# Patient Record
Sex: FEMALE | Race: BLACK OR AFRICAN AMERICAN | NOT HISPANIC OR LATINO | Employment: PART TIME | URBAN - METROPOLITAN AREA
[De-identification: names, ages, dates, MRNs, and addresses within clinical notes are randomized per-mention and may not be internally consistent; named-entity substitution may affect disease eponyms.]

---

## 2017-02-15 RX ORDER — ETODOLAC 400 MG/1
400 TABLET, FILM COATED ORAL 2 TIMES DAILY
Qty: 20 TABLET | Refills: 0 | Status: SHIPPED | OUTPATIENT
Start: 2017-02-15 | End: 2017-02-25

## 2017-02-15 RX ORDER — METHOCARBAMOL 500 MG/1
500 TABLET, FILM COATED ORAL 3 TIMES DAILY
Qty: 30 TABLET | Refills: 0 | Status: SHIPPED | OUTPATIENT
Start: 2017-02-15 | End: 2017-02-25

## 2017-03-17 RX ORDER — BENAZEPRIL HYDROCHLORIDE 5 MG/1
5 TABLET ORAL DAILY
Qty: 90 TABLET | Refills: 3 | Status: SHIPPED | OUTPATIENT
Start: 2017-03-17 | End: 2017-06-20

## 2017-04-29 ENCOUNTER — PATIENT MESSAGE (OUTPATIENT)
Dept: INTERNAL MEDICINE | Facility: CLINIC | Age: 72
End: 2017-04-29

## 2017-05-23 ENCOUNTER — LAB VISIT (OUTPATIENT)
Dept: LAB | Facility: HOSPITAL | Age: 72
End: 2017-05-23
Attending: INTERNAL MEDICINE
Payer: MEDICARE

## 2017-05-23 DIAGNOSIS — Z79.899 OTHER LONG TERM (CURRENT) DRUG THERAPY: ICD-10-CM

## 2017-05-23 DIAGNOSIS — R79.89 INCREASED PTH LEVEL: ICD-10-CM

## 2017-05-23 DIAGNOSIS — I10 ESSENTIAL HYPERTENSION: ICD-10-CM

## 2017-05-23 DIAGNOSIS — E04.2 MULTIPLE THYROID NODULES: ICD-10-CM

## 2017-05-23 LAB
25(OH)D3+25(OH)D2 SERPL-MCNC: 20 NG/ML
ANION GAP SERPL CALC-SCNC: 8 MMOL/L
BASOPHILS # BLD AUTO: 0.02 K/UL
BASOPHILS NFR BLD: 0.3 %
BUN SERPL-MCNC: 18 MG/DL
CALCIUM SERPL-MCNC: 9.9 MG/DL
CHLORIDE SERPL-SCNC: 100 MMOL/L
CHOLEST/HDLC SERPL: 3.6 {RATIO}
CO2 SERPL-SCNC: 30 MMOL/L
CREAT SERPL-MCNC: 1.2 MG/DL
DIFFERENTIAL METHOD: ABNORMAL
EOSINOPHIL # BLD AUTO: 0.6 K/UL
EOSINOPHIL NFR BLD: 7.6 %
ERYTHROCYTE [DISTWIDTH] IN BLOOD BY AUTOMATED COUNT: 15.6 %
EST. GFR  (AFRICAN AMERICAN): 52.5 ML/MIN/1.73 M^2
EST. GFR  (NON AFRICAN AMERICAN): 45.6 ML/MIN/1.73 M^2
GLUCOSE SERPL-MCNC: 80 MG/DL
HCT VFR BLD AUTO: 40 %
HDL/CHOLESTEROL RATIO: 27.7 %
HDLC SERPL-MCNC: 191 MG/DL
HDLC SERPL-MCNC: 53 MG/DL
HGB BLD-MCNC: 13.1 G/DL
LDLC SERPL CALC-MCNC: 121.6 MG/DL
LYMPHOCYTES # BLD AUTO: 3 K/UL
LYMPHOCYTES NFR BLD: 39.1 %
MCH RBC QN AUTO: 26.1 PG
MCHC RBC AUTO-ENTMCNC: 32.8 %
MCV RBC AUTO: 80 FL
MONOCYTES # BLD AUTO: 0.7 K/UL
MONOCYTES NFR BLD: 9.2 %
NEUTROPHILS # BLD AUTO: 3.3 K/UL
NEUTROPHILS NFR BLD: 43.7 %
NONHDLC SERPL-MCNC: 138 MG/DL
PLATELET # BLD AUTO: 257 K/UL
PMV BLD AUTO: 10.3 FL
POTASSIUM SERPL-SCNC: 3.7 MMOL/L
PTH-INTACT SERPL-MCNC: 91 PG/ML
RBC # BLD AUTO: 5.02 M/UL
SODIUM SERPL-SCNC: 138 MMOL/L
TRIGL SERPL-MCNC: 82 MG/DL
TSH SERPL DL<=0.005 MIU/L-ACNC: 1.83 UIU/ML
WBC # BLD AUTO: 7.64 K/UL

## 2017-05-23 PROCEDURE — 82306 VITAMIN D 25 HYDROXY: CPT

## 2017-05-23 PROCEDURE — 83970 ASSAY OF PARATHORMONE: CPT

## 2017-05-23 PROCEDURE — 84443 ASSAY THYROID STIM HORMONE: CPT

## 2017-05-23 PROCEDURE — 85025 COMPLETE CBC W/AUTO DIFF WBC: CPT

## 2017-05-23 PROCEDURE — 80061 LIPID PANEL: CPT

## 2017-05-23 PROCEDURE — 80048 BASIC METABOLIC PNL TOTAL CA: CPT

## 2017-05-23 PROCEDURE — 36415 COLL VENOUS BLD VENIPUNCTURE: CPT

## 2017-05-26 ENCOUNTER — PATIENT MESSAGE (OUTPATIENT)
Dept: INTERNAL MEDICINE | Facility: CLINIC | Age: 72
End: 2017-05-26

## 2017-05-30 ENCOUNTER — OFFICE VISIT (OUTPATIENT)
Dept: INTERNAL MEDICINE | Facility: CLINIC | Age: 72
End: 2017-05-30
Payer: MEDICARE

## 2017-05-30 VITALS
BODY MASS INDEX: 31 KG/M2 | HEART RATE: 67 BPM | DIASTOLIC BLOOD PRESSURE: 72 MMHG | HEIGHT: 65 IN | SYSTOLIC BLOOD PRESSURE: 131 MMHG | WEIGHT: 186.06 LBS

## 2017-05-30 DIAGNOSIS — R79.89 LOW VITAMIN D LEVEL: ICD-10-CM

## 2017-05-30 DIAGNOSIS — I10 ESSENTIAL HYPERTENSION: Primary | ICD-10-CM

## 2017-05-30 DIAGNOSIS — N18.30 CKD (CHRONIC KIDNEY DISEASE), STAGE III: ICD-10-CM

## 2017-05-30 DIAGNOSIS — R79.89 HIGH SERUM PARATHYROID HORMONE (PTH): ICD-10-CM

## 2017-05-30 PROCEDURE — 99214 OFFICE O/P EST MOD 30 MIN: CPT | Mod: S$GLB,,, | Performed by: INTERNAL MEDICINE

## 2017-05-30 PROCEDURE — 1159F MED LIST DOCD IN RCRD: CPT | Mod: S$GLB,,, | Performed by: INTERNAL MEDICINE

## 2017-05-30 PROCEDURE — 1126F AMNT PAIN NOTED NONE PRSNT: CPT | Mod: S$GLB,,, | Performed by: INTERNAL MEDICINE

## 2017-05-30 PROCEDURE — 99999 PR PBB SHADOW E&M-EST. PATIENT-LVL III: CPT | Mod: PBBFAC,,, | Performed by: INTERNAL MEDICINE

## 2017-05-30 PROCEDURE — 1157F ADVNC CARE PLAN IN RCRD: CPT | Mod: S$GLB,,, | Performed by: INTERNAL MEDICINE

## 2017-05-30 PROCEDURE — 99499 UNLISTED E&M SERVICE: CPT | Mod: S$GLB,,, | Performed by: INTERNAL MEDICINE

## 2017-05-30 NOTE — PROGRESS NOTES
REASON FOR VISIT:  This is a 72-year-old female who comes in for a regular   follow-up visit.  Overall in general she feels well.  She does complain of being   tired at times, but she gets up at 4:30 in the morning and then goes to the gym   later for about an hour to an hour and a half workout.  She has no trouble   going to sleep.  Sometimes she might wake up once or twice before getting up in   the morning.    PAST MEDICAL HISTORY:  Hypertension.  Irritable bowel syndrome.  Details of this and the rest of past medical history are outlined in 11/16/2016   note.    In reference to last year, initially she had a calcium level of 10.6 and because   of such, had a PTH that was 96, which was a little bit elevated.  Also her   creatinine at first was 1.3, but then with better hydration went down to 1.0 and   the calcium went back down to 10.2.  She also had a thyroid ultrasound to   follow up on bilateral thyroid nodules and there was no change.  In the current   labs, her PTH is down to 91, a year ago 96.  Creatinine was 1.2 and calcium 9.9;   however, she feels that she might not be drinking as much water as she has done   before.  She had a normal CBC, and cholesterol 191 with HDL 53 and .    TSH was normal at 1.8.  Vitamin D was 20.  She was on vitamin D 1000 units, but   per my instructions was told to stop.    REVIEW OF SYMPTOMS:  She has no pains in the chest, palpitations, shortness of   breath, or abdominal pain.  Regular bowel function.  No difficulty urinating.    MEDICATIONS:  Dyazide once a day.  Benazepril 5 mg a day.    PHYSICAL EXAMINATION:  VITAL SIGNS:  Weight 186 pounds, pulse rate 68, blood pressure by me 131/72.  LUNGS:  Clear.  HEART:  Regular rate and rhythm.  No murmurs or gallops.  ABDOMEN:  Active bowel sounds, soft, nontender.  No hepatosplenomegaly or   abdominal masses.  PULSES:  2+ carotid, 2+ pedal pulses.  EXTREMITIES:  Very trace pedal edema on the right.    IMPRESSION:  1.   Hypertension, optimal control.  2.  Chronic kidney disease stage III, based on her current creatinine reading.  3.  Mild hyperparathyroid.  4.  Vitamin D deficiency.    PLAN:  Again, proper hydration discussed.  Advised taking vitamin D 1000 units a day.  We will arrange for a renal ultrasound.          /brandon [id number] review        KATHIA/DONNIE  dd: 05/30/2017 16:00:39 (CDT)  td: 05/31/2017 01:08:10 (CDT)  Doc ID   #[id number]  Job ID #[id number]    CC:

## 2017-06-05 ENCOUNTER — CLINICAL SUPPORT (OUTPATIENT)
Dept: AUDIOLOGY | Facility: CLINIC | Age: 72
End: 2017-06-05
Payer: MEDICARE

## 2017-06-05 ENCOUNTER — OFFICE VISIT (OUTPATIENT)
Dept: OTOLARYNGOLOGY | Facility: CLINIC | Age: 72
End: 2017-06-05
Payer: MEDICARE

## 2017-06-05 VITALS — HEIGHT: 65 IN | BODY MASS INDEX: 30.23 KG/M2 | WEIGHT: 181.44 LBS

## 2017-06-05 DIAGNOSIS — H91.13 PRESBYCUSIS, BILATERAL: Primary | ICD-10-CM

## 2017-06-05 DIAGNOSIS — H90.3 SENSORINEURAL HEARING LOSS, BILATERAL: Primary | ICD-10-CM

## 2017-06-05 PROCEDURE — 99999 PR PBB SHADOW E&M-EST. PATIENT-LVL III: CPT | Mod: PBBFAC,,, | Performed by: OTOLARYNGOLOGY

## 2017-06-05 PROCEDURE — 1159F MED LIST DOCD IN RCRD: CPT | Mod: S$GLB,,, | Performed by: OTOLARYNGOLOGY

## 2017-06-05 PROCEDURE — 1157F ADVNC CARE PLAN IN RCRD: CPT | Mod: S$GLB,,, | Performed by: OTOLARYNGOLOGY

## 2017-06-05 PROCEDURE — 99999 PR PBB SHADOW E&M-EST. PATIENT-LVL I: CPT | Mod: PBBFAC,,,

## 2017-06-05 PROCEDURE — 92557 COMPREHENSIVE HEARING TEST: CPT | Mod: S$GLB,,, | Performed by: AUDIOLOGIST

## 2017-06-05 PROCEDURE — 1126F AMNT PAIN NOTED NONE PRSNT: CPT | Mod: S$GLB,,, | Performed by: OTOLARYNGOLOGY

## 2017-06-05 PROCEDURE — 99203 OFFICE O/P NEW LOW 30 MIN: CPT | Mod: S$GLB,,, | Performed by: OTOLARYNGOLOGY

## 2017-06-05 NOTE — PROGRESS NOTES
Subjective:       Patient ID: Carmen Wang is a 72 y.o. female.    Chief Complaint: Hearing Loss    71 yo F with years of hearing loss. Stopped using ITC HA 1 year ago because they were clogging her ears up with wax. She has noticed her hearing becoming worse since then.      Hearing Loss:   Chronicity:  Chronic  Onset:  More than 1 year ago  Progression since onset:  Gradually worsening  Frequency:  Constantly  Severity:  Moderate  Hearing loss characteristics:  Muffled, trouble hearing TV, worse background noise and impaired select discrimination   Associated symptoms: tinnitus.  No dizziness, no vertigo, no ear congestion, no ear pain, no fever and no headaches.  Aggravated by:  Nothing  Treatments tried:  Hearing aids  Improvement on treatment:  MildNo dizziness.    Review of Systems   Constitutional: Negative.  Negative for fever.   HENT: Positive for hearing loss and tinnitus. Negative for ear discharge and ear pain.    Eyes: Negative for visual disturbance.   Respiratory: Negative for shortness of breath.    Cardiovascular: Negative for chest pain.   Gastrointestinal: Negative.    Endocrine: Negative.    Genitourinary: Negative.    Musculoskeletal: Negative.    Skin: Negative.    Allergic/Immunologic: Negative.    Neurological: Negative for dizziness, vertigo and headaches.   Hematological: Negative.    Psychiatric/Behavioral: Negative.        Past Medical History:   Diagnosis Date    Cataract     Hypertension     IBS (irritable bowel syndrome)        Past Surgical History:   Procedure Laterality Date    APPENDECTOMY      with the hysterectomy    BUNIONECTOMY      CHOLECYSTECTOMY      COLONOSCOPY N/A 10/5/2015    Procedure: COLONOSCOPY;  Surgeon: Teja Olivarez MD;  Location: [address];  Service: Endoscopy;  Laterality: N/A;    HYSTERECTOMY      RUPERT secondary to pelvic pain, sacrocolpoplexy       Objective:      Physical Exam   Constitutional: She is oriented to person, place, and  time. Vital signs are normal. She appears well-developed and well-nourished.  Non-toxic appearance. She does not have a sickly appearance. No distress.   HENT:   Head: Normocephalic and atraumatic.   Right Ear: Tympanic membrane, external ear and ear canal normal. No tenderness. Tympanic membrane is not injected. Decreased hearing is noted.   Left Ear: Tympanic membrane, external ear and ear canal normal. No tenderness. Tympanic membrane is not injected. Decreased hearing is noted.   Nose: No mucosal edema, rhinorrhea or septal deviation.   Eyes: EOM and lids are normal. Pupils are equal, round, and reactive to light.   Neck: Normal range of motion. No thyroid mass present.   Cardiovascular: Normal rate, regular rhythm and normal heart sounds.    Pulmonary/Chest: Effort normal and breath sounds normal.   Lymphadenopathy:     She has no cervical adenopathy.   Neurological: She is alert and oriented to person, place, and time.   Skin: Skin is warm and dry.   Nursing note and vitals reviewed.            Assessment:       1. Presbycusis, bilateral        Plan:       Carmen was seen today for hearing loss.    Diagnoses and all orders for this visit:    Presbycusis, bilateral    Recommend having a hearing aid evaluation

## 2017-06-12 ENCOUNTER — HOSPITAL ENCOUNTER (OUTPATIENT)
Dept: RADIOLOGY | Facility: HOSPITAL | Age: 72
Discharge: HOME | End: 2017-06-12
Attending: INTERNAL MEDICINE
Payer: MEDICARE

## 2017-06-12 DIAGNOSIS — I10 ESSENTIAL HYPERTENSION: ICD-10-CM

## 2017-06-12 DIAGNOSIS — N18.30 CKD (CHRONIC KIDNEY DISEASE), STAGE III: ICD-10-CM

## 2017-06-12 PROCEDURE — 76770 US EXAM ABDO BACK WALL COMP: CPT | Mod: 26,,, | Performed by: RADIOLOGY

## 2017-06-12 PROCEDURE — 76770 US EXAM ABDO BACK WALL COMP: CPT | Mod: TC

## 2017-06-14 ENCOUNTER — TELEPHONE (OUTPATIENT)
Dept: INTERNAL MEDICINE | Facility: CLINIC | Age: 72
End: 2017-06-14

## 2017-06-14 ENCOUNTER — LAB VISIT (OUTPATIENT)
Dept: LAB | Facility: HOSPITAL | Age: 72
End: 2017-06-14
Attending: INTERNAL MEDICINE
Payer: MEDICARE

## 2017-06-14 DIAGNOSIS — N30.90 INFLAMMATION OF BLADDER: ICD-10-CM

## 2017-06-14 DIAGNOSIS — N30.90 INFLAMMATION OF BLADDER: Primary | ICD-10-CM

## 2017-06-14 LAB
BILIRUB UR QL STRIP: NEGATIVE
CLARITY UR REFRACT.AUTO: ABNORMAL
COLOR UR AUTO: YELLOW
GLUCOSE UR QL STRIP: NEGATIVE
HGB UR QL STRIP: NEGATIVE
KETONES UR QL STRIP: NEGATIVE
LEUKOCYTE ESTERASE UR QL STRIP: NEGATIVE
NITRITE UR QL STRIP: NEGATIVE
PH UR STRIP: 5 [PH] (ref 5–8)
PROT UR QL STRIP: NEGATIVE
SP GR UR STRIP: 1.02 (ref 1–1.03)
URN SPEC COLLECT METH UR: ABNORMAL
UROBILINOGEN UR STRIP-ACNC: NEGATIVE EU/DL

## 2017-06-14 PROCEDURE — 87086 URINE CULTURE/COLONY COUNT: CPT

## 2017-06-14 PROCEDURE — 81003 URINALYSIS AUTO W/O SCOPE: CPT

## 2017-06-16 LAB — BACTERIA UR CULT: NO GROWTH

## 2017-06-19 ENCOUNTER — NURSE TRIAGE (OUTPATIENT)
Dept: ADMINISTRATIVE | Facility: CLINIC | Age: 72
End: 2017-06-19

## 2017-06-19 ENCOUNTER — PATIENT MESSAGE (OUTPATIENT)
Dept: INTERNAL MEDICINE | Facility: CLINIC | Age: 72
End: 2017-06-19

## 2017-06-19 ENCOUNTER — TELEPHONE (OUTPATIENT)
Dept: INTERNAL MEDICINE | Facility: CLINIC | Age: 72
End: 2017-06-19

## 2017-06-19 NOTE — TELEPHONE ENCOUNTER
Called pt to see if she would like to come in today at 4 no answer left message on VM to call office back

## 2017-06-19 NOTE — TELEPHONE ENCOUNTER
Reason for Disposition   Age > 60 years    Protocols used: ST HEART RATE AND HEARTBEAT [id number]    Pt calling regarding heart rate fluctuating.  Pt states she does not feel bad.  Attempt to schedule appointment with MD today but pt stated she could not be seen today and would like an appointment for tomorrow.  Advised pt to call back if her condition changes.  Appointment scheduled for tomorrow.  Care Advice given.

## 2017-06-20 ENCOUNTER — PATIENT MESSAGE (OUTPATIENT)
Dept: INTERNAL MEDICINE | Facility: CLINIC | Age: 72
End: 2017-06-20

## 2017-06-20 ENCOUNTER — DOCUMENTATION ONLY (OUTPATIENT)
Dept: INTERNAL MEDICINE | Facility: CLINIC | Age: 72
End: 2017-06-20

## 2017-06-20 ENCOUNTER — LAB VISIT (OUTPATIENT)
Dept: LAB | Facility: HOSPITAL | Age: 72
End: 2017-06-20
Attending: INTERNAL MEDICINE
Payer: MEDICARE

## 2017-06-20 ENCOUNTER — OFFICE VISIT (OUTPATIENT)
Dept: INTERNAL MEDICINE | Facility: CLINIC | Age: 72
End: 2017-06-20
Payer: MEDICARE

## 2017-06-20 VITALS
BODY MASS INDEX: 29.27 KG/M2 | DIASTOLIC BLOOD PRESSURE: 82 MMHG | HEIGHT: 65 IN | HEART RATE: 78 BPM | SYSTOLIC BLOOD PRESSURE: 135 MMHG | WEIGHT: 175.69 LBS

## 2017-06-20 DIAGNOSIS — I10 ESSENTIAL HYPERTENSION: ICD-10-CM

## 2017-06-20 DIAGNOSIS — N18.30 CKD (CHRONIC KIDNEY DISEASE), STAGE III: ICD-10-CM

## 2017-06-20 DIAGNOSIS — D64.9 ANEMIA, UNSPECIFIED TYPE: ICD-10-CM

## 2017-06-20 DIAGNOSIS — R00.0 TACHYCARDIA: ICD-10-CM

## 2017-06-20 DIAGNOSIS — R79.89 LOW TSH LEVEL: Primary | ICD-10-CM

## 2017-06-20 DIAGNOSIS — I10 ESSENTIAL HYPERTENSION: Primary | ICD-10-CM

## 2017-06-20 LAB
ALBUMIN SERPL BCP-MCNC: 3.4 G/DL
ALP SERPL-CCNC: 67 U/L
ALT SERPL W/O P-5'-P-CCNC: 16 U/L
ANION GAP SERPL CALC-SCNC: 7 MMOL/L
AST SERPL-CCNC: 20 U/L
BASOPHILS # BLD AUTO: 0.02 K/UL
BASOPHILS NFR BLD: 0.2 %
BILIRUB SERPL-MCNC: 0.5 MG/DL
BUN SERPL-MCNC: 22 MG/DL
CALCIUM SERPL-MCNC: 10.3 MG/DL
CHLORIDE SERPL-SCNC: 102 MMOL/L
CO2 SERPL-SCNC: 31 MMOL/L
CREAT SERPL-MCNC: 1 MG/DL
DIFFERENTIAL METHOD: ABNORMAL
EOSINOPHIL # BLD AUTO: 0.3 K/UL
EOSINOPHIL NFR BLD: 2.7 %
ERYTHROCYTE [DISTWIDTH] IN BLOOD BY AUTOMATED COUNT: 15.8 %
EST. GFR  (AFRICAN AMERICAN): >60 ML/MIN/1.73 M^2
EST. GFR  (NON AFRICAN AMERICAN): 56.8 ML/MIN/1.73 M^2
GLUCOSE SERPL-MCNC: 96 MG/DL
HCT VFR BLD AUTO: 36.6 %
HGB BLD-MCNC: 11.7 G/DL
LYMPHOCYTES # BLD AUTO: 2.5 K/UL
LYMPHOCYTES NFR BLD: 25.6 %
MCH RBC QN AUTO: 25.8 PG
MCHC RBC AUTO-ENTMCNC: 32 %
MCV RBC AUTO: 81 FL
MONOCYTES # BLD AUTO: 1.2 K/UL
MONOCYTES NFR BLD: 12.6 %
NEUTROPHILS # BLD AUTO: 5.8 K/UL
NEUTROPHILS NFR BLD: 58.6 %
PLATELET # BLD AUTO: 271 K/UL
PMV BLD AUTO: 10.9 FL
POTASSIUM SERPL-SCNC: 3.6 MMOL/L
PROT SERPL-MCNC: 7.4 G/DL
RBC # BLD AUTO: 4.54 M/UL
SODIUM SERPL-SCNC: 140 MMOL/L
T4 FREE SERPL-MCNC: 1.11 NG/DL
TSH SERPL DL<=0.005 MIU/L-ACNC: 0.04 UIU/ML
WBC # BLD AUTO: 9.83 K/UL

## 2017-06-20 PROCEDURE — 1157F ADVNC CARE PLAN IN RCRD: CPT | Mod: S$GLB,,, | Performed by: INTERNAL MEDICINE

## 2017-06-20 PROCEDURE — 99999 PR PBB SHADOW E&M-EST. PATIENT-LVL III: CPT | Mod: PBBFAC,,, | Performed by: INTERNAL MEDICINE

## 2017-06-20 PROCEDURE — 93005 ELECTROCARDIOGRAM TRACING: CPT | Mod: S$GLB,,, | Performed by: INTERNAL MEDICINE

## 2017-06-20 PROCEDURE — 80053 COMPREHEN METABOLIC PANEL: CPT

## 2017-06-20 PROCEDURE — 99499 UNLISTED E&M SERVICE: CPT | Mod: S$PBB,,, | Performed by: INTERNAL MEDICINE

## 2017-06-20 PROCEDURE — 84443 ASSAY THYROID STIM HORMONE: CPT

## 2017-06-20 PROCEDURE — 84439 ASSAY OF FREE THYROXINE: CPT

## 2017-06-20 PROCEDURE — 1159F MED LIST DOCD IN RCRD: CPT | Mod: S$GLB,,, | Performed by: INTERNAL MEDICINE

## 2017-06-20 PROCEDURE — 85025 COMPLETE CBC W/AUTO DIFF WBC: CPT

## 2017-06-20 PROCEDURE — 1126F AMNT PAIN NOTED NONE PRSNT: CPT | Mod: S$GLB,,, | Performed by: INTERNAL MEDICINE

## 2017-06-20 PROCEDURE — 99214 OFFICE O/P EST MOD 30 MIN: CPT | Mod: S$GLB,,, | Performed by: INTERNAL MEDICINE

## 2017-06-20 PROCEDURE — 93010 ELECTROCARDIOGRAM REPORT: CPT | Mod: S$GLB,,, | Performed by: INTERNAL MEDICINE

## 2017-06-20 PROCEDURE — 36415 COLL VENOUS BLD VENIPUNCTURE: CPT | Mod: PO

## 2017-06-20 RX ORDER — ATENOLOL 50 MG/1
50 TABLET ORAL DAILY
Qty: 30 TABLET | Refills: 11 | Status: SHIPPED | OUTPATIENT
Start: 2017-06-20 | End: 2017-08-30 | Stop reason: DRUGHIGH

## 2017-06-20 NOTE — PROGRESS NOTES
REASON FOR VISIT:  This is a 72-year-old female who two days ago, after taking a   shower, noticed her heart thumping.  She took her blood pressure and it was 160   and the heart rate was going above 100, maybe 110.  She rested for a while and   then it resolved, but she did feel woozy throughout the day.  Yesterday she was   at the gym and when she got on the treadmill, just walking 3 miles an hour, it   was going up to 130.  She slowed it down to a crawl pace, maybe no more than 1   mile an hour, but it remained at 120.  When she got off, it eventually went   down, but she has felt weak and dizzy throughout the day.  She also recently has   noted some weight loss.  When she weighed herself four days ago, it was 177 and   normally she is in the 180s.    PAST MEDICAL HISTORY:  Hypertension.  Irritable bowel syndrome.  Chronic kidney disease, stage III.  Recently secondary hyperparathyroid.    CURRENT MEDICINES:  Dyazide once a day.  Benazepril 5 mg once a day.    REVIEW OF SYMPTOMS:  She has no abdominal pain.  No change in bowel or urine   function.    Before the appointment today, she had an EKG which revealed a normal sinus   rhythm with some nonspecific T-wave abnormalities in the anterior lateral leads.    PHYSICAL EXAMINATION:  VITAL SIGNS:  Weight 175 pounds, pulse rate 84, blood pressure by me 152/82.  On   May 30, she had a weight of 186 pounds; there is 11-pound weight change.  LUNGS:  Clear.  HEART:  Regular rate and rhythm.  ABDOMEN:  Active bowel sounds, soft, nontender.  PULSES:  2+ carotid pulses.  RECTAL:  Stool is brown, heme negative.  Excess anal tissue (this was done   because last night she was complaining of pain in her rectal region).    IMPRESSION:  1.  Tachycardia.  2.  Hypertension.  3.  Weight loss.  4.  Chronic kidney disease.    PLAN:  Today we will repeat labs, although she had them recently.  We will   arrange for a CBC, chemistry, and a TSH.  We will stop benazepril and put her on    atenolol at 50 mg once a day.  Phone review to follow up.    /sc [id number] review      KATHIA/DONNIE  dd: 06/20/2017 11:19:30 (CDT)  td: 06/21/2017 00:36:37 (CDT)  Doc ID   #[id number]  Job ID #[id number]    CC:

## 2017-06-21 NOTE — PROGRESS NOTES
Test results noted     TSH is low consistent with hyperthyroid state    May have a thyroiditis    Continue with atenolol   Will repeat lab in 2 weeks

## 2017-06-22 ENCOUNTER — PATIENT MESSAGE (OUTPATIENT)
Dept: INTERNAL MEDICINE | Facility: CLINIC | Age: 72
End: 2017-06-22

## 2017-06-23 ENCOUNTER — PATIENT MESSAGE (OUTPATIENT)
Dept: INTERNAL MEDICINE | Facility: CLINIC | Age: 72
End: 2017-06-23

## 2017-06-27 ENCOUNTER — PATIENT MESSAGE (OUTPATIENT)
Dept: INTERNAL MEDICINE | Facility: CLINIC | Age: 72
End: 2017-06-27

## 2017-07-07 ENCOUNTER — LAB VISIT (OUTPATIENT)
Dept: LAB | Facility: HOSPITAL | Age: 72
End: 2017-07-07
Attending: INTERNAL MEDICINE
Payer: MEDICARE

## 2017-07-07 DIAGNOSIS — D64.9 ANEMIA, UNSPECIFIED TYPE: ICD-10-CM

## 2017-07-07 DIAGNOSIS — R79.89 LOW TSH LEVEL: ICD-10-CM

## 2017-07-07 LAB
BASOPHILS # BLD AUTO: 0.02 K/UL
BASOPHILS NFR BLD: 0.3 %
DIFFERENTIAL METHOD: ABNORMAL
EOSINOPHIL # BLD AUTO: 0.6 K/UL
EOSINOPHIL NFR BLD: 7.5 %
ERYTHROCYTE [DISTWIDTH] IN BLOOD BY AUTOMATED COUNT: 15.9 %
HCT VFR BLD AUTO: 36.4 %
HGB BLD-MCNC: 11.5 G/DL
LYMPHOCYTES # BLD AUTO: 2.9 K/UL
LYMPHOCYTES NFR BLD: 36.7 %
MCH RBC QN AUTO: 25.6 PG
MCHC RBC AUTO-ENTMCNC: 31.6 %
MCV RBC AUTO: 81 FL
MONOCYTES # BLD AUTO: 0.9 K/UL
MONOCYTES NFR BLD: 10.8 %
NEUTROPHILS # BLD AUTO: 3.6 K/UL
NEUTROPHILS NFR BLD: 44.6 %
PLATELET # BLD AUTO: 262 K/UL
PMV BLD AUTO: 11.6 FL
RBC # BLD AUTO: 4.5 M/UL
T4 FREE SERPL-MCNC: 0.99 NG/DL
TSH SERPL DL<=0.005 MIU/L-ACNC: 1.89 UIU/ML
WBC # BLD AUTO: 7.99 K/UL

## 2017-07-07 PROCEDURE — 85025 COMPLETE CBC W/AUTO DIFF WBC: CPT

## 2017-07-07 PROCEDURE — 83540 ASSAY OF IRON: CPT

## 2017-07-07 PROCEDURE — 82728 ASSAY OF FERRITIN: CPT

## 2017-07-07 PROCEDURE — 84439 ASSAY OF FREE THYROXINE: CPT

## 2017-07-07 PROCEDURE — 36415 COLL VENOUS BLD VENIPUNCTURE: CPT | Mod: PO

## 2017-07-07 PROCEDURE — 84443 ASSAY THYROID STIM HORMONE: CPT

## 2017-07-08 ENCOUNTER — PATIENT MESSAGE (OUTPATIENT)
Dept: INTERNAL MEDICINE | Facility: CLINIC | Age: 72
End: 2017-07-08

## 2017-07-08 LAB
FERRITIN SERPL-MCNC: 25 NG/ML
IRON SERPL-MCNC: 40 UG/DL
SATURATED IRON: 8 %
TOTAL IRON BINDING CAPACITY: 481 UG/DL
TRANSFERRIN SERPL-MCNC: 325 MG/DL

## 2017-07-09 ENCOUNTER — PATIENT MESSAGE (OUTPATIENT)
Dept: INTERNAL MEDICINE | Facility: CLINIC | Age: 72
End: 2017-07-09

## 2017-07-09 DIAGNOSIS — D64.9 ANEMIA, UNSPECIFIED TYPE: Primary | ICD-10-CM

## 2017-07-10 ENCOUNTER — TELEPHONE (OUTPATIENT)
Dept: INTERNAL MEDICINE | Facility: CLINIC | Age: 72
End: 2017-07-10

## 2017-07-10 NOTE — TELEPHONE ENCOUNTER
Set up labs in  1 months as per order (Routing comment    Appt schedule for labs and mail out to home address on file.

## 2017-07-11 NOTE — TELEPHONE ENCOUNTER
Dr. Redman,   Do I continue to take the D3 Vitamins along with the Feron tablets?   Please let me know.   Thanks             Please advise

## 2017-07-12 ENCOUNTER — PATIENT MESSAGE (OUTPATIENT)
Dept: INTERNAL MEDICINE | Facility: CLINIC | Age: 72
End: 2017-07-12

## 2017-08-01 ENCOUNTER — TELEPHONE (OUTPATIENT)
Dept: OBSTETRICS AND GYNECOLOGY | Facility: CLINIC | Age: 72
End: 2017-08-01

## 2017-08-01 ENCOUNTER — PATIENT MESSAGE (OUTPATIENT)
Dept: OBSTETRICS AND GYNECOLOGY | Facility: CLINIC | Age: 72
End: 2017-08-01

## 2017-08-01 DIAGNOSIS — Z12.31 OTHER SCREENING MAMMOGRAM: Primary | ICD-10-CM

## 2017-08-14 ENCOUNTER — LAB VISIT (OUTPATIENT)
Dept: LAB | Facility: HOSPITAL | Age: 72
End: 2017-08-14
Attending: INTERNAL MEDICINE
Payer: MEDICARE

## 2017-08-14 DIAGNOSIS — D64.9 ANEMIA, UNSPECIFIED TYPE: ICD-10-CM

## 2017-08-14 LAB
ERYTHROCYTE [DISTWIDTH] IN BLOOD BY AUTOMATED COUNT: 16.4 %
FERRITIN SERPL-MCNC: 49 NG/ML
HCT VFR BLD AUTO: 39.7 %
HGB BLD-MCNC: 12.6 G/DL
IRON SERPL-MCNC: 80 UG/DL
MCH RBC QN AUTO: 25.5 PG
MCHC RBC AUTO-ENTMCNC: 31.7 G/DL
MCV RBC AUTO: 80 FL
PLATELET # BLD AUTO: 270 K/UL
PMV BLD AUTO: 11.3 FL
RBC # BLD AUTO: 4.94 M/UL
SATURATED IRON: 19 %
TOTAL IRON BINDING CAPACITY: 431 UG/DL
TRANSFERRIN SERPL-MCNC: 291 MG/DL
WBC # BLD AUTO: 7.66 K/UL

## 2017-08-14 PROCEDURE — 83540 ASSAY OF IRON: CPT

## 2017-08-14 PROCEDURE — 36415 COLL VENOUS BLD VENIPUNCTURE: CPT | Mod: PO

## 2017-08-14 PROCEDURE — 82728 ASSAY OF FERRITIN: CPT

## 2017-08-14 PROCEDURE — 85027 COMPLETE CBC AUTOMATED: CPT

## 2017-08-21 ENCOUNTER — DOCUMENTATION ONLY (OUTPATIENT)
Dept: INTERNAL MEDICINE | Facility: CLINIC | Age: 72
End: 2017-08-21

## 2017-08-21 DIAGNOSIS — D64.9 ANEMIA, UNSPECIFIED TYPE: ICD-10-CM

## 2017-08-21 DIAGNOSIS — R79.89 ABNORMAL THYROID BLOOD TEST: Primary | ICD-10-CM

## 2017-08-21 DIAGNOSIS — N18.30 STAGE 3 CHRONIC KIDNEY DISEASE: ICD-10-CM

## 2017-08-21 DIAGNOSIS — K58.9 IRRITABLE BOWEL SYNDROME WITHOUT DIARRHEA: ICD-10-CM

## 2017-08-22 ENCOUNTER — PATIENT MESSAGE (OUTPATIENT)
Dept: INTERNAL MEDICINE | Facility: CLINIC | Age: 72
End: 2017-08-22

## 2017-08-22 ENCOUNTER — LAB VISIT (OUTPATIENT)
Dept: LAB | Facility: HOSPITAL | Age: 72
End: 2017-08-22
Attending: INTERNAL MEDICINE
Payer: MEDICARE

## 2017-08-22 ENCOUNTER — DOCUMENTATION ONLY (OUTPATIENT)
Dept: INTERNAL MEDICINE | Facility: CLINIC | Age: 72
End: 2017-08-22

## 2017-08-22 DIAGNOSIS — N18.30 STAGE 3 CHRONIC KIDNEY DISEASE: Primary | ICD-10-CM

## 2017-08-22 DIAGNOSIS — N18.30 STAGE 3 CHRONIC KIDNEY DISEASE: ICD-10-CM

## 2017-08-22 DIAGNOSIS — R79.89 ABNORMAL THYROID BLOOD TEST: ICD-10-CM

## 2017-08-22 LAB
ANION GAP SERPL CALC-SCNC: 7 MMOL/L
BUN SERPL-MCNC: 22 MG/DL
CALCIUM SERPL-MCNC: 10.4 MG/DL
CHLORIDE SERPL-SCNC: 104 MMOL/L
CO2 SERPL-SCNC: 30 MMOL/L
CREAT SERPL-MCNC: 1.3 MG/DL
EST. GFR  (AFRICAN AMERICAN): 47.7 ML/MIN/1.73 M^2
EST. GFR  (NON AFRICAN AMERICAN): 41.4 ML/MIN/1.73 M^2
GLUCOSE SERPL-MCNC: 86 MG/DL
POTASSIUM SERPL-SCNC: 4.1 MMOL/L
SODIUM SERPL-SCNC: 141 MMOL/L
T4 FREE SERPL-MCNC: 1.07 NG/DL
TSH SERPL DL<=0.005 MIU/L-ACNC: 2.41 UIU/ML

## 2017-08-22 PROCEDURE — 84439 ASSAY OF FREE THYROXINE: CPT

## 2017-08-22 PROCEDURE — 80048 BASIC METABOLIC PNL TOTAL CA: CPT

## 2017-08-22 PROCEDURE — 84443 ASSAY THYROID STIM HORMONE: CPT

## 2017-08-22 PROCEDURE — 36415 COLL VENOUS BLD VENIPUNCTURE: CPT

## 2017-08-22 NOTE — PROGRESS NOTES
Mrs. nowak recently had labs this past week      the hemoglobin, hematocrit, ferritin, iron levels are improved continue with Fergon once a day       she had a stool for occult blood in the lab that was negative       she is still however having recurrent episodes of abdominal cramps and loose stools which thus far felt to be due to urine will bowel syndrome but will put in referral for GI for further evaluation      Her recent thyroid panel was normal however the creatinine again is up to 1.3 consistent with chronic kidney disease stage III       within the past she has she's been fluctuating between stage II and 3       she is now on Dyazide and atenolol and I suggested to put a hold on Dyazide, keep me informed of her blood pressure and and let me know if the edema develops     will also refer to nephrology for further assessment and evaluation

## 2017-08-25 ENCOUNTER — TELEPHONE (OUTPATIENT)
Dept: INTERNAL MEDICINE | Facility: CLINIC | Age: 72
End: 2017-08-25

## 2017-08-28 ENCOUNTER — PATIENT MESSAGE (OUTPATIENT)
Dept: INTERNAL MEDICINE | Facility: CLINIC | Age: 72
End: 2017-08-28

## 2017-08-28 ENCOUNTER — DOCUMENTATION ONLY (OUTPATIENT)
Dept: INTERNAL MEDICINE | Facility: CLINIC | Age: 72
End: 2017-08-28

## 2017-08-28 NOTE — PROGRESS NOTES
Dr. Redman,   I discontinued taking the Triameterene fluid tablet as you directed me to do on last Tuesday. My weight has increased by 8lbs in the last 6 days. My feet have begun to swell. I decided to take the medicine this Monday morning.   Please call me and let me know where to go from here.   [phone]     Thank you.

## 2017-08-30 ENCOUNTER — PATIENT MESSAGE (OUTPATIENT)
Dept: NEPHROLOGY | Facility: CLINIC | Age: 72
End: 2017-08-30

## 2017-08-30 ENCOUNTER — LAB VISIT (OUTPATIENT)
Dept: LAB | Facility: HOSPITAL | Age: 72
End: 2017-08-30
Payer: MEDICARE

## 2017-08-30 ENCOUNTER — OFFICE VISIT (OUTPATIENT)
Dept: NEPHROLOGY | Facility: CLINIC | Age: 72
End: 2017-08-30
Payer: MEDICARE

## 2017-08-30 VITALS
OXYGEN SATURATION: 98 % | HEIGHT: 65 IN | SYSTOLIC BLOOD PRESSURE: 124 MMHG | BODY MASS INDEX: 30.16 KG/M2 | DIASTOLIC BLOOD PRESSURE: 82 MMHG | WEIGHT: 181 LBS | HEART RATE: 54 BPM

## 2017-08-30 DIAGNOSIS — I10 ESSENTIAL HYPERTENSION: ICD-10-CM

## 2017-08-30 DIAGNOSIS — N18.30 CKD (CHRONIC KIDNEY DISEASE), STAGE III: Primary | ICD-10-CM

## 2017-08-30 DIAGNOSIS — N18.30 CKD (CHRONIC KIDNEY DISEASE), STAGE III: ICD-10-CM

## 2017-08-30 LAB
CREAT UR-MCNC: 38 MG/DL
PROT UR-MCNC: <7 MG/DL
PROT/CREAT RATIO, UR: NORMAL

## 2017-08-30 PROCEDURE — 99205 OFFICE O/P NEW HI 60 MIN: CPT | Mod: S$GLB,,, | Performed by: INTERNAL MEDICINE

## 2017-08-30 PROCEDURE — 84156 ASSAY OF PROTEIN URINE: CPT

## 2017-08-30 PROCEDURE — 1126F AMNT PAIN NOTED NONE PRSNT: CPT | Mod: S$GLB,,, | Performed by: INTERNAL MEDICINE

## 2017-08-30 PROCEDURE — 3079F DIAST BP 80-89 MM HG: CPT | Mod: S$GLB,,, | Performed by: INTERNAL MEDICINE

## 2017-08-30 PROCEDURE — 3074F SYST BP LT 130 MM HG: CPT | Mod: S$GLB,,, | Performed by: INTERNAL MEDICINE

## 2017-08-30 PROCEDURE — 1157F ADVNC CARE PLAN IN RCRD: CPT | Mod: S$GLB,,, | Performed by: INTERNAL MEDICINE

## 2017-08-30 PROCEDURE — 3008F BODY MASS INDEX DOCD: CPT | Mod: S$GLB,,, | Performed by: INTERNAL MEDICINE

## 2017-08-30 PROCEDURE — 1159F MED LIST DOCD IN RCRD: CPT | Mod: S$GLB,,, | Performed by: INTERNAL MEDICINE

## 2017-08-30 PROCEDURE — 99499 UNLISTED E&M SERVICE: CPT | Mod: S$GLB,,, | Performed by: INTERNAL MEDICINE

## 2017-08-30 PROCEDURE — 99999 PR PBB SHADOW E&M-EST. PATIENT-LVL III: CPT | Mod: PBBFAC,,, | Performed by: INTERNAL MEDICINE

## 2017-08-30 RX ORDER — TRIAMTERENE/HYDROCHLOROTHIAZID 37.5-25 MG
1 TABLET ORAL DAILY
COMMUNITY
End: 2018-09-04 | Stop reason: SDUPTHER

## 2017-08-30 RX ORDER — BENAZEPRIL HYDROCHLORIDE 20 MG/1
20 TABLET ORAL DAILY
Qty: 30 TABLET | Refills: 3 | Status: SHIPPED | OUTPATIENT
Start: 2017-08-30 | End: 2017-10-02 | Stop reason: SDUPTHER

## 2017-08-30 NOTE — LETTER
August 30, 2017      Deniz Redman MD  [address]  [place] [address]           [facility] Nephrology  [address]  [facility] [address]  Phone: [phone]  Fax: [phone]          Patient: Carmen Wang   MR Number: [id number]   YOB: 1945   Date of Visit: 8/30/2017       Dear Dr. Deniz Redman:    Thank you for referring Carmen Wang to me for evaluation. Attached you will find relevant portions of my assessment and plan of care.    If you have questions, please do not hesitate to call me. I look forward to following Carmen Wang along with you.    Sincerely,    Jessy Hoang, DO [place]  CC:  No Recipients    If you would like to receive this communication electronically, please contact [email or web address] or [phone] to request more information on [employer] access.    For providers and/or their staff who would like to refer a patient to [facility], please contact us through our one-stop-shop provider referral line, [facility], at [phone].    If you feel you have received this communication in error or would no longer like to receive these types of communications, please e-mail [email or web address]

## 2017-08-30 NOTE — PROGRESS NOTES
CONSULTING PHYSICIAN:  Deniz Redman M.D.    REASON FOR CONSULTATION:  Evaluation of CKD stage III.    HISTORY OF PRESENT ILLNESS:  This is a 72-year-old -American female with   longstanding history of hypertension, irritable bowel syndrome and CKD stage   III, who is coming in for evaluation.  The patient states her blood pressures   are fairly well controlled in the 130s/70s.  She states that she does not   hydrate well and drinks about 32 ounces of water a day.  She does take   occasional Advil about two to three times a month.  Her benazepril was switched   to atenolol in June and recently triamterene and hydrochlorothiazide was held   because her creatinine was 1.3 and she started retaining fluid and had went back   on it about two days ago and the edema is improving.    PAST MEDICAL HISTORY:  Significant for hypertension for five years, CKD stage   III, IBS.    REVIEW OF SYSTEMS:  She denies any frequency, urgency, hematuria, dysuria,   nausea, vomiting, chest pain or shortness of breath.    PHYSICAL EXAMINATION:  GENERAL:  Well-nourished, well-developed individual, in no acute distress.  HEENT:  PERRLA, EOMI.  NECK:  No cervical lymphadenopathy.  CARDIOVASCULAR:  Rate and rhythm regular murmurs, gallops.  ABDOMEN:  Soft, nontender, nondistended.  EXTREMITIES:  Trace edema in the ankles, more so on the left side, which is   chronic for her.  SKIN:  No erythema, warm to touch.  PSYCHIATRIC:  Alert and oriented x3.  The patient has good judgment and insight.    ASSESSMENT AND PLAN:  This is a 72-year-old -American female with   longstanding history of hypertension and chronic kidney disease stage III, who   is coming in for evaluation.  1.  Chronic kidney disease stage III with an MDRD GFR of 48 mL per minute.  Her   baseline creatinine appears to be around 1.1 to 1.2 and occasionally lower and   higher with the most recent creatinine of 1.3.  Her CKD is likely secondary to   longstanding  hypertension and age-related nephron loss.  I do not think she is   having prerenal picture at times with dehydration and NSAIDs on board.  I   advised her to stay away from NSAIDs and to hydrate well.  She understands the   importance of hydration.  I would like to switch for her atenolol to benazepril   20 mg for nephro-protection and we will repeat labs next week after she has been   on benazepril and hydrated.  She is also on triamterene and hydrochlorothiazide   and discussed low-potassium diet in the past while she was on both, has not had   any potassium issues.  We will monitor closely and take her off triamterene if   needed if potassium becomes an issue.  2.  Hypertension.  Blood pressures are stable.  She will be switched from   atenolol 50, benazepril 20 mg and will continue triamterene and   hydrochlorothiazide.  She will monitor blood pressures, currently stable, she   will notify us if they are high.  3.  Renal osteodystrophy.  We will check an intact PTH and vitamin D levels.    Her calciums are borderline high and is not on calcium supplements.  She is on   vitamin D.  We will check an SPEP with next labs.  Her intact PTH is stable.  We   will check an ionized calcium and SPEP.  4.  Anemia.  Hemoglobin is stable.  5.  She also had a recent ultrasound, which was stable with a simple cyst.    I would like to thank Dr. Redman for this consult.      JYOTSNA  dd: 08/30/2017 14:34:31 (CDT)  td: 08/31/2017 05:13:27 (CDT)  Doc ID   #[id number]  Job ID #[id number]    CC:     [id number]

## 2017-09-01 ENCOUNTER — PATIENT MESSAGE (OUTPATIENT)
Dept: NEPHROLOGY | Facility: CLINIC | Age: 72
End: 2017-09-01

## 2017-09-01 ENCOUNTER — PATIENT MESSAGE (OUTPATIENT)
Dept: INTERNAL MEDICINE | Facility: CLINIC | Age: 72
End: 2017-09-01

## 2017-09-01 NOTE — TELEPHONE ENCOUNTER
Dr. Hoang changed my medicine. I am back on Benezapril but 20 mg. You had me on the 5 mg.   Why such a big jump?   My pressure has averaged 140/65 for the past week. It was lower than that when I visited her.     Please contact me.     [phone]

## 2017-09-02 ENCOUNTER — PATIENT MESSAGE (OUTPATIENT)
Dept: INTERNAL MEDICINE | Facility: CLINIC | Age: 72
End: 2017-09-02

## 2017-09-08 ENCOUNTER — LAB VISIT (OUTPATIENT)
Dept: LAB | Facility: HOSPITAL | Age: 72
End: 2017-09-08
Attending: INTERNAL MEDICINE
Payer: MEDICARE

## 2017-09-08 DIAGNOSIS — I10 ESSENTIAL HYPERTENSION: ICD-10-CM

## 2017-09-08 DIAGNOSIS — N18.30 CKD (CHRONIC KIDNEY DISEASE), STAGE III: ICD-10-CM

## 2017-09-08 LAB
ALBUMIN SERPL BCP-MCNC: 3.4 G/DL
ANION GAP SERPL CALC-SCNC: 10 MMOL/L
BUN SERPL-MCNC: 21 MG/DL
CALCIUM SERPL-MCNC: 10.5 MG/DL
CHLORIDE SERPL-SCNC: 101 MMOL/L
CO2 SERPL-SCNC: 30 MMOL/L
CREAT SERPL-MCNC: 1.2 MG/DL
EST. GFR  (AFRICAN AMERICAN): 52.5 ML/MIN/1.73 M^2
EST. GFR  (NON AFRICAN AMERICAN): 45.6 ML/MIN/1.73 M^2
GLUCOSE SERPL-MCNC: 49 MG/DL
PHOSPHATE SERPL-MCNC: 2.1 MG/DL
POTASSIUM SERPL-SCNC: 4 MMOL/L
SODIUM SERPL-SCNC: 141 MMOL/L

## 2017-09-08 PROCEDURE — 84165 PROTEIN E-PHORESIS SERUM: CPT | Mod: 26,,, | Performed by: PATHOLOGY

## 2017-09-08 PROCEDURE — 80069 RENAL FUNCTION PANEL: CPT

## 2017-09-08 PROCEDURE — 36415 COLL VENOUS BLD VENIPUNCTURE: CPT | Mod: PO

## 2017-09-08 PROCEDURE — 84165 PROTEIN E-PHORESIS SERUM: CPT

## 2017-09-09 NOTE — PROGRESS NOTES
Received call from lab for critical glucose of 49. Patient not inpatient, no need for intervention at this time. Will route note to ordering provider.

## 2017-09-11 ENCOUNTER — TELEPHONE (OUTPATIENT)
Dept: NEPHROLOGY | Facility: CLINIC | Age: 72
End: 2017-09-11

## 2017-09-11 ENCOUNTER — TELEPHONE (OUTPATIENT)
Dept: INTERNAL MEDICINE | Facility: CLINIC | Age: 72
End: 2017-09-11

## 2017-09-11 DIAGNOSIS — N18.30 STAGE 3 CHRONIC KIDNEY DISEASE: Primary | ICD-10-CM

## 2017-09-11 DIAGNOSIS — I10 ESSENTIAL HYPERTENSION: Primary | ICD-10-CM

## 2017-09-11 LAB
ALBUMIN SERPL ELPH-MCNC: 3.94 G/DL
ALPHA1 GLOB SERPL ELPH-MCNC: 0.3 G/DL
ALPHA2 GLOB SERPL ELPH-MCNC: 0.83 G/DL
B-GLOBULIN SERPL ELPH-MCNC: 0.86 G/DL
GAMMA GLOB SERPL ELPH-MCNC: 1.47 G/DL
PATHOLOGIST INTERPRETATION SPE: NORMAL
PROT SERPL-MCNC: 7.4 G/DL

## 2017-09-11 NOTE — TELEPHONE ENCOUNTER
----- Message from MarinoMain Leeanne sent at 9/11/2017  3:15 PM CDT -----  Contact: Pt at [phone]  Pt would like to discuss her test results from tests that was completed for Dr. Hoang . Pt would like Dr. Redman to look at the notes.

## 2017-09-11 NOTE — TELEPHONE ENCOUNTER
"I sent the below message to the pt.  Please get renin, aldosterone and SPEP.    "Your creatinine is stable on the benazepril.  I noticed your bicarb is always slightly on the higher side.  I want to rule out hyperaldosteronism to make sure you don't have that.  We can screen that with a blood test.  I will have my nurse set up the blood work.  Calcium borderline high  chronically-please hold off on any calcium supplements if you are taking any".  "

## 2017-09-11 NOTE — TELEPHONE ENCOUNTER
I am not sure if she was fasting when she did the blood work.  Please make sure she is eating frequent meals and I would recommend that she f/u with her PCP for evaluation.

## 2017-09-11 NOTE — TELEPHONE ENCOUNTER
----- Message from Jayla Neal MA sent at 9/11/2017 10:54 AM CDT -----  Pt wants to know what to do about her low blood sugar. She is very concerned.

## 2017-09-12 ENCOUNTER — HOSPITAL ENCOUNTER (OUTPATIENT)
Dept: RADIOLOGY | Facility: HOSPITAL | Age: 72
Discharge: HOME | End: 2017-09-12
Attending: OBSTETRICS & GYNECOLOGY
Payer: MEDICARE

## 2017-09-12 ENCOUNTER — TELEPHONE (OUTPATIENT)
Dept: NEPHROLOGY | Facility: CLINIC | Age: 72
End: 2017-09-12

## 2017-09-12 VITALS — WEIGHT: 181 LBS | BODY MASS INDEX: 30.16 KG/M2 | HEIGHT: 65 IN

## 2017-09-12 DIAGNOSIS — Z12.31 OTHER SCREENING MAMMOGRAM: ICD-10-CM

## 2017-09-12 PROCEDURE — 77063 BREAST TOMOSYNTHESIS BI: CPT | Mod: 26,,, | Performed by: RADIOLOGY

## 2017-09-12 PROCEDURE — 77067 SCR MAMMO BI INCL CAD: CPT | Mod: 26,,, | Performed by: RADIOLOGY

## 2017-09-12 PROCEDURE — 77067 SCR MAMMO BI INCL CAD: CPT | Mod: TC

## 2017-09-26 ENCOUNTER — OFFICE VISIT (OUTPATIENT)
Dept: OPTOMETRY | Facility: CLINIC | Age: 72
End: 2017-09-26
Payer: MEDICARE

## 2017-09-26 DIAGNOSIS — H10.33 ACUTE CONJUNCTIVITIS OF BOTH EYES, UNSPECIFIED ACUTE CONJUNCTIVITIS TYPE: ICD-10-CM

## 2017-09-26 DIAGNOSIS — H18.20 CORNEAL EDEMA OF BOTH EYES: Primary | ICD-10-CM

## 2017-09-26 PROCEDURE — 99999 PR PBB SHADOW E&M-EST. PATIENT-LVL II: CPT | Mod: PBBFAC,,, | Performed by: OPTOMETRIST

## 2017-09-26 PROCEDURE — 92012 INTRM OPH EXAM EST PATIENT: CPT | Mod: S$GLB,,, | Performed by: OPTOMETRIST

## 2017-09-26 NOTE — PROGRESS NOTES
HPI     Ms. Carmen Wang is here for an urgent visit.  She is a patient of Dr. Espinal's, but he did not have any open   appointments today.    She says eyes have been red, painful, and sensitive to light x 2-3 weeks.   Symptoms started OD then moved to OS. Since 4 days ago pain and light   sensitivity have resolved, but blurred vision started 2 days ago.   Blurred vision with glasses affects all ranges worse in the morning and   improves throughout the day, resolves by around 11pm.    09/07/17 pt went to [facility] and was told she had   conjunctivitis. She was told to take Tobradex and Refresh for 2 weeks.  09/22/17 pt went in for a follow up visit and she c/o pain and light   sensitivity. She was prescribed Durezol (TID OU x 1 week, then qDay OU x 1   week, then discontinue), pt only used drop Friday night and Saturday   morning because she began to notice changes in her vision, pt states she   attempted to call Dr. Hernandez's office on Saturday 09/23/17 to notify   them of her vision changes but did not speak to anyone until yesterday   afternoon (09/25/17), pt states they did not give her any instructions, pt   states that while picking up other medications from the pharmacy this   morning she discovered that the office called in a prescription for   PredForte but she pt did not  the drop.    +Drops: Refresh (last used this morning), Durezol TID OU (last used last   night, pt confirms shaking bottle)  -Pain  -Tearing  -Sensitivity to light  -Flashes  -Floaters    OCULAR HISTORY  Last Eye Exam 09/22/17: at [facility] with Dr. Baldemar [facility]     Last visit at [facility]: 04/18/16 with Dr. Espinal  (-)eye surgery   Amaurosis fugax (2013, carotid doppler normal right and 1-39% stenosis   left)  Cataracts OU     FAMILY HISTORY  (+)Glaucoma: mother         Last edited by Eri Spencer, OD on 9/26/2017 11:32 AM. (History)            Assessment /Plan     For exam results, see  Encounter Report.    Corneal edema of both eyes   Cause of blurred vision and likely cause of mild iritis OU. Either secondary to conjunctivitis or reaction to Durezol.   Discontinue Durezol, switch to PredForte (prescribed recently by Dr. Badlemar Hernandez, pt has not yet picked up medication) QID OU until seen for f/u by Dr. Espinal. Shake bottle well before each use.    Recommended she RTC with Dr. Espinal in 2-3 days, but pt declines and requests appointment next week. Scheduled for 10/03/17. Advised pt to call or RTC immediately if symptoms worsen.    Acute conjunctivitis of both eyes, unspecified acute conjunctivitis type   Start PredForte QID OU. See Dr. Espinal for f/u.     Note: Outside records faxed today from Dr. Baldemar Hernandez's office. Reviewed and uploaded into [employer].       RTC 10/03/17 for f/u with Dr. Espinal

## 2017-09-26 NOTE — PATIENT INSTRUCTIONS
Please continue taking the PredForte drops: 1 drop in both eyes, 4 times per day, until seen by Dr. Espinal for a follow-up visit. Shake bottle well before each use.     If you have any problems after hours or over the weekend, please call the [facility]  at [phone] and ask that the on-call Ophthalmologist be paged.

## 2017-09-30 ENCOUNTER — PATIENT MESSAGE (OUTPATIENT)
Dept: OPTOMETRY | Facility: CLINIC | Age: 72
End: 2017-09-30

## 2017-10-02 ENCOUNTER — TELEPHONE (OUTPATIENT)
Dept: OPTOMETRY | Facility: CLINIC | Age: 72
End: 2017-10-02

## 2017-10-02 DIAGNOSIS — I10 ESSENTIAL HYPERTENSION: Primary | ICD-10-CM

## 2017-10-02 RX ORDER — BENAZEPRIL HYDROCHLORIDE 20 MG/1
20 TABLET ORAL DAILY
Qty: 30 TABLET | Refills: 3 | Status: SHIPPED | OUTPATIENT
Start: 2017-10-02 | End: 2018-01-24 | Stop reason: SDUPTHER

## 2017-10-02 NOTE — TELEPHONE ENCOUNTER
Please see the tel message from 9/11.  I don't see the aldosterone and renin levels-did she not get them?  Are the orders still there for her to get new ones.

## 2017-10-02 NOTE — TELEPHONE ENCOUNTER
Called pt in response to her email    Pt first concern was that Dr. Spencer had instructed her to use the predforte QID OU, but drop had instruction on label to use TID OU .     I explained to her that The drop was originally  prescribed by Dr. Hernandez an outside [facility] provider so when he put the prescription in yes it did say TID OU, but at your follow up with Dr. Spencer she instructed QID OU.       Pt also had questions on why her view of the AVS did not have a final diagenesis      Pt was concern about Dr. Espinal having access to viewing her outside records form Dr. Hernandez it was explained to pt that they are scanned in her chart and Dr. Espinal would be able to view them under media

## 2017-10-03 ENCOUNTER — OFFICE VISIT (OUTPATIENT)
Dept: OPTOMETRY | Facility: CLINIC | Age: 72
End: 2017-10-03
Payer: MEDICARE

## 2017-10-03 DIAGNOSIS — Z13.5 SCREENING FOR EYE CONDITION: ICD-10-CM

## 2017-10-03 DIAGNOSIS — H25.13 NUCLEAR SCLEROSIS OF BOTH EYES: ICD-10-CM

## 2017-10-03 DIAGNOSIS — H53.8 BLURRED VISION, BILATERAL: Primary | ICD-10-CM

## 2017-10-03 DIAGNOSIS — H26.9 CORTICAL CATARACT OF LEFT EYE: ICD-10-CM

## 2017-10-03 PROCEDURE — 92012 INTRM OPH EXAM EST PATIENT: CPT | Mod: S$GLB,,, | Performed by: OPTOMETRIST

## 2017-10-03 PROCEDURE — 99499 UNLISTED E&M SERVICE: CPT | Mod: S$PBB,,, | Performed by: OPTOMETRIST

## 2017-10-03 PROCEDURE — 99999 PR PBB SHADOW E&M-EST. PATIENT-LVL II: CPT | Mod: PBBFAC,,, | Performed by: OPTOMETRIST

## 2017-10-03 RX ORDER — SODIUM CHLORIDE 50 MG/ML
2 SOLUTION/ DROPS OPHTHALMIC 2 TIMES DAILY
Qty: 15 ML | Refills: 5 | Status: SHIPPED | OUTPATIENT
Start: 2017-10-03 | End: 2018-07-12

## 2017-10-03 NOTE — PATIENT INSTRUCTIONS
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  Central cortical cataract in the left eye.  Defer refraction to later date.   Will then advise regarding whether cataract surgery is necessary at this time.    Recent diagnosis of (presumably) viral conjunctivitis in both eyes.  Was prescribed tobramycin/dexamethasone eyedrops, which cleared signs and symptoms, but threreafter Ms. Wang noted pain in the eye(s).    Subseqently was given Rx for Durazol eyedrops which did not appear to be of benefit, per patient.  Thereafter, began using Prednisolone Acetate 1.0% ophthalmic suspension four times per day in both eyes.  Presents today with rare cell (only) in the anterior chamber of each eye.   Patient reports no pain, but states that she notes blurry vision in the morning upon awakening, but not when she has not slept much the night before (twice).    She notes that when she awakens with blurred vision, her vision seems to become clearer after a while.  Notes she did not sleep much last night, and she noted no symptoms of blurred vision this morning.  Discussed with  whether cornea of each eye may be becoming hypoxic while sleeping, causing hazy cornea and blurred vision in each eye, although not noted on examination today.  Note no evidence of abnormality of retina at macula on OCT done today.     Continue Prednisolone Acetate 1.0% four times per day in each eye.  Suggest use of Huey 128 5.0% sodium chloride solution at bedtime and upon awakening in both eyes eyes.  Return for recheck in approximately ten days.  Ms. Wang to call Ms. Moser to schedule.   Will attempt refraction on that date.

## 2017-10-03 NOTE — PROGRESS NOTES
HPI     Concerns About Ocular Health    Additional comments: Eye exam            Comments   Patient is in for a Urgent Care f/u for corneal edema OU. Patient was seen   by Dr. Chinchilla on 09/26/17 and was given Predforte eye drops to use four time   a day OU. Patient states she's still having blurry vision OU in the   morning for 4 hours after waking up.     On 09/07/2017 saw doctor at [facility], [place].  Was advised of viral infection.  Was given Rx for generic TobraDex drops.    Redness cleared, but pain noted thereafter.  Went back.  Was given Rx for   Durezol to use in both eyes.  Pain relieved, but VA was blurry in both   eyes upon awakening in AM  Saw Dr. Spencer on 09/26/2017.  Was advised to use Prednisolone drops qid OU,   in lieu of Durezol.  Still using drops.  Notes redness and pain has subsided, but blurriness of vision in AM   persists, but not this morning (notes she did not sleep much last night)      Patient's age: 72 y.o.  AA female.   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 Yes      If yes, wears  Full-time or part-time?  Full-time   Present glasses are: Bifocal, SV Distance, SV Reading?  Progressive lenses     Approximate age of present glasses:  1+ years old   Got new glasses following last exam, or subsequently?:  No    Any problem with VA with glasses?  Pt co decrease in distance vision   Also has trouble with night driving and glare also notices a change in her     peripheral vision   Wears CLs?:  No   Headaches?  No   Eye pain/discomfort?  No                                                                                   Flashes?  No   Floaters?  Yes, several years denies increase or change   Diplopia/Double vision?  No   Patient's Ocular History:          Any eye surgeries? No          Any eye injury?  No   Pt was seen by an eye Dr in [place]  "08/2015 for scratched cornea          Any treatment for eye disease?  No   Family history of eye disease?     Mother and Maternal Aunt + Glaucoma   Cousins  + Keratoconus   Significant patient medical history:         1. Diabetes?  No   2. HBP?  Yes, controlled by medication and diet               3. Other (describe):      ! OTC eyedrops currently using:  None    ! Prescription eye meds currently using:  None    ! Any history of allergy/adverse reaction to any eye meds used   previously?  No    ! Any history of allergy/adverse reaction to eyedrops used during prior   eye exam(s)? No    ! Any history of allergy/adverse reaction to Novacaine or similar meds?   No    ! Any history of allergy/adverse reaction to Epinephrine or similar meds?   No    ! Patient okay with use of anesthetic eyedrops to check eye pressure?    Yes      ! Patient okay with use of eyedrops to dilate pupils today?  Yes    !  Allergies/Medications/Medical History/Family History reviewed today?    Yes       PD =   75/71  Desired reading distance =   14.5"                         Last edited by Chris Espinal, OD on 10/3/2017 10:42 AM. (History)            Assessment /Plan     For exam results, see Encounter Report.    1. Blurred vision, bilateral  sodium chloride 5% (PUJA 128) 5 % ophthalmic solution   2. Nuclear sclerosis of both eyes     3. Cortical cataract of left eye     4. Screening for eye condition                   Bilateral nuclear sclerosis of lens of both eyes.  Central cortical cataract in the left eye.  Defer refraction to later date.   Will then advise regarding whether cataract surgery is necessary at this time.    Recent diagnosis of (presumably) viral conjunctivitis in both eyes.  Was prescribed tobramycin/dexamethasone eyedrops, which cleared signs and symptoms, but threreafter Ms. Wang noted pain in the eye(s).    Subseqently was given Rx for Durazol eyedrops which did not appear to be of benefit, per patient.  Thereafter, " began using Prednisolone Acetate 1.0% ophthalmic suspension four times per day in both eyes.  Presents today with rare cell (only) in the anterior chamber of each eye.   Patient reports no pain, but states that she notes blurry vision in the morning upon awakening, but not when she has not slept much the night before (twice).    She notes that when she awakens with blurred vision, her vision seems to become clearer after a while.  Notes she did not sleep much last night, and she noted no symptoms of blurred vision this morning.  Discussed with  whether cornea of each eye may be becoming hypoxic while sleeping, causing hazy cornea and blurred vision in each eye, although not noted on examination today.  Note no evidence of abnormality of retina at macula on OCT done today.     Continue Prednisolone Acetate 1.0% four times per day in each eye.  Suggest use of Huey 128 5.0% sodium chloride solution at bedtime and upon awakening in both eyes eyes.  Return for recheck in approximately ten days.  Ms. Wang to call Ms. Moser to schedule.   Will attempt refraction on that date.

## 2017-10-08 ENCOUNTER — PATIENT MESSAGE (OUTPATIENT)
Dept: OPTOMETRY | Facility: CLINIC | Age: 72
End: 2017-10-08

## 2017-10-09 DIAGNOSIS — H53.8 BLURRED VISION, BILATERAL: Primary | ICD-10-CM

## 2017-10-09 RX ORDER — PREDNISOLONE ACETATE 10 MG/ML
1 SUSPENSION/ DROPS OPHTHALMIC 4 TIMES DAILY
Qty: 5 ML | Refills: 0 | Status: SHIPPED | OUTPATIENT
Start: 2017-10-09 | End: 2017-10-19

## 2017-10-16 ENCOUNTER — OFFICE VISIT (OUTPATIENT)
Dept: OPTOMETRY | Facility: CLINIC | Age: 72
End: 2017-10-16
Payer: MEDICARE

## 2017-10-16 DIAGNOSIS — H18.9 CORNEAL DISORDER: ICD-10-CM

## 2017-10-16 DIAGNOSIS — H53.8 BLURRED VISION, BILATERAL: Primary | ICD-10-CM

## 2017-10-16 DIAGNOSIS — H26.9 CORTICAL CATARACT OF LEFT EYE: ICD-10-CM

## 2017-10-16 DIAGNOSIS — H25.13 NUCLEAR SCLEROSIS OF BOTH EYES: ICD-10-CM

## 2017-10-16 PROCEDURE — 92012 INTRM OPH EXAM EST PATIENT: CPT | Mod: S$GLB,,, | Performed by: OPTOMETRIST

## 2017-10-16 PROCEDURE — 99499 UNLISTED E&M SERVICE: CPT | Mod: S$PBB,,, | Performed by: OPTOMETRIST

## 2017-10-16 PROCEDURE — 99999 PR PBB SHADOW E&M-EST. PATIENT-LVL II: CPT | Mod: PBBFAC,,, | Performed by: OPTOMETRIST

## 2017-10-16 NOTE — PROGRESS NOTES
HPI     Concerns About Ocular Health    Additional comments: f/u on eye discomfort/blurring of vision in OS noted   on last visit  States VA seems a little blurry today, but eyes do feel better, and she   does not notice the blurring of VA in early AM noted at the time of the   last visit.            Comments   Patient in for progress check.  Patient is in for a f/u   Being followed for (diagnosis):   Blurred Vision bilateral     Date last seen: 10/03/17    Doctor last seen:  Dr. Espinal     Prescribed eye medications(s) using:  Prednisolone Acetate 1.0% qid OU     OTC eye medication(s) using:  Huey 128   5.0% sodium chloride solution bid   since last visit.      Signs/symptoms of condition resolved/better/stable/worse?:  Much Better    Allergies/Medications reviewed today?  Yes              Last edited by Chris Espinal, OD on 10/16/2017 10:52 AM. (History)            Assessment /Plan     For exam results, see Encounter Report.    1. Blurred vision, bilateral     2. Corneal disorder     3. Nuclear sclerosis of both eyes     4. Cortical cataract of left eye                  Trace central corneal haze bilaterally.   Ms. Wang notes difficulty with VA, and more apparent in the right eye than in the left eye.     Taper use of Prednisolone Acetate eyedrops in both eyes as follows:  One drop three times per day for three days, then one drop twice per day for three days, and then one drop once per day for three days, and then discontinue.  Continue Huey 128 5.0% ophthalmic solution in both eyes at bedtime and upon awakening.  Return in two weeks for progress check and recheck of refraction in two weeks.

## 2017-10-16 NOTE — PATIENT INSTRUCTIONS
Trace central corneal haze bilaterally.   Ms. Wang notes difficulty with VA, and more apparent in the right eye than in the left eye.     Taper use of Prednisolone Acetate eyedrops in both eyes as follows:  One drop three times per day for three days, then one drop twice per day for three days, and then one drop once per day for three days, and then discontinue.  Continue Huey 128 5.0% ophthalmic solution in both eyes at bedtime and upon awakening.  Return in two weeks for progress check and recheck of refraction in two weeks.

## 2017-10-26 ENCOUNTER — PATIENT MESSAGE (OUTPATIENT)
Dept: OPTOMETRY | Facility: CLINIC | Age: 72
End: 2017-10-26

## 2017-10-28 RX ORDER — TRIAMTERENE AND HYDROCHLOROTHIAZIDE 37.5; 25 MG/1; MG/1
1 CAPSULE ORAL EVERY MORNING
Qty: 90 CAPSULE | Refills: 3 | Status: SHIPPED | OUTPATIENT
Start: 2017-10-28 | End: 2018-10-18 | Stop reason: SDUPTHER

## 2017-10-30 ENCOUNTER — PATIENT MESSAGE (OUTPATIENT)
Dept: OPTOMETRY | Facility: CLINIC | Age: 72
End: 2017-10-30

## 2017-11-06 ENCOUNTER — OFFICE VISIT (OUTPATIENT)
Dept: OPTOMETRY | Facility: CLINIC | Age: 72
End: 2017-11-06
Payer: MEDICARE

## 2017-11-06 DIAGNOSIS — H25.13 NUCLEAR SCLEROSIS OF BOTH EYES: ICD-10-CM

## 2017-11-06 DIAGNOSIS — H18.9 CORNEAL DISORDER: Primary | ICD-10-CM

## 2017-11-06 DIAGNOSIS — H18.20 CORNEAL EDEMA OF BOTH EYES: ICD-10-CM

## 2017-11-06 PROCEDURE — 99999 PR PBB SHADOW E&M-EST. PATIENT-LVL II: CPT | Mod: PBBFAC,,, | Performed by: OPTOMETRIST

## 2017-11-06 PROCEDURE — 99499 UNLISTED E&M SERVICE: CPT | Mod: S$PBB,,, | Performed by: OPTOMETRIST

## 2017-11-06 PROCEDURE — 92012 INTRM OPH EXAM EST PATIENT: CPT | Mod: S$GLB,,, | Performed by: OPTOMETRIST

## 2017-11-06 NOTE — PROGRESS NOTES
"HPI     Follow-up    Additional comments: Patient here for follow-up using drops. She does not   see well with her glasses on  -she takes them off and sees much better     When her glasses are on she sees double in OD and a shadow in OS.            Comments   Patient in for progress check.  Patient is in for a f/u   Being followed for (diagnosis):   Blurred Vision bilateral      Date last seen: 10/16/17     Doctor last seen:  Dr. Espinal      Prescribed eye medications(s) using:  NaCL solution bid in both eyes.  No   longer using Prednisolone Acetate drops in either eye.       Signs/symptoms of condition resolved/better/stable/worse?:  About the same        Allergies/Medications reviewed today?  Yes        PD 75/71  Reading distance 14.5"              Last edited by Chris Espinal, OD on 11/6/2017 10:53 AM. (History)            Assessment /Plan     For exam results, see Encounter Report.    1. Corneal disorder     2. Nuclear sclerosis of both eyes     3. Corneal edema of both eyes                  Bilateral nuclear sclerosis of lens, consistent with age.  Early central cortical cataract in the left eye.   No need for cataract surgery in either eye.  Presbyopia consistent with age.  New spectacle lens Rx issued for use as desired.  Recommend full-time wear.    Suspect mild corneal edema in both eyes secondary to hypoxia while asleep.  Central cornea appears clear in each eye today.   Continue Huey 128 (sodium chloride) eyedrops in both eyes at bedtime and upon awakening in the morning.     Recheck in six months - or prior if any further problems or changes in vision noted in the interim.     "

## 2017-11-06 NOTE — PATIENT INSTRUCTIONS
Bilateral nuclear sclerosis of lens, consistent with age.  Early central cortical cataract in the left eye.   No need for cataract surgery in either eye.  Presbyopia consistent with age.  New spectacle lens Rx issued for use as desired.  Recommend full-time wear.    Suspect mild corneal edema in both eyes secondary to hypoxia while asleep.  Central cornea appears clear in each eye today.   Continue Huey 128 (sodium chloride) eyedrops in both eyes at bedtime and upon awakening in the morning.     Recheck in six months - or prior if any further problems or changes in vision noted in the interim.

## 2017-11-08 ENCOUNTER — PATIENT MESSAGE (OUTPATIENT)
Dept: OPTOMETRY | Facility: CLINIC | Age: 72
End: 2017-11-08

## 2017-11-09 ENCOUNTER — PATIENT MESSAGE (OUTPATIENT)
Dept: OPTOMETRY | Facility: CLINIC | Age: 72
End: 2017-11-09

## 2017-11-11 ENCOUNTER — OFFICE VISIT (OUTPATIENT)
Dept: OPTOMETRY | Facility: CLINIC | Age: 72
End: 2017-11-11
Payer: MEDICARE

## 2017-11-11 DIAGNOSIS — H18.9 CORNEAL DISORDER: ICD-10-CM

## 2017-11-11 DIAGNOSIS — H20.10 IRITIS, CHRONIC: Primary | ICD-10-CM

## 2017-11-11 DIAGNOSIS — H57.11 EYE PAIN, RIGHT: ICD-10-CM

## 2017-11-11 PROCEDURE — 92012 INTRM OPH EXAM EST PATIENT: CPT | Mod: S$GLB,,, | Performed by: OPTOMETRIST

## 2017-11-11 PROCEDURE — 99999 PR PBB SHADOW E&M-EST. PATIENT-LVL II: CPT | Mod: PBBFAC,,, | Performed by: OPTOMETRIST

## 2017-11-11 RX ORDER — PREDNISOLONE ACETATE 10 MG/ML
SUSPENSION/ DROPS OPHTHALMIC
Qty: 5 ML | Refills: 0 | Status: SHIPPED | OUTPATIENT
Start: 2017-11-11 | End: 2018-02-22

## 2017-11-11 NOTE — PATIENT INSTRUCTIONS
Chronic/recurrent symptomatic mild iritis in the right eye.  Note trace cells in anterior chamber of the right eye today.  Possible few KPs on endothelium of the right eye.  Suggest resume use of Pred Forte opthalmic suspension in the right eye - one drop every two hours while awake until seen again.  Return on 11/13/2017 for follow-up.  Once right eye looks and feels back to normal, will do slow taper of drops, and will advise complete medical work-up to rule out any occult inflammatory etiology of symptoms (PCP Dr. Deniz Redman).

## 2017-11-11 NOTE — PROGRESS NOTES
HPI     Eye Problem    Additional comments: Pt states that she is having a reoccurance of red   eye started on Wednesday (11/08/17) morning and has persisted.  Not as red   today as originally.  Notes eye was uncomfortable with photophobia on 11/08, but somewhat better   eye.            Comments   Patient in for progress check.    Being followed for (diagnosis):   Recurrent redness of right eye.  Some   photophobia a few days ago, but redness somewhat improved, and photophobia   has resolved.     Date last seen: 11/06/2017    Doctor last seen:  Dr. Espinal    Prescribed eye medications(s) using:  None presently    OTC eye medication(s) using:  Matthew 128 sodium chloride eyedrops in both   eyes, as previously suggested.     Signs/symptoms of condition resolved/better/stable/worse?:  Recurrent.     Reports no known history of inflammatory disease, but does report history   of thyroid nodules.     Allergies/Medications reviewed today?  yes                                             Date last seen:  11/6/17    Doctor last seen:  Dr. Espinal    Prescribed eye medications(s) using:  none    OTC eye medication(s) using:  Sodium Chloride Opthalmic solution (matthew)    Signs/symptoms of condition resolved/better/stable/worse?:  Not so red     Allergies/Medications reviewed today?  yes                 Last edited by Chris Espinal, OD on 11/11/2017  1:28 PM. (History)            Assessment /Plan     For exam results, see Encounter Report.    1. Iritis, chronic  prednisoLONE acetate (PRED FORTE) 1 % DrpS   2. Eye pain, right     3. Corneal disorder

## 2017-11-13 ENCOUNTER — PATIENT MESSAGE (OUTPATIENT)
Dept: OPTOMETRY | Facility: CLINIC | Age: 72
End: 2017-11-13

## 2017-11-19 ENCOUNTER — PATIENT MESSAGE (OUTPATIENT)
Dept: OPTOMETRY | Facility: CLINIC | Age: 72
End: 2017-11-19

## 2017-11-20 ENCOUNTER — OFFICE VISIT (OUTPATIENT)
Dept: OPTOMETRY | Facility: CLINIC | Age: 72
End: 2017-11-20
Payer: MEDICARE

## 2017-11-20 DIAGNOSIS — H18.9 CORNEAL DISORDER: Primary | ICD-10-CM

## 2017-11-20 DIAGNOSIS — H20.10 IRITIS, CHRONIC: ICD-10-CM

## 2017-11-20 PROCEDURE — 99999 PR PBB SHADOW E&M-EST. PATIENT-LVL II: CPT | Mod: PBBFAC,,, | Performed by: OPTOMETRIST

## 2017-11-20 PROCEDURE — 99499 UNLISTED E&M SERVICE: CPT | Mod: S$GLB,,, | Performed by: OPTOMETRIST

## 2017-11-20 NOTE — PATIENT INSTRUCTIONS
Mrs. Wang now asymptomatic.  Reports she has discontinued use of Prednisolone drops in both eye, after a rapid taper.  No longer using Huey 128 drops in either eye.  No evidence of corneal edema OD and OS.   Happy with VA with latest spectacle lens Rx issued.  Discussed with Mrs. Wang.  Remain off all drops, and monitor.   Call/return if she notes any recurrence of symptoms.  Othewise, return when she receives a recall notice in the mail for her annual general eye examination and refraction.

## 2017-11-20 NOTE — PROGRESS NOTES
HPI     Concerns About Ocular Health    Additional comments: f/u           Comments   Patient in for progress check.    Being followed for (diagnosis):   Recurrent redness of right eye.  Some   photophobia a few days ago, but redness somewhat improved, and photophobia   has resolved.     Date last seen: 11/11/2017    Doctor last seen:  Dr. Espinal    Prescribed eye medications(s) using:  None presently    OTC eye medication(s) using:       Signs/symptoms of condition resolved/better/stable/worse?:  Recurrent.     Reports no known history of inflammatory disease, but does report history   of thyroid nodules.     Allergies/Medications reviewed today?  yes                                                    Last edited by Heaven Moser on 11/20/2017  2:04 PM. (History)            Assessment /Plan     For exam results, see Encounter Report.    1. Corneal disorder     2. Iritis, chronic                      Mrs. Wang now asymptomatic.  Reports she has discontinued use of Prednisolone drops in both eye, after a rapid taper.  No longer using Huey 128 drops in either eye.  No evidence of cornea edema OD and OS.   Happy with VA with latest spectacle lens Rx issued.  Discussed with Mrs. Wang.  Remain off all drops, and monitor.   Call/return if she notes any recurrence of symptoms.  Othewise, return when she receives a recall notice in the mail for her annual general eye examination and refraction.

## 2017-12-28 ENCOUNTER — PATIENT MESSAGE (OUTPATIENT)
Dept: INTERNAL MEDICINE | Facility: CLINIC | Age: 72
End: 2017-12-28

## 2018-01-08 ENCOUNTER — LAB VISIT (OUTPATIENT)
Dept: LAB | Facility: HOSPITAL | Age: 73
End: 2018-01-08
Attending: INTERNAL MEDICINE
Payer: MEDICARE

## 2018-01-08 DIAGNOSIS — I10 ESSENTIAL HYPERTENSION: ICD-10-CM

## 2018-01-08 PROCEDURE — 84244 ASSAY OF RENIN: CPT

## 2018-01-08 PROCEDURE — 36415 COLL VENOUS BLD VENIPUNCTURE: CPT | Mod: PO

## 2018-01-08 PROCEDURE — 82088 ASSAY OF ALDOSTERONE: CPT

## 2018-01-09 ENCOUNTER — PATIENT MESSAGE (OUTPATIENT)
Dept: INTERNAL MEDICINE | Facility: CLINIC | Age: 73
End: 2018-01-09

## 2018-01-10 LAB — ALDOST SERPL-MCNC: 12.2 NG/DL

## 2018-01-11 LAB — RENIN PLAS-CCNC: 6.3 NG/ML/H

## 2018-01-22 DIAGNOSIS — I10 ESSENTIAL HYPERTENSION: ICD-10-CM

## 2018-01-22 RX ORDER — BENAZEPRIL HYDROCHLORIDE 20 MG/1
20 TABLET ORAL DAILY
Qty: 30 TABLET | Refills: 0 | Status: CANCELLED | OUTPATIENT
Start: 2018-01-22

## 2018-01-24 ENCOUNTER — TELEPHONE (OUTPATIENT)
Dept: NEPHROLOGY | Facility: CLINIC | Age: 73
End: 2018-01-24

## 2018-01-24 ENCOUNTER — PATIENT MESSAGE (OUTPATIENT)
Dept: INTERNAL MEDICINE | Facility: CLINIC | Age: 73
End: 2018-01-24

## 2018-01-24 DIAGNOSIS — I10 ESSENTIAL HYPERTENSION: ICD-10-CM

## 2018-01-24 RX ORDER — BENAZEPRIL HYDROCHLORIDE 20 MG/1
20 TABLET ORAL DAILY
Qty: 90 TABLET | Refills: 3 | Status: SHIPPED | OUTPATIENT
Start: 2018-01-24 | End: 2019-01-11 | Stop reason: SDUPTHER

## 2018-01-25 ENCOUNTER — PATIENT MESSAGE (OUTPATIENT)
Dept: INTERNAL MEDICINE | Facility: CLINIC | Age: 73
End: 2018-01-25

## 2018-02-22 ENCOUNTER — OFFICE VISIT (OUTPATIENT)
Dept: NEPHROLOGY | Facility: CLINIC | Age: 73
End: 2018-02-22
Payer: MEDICARE

## 2018-02-22 ENCOUNTER — LAB VISIT (OUTPATIENT)
Dept: LAB | Facility: HOSPITAL | Age: 73
End: 2018-02-22
Payer: MEDICARE

## 2018-02-22 VITALS
HEART RATE: 77 BPM | HEIGHT: 65 IN | OXYGEN SATURATION: 99 % | DIASTOLIC BLOOD PRESSURE: 64 MMHG | SYSTOLIC BLOOD PRESSURE: 126 MMHG | WEIGHT: 181.44 LBS | BODY MASS INDEX: 30.23 KG/M2

## 2018-02-22 DIAGNOSIS — N18.30 CKD (CHRONIC KIDNEY DISEASE), STAGE III: ICD-10-CM

## 2018-02-22 DIAGNOSIS — I10 ESSENTIAL HYPERTENSION: ICD-10-CM

## 2018-02-22 DIAGNOSIS — I10 ESSENTIAL HYPERTENSION: Primary | ICD-10-CM

## 2018-02-22 LAB
ALBUMIN SERPL BCP-MCNC: 3.4 G/DL
ANION GAP SERPL CALC-SCNC: 10 MMOL/L
BUN SERPL-MCNC: 21 MG/DL
CALCIUM SERPL-MCNC: 10.4 MG/DL
CHLORIDE SERPL-SCNC: 99 MMOL/L
CO2 SERPL-SCNC: 28 MMOL/L
CREAT SERPL-MCNC: 1.2 MG/DL
EST. GFR  (AFRICAN AMERICAN): 52.5 ML/MIN/1.73 M^2
EST. GFR  (NON AFRICAN AMERICAN): 45.6 ML/MIN/1.73 M^2
GLUCOSE SERPL-MCNC: 86 MG/DL
PHOSPHATE SERPL-MCNC: 2.8 MG/DL
POTASSIUM SERPL-SCNC: 3.7 MMOL/L
PTH-INTACT SERPL-MCNC: 80 PG/ML
SODIUM SERPL-SCNC: 137 MMOL/L

## 2018-02-22 PROCEDURE — 84165 PROTEIN E-PHORESIS SERUM: CPT | Mod: 26,,, | Performed by: PATHOLOGY

## 2018-02-22 PROCEDURE — 83970 ASSAY OF PARATHORMONE: CPT

## 2018-02-22 PROCEDURE — 80069 RENAL FUNCTION PANEL: CPT

## 2018-02-22 PROCEDURE — 84165 PROTEIN E-PHORESIS SERUM: CPT

## 2018-02-22 PROCEDURE — 99999 PR PBB SHADOW E&M-EST. PATIENT-LVL II: CPT | Mod: PBBFAC,,, | Performed by: INTERNAL MEDICINE

## 2018-02-22 PROCEDURE — 1159F MED LIST DOCD IN RCRD: CPT | Mod: S$GLB,,, | Performed by: INTERNAL MEDICINE

## 2018-02-22 PROCEDURE — 99214 OFFICE O/P EST MOD 30 MIN: CPT | Mod: S$GLB,,, | Performed by: INTERNAL MEDICINE

## 2018-02-22 PROCEDURE — 3008F BODY MASS INDEX DOCD: CPT | Mod: S$GLB,,, | Performed by: INTERNAL MEDICINE

## 2018-02-22 PROCEDURE — 36415 COLL VENOUS BLD VENIPUNCTURE: CPT

## 2018-02-22 PROCEDURE — 1126F AMNT PAIN NOTED NONE PRSNT: CPT | Mod: S$GLB,,, | Performed by: INTERNAL MEDICINE

## 2018-02-22 NOTE — PROGRESS NOTES
HISTORY OF PRESENT ILLNESS:  This is a 72-year-old -American female with   longstanding history of hypertension, irritable bowel syndrome and CKD stage   III, who is coming in for f/u.  The patient states her blood pressures   are fairly well controlled in the 120's- 130s/60's-70s.   No recent hospitalizations.    PAST MEDICAL HISTORY:  Significant for hypertension for five years, CKD stage   III, IBS.    REVIEW OF SYSTEMS:  She denies any frequency, urgency, hematuria, dysuria,   nausea, vomiting, chest pain or shortness of breath.    PHYSICAL EXAMINATION:  GENERAL:  Well-nourished, well-developed individual, in no acute distress.  HEENT:  PERRLA, EOMI.  NECK:  No cervical lymphadenopathy.  CARDIOVASCULAR:  Rate and rhythm regular murmurs, gallops.  ABDOMEN:  Soft, nontender, nondistended.  EXTREMITIES:  Trace edema in the ankles, more so on the left side, which is   chronic for her.  SKIN:  No erythema, warm to touch.  PSYCHIATRIC:  Alert and oriented x3.  The patient has good judgment and insight.    ASSESSMENT AND PLAN:  This is a 72-year-old -American female with   longstanding history of hypertension and chronic kidney disease stage III, who   is coming in for f/u.  1.  Chronic kidney disease stage III with an MDRD GFR of 50 mL per minute.  Her   baseline creatinine appears to be around 1.1 to 1.2 and occasionally lower and   higher with the most recent creatinine of 1.2.  Her CKD is likely secondary to   longstanding hypertension and age-related nephron loss.    I advised her to stay away from NSAIDs and to hydrate well.  She understands the   importance of hydration.  I  Switched  for her atenolol to benazepril 20 mg for nephro-protection.  She is also on triamterene and hydrochlorothiazide and discussed low-potassium diet in the past while she was on both, has not had any potassium issues.  We will monitor closely and take her off triamterene if   needed if potassium becomes an issue.  2.   Hypertension.  Blood pressures are stable.   She will monitor blood pressures, currently stable, she   will notify us if they are high.  3.  Renal osteodystrophy.   intact PTH stable .    Her calciums are borderline high and is not  taking  calcium supplements. We will check an SPEP with next labs.  Her intact PTH is stable. SPEP stable.  4.  Anemia.  Hemoglobin is stable.  5.  She also had a recent ultrasound, which was stable with a simple cyst.    RTC in 5 months.

## 2018-02-23 LAB
ALBUMIN SERPL ELPH-MCNC: 3.71 G/DL
ALPHA1 GLOB SERPL ELPH-MCNC: 0.28 G/DL
ALPHA2 GLOB SERPL ELPH-MCNC: 0.82 G/DL
B-GLOBULIN SERPL ELPH-MCNC: 0.84 G/DL
GAMMA GLOB SERPL ELPH-MCNC: 1.43 G/DL
PATHOLOGIST INTERPRETATION SPE: NORMAL
PROT SERPL-MCNC: 7.1 G/DL

## 2018-03-02 ENCOUNTER — OFFICE VISIT (OUTPATIENT)
Dept: GASTROENTEROLOGY | Facility: CLINIC | Age: 73
End: 2018-03-02
Payer: MEDICARE

## 2018-03-02 VITALS
WEIGHT: 182.75 LBS | BODY MASS INDEX: 29.37 KG/M2 | DIASTOLIC BLOOD PRESSURE: 76 MMHG | HEIGHT: 66 IN | HEART RATE: 75 BPM | SYSTOLIC BLOOD PRESSURE: 141 MMHG

## 2018-03-02 DIAGNOSIS — D50.9 IRON DEFICIENCY ANEMIA, UNSPECIFIED IRON DEFICIENCY ANEMIA TYPE: ICD-10-CM

## 2018-03-02 DIAGNOSIS — K58.0 IRRITABLE BOWEL SYNDROME WITH DIARRHEA: Primary | ICD-10-CM

## 2018-03-02 PROCEDURE — 99999 PR PBB SHADOW E&M-EST. PATIENT-LVL III: CPT | Mod: PBBFAC,,, | Performed by: INTERNAL MEDICINE

## 2018-03-02 PROCEDURE — 99204 OFFICE O/P NEW MOD 45 MIN: CPT | Mod: S$PBB,,, | Performed by: INTERNAL MEDICINE

## 2018-03-02 RX ORDER — QUINIDINE GLUCONATE 324 MG
240 TABLET, EXTENDED RELEASE ORAL
COMMUNITY
End: 2018-09-26

## 2018-03-02 NOTE — PROGRESS NOTES
[facility] Consultation Note    Reason for Consult:    Chief Complaint   Patient presents with    Irritable Bowel Syndrome    Diarrhea       PCP:   Deniz Redman    Referring MD:  Aaareferral Self  No address on file    HPI:  Carmen Wang is a 72 y.o. female here for evaluation of chronic diarrhea.  She has had problems with bowel movements since gallbladder surgery.  She mostly has a problem with fecal urgency.  She has a BM soon after eating.  Changes to her diet have helped her bowel movements.  Rich and creamy foods worsened symptoms.  Also mixed temperatures caused worse symptoms.  Her bowel movements are once daily, but loose.  She has increased bloating, gas, and noises.  She denies pain.  Symptoms improve after bowel movements.  She denies blood in the stools.  She also denies nausea and vomiting.     She had a colonoscopy with Dr. Olivarez 10/5/15 for screening - this was to the cecum with good prep and only revealed diverticulosis of the entire colon (10 year repeat recommended).  She also had a colonoscopy 10/28/08 with Dr. Olivarez for screening that was to the cecum with good prep and also showed only diverticulosis.  She has had iron deficiency present for many years and started taking supplements in August with improvement.            ROS:  Constitutional: No fevers, chills, No weight loss, normal appetite  ENT: No congestion, rhinorrhea, or chronic sinus problems  CV: No chest pain or palpitations  Pulm: No cough, No shortness of breath  Ophtho: No vision changes or pain  GI: see HPI  Derm: No rash or lesions  Heme: No lymphadenopathy, No bruising  MSK: No arthritis or joint swelling  : No dysuria, No frequent urination  Endo: No hot or cold intolerance        Medical History:  has a past medical history of Cataract; Chronic diarrhea; Hypertension; and IBS (irritable bowel syndrome).    Surgical History:  has a past surgical history that includes Cholecystectomy;  "Bunionectomy; Hysterectomy; Appendectomy; and Colonoscopy (N/A, 10/5/2015).    Family History: family history includes Arthritis in her father, mother, and son; Breast cancer (age of onset: 76) in her sister; Cancer in her brother; Diabetes in her brother; Glaucoma in her maternal aunt, maternal aunt, and mother; Hypertension in her father, mother, son, and son; Lupus in her daughter; No Known Problems in her brother, brother, brother, brother, brother, maternal grandfather, maternal uncle, paternal aunt, paternal grandfather, paternal grandmother, paternal uncle, sister, sister, and sister; Stroke in her maternal grandmother; Thyroid disease in her brother, mother, sister, sister, and sister.    Social History:  reports that she has never smoked. She has never used smokeless tobacco. She reports that she does not drink alcohol or use drugs.    Review of patient's allergies indicates:   Allergen Reactions    Codeine      Other reaction(s): Hallucinations    Penicillins Hives     Other reaction(s): Rash       Prior to Admission medications    Medication Sig Start Date End Date Taking? Authorizing Provider   benazepril (LOTENSIN) 20 MG tablet Take 1 tablet (20 mg total) by mouth once daily. 1/24/18 1/24/19 Yes Deniz Redman MD   ferrous gluconate (IRON HIGH POTENCY) 240 (27 FE) MG tablet Take 240 mg by mouth 3 (three) times daily with meals.   Yes Historical Provider, MD   triamterene-hydrochlorothiazide 37.5-25 mg (DYAZIDE) 37.5-25 mg per capsule TAKE 1 CAPSULE BY MOUTH EVERY MORNING. 10/28/17 10/28/18 Yes Deniz Redman MD   sodium chloride 5% (PUJA 128) 5 % ophthalmic solution Place 2 drops into both eyes 2 (two) times daily. 10/3/17 11/2/17  Chris Espinal OD   triamterene-hydrochlorothiazide 37.5-25 mg (MAXZIDE-25) 37.5-25 mg per tablet Take 1 tablet by mouth once daily.    Historical Provider, MD       Objective Findings:  Vital Signs:  BP (!) 141/76   Pulse 75   Ht 5' 5.5" (1.664 m)   Wt 82.9 " kg (182 lb 12.2 oz)   LMP 01/01/1982 (Approximate)   BMI 29.95 kg/m²   Body mass index is 29.95 kg/m².    Physical Exam:  General Appearance:  Well appearing in no acute distress, appears stated age  Head:  Normocephalic, atraumatic  Eyes:  No scleral icterus or pallor, EOMI  ENT:  Neck supple, moist mucosa; OP clear  Lungs:  CTA bilaterally in anterior and posterior fields, no wheezes, no crackles; no dullness  Heart:  Regular rate and rhythm, S1, S2 normal, no murmurs heard  Abdomen:  Soft, non tender, non distended with positive bowel sounds in all four quadrants. No hepatosplenomegaly, ascites, or mass  Extremities:  No clubbing, cyanosis, or edema        Labs:  Lab Results   Component Value Date    WBC 7.66 08/14/2017    HGB 12.6 08/14/2017    HCT 39.7 08/14/2017    MCV 80 (L) 08/14/2017    RDW 16.4 (H) 08/14/2017     08/14/2017    GRAN 3.6 07/07/2017    GRAN 44.6 07/07/2017    LYMPH 2.9 07/07/2017    LYMPH 36.7 07/07/2017    MONO 0.9 07/07/2017    MONO 10.8 07/07/2017    EOS 0.6 (H) 07/07/2017    BASO 0.02 07/07/2017     Lab Results   Component Value Date     02/22/2018    K 3.7 02/22/2018    CL 99 02/22/2018    CO2 28 02/22/2018    GLU 86 02/22/2018    BUN 21 02/22/2018    CREATININE 1.2 02/22/2018    CALCIUM 10.4 02/22/2018    PROT 7.4 06/20/2017    ALBUMIN 3.4 (L) 02/22/2018    BILITOT 0.5 06/20/2017    ALKPHOS 67 06/20/2017    AST 20 06/20/2017    ALT 16 06/20/2017                   Assessment:  Carmen Wang is a 72 y.o. female with:  1. Irritable bowel syndrome with diarrhea - prior cholecystectomy and may have bile acid involvement.  Also consider SIBO.  Need to consider Celiac disease as well.     2. Iron deficiency anemia, unspecified iron deficiency anemia type - present for many years and improved with oral replacement therapy.  Negative colonsocopy 2008 and 2015.            Recommendations/Plan:  1. Celiac serology ordered  2. Try IBgard    Follow-up as needed.        Order  summary:  Orders Placed This Encounter    TISSUE TRANSGLUTAMINASE (TTG), IGA    IGA         Thank you so much for allowing me to participate in the care of Carmen Rutledge MD

## 2018-03-12 ENCOUNTER — HOSPITAL ENCOUNTER (OUTPATIENT)
Dept: RADIOLOGY | Facility: HOSPITAL | Age: 73
Discharge: HOME | End: 2018-03-12
Attending: PHYSICIAN ASSISTANT
Payer: MEDICARE

## 2018-03-12 ENCOUNTER — OFFICE VISIT (OUTPATIENT)
Dept: ORTHOPEDICS | Facility: CLINIC | Age: 73
End: 2018-03-12
Payer: MEDICARE

## 2018-03-12 VITALS
BODY MASS INDEX: 30.71 KG/M2 | WEIGHT: 184.31 LBS | HEIGHT: 65 IN | DIASTOLIC BLOOD PRESSURE: 82 MMHG | SYSTOLIC BLOOD PRESSURE: 126 MMHG | HEART RATE: 68 BPM

## 2018-03-12 DIAGNOSIS — R52 PAIN: ICD-10-CM

## 2018-03-12 DIAGNOSIS — M16.12 PRIMARY OSTEOARTHRITIS OF LEFT HIP: ICD-10-CM

## 2018-03-12 DIAGNOSIS — R52 PAIN: Primary | ICD-10-CM

## 2018-03-12 PROCEDURE — 73502 X-RAY EXAM HIP UNI 2-3 VIEWS: CPT | Mod: TC,LT

## 2018-03-12 PROCEDURE — 99203 OFFICE O/P NEW LOW 30 MIN: CPT | Mod: S$GLB,,, | Performed by: PHYSICIAN ASSISTANT

## 2018-03-12 PROCEDURE — 99999 PR PBB SHADOW E&M-EST. PATIENT-LVL III: CPT | Mod: PBBFAC,,, | Performed by: PHYSICIAN ASSISTANT

## 2018-03-12 PROCEDURE — 73502 X-RAY EXAM HIP UNI 2-3 VIEWS: CPT | Mod: 26,LT,, | Performed by: RADIOLOGY

## 2018-03-12 PROCEDURE — 3074F SYST BP LT 130 MM HG: CPT | Mod: S$GLB,,, | Performed by: PHYSICIAN ASSISTANT

## 2018-03-12 PROCEDURE — 3079F DIAST BP 80-89 MM HG: CPT | Mod: S$GLB,,, | Performed by: PHYSICIAN ASSISTANT

## 2018-03-12 RX ORDER — METHYLPREDNISOLONE 4 MG/1
TABLET ORAL
Qty: 1 PACKAGE | Refills: 0 | Status: SHIPPED | OUTPATIENT
Start: 2018-03-12 | End: 2018-07-12

## 2018-03-13 NOTE — PROGRESS NOTES
SUBJECTIVE:     Chief Complaint & History of Present Illness:  Carmen Wang is a New patient 72 y.o. female who is seen here today with a complaint of    Chief Complaint   Patient presents with    Left Hip - Pain    Left Leg - Pain    .  She's here today for evaluation of gradual onset pain and soreness in the left hip and upper thigh region over the past several months.  There's been no specific trauma or injury to the leg or hip states she has a pain with certain rotational movements of the hip and flexion  On a scale of 1-10, with 10 being worst pain imaginable, he rates this pain as 3 on good days and 5 on bad days.  she describes the pain as tender and sore.      Past Medical History:   Diagnosis Date    Cataract     Chronic diarrhea     Hypertension     IBS (irritable bowel syndrome)        Past Surgical History:   Procedure Laterality Date    APPENDECTOMY      with the hysterectomy    BUNIONECTOMY      CHOLECYSTECTOMY      COLONOSCOPY N/A 10/5/2015    Procedure: COLONOSCOPY;  Surgeon: Teja Olivarez MD;  Location: [address]);  Service: Endoscopy;  Laterality: N/A;    HYSTERECTOMY      RUPERT secondary to pelvic pain, sacrocolpoplexy       Family History   Problem Relation Age of Onset    Hypertension Mother     Arthritis Mother     Thyroid disease Mother     Glaucoma Mother     Arthritis Father     Hypertension Father     Thyroid disease Sister     Breast cancer Sister 76    Thyroid disease Brother     Diabetes Brother     Thyroid disease Sister     Thyroid disease Sister     Cancer Brother      prostate    No Known Problems Sister     No Known Problems Sister     Lupus Daughter     Arthritis Son     Hypertension Son     Hypertension Son     No Known Problems Sister     No Known Problems Brother     No Known Problems Brother     No Known Problems Brother     No Known Problems Brother     No Known Problems Brother     Glaucoma Maternal Aunt     Stroke  Maternal Grandmother     Glaucoma Maternal Aunt     No Known Problems Maternal Uncle     No Known Problems Paternal Aunt     No Known Problems Paternal Uncle     No Known Problems Maternal Grandfather     No Known Problems Paternal Grandmother     No Known Problems Paternal Grandfather     Colon cancer Neg Hx     Ovarian cancer Neg Hx     Amblyopia Neg Hx     Blindness Neg Hx     Cataracts Neg Hx     Macular degeneration Neg Hx     Retinal detachment Neg Hx     Strabismus Neg Hx     Esophageal cancer Neg Hx        Review of patient's allergies indicates:   Allergen Reactions    Codeine      Other reaction(s): Hallucinations    Penicillins Hives     Other reaction(s): Rash         Current Outpatient Prescriptions:     benazepril (LOTENSIN) 20 MG tablet, Take 1 tablet (20 mg total) by mouth once daily., Disp: 90 tablet, Rfl: 3    ferrous gluconate (IRON HIGH POTENCY) 240 (27 FE) MG tablet, Take 240 mg by mouth 3 (three) times daily with meals., Disp: , Rfl:     triamterene-hydrochlorothiazide 37.5-25 mg (DYAZIDE) 37.5-25 mg per capsule, TAKE 1 CAPSULE BY MOUTH EVERY MORNING., Disp: 90 capsule, Rfl: 3    triamterene-hydrochlorothiazide 37.5-25 mg (MAXZIDE-25) 37.5-25 mg per tablet, Take 1 tablet by mouth once daily., Disp: , Rfl:     methylPREDNISolone (MEDROL DOSEPACK) 4 mg tablet, use as directed, Disp: 1 Package, Rfl: 0    sodium chloride 5% (PUJA 128) 5 % ophthalmic solution, Place 2 drops into both eyes 2 (two) times daily., Disp: 15 mL, Rfl: 5    Review of Systems:  ROS:  Constitutional: no fever or chills  Eyes: no visual changes  ENT: no nasal congestion or sore throat  Respiratory: no cough or shortness of breath  Cardiovascular: no chest pain or palpitations  Gastrointestinal: no nausea or vomiting, tolerating diet, Positive for IBS  Genitourinary: no hematuria or dysuria, Positive CK D stage III,   Integument/Breast: no rash or pruritis  Hematologic/Lymphatic: no easy bruising or  "lymphadenopathy  Musculoskeletal: no arthralgias or myalgias  Neurological: no seizures or tremors  Behavioral/Psych: no auditory or visual hallucinations  Endocrine: no heat or cold intolerance      PE:  /82 (BP Location: Right arm, Patient Position: Sitting)   Pulse 68   Ht 5' 5" (1.651 m)   Wt 83.6 kg (184 lb 4.9 oz)   LMP 01/01/1982 (Approximate)   BMI 30.67 kg/m²   General: Pleasant, cooperative, NAD   HEENT: NCAT, sclera nonicteric   Lungs: Respirations are equal and unlabored.   Abdomen: Soft and non-tender.  CV: 2+ bilateral upper and lower extremity pulses.   Skin: Intact throughout LE with no rashes, erythema, or lesions  Extremities: No LE edema, NVI lower extremities        Hip Exam:   leftpositives: pain with rotation and negatives: FROM  no pain with heel impact  pulses full    130 degrees flexion  0 degrees extension   30 degrees internal rotation  40 degrees external rotation  35 degrees abduction  0 degrees adduction   0 flexion contracture      RADIOGRAPHS:  X-rays taken today films viewed by me demonstrate mild to moderate arthritic changes throughout the left hip with evidence of impingement in the inferior acetabular region with sclerotic changes in this area no other evidence of fracture dislocation or other bony abnormalities    ASSESSMENT/PLAN:     Plan: We discussed with the patient at length all the different treatment options available for arthrosis of the hip including anti-inflammatories, acetaminophen, rest, ice, lower extremity strengthening exercise, occasional cortisone injections for temporary relief, and finally total hip arthroplasty.   Patient would like to begin with conservative treatment we'll schedule her physical therapy for range of motion and strengthening her back in 3 weeks to assess progress sooner symptoms dictate  "

## 2018-04-09 ENCOUNTER — OFFICE VISIT (OUTPATIENT)
Dept: ORTHOPEDICS | Facility: CLINIC | Age: 73
End: 2018-04-09
Payer: MEDICARE

## 2018-04-09 VITALS
HEART RATE: 65 BPM | WEIGHT: 184 LBS | SYSTOLIC BLOOD PRESSURE: 138 MMHG | HEIGHT: 65 IN | DIASTOLIC BLOOD PRESSURE: 80 MMHG | BODY MASS INDEX: 30.66 KG/M2

## 2018-04-09 DIAGNOSIS — M16.12 PRIMARY OSTEOARTHRITIS OF LEFT HIP: Primary | ICD-10-CM

## 2018-04-09 PROCEDURE — 3079F DIAST BP 80-89 MM HG: CPT | Mod: S$GLB,,, | Performed by: PHYSICIAN ASSISTANT

## 2018-04-09 PROCEDURE — 3075F SYST BP GE 130 - 139MM HG: CPT | Mod: S$GLB,,, | Performed by: PHYSICIAN ASSISTANT

## 2018-04-09 PROCEDURE — 99213 OFFICE O/P EST LOW 20 MIN: CPT | Mod: S$GLB,,, | Performed by: PHYSICIAN ASSISTANT

## 2018-04-09 PROCEDURE — 99999 PR PBB SHADOW E&M-EST. PATIENT-LVL III: CPT | Mod: PBBFAC,,, | Performed by: PHYSICIAN ASSISTANT

## 2018-04-09 NOTE — PROGRESS NOTES
SUBJECTIVE:     Chief Complaint & History of Present Illness:  Carmen Wang is a Established patient 72 y.o. female who is seen here today with a complaint of    Chief Complaint   Patient presents with    Left Hip - Pain    .  She is patient well known to me was last seen and treated in the clinic for this condition 3/12/2018 at which time we had begun on a course of physical therapy and a steroid pack for her painful hips.  She has responded very well with physical therapy and her pain is just about returned to baseline.  She has about 4 sessions left and is very enthusiastic about her great progress  On a scale of 1-10, with 10 being worst pain imaginable, he rates this pain as 1 on good days and 3 on bad days.  she describes the pain as sore.      Past Medical History:   Diagnosis Date    Cataract     Chronic diarrhea     Hypertension     IBS (irritable bowel syndrome)        Past Surgical History:   Procedure Laterality Date    APPENDECTOMY      with the hysterectomy    BUNIONECTOMY      CHOLECYSTECTOMY      COLONOSCOPY N/A 10/5/2015    Procedure: COLONOSCOPY;  Surgeon: Teja Olivarez MD;  Location: [facility] ([address] [place] [address]);  Service: Endoscopy;  Laterality: N/A;    HYSTERECTOMY      RUPERT secondary to pelvic pain, sacrocolpoplexy       Family History   Problem Relation Age of Onset    Hypertension Mother     Arthritis Mother     Thyroid disease Mother     Glaucoma Mother     Arthritis Father     Hypertension Father     Thyroid disease Sister     Breast cancer Sister 76    Thyroid disease Brother     Diabetes Brother     Thyroid disease Sister     Thyroid disease Sister     Cancer Brother      prostate    No Known Problems Sister     No Known Problems Sister     Lupus Daughter     Arthritis Son     Hypertension Son     Hypertension Son     No Known Problems Sister     No Known Problems Brother     No Known Problems Brother     No Known Problems Brother     No Known Problems  Brother     No Known Problems Brother     Glaucoma Maternal Aunt     Stroke Maternal Grandmother     Glaucoma Maternal Aunt     No Known Problems Maternal Uncle     No Known Problems Paternal Aunt     No Known Problems Paternal Uncle     No Known Problems Maternal Grandfather     No Known Problems Paternal Grandmother     No Known Problems Paternal Grandfather     Colon cancer Neg Hx     Ovarian cancer Neg Hx     Amblyopia Neg Hx     Blindness Neg Hx     Cataracts Neg Hx     Macular degeneration Neg Hx     Retinal detachment Neg Hx     Strabismus Neg Hx     Esophageal cancer Neg Hx        Review of patient's allergies indicates:   Allergen Reactions    Codeine      Other reaction(s): Hallucinations    Penicillins Hives     Other reaction(s): Rash         Current Outpatient Prescriptions:     benazepril (LOTENSIN) 20 MG tablet, Take 1 tablet (20 mg total) by mouth once daily., Disp: 90 tablet, Rfl: 3    ferrous gluconate (IRON HIGH POTENCY) 240 (27 FE) MG tablet, Take 240 mg by mouth 3 (three) times daily with meals., Disp: , Rfl:     methylPREDNISolone (MEDROL DOSEPACK) 4 mg tablet, use as directed, Disp: 1 Package, Rfl: 0    triamterene-hydrochlorothiazide 37.5-25 mg (DYAZIDE) 37.5-25 mg per capsule, TAKE 1 CAPSULE BY MOUTH EVERY MORNING., Disp: 90 capsule, Rfl: 3    triamterene-hydrochlorothiazide 37.5-25 mg (MAXZIDE-25) 37.5-25 mg per tablet, Take 1 tablet by mouth once daily., Disp: , Rfl:     sodium chloride 5% (PUJA 128) 5 % ophthalmic solution, Place 2 drops into both eyes 2 (two) times daily., Disp: 15 mL, Rfl: 5    Review of Systems:  ROS:  Constitutional: no fever or chills  Eyes: no visual changes  ENT: no nasal congestion or sore throat  Respiratory: no cough or shortness of breath  Cardiovascular: no chest pain or palpitations  Gastrointestinal: no nausea or vomiting, tolerating diet, Positive for IBS  Genitourinary: no hematuria or dysuria, Positive CK D stage III,  "  Integument/Breast: no rash or pruritis  Hematologic/Lymphatic: no easy bruising or lymphadenopathy  Musculoskeletal: no arthralgias or myalgias  Neurological: no seizures or tremors  Behavioral/Psych: no auditory or visual hallucinations  Endocrine: no heat or cold intolerance       PE:  /80 (BP Location: Left arm, Patient Position: Sitting)   Pulse 65   Ht 5' 5" (1.651 m)   Wt 83.4 kg (183 lb 15.6 oz)   LMP 01/01/1982 (Approximate)   BMI 30.61 kg/m²   General: Pleasant, cooperative, NAD   HEENT: NCAT, sclera nonicteric   Lungs: Respirations are equal and unlabored.   Abdomen: Soft and non-tender.  CV: 2+ bilateral upper and lower extremity pulses.   Skin: Intact throughout LE with no rashes, erythema, or lesions  Extremities: No LE edema, NVI lower extremities        Hip Exam:   leftfull painless range of motion, without tenderness    135 degrees flexion  -05 degrees extension   30 degrees internal rotation  40 degrees external rotation  40 degrees abduction  0 degrees adduction   0 flexion contracture            ASSESSMENT/PLAN:     Plan: We discussed with the patient at length all the different treatment options available for arthrosis of the hip including anti-inflammatories, acetaminophen, rest, ice, lower extremity strengthening exercise, occasional cortisone injections for temporary relief, and finally total hip arthroplasty.   She'll continue her current course of physical therapy with plans to transition to an independent program.  We'll follow-up as needed  "

## 2018-06-13 ENCOUNTER — PATIENT MESSAGE (OUTPATIENT)
Dept: INTERNAL MEDICINE | Facility: CLINIC | Age: 73
End: 2018-06-13

## 2018-06-27 ENCOUNTER — PATIENT MESSAGE (OUTPATIENT)
Dept: INTERNAL MEDICINE | Facility: CLINIC | Age: 73
End: 2018-06-27

## 2018-07-03 ENCOUNTER — PATIENT MESSAGE (OUTPATIENT)
Dept: INTERNAL MEDICINE | Facility: CLINIC | Age: 73
End: 2018-07-03

## 2018-07-05 ENCOUNTER — TELEPHONE (OUTPATIENT)
Dept: NEPHROLOGY | Facility: CLINIC | Age: 73
End: 2018-07-05

## 2018-07-05 DIAGNOSIS — N18.30 CKD (CHRONIC KIDNEY DISEASE), STAGE III: Primary | ICD-10-CM

## 2018-07-09 ENCOUNTER — TELEPHONE (OUTPATIENT)
Dept: INTERNAL MEDICINE | Facility: CLINIC | Age: 73
End: 2018-07-09

## 2018-07-09 DIAGNOSIS — N18.30 CKD (CHRONIC KIDNEY DISEASE), STAGE III: ICD-10-CM

## 2018-07-09 DIAGNOSIS — I10 ESSENTIAL HYPERTENSION: ICD-10-CM

## 2018-07-09 DIAGNOSIS — Z00.00 ANNUAL PHYSICAL EXAM: Primary | ICD-10-CM

## 2018-07-09 DIAGNOSIS — D64.9 ANEMIA, UNSPECIFIED TYPE: ICD-10-CM

## 2018-07-09 NOTE — TELEPHONE ENCOUNTER
----- Message from Opal Nye sent at 7/9/2018  8:11 AM CDT -----  Contact: self/ [phone]  Doctor appointment and lab have been scheduled.  Please link lab orders to the lab appointment.  Date of doctor appointment:  7/17/19  Physical or EP:  phys  Date of lab appointment:  7/10/19  Comments:

## 2018-07-10 ENCOUNTER — LAB VISIT (OUTPATIENT)
Dept: LAB | Facility: HOSPITAL | Age: 73
End: 2018-07-10
Attending: INTERNAL MEDICINE
Payer: MEDICARE

## 2018-07-10 DIAGNOSIS — D64.9 ANEMIA, UNSPECIFIED TYPE: ICD-10-CM

## 2018-07-10 DIAGNOSIS — Z00.00 ANNUAL PHYSICAL EXAM: ICD-10-CM

## 2018-07-10 DIAGNOSIS — I10 ESSENTIAL HYPERTENSION: ICD-10-CM

## 2018-07-10 DIAGNOSIS — N18.30 CKD (CHRONIC KIDNEY DISEASE), STAGE III: ICD-10-CM

## 2018-07-10 DIAGNOSIS — N18.30 STAGE 3 CHRONIC KIDNEY DISEASE: ICD-10-CM

## 2018-07-10 LAB
25(OH)D3+25(OH)D2 SERPL-MCNC: 23 NG/ML
ALBUMIN SERPL BCP-MCNC: 3.5 G/DL
ALBUMIN SERPL BCP-MCNC: 3.5 G/DL
ALP SERPL-CCNC: 67 U/L
ALT SERPL W/O P-5'-P-CCNC: 16 U/L
ANION GAP SERPL CALC-SCNC: 9 MMOL/L
AST SERPL-CCNC: 21 U/L
BASOPHILS # BLD AUTO: 0.03 K/UL
BASOPHILS NFR BLD: 0.5 %
BILIRUB SERPL-MCNC: 0.4 MG/DL
BUN SERPL-MCNC: 19 MG/DL
CALCIUM SERPL-MCNC: 10.3 MG/DL
CHLORIDE SERPL-SCNC: 103 MMOL/L
CHOLEST SERPL-MCNC: 194 MG/DL
CHOLEST/HDLC SERPL: 3.5 {RATIO}
CO2 SERPL-SCNC: 27 MMOL/L
CREAT SERPL-MCNC: 1.2 MG/DL
DIFFERENTIAL METHOD: ABNORMAL
EOSINOPHIL # BLD AUTO: 0.5 K/UL
EOSINOPHIL NFR BLD: 6.9 %
ERYTHROCYTE [DISTWIDTH] IN BLOOD BY AUTOMATED COUNT: 15.9 %
EST. GFR  (AFRICAN AMERICAN): 52.2 ML/MIN/1.73 M^2
EST. GFR  (NON AFRICAN AMERICAN): 45.3 ML/MIN/1.73 M^2
GLUCOSE SERPL-MCNC: 82 MG/DL
HCT VFR BLD AUTO: 40.4 %
HDLC SERPL-MCNC: 56 MG/DL
HDLC SERPL: 28.9 %
HGB BLD-MCNC: 12.5 G/DL
IMM GRANULOCYTES # BLD AUTO: 0.01 K/UL
IMM GRANULOCYTES NFR BLD AUTO: 0.2 %
LDLC SERPL CALC-MCNC: 125.4 MG/DL
LYMPHOCYTES # BLD AUTO: 2.7 K/UL
LYMPHOCYTES NFR BLD: 40.2 %
MCH RBC QN AUTO: 27.2 PG
MCHC RBC AUTO-ENTMCNC: 30.9 G/DL
MCV RBC AUTO: 88 FL
MONOCYTES # BLD AUTO: 0.7 K/UL
MONOCYTES NFR BLD: 10.8 %
NEUTROPHILS # BLD AUTO: 2.8 K/UL
NEUTROPHILS NFR BLD: 41.4 %
NONHDLC SERPL-MCNC: 138 MG/DL
NRBC BLD-RTO: 0 /100 WBC
PHOSPHATE SERPL-MCNC: 3.6 MG/DL
PLATELET # BLD AUTO: 280 K/UL
PMV BLD AUTO: 11.9 FL
POTASSIUM SERPL-SCNC: 4 MMOL/L
PROT SERPL-MCNC: 7.3 G/DL
PTH-INTACT SERPL-MCNC: 93 PG/ML
RBC # BLD AUTO: 4.6 M/UL
SODIUM SERPL-SCNC: 139 MMOL/L
T4 FREE SERPL-MCNC: 0.9 NG/DL
TRIGL SERPL-MCNC: 63 MG/DL
TSH SERPL DL<=0.005 MIU/L-ACNC: 4.16 UIU/ML
WBC # BLD AUTO: 6.65 K/UL

## 2018-07-10 PROCEDURE — 80061 LIPID PANEL: CPT

## 2018-07-10 PROCEDURE — 36415 COLL VENOUS BLD VENIPUNCTURE: CPT | Mod: PO

## 2018-07-10 PROCEDURE — 84443 ASSAY THYROID STIM HORMONE: CPT

## 2018-07-10 PROCEDURE — 85025 COMPLETE CBC W/AUTO DIFF WBC: CPT

## 2018-07-10 PROCEDURE — 82306 VITAMIN D 25 HYDROXY: CPT

## 2018-07-10 PROCEDURE — 83970 ASSAY OF PARATHORMONE: CPT

## 2018-07-10 PROCEDURE — 80069 RENAL FUNCTION PANEL: CPT

## 2018-07-10 PROCEDURE — 84439 ASSAY OF FREE THYROXINE: CPT

## 2018-07-10 PROCEDURE — 80053 COMPREHEN METABOLIC PANEL: CPT

## 2018-07-11 ENCOUNTER — PATIENT MESSAGE (OUTPATIENT)
Dept: ADMINISTRATIVE | Facility: OTHER | Age: 73
End: 2018-07-11

## 2018-07-12 ENCOUNTER — OFFICE VISIT (OUTPATIENT)
Dept: NEPHROLOGY | Facility: CLINIC | Age: 73
End: 2018-07-12
Payer: MEDICARE

## 2018-07-12 VITALS
HEART RATE: 74 BPM | SYSTOLIC BLOOD PRESSURE: 132 MMHG | DIASTOLIC BLOOD PRESSURE: 82 MMHG | WEIGHT: 183.19 LBS | HEIGHT: 65 IN | BODY MASS INDEX: 30.52 KG/M2 | OXYGEN SATURATION: 99 %

## 2018-07-12 DIAGNOSIS — N18.30 CKD (CHRONIC KIDNEY DISEASE), STAGE III: Primary | ICD-10-CM

## 2018-07-12 DIAGNOSIS — I10 ESSENTIAL HYPERTENSION: ICD-10-CM

## 2018-07-12 PROCEDURE — 99999 PR PBB SHADOW E&M-EST. PATIENT-LVL II: CPT | Mod: PBBFAC,,, | Performed by: INTERNAL MEDICINE

## 2018-07-12 PROCEDURE — 3079F DIAST BP 80-89 MM HG: CPT | Mod: S$GLB,,, | Performed by: INTERNAL MEDICINE

## 2018-07-12 PROCEDURE — 99214 OFFICE O/P EST MOD 30 MIN: CPT | Mod: S$GLB,,, | Performed by: INTERNAL MEDICINE

## 2018-07-12 PROCEDURE — 3075F SYST BP GE 130 - 139MM HG: CPT | Mod: S$GLB,,, | Performed by: INTERNAL MEDICINE

## 2018-07-12 RX ORDER — ERGOCALCIFEROL 1.25 MG/1
50000 CAPSULE ORAL
Qty: 7 CAPSULE | Refills: 0 | Status: SHIPPED | OUTPATIENT
Start: 2018-07-12 | End: 2019-01-24 | Stop reason: CLARIF

## 2018-07-12 NOTE — PROGRESS NOTES
HISTORY OF PRESENT ILLNESS:  This is a 73-year-old -American female with   longstanding history of hypertension, irritable bowel syndrome and CKD stage   III, who is coming in for f/u.  The patient states her blood pressures   are fairly well controlled in the 130's-140's/70s-80's.   No recent hospitalizations. Denies NSAID's.    PAST MEDICAL HISTORY:  Significant for hypertension for five years, CKD stage   III, IBS.    REVIEW OF SYSTEMS:  She denies any frequency, urgency, hematuria, dysuria,   nausea, vomiting, chest pain or shortness of breath.     PHYSICAL EXAMINATION:  GENERAL:  Well-nourished, well-developed individual, in no acute distress.  HEENT:  PERRLA, EOMI.  NECK:  No cervical lymphadenopathy.  CARDIOVASCULAR:  Rate and rhythm regular murmurs, gallops.  ABDOMEN:  Soft, nontender, nondistended.  EXTREMITIES:  Trace edema in the ankles, more so on the left side, which is   chronic for her.  SKIN:  No erythema, warm to touch.  PSYCHIATRIC:  Alert and oriented x3.  The patient has good judgment and insight.    ASSESSMENT AND PLAN:  This is a 73-year-old -American female with   longstanding history of hypertension and chronic kidney disease stage III, who   is coming in for f/u.  1.  Chronic kidney disease stage III with an MDRD GFR of 50 mL per minute.  Her   baseline creatinine appears to be around 1.1 to 1.2 and occasionally lower and   higher with the most recent creatinine of 1.2.  Her CKD is likely secondary to   longstanding hypertension and age-related nephron loss.    I advised her to stay away from NSAIDs and to hydrate well.  She understands the   importance of hydration.  On  benazepril 20 mg for nephro-protection.  She is also on triamterene and hydrochlorothiazide and discussed low-potassium diet in the past while she was on both, has not had any potassium issues.  We will monitor closely and take her off triamterene if   needed if potassium becomes an issue.  2.  Hypertension.   Blood pressures are stable.   She will monitor blood pressures, currently stable, she   will notify us if they are high.  3.  Renal osteodystrophy.   intact PTH stable .  Ca/phos stable.  Her calciums are borderline high and is not  taking  calcium supplements.  On HCTZ-will con't to monitor. SPEP stable.  4.  Anemia.  Hemoglobin is stable.  5.  She also had a recent ultrasound, which was stable with a simple cyst.    RTC in 7-8 months.

## 2018-07-17 ENCOUNTER — OFFICE VISIT (OUTPATIENT)
Dept: INTERNAL MEDICINE | Facility: CLINIC | Age: 73
End: 2018-07-17
Payer: MEDICARE

## 2018-07-17 VITALS
DIASTOLIC BLOOD PRESSURE: 72 MMHG | HEART RATE: 66 BPM | BODY MASS INDEX: 29.66 KG/M2 | HEIGHT: 65 IN | WEIGHT: 178 LBS | SYSTOLIC BLOOD PRESSURE: 136 MMHG

## 2018-07-17 DIAGNOSIS — Z00.00 ANNUAL PHYSICAL EXAM: Primary | ICD-10-CM

## 2018-07-17 DIAGNOSIS — N18.30 STAGE 3 CHRONIC KIDNEY DISEASE: ICD-10-CM

## 2018-07-17 DIAGNOSIS — Z12.11 SCREENING FOR COLON CANCER: ICD-10-CM

## 2018-07-17 DIAGNOSIS — R79.89 ABNORMAL THYROID BLOOD TEST: ICD-10-CM

## 2018-07-17 DIAGNOSIS — R79.89 LOW VITAMIN D LEVEL: ICD-10-CM

## 2018-07-17 DIAGNOSIS — I10 ESSENTIAL HYPERTENSION: ICD-10-CM

## 2018-07-17 PROCEDURE — 3075F SYST BP GE 130 - 139MM HG: CPT | Mod: S$GLB,,, | Performed by: INTERNAL MEDICINE

## 2018-07-17 PROCEDURE — 3078F DIAST BP <80 MM HG: CPT | Mod: S$GLB,,, | Performed by: INTERNAL MEDICINE

## 2018-07-17 PROCEDURE — G0009 ADMIN PNEUMOCOCCAL VACCINE: HCPCS | Mod: S$GLB,,, | Performed by: INTERNAL MEDICINE

## 2018-07-17 PROCEDURE — 99999 PR PBB SHADOW E&M-EST. PATIENT-LVL III: CPT | Mod: PBBFAC,,, | Performed by: INTERNAL MEDICINE

## 2018-07-17 PROCEDURE — 99397 PER PM REEVAL EST PAT 65+ YR: CPT | Mod: S$GLB,,, | Performed by: INTERNAL MEDICINE

## 2018-07-17 PROCEDURE — 90732 PPSV23 VACC 2 YRS+ SUBQ/IM: CPT | Mod: S$GLB,,, | Performed by: INTERNAL MEDICINE

## 2018-07-17 NOTE — PROGRESS NOTES
Answers for HPI/ROS submitted by the patient on 7/10/2018   Hypertension  Chronicity: recurrent  Onset: more than 1 year ago  Progression since onset: gradually improving  Condition status: controlled  anxiety: No  blurred vision: No  chest pain: No  headaches: No  malaise/fatigue: No  neck pain: No  orthopnea: No  palpitations: No  PND: No  shortness of breath: No  sweats: No  Agents associated with hypertension: no associated agents  CAD risks: no known risk factors  Compliance problems: diet  Past treatments: diuretics  Improvement on treatment: moderate      PAST MEDICAL HISTORY:  Hypertension.  Irritable bowel syndrome, with with diarrhea  Chronic kidney disease stage III, with secondary hyperparathyroid  Tachycardia  Iron deficiency anemia  Multinodular thyroid gland, on ultrasound  Cholecystectomy.  Hysterectomy.  Bladder suspension.  Lumbar degenerative disk disease.  Gluteal muscular tear.  Right foot surgery.  Resection of lipoma from the groin.  Bout of postcholecystectomy diarrhea.  Prior history of shingles.  Hearing impairment.    SOCIAL HISTORY:  Tobacco and alcohol use - none.  Exercises by doing a treadmill   and elliptical.    FAMILY HISTORY:  Very extensive.  Six sisters and seven brothers, thyroid conditions in three   sisters, one brother with diabetes, another brother with prostate cancer, but   all 13 siblings are living.    SCREENING:  Colonoscopy in October 2015 revealed diverticulosis.    MEDICATIONS:  Benazepril 20 mg daily  Vitamin D 50,000 units once a week  Dyazide once daily      REASON FOR VISIT:  This is a 73-year-old female who comes in for an annual   routine visit.  She is still being followed by Nephrology.  Recently, vitamin D   50,000 units once a week was added because of low vitamin D and also now for the   past few days, she has been off her iron pills.    In the interim, she met with Dr. Rutledge of Gastroenterology for diarrhea in March.    Testing for celiac disease was  negative.  She also tried IBgard without   success.  Really the times when she might have a loose stool is after eating   something or when it is hot or warm or when she feels hot or warm and actually   it is more of a soft stool, it is not watery or liquid like.    RECENT LABS:  TSH was elevated at 4.1, but free T4 is normal at 0.9.    Cholesterol 194 with HDL 56, .  Chemistry was normal.  Creatinine was   1.2, which is stable.  Hematocrit 40.4 which is improved with MCV now 88.    Vitamin D 23.  PTH is 93.    REVIEW OF SYMPTOMS:  Reports no pains in the chest, palpitations, shortness of   breath, or abdominal pain.  No difficulty urinating, although it is frequent.    Currently, has no arthralgias or headaches.  Occasionally might wake up with a   pain in the back of the left neck.    PHYSICAL EXAMINATION:  VITAL SIGNS:  Weight is 178 pounds, pulse 68, blood pressure 136/72.  HEENT:  Bilateral hearing aids.  Tympanic membranes normal.  Nasal mucosa is   clear.  Oropharynx, no abnormal findings.  NECK:  No thyromegaly.  No palpable masses.  LUNGS:  Clear breath sounds.  HEART:  Regular rate and rhythm.  ABDOMEN:  Active bowel sounds, soft, nontender.  No hepatosplenomegaly or   abdominal masses.  PULSES:  2+ carotid, 2+ pedal pulses.  EXTREMITIES:  No edema.  LYMPH GLAND:  No palpable adenopathy.  MUSCULOSKELETAL:  No joint deformities.    REVIEW OF THE CHART:  She does maintain regular eye examinations.    IMPRESSION:  1.  General exam.  2.  Hypertension.  3.  Chronic kidney disease with secondary hyperparathyroid.  4.  Low vitamin D.  5.  Episodic diarrhea.  6.  History of iron deficiency anemia.  7.  Elevated TSH with family history of thyroid disease.    PLAN:  In two months, we will repeat a TSH along with the TPO antibody, also   repeat a hematology profile.  Continue with attention to proper diet and   physical activity.          /ls [id number] review        KATHIA/DONNIE  dd: 07/17/2018 10:09:20 (CDT)  td:  07/17/2018 12:12:51 (CDT)  Doc ID   #[id number]  Job ID #[id number]    CC:

## 2018-07-20 ENCOUNTER — LAB VISIT (OUTPATIENT)
Dept: LAB | Facility: HOSPITAL | Age: 73
End: 2018-07-20
Attending: INTERNAL MEDICINE
Payer: MEDICARE

## 2018-07-20 DIAGNOSIS — Z12.11 SCREENING FOR COLON CANCER: ICD-10-CM

## 2018-07-20 LAB — HEMOCCULT STL QL IA: NEGATIVE

## 2018-07-20 PROCEDURE — 82274 ASSAY TEST FOR BLOOD FECAL: CPT

## 2018-08-28 RX ORDER — ERGOCALCIFEROL 1.25 MG/1
CAPSULE ORAL
Qty: 7 CAPSULE | Refills: 0 | OUTPATIENT
Start: 2018-08-28

## 2018-09-04 ENCOUNTER — HOSPITAL ENCOUNTER (OUTPATIENT)
Dept: RADIOLOGY | Facility: HOSPITAL | Age: 73
Discharge: HOME | End: 2018-09-04
Attending: PHYSICIAN ASSISTANT
Payer: MEDICARE

## 2018-09-04 ENCOUNTER — OFFICE VISIT (OUTPATIENT)
Dept: ORTHOPEDICS | Facility: CLINIC | Age: 73
End: 2018-09-04
Payer: MEDICARE

## 2018-09-04 VITALS
HEIGHT: 65 IN | WEIGHT: 182 LBS | DIASTOLIC BLOOD PRESSURE: 72 MMHG | SYSTOLIC BLOOD PRESSURE: 121 MMHG | BODY MASS INDEX: 30.32 KG/M2

## 2018-09-04 DIAGNOSIS — M70.61 TROCHANTERIC BURSITIS OF RIGHT HIP: ICD-10-CM

## 2018-09-04 DIAGNOSIS — M54.41 ACUTE RIGHT-SIDED BACK PAIN WITH SCIATICA: Primary | ICD-10-CM

## 2018-09-04 DIAGNOSIS — M54.41 ACUTE RIGHT-SIDED BACK PAIN WITH SCIATICA: ICD-10-CM

## 2018-09-04 PROCEDURE — 20610 DRAIN/INJ JOINT/BURSA W/O US: CPT | Mod: PBBFAC | Performed by: PHYSICIAN ASSISTANT

## 2018-09-04 PROCEDURE — 99213 OFFICE O/P EST LOW 20 MIN: CPT | Mod: PBBFAC,25 | Performed by: PHYSICIAN ASSISTANT

## 2018-09-04 PROCEDURE — 99213 OFFICE O/P EST LOW 20 MIN: CPT | Mod: S$PBB,25,, | Performed by: PHYSICIAN ASSISTANT

## 2018-09-04 PROCEDURE — 99999 PR PBB SHADOW E&M-EST. PATIENT-LVL III: CPT | Mod: PBBFAC,,, | Performed by: PHYSICIAN ASSISTANT

## 2018-09-04 PROCEDURE — 1101F PT FALLS ASSESS-DOCD LE1/YR: CPT | Mod: ,,, | Performed by: PHYSICIAN ASSISTANT

## 2018-09-04 PROCEDURE — 72110 X-RAY EXAM L-2 SPINE 4/>VWS: CPT | Mod: TC

## 2018-09-04 PROCEDURE — 3078F DIAST BP <80 MM HG: CPT | Mod: ,,, | Performed by: PHYSICIAN ASSISTANT

## 2018-09-04 PROCEDURE — 20610 DRAIN/INJ JOINT/BURSA W/O US: CPT | Mod: S$PBB,RT,, | Performed by: PHYSICIAN ASSISTANT

## 2018-09-04 PROCEDURE — 3074F SYST BP LT 130 MM HG: CPT | Mod: ,,, | Performed by: PHYSICIAN ASSISTANT

## 2018-09-04 PROCEDURE — 72110 X-RAY EXAM L-2 SPINE 4/>VWS: CPT | Mod: 26,,, | Performed by: RADIOLOGY

## 2018-09-04 RX ORDER — BETAMETHASONE SODIUM PHOSPHATE AND BETAMETHASONE ACETATE 3; 3 MG/ML; MG/ML
6 INJECTION, SUSPENSION INTRA-ARTICULAR; INTRALESIONAL; INTRAMUSCULAR; SOFT TISSUE
Status: COMPLETED | OUTPATIENT
Start: 2018-09-04 | End: 2018-09-04

## 2018-09-04 RX ADMIN — BETAMETHASONE ACETATE AND BETAMETHASONE SODIUM PHOSPHATE 6 MG: 3; 3 INJECTION, SUSPENSION INTRA-ARTICULAR; INTRALESIONAL; INTRAMUSCULAR; SOFT TISSUE at 11:09

## 2018-09-04 NOTE — PROGRESS NOTES
SUBJECTIVE:     Chief Complaint & History of Present Illness:  Carmen Wang is a Established patient 73 y.o. female who is seen here today with a complaint of    Chief Complaint   Patient presents with    Right Hip - Pain, Swelling    .  Patient well-known to me was last seen treated the clinic  04/09/2018 for left hip issues.  She continues to have soreness and pain across low back but this time radiating across the right hip.  These exacerbated ROM with periods of increased activity and exercise relieved with ice and rest.  On a scale of 1-10, with 10 being worst pain imaginable, he rates this pain as 3 on good days and 7 on bad days.  she describes the pain as   Tender.      Past Medical History:   Diagnosis Date    Cataract     Chronic diarrhea     Hypertension     IBS (irritable bowel syndrome)        Past Surgical History:   Procedure Laterality Date    APPENDECTOMY      with the hysterectomy    BUNIONECTOMY      CHOLECYSTECTOMY      HYSTERECTOMY      RUPERT secondary to pelvic pain, sacrocolpoplexy       Family History   Problem Relation Age of Onset    Hypertension Mother     Arthritis Mother     Thyroid disease Mother     Glaucoma Mother     Arthritis Father     Hypertension Father     Thyroid disease Sister     Breast cancer Sister 76    Thyroid disease Brother     Diabetes Brother     Thyroid disease Sister     Thyroid disease Sister     Cancer Brother         prostate    No Known Problems Sister     No Known Problems Sister     Lupus Daughter     Arthritis Son     Hypertension Son     Hypertension Son     No Known Problems Sister     Cancer Brother         prostate    No Known Problems Brother     No Known Problems Brother     No Known Problems Brother     No Known Problems Brother     Glaucoma Maternal Aunt     Stroke Maternal Grandmother     Glaucoma Maternal Aunt     No Known Problems Maternal Uncle     No Known Problems Paternal Aunt     No Known Problems  "Paternal Uncle     No Known Problems Maternal Grandfather     No Known Problems Paternal Grandmother     No Known Problems Paternal Grandfather     Colon cancer Neg Hx     Ovarian cancer Neg Hx     Amblyopia Neg Hx     Blindness Neg Hx     Cataracts Neg Hx     Macular degeneration Neg Hx     Retinal detachment Neg Hx     Strabismus Neg Hx     Esophageal cancer Neg Hx        Review of patient's allergies indicates:   Allergen Reactions    Codeine      Other reaction(s): Hallucinations    Penicillins Hives     Other reaction(s): Rash         Current Outpatient Medications:     benazepril (LOTENSIN) 20 MG tablet, Take 1 tablet (20 mg total) by mouth once daily., Disp: 90 tablet, Rfl: 3    triamterene-hydrochlorothiazide 37.5-25 mg (DYAZIDE) 37.5-25 mg per capsule, TAKE 1 CAPSULE BY MOUTH EVERY MORNING., Disp: 90 capsule, Rfl: 3    ergocalciferol (ERGOCALCIFEROL) 50,000 unit Cap, Take 1 capsule (50,000 Units total) by mouth every 7 days., Disp: 7 capsule, Rfl: 0    ferrous gluconate (IRON HIGH POTENCY) 240 (27 FE) MG tablet, Take 240 mg by mouth 3 (three) times daily with meals., Disp: , Rfl:     Review of Systems:  ROS:  Constitutional: no fever or chills  Eyes: no visual changes  ENT: no nasal congestion or sore throat  Respiratory: no cough or shortness of breath  Cardiovascular: no chest pain or palpitations  Gastrointestinal: no nausea or vomiting, tolerating diet, Positive for IBS  Genitourinary: no hematuria or dysuria, Positive CK D stage III,   Integument/Breast: no rash or pruritis  Hematologic/Lymphatic: no easy bruising or lymphadenopathy  Musculoskeletal: no arthralgias or myalgias  Neurological: no seizures or tremors  Behavioral/Psych: no auditory or visual hallucinations  Endocrine: no heat or cold intolerance       PE:  /72 (BP Location: Left arm, Patient Position: Sitting)   Ht 5' 5" (1.651 m)   Wt 82.6 kg (181 lb 15.8 oz)   LMP 01/01/1982 (Approximate)   BMI 30.28 kg/m² "   General: Pleasant, cooperative, NAD   HEENT: NCAT, sclera nonicteric   Lungs: Respirations are equal and unlabored.   Abdomen: Soft and non-tender.  CV: 2+ bilateral upper and lower extremity pulses.   Skin: Intact throughout LE with no rashes, erythema, or lesions  Extremities: No LE edema, NVI lower extremities        Hip Exam:   rightpositives: pain with flexion and tenderness over greater trochanter and negatives: FROM  no pain with heel impact  pulses full    125 degrees flexion  -05 degrees extension   35 degrees internal rotation  40 degrees external rotation  45 degrees abduction  -05 degrees adduction   0 flexion contracture     tenderness to palpation at the iliopsoas of the right hip     RADIOGRAPHS:   x-rays lumbar spine taken today films reviewed by me demonstrate well-preserved disc spaces throughout the lumbar spine no evidence of fracture dislocation or advanced degenerative joint disease or degenerative disc disease    ASSESSMENT/PLAN:     Plan: We discussed with the patient at length all the different treatment options available for arthrosis of the hip including anti-inflammatories, acetaminophen, rest, ice, lower extremity strengthening exercise, occasional cortisone injections for temporary relief, and finally total hip arthroplasty.    will proceed with therapeutic diagnostic cortisone injection of the right greater trochanteric bursa followed by a course of physical therapy    The injection site was identified and the skin was prepared with an ETOH solution. The  Greater trochanteric bursa of the   left  hip was injected with 1 ml of Celestone and 5 ml Lidocaine under sterile technique. Carmen Wang tolerated the procedure well, she was advised to rest the  hip  today, ice and support. she did receive immediate relief of the pain in and about her  hip  she was told this would be short lived and is secondary to the lidocaine. she may have an increase in her discomfort tonight followed by  steady improvement over the next several days. It may take 1-3 weeks following the injection to get the full benefit of the medication.  I will see her back in 4-6 weeks. Sooner if she has any problems or concerns.

## 2018-09-12 ENCOUNTER — TELEPHONE (OUTPATIENT)
Dept: ORTHOPEDICS | Facility: CLINIC | Age: 73
End: 2018-09-12

## 2018-09-12 ENCOUNTER — PATIENT MESSAGE (OUTPATIENT)
Dept: ORTHOPEDICS | Facility: CLINIC | Age: 73
End: 2018-09-12

## 2018-09-12 NOTE — TELEPHONE ENCOUNTER
Called pt to cancel her appt for Monday as she requested. I also told her Mr. Roma guidot be in today and will get back with her when he gets back.

## 2018-09-17 ENCOUNTER — PATIENT MESSAGE (OUTPATIENT)
Dept: ORTHOPEDICS | Facility: CLINIC | Age: 73
End: 2018-09-17

## 2018-09-17 ENCOUNTER — PATIENT MESSAGE (OUTPATIENT)
Dept: INTERNAL MEDICINE | Facility: CLINIC | Age: 73
End: 2018-09-17

## 2018-09-17 DIAGNOSIS — M70.61 TROCHANTERIC BURSITIS OF RIGHT HIP: Primary | ICD-10-CM

## 2018-09-18 ENCOUNTER — TELEPHONE (OUTPATIENT)
Dept: INTERNAL MEDICINE | Facility: CLINIC | Age: 73
End: 2018-09-18

## 2018-09-18 ENCOUNTER — TELEPHONE (OUTPATIENT)
Dept: ORTHOPEDICS | Facility: CLINIC | Age: 73
End: 2018-09-18

## 2018-09-18 ENCOUNTER — PATIENT MESSAGE (OUTPATIENT)
Dept: ORTHOPEDICS | Facility: CLINIC | Age: 73
End: 2018-09-18

## 2018-09-18 DIAGNOSIS — Z12.31 ENCOUNTER FOR SCREENING MAMMOGRAM FOR BREAST CANCER: Primary | ICD-10-CM

## 2018-09-18 NOTE — TELEPHONE ENCOUNTER
----- Message from Jeromy Glass sent at 9/18/2018  8:42 AM CDT -----  Contact: self  Patient states sent a message through her [facility] on yesterday-9/17 in regards to a visit and/or physical therapy Patient can be reached at

## 2018-09-18 NOTE — TELEPHONE ENCOUNTER
----- Message from Liliane Stanton sent at 9/18/2018  8:34 AM CDT -----  Contact: [employer] request  Message     ----- Message from [facility], System Message sent at 9/17/2018 11:48 AM CDT -----    Appointment Request From: Carmen Wang    With Provider: mammography    Preferred Date Range: 9/24/2018 - 10/12/2018    Preferred Times: Any time    Reason for visit: Existing Patient    Comments:  annual mammogram

## 2018-09-18 NOTE — TELEPHONE ENCOUNTER
I spoke to the pt letting her know that her physical therapy orders have been faxed. The pt's appt was also confirmed.

## 2018-09-20 ENCOUNTER — LAB VISIT (OUTPATIENT)
Dept: LAB | Facility: HOSPITAL | Age: 73
End: 2018-09-20
Attending: INTERNAL MEDICINE
Payer: MEDICARE

## 2018-09-20 DIAGNOSIS — I10 ESSENTIAL HYPERTENSION: ICD-10-CM

## 2018-09-20 DIAGNOSIS — R79.89 LOW VITAMIN D LEVEL: ICD-10-CM

## 2018-09-20 DIAGNOSIS — N18.30 STAGE 3 CHRONIC KIDNEY DISEASE: ICD-10-CM

## 2018-09-20 DIAGNOSIS — R79.89 ABNORMAL THYROID BLOOD TEST: ICD-10-CM

## 2018-09-20 DIAGNOSIS — Z00.00 ANNUAL PHYSICAL EXAM: ICD-10-CM

## 2018-09-20 LAB
BASOPHILS # BLD AUTO: 0.03 K/UL
BASOPHILS NFR BLD: 0.4 %
DIFFERENTIAL METHOD: ABNORMAL
EOSINOPHIL # BLD AUTO: 0.4 K/UL
EOSINOPHIL NFR BLD: 4.6 %
ERYTHROCYTE [DISTWIDTH] IN BLOOD BY AUTOMATED COUNT: 15.5 %
HCT VFR BLD AUTO: 41.3 %
HGB BLD-MCNC: 12.8 G/DL
IMM GRANULOCYTES # BLD AUTO: 0.02 K/UL
IMM GRANULOCYTES NFR BLD AUTO: 0.3 %
IRON SERPL-MCNC: 80 UG/DL
LYMPHOCYTES # BLD AUTO: 2.6 K/UL
LYMPHOCYTES NFR BLD: 33.9 %
MCH RBC QN AUTO: 26.3 PG
MCHC RBC AUTO-ENTMCNC: 31 G/DL
MCV RBC AUTO: 85 FL
MONOCYTES # BLD AUTO: 1 K/UL
MONOCYTES NFR BLD: 12.3 %
NEUTROPHILS # BLD AUTO: 3.8 K/UL
NEUTROPHILS NFR BLD: 48.5 %
NRBC BLD-RTO: 0 /100 WBC
PLATELET # BLD AUTO: 262 K/UL
PMV BLD AUTO: 12 FL
RBC # BLD AUTO: 4.87 M/UL
SATURATED IRON: 20 %
TOTAL IRON BINDING CAPACITY: 408 UG/DL
TRANSFERRIN SERPL-MCNC: 276 MG/DL
TSH SERPL DL<=0.005 MIU/L-ACNC: 1.95 UIU/ML
WBC # BLD AUTO: 7.79 K/UL

## 2018-09-20 PROCEDURE — 86376 MICROSOMAL ANTIBODY EACH: CPT

## 2018-09-20 PROCEDURE — 84443 ASSAY THYROID STIM HORMONE: CPT

## 2018-09-20 PROCEDURE — 83540 ASSAY OF IRON: CPT

## 2018-09-20 PROCEDURE — 85025 COMPLETE CBC W/AUTO DIFF WBC: CPT

## 2018-09-20 PROCEDURE — 36415 COLL VENOUS BLD VENIPUNCTURE: CPT | Mod: PO

## 2018-09-21 LAB — THYROPEROXIDASE IGG SERPL-ACNC: <6 IU/ML

## 2018-09-22 ENCOUNTER — PATIENT MESSAGE (OUTPATIENT)
Dept: INTERNAL MEDICINE | Facility: CLINIC | Age: 73
End: 2018-09-22

## 2018-09-22 DIAGNOSIS — N18.30 CKD (CHRONIC KIDNEY DISEASE), STAGE III: ICD-10-CM

## 2018-09-22 DIAGNOSIS — I10 ESSENTIAL HYPERTENSION: Primary | ICD-10-CM

## 2018-09-25 ENCOUNTER — TELEPHONE (OUTPATIENT)
Dept: INTERNAL MEDICINE | Facility: CLINIC | Age: 73
End: 2018-09-25

## 2018-09-26 ENCOUNTER — PATIENT MESSAGE (OUTPATIENT)
Dept: INTERNAL MEDICINE | Facility: CLINIC | Age: 73
End: 2018-09-26

## 2018-10-03 ENCOUNTER — HOSPITAL ENCOUNTER (OUTPATIENT)
Dept: RADIOLOGY | Facility: HOSPITAL | Age: 73
Discharge: HOME | End: 2018-10-03
Attending: INTERNAL MEDICINE
Payer: MEDICARE

## 2018-10-03 DIAGNOSIS — Z12.31 ENCOUNTER FOR SCREENING MAMMOGRAM FOR BREAST CANCER: ICD-10-CM

## 2018-10-03 PROCEDURE — 77063 BREAST TOMOSYNTHESIS BI: CPT | Mod: 26,,, | Performed by: RADIOLOGY

## 2018-10-03 PROCEDURE — 77063 BREAST TOMOSYNTHESIS BI: CPT | Mod: TC

## 2018-10-03 PROCEDURE — 77067 SCR MAMMO BI INCL CAD: CPT | Mod: TC

## 2018-10-03 PROCEDURE — 77067 SCR MAMMO BI INCL CAD: CPT | Mod: 26,,, | Performed by: RADIOLOGY

## 2018-10-11 ENCOUNTER — TELEPHONE (OUTPATIENT)
Dept: ORTHOPEDICS | Facility: CLINIC | Age: 73
End: 2018-10-11

## 2018-10-11 NOTE — TELEPHONE ENCOUNTER
----- Message from Kezia Herrera sent at 10/11/2018  8:46 AM CDT -----  Contact: Self  Pt needs a call back regarding appt can be reached @work

## 2018-10-18 RX ORDER — TRIAMTERENE AND HYDROCHLOROTHIAZIDE 37.5; 25 MG/1; MG/1
1 CAPSULE ORAL EVERY MORNING
Qty: 90 CAPSULE | Refills: 3 | Status: SHIPPED | OUTPATIENT
Start: 2018-10-18 | End: 2019-10-09 | Stop reason: SDUPTHER

## 2018-10-29 ENCOUNTER — PATIENT MESSAGE (OUTPATIENT)
Dept: ORTHOPEDICS | Facility: CLINIC | Age: 73
End: 2018-10-29

## 2018-10-30 ENCOUNTER — PATIENT MESSAGE (OUTPATIENT)
Dept: INTERNAL MEDICINE | Facility: CLINIC | Age: 73
End: 2018-10-30

## 2018-11-27 ENCOUNTER — IMMUNIZATION (OUTPATIENT)
Dept: INTERNAL MEDICINE | Facility: CLINIC | Age: 73
End: 2018-11-27
Payer: MEDICARE

## 2018-11-27 PROCEDURE — 90662 IIV NO PRSV INCREASED AG IM: CPT | Mod: S$GLB,,, | Performed by: INTERNAL MEDICINE

## 2018-11-27 PROCEDURE — G0008 ADMIN INFLUENZA VIRUS VAC: HCPCS | Mod: S$GLB,,, | Performed by: INTERNAL MEDICINE

## 2019-01-11 ENCOUNTER — LAB VISIT (OUTPATIENT)
Dept: LAB | Facility: HOSPITAL | Age: 74
End: 2019-01-11
Attending: INTERNAL MEDICINE
Payer: MEDICARE

## 2019-01-11 DIAGNOSIS — I10 ESSENTIAL HYPERTENSION: ICD-10-CM

## 2019-01-11 DIAGNOSIS — N18.30 CKD (CHRONIC KIDNEY DISEASE), STAGE III: ICD-10-CM

## 2019-01-11 LAB
ANION GAP SERPL CALC-SCNC: 9 MMOL/L
BASOPHILS # BLD AUTO: 0.04 K/UL
BASOPHILS NFR BLD: 0.6 %
BUN SERPL-MCNC: 17 MG/DL
CALCIUM SERPL-MCNC: 10.7 MG/DL
CHLORIDE SERPL-SCNC: 103 MMOL/L
CHOLEST SERPL-MCNC: 192 MG/DL
CHOLEST/HDLC SERPL: 3.6 {RATIO}
CO2 SERPL-SCNC: 28 MMOL/L
CREAT SERPL-MCNC: 1.3 MG/DL
DIFFERENTIAL METHOD: ABNORMAL
EOSINOPHIL # BLD AUTO: 0.5 K/UL
EOSINOPHIL NFR BLD: 7.3 %
ERYTHROCYTE [DISTWIDTH] IN BLOOD BY AUTOMATED COUNT: 16.3 %
EST. GFR  (AFRICAN AMERICAN): 47.4 ML/MIN/1.73 M^2
EST. GFR  (NON AFRICAN AMERICAN): 41.1 ML/MIN/1.73 M^2
GLUCOSE SERPL-MCNC: 75 MG/DL
HCT VFR BLD AUTO: 43.4 %
HDLC SERPL-MCNC: 53 MG/DL
HDLC SERPL: 27.6 %
HGB BLD-MCNC: 13.4 G/DL
IMM GRANULOCYTES # BLD AUTO: 0.02 K/UL
IMM GRANULOCYTES NFR BLD AUTO: 0.3 %
LDLC SERPL CALC-MCNC: 125.2 MG/DL
LYMPHOCYTES # BLD AUTO: 2.7 K/UL
LYMPHOCYTES NFR BLD: 42.5 %
MCH RBC QN AUTO: 26.3 PG
MCHC RBC AUTO-ENTMCNC: 30.9 G/DL
MCV RBC AUTO: 85 FL
MONOCYTES # BLD AUTO: 0.6 K/UL
MONOCYTES NFR BLD: 9.8 %
NEUTROPHILS # BLD AUTO: 2.5 K/UL
NEUTROPHILS NFR BLD: 39.5 %
NONHDLC SERPL-MCNC: 139 MG/DL
NRBC BLD-RTO: 0 /100 WBC
PLATELET # BLD AUTO: 268 K/UL
PMV BLD AUTO: 11.7 FL
POTASSIUM SERPL-SCNC: 3.9 MMOL/L
RBC # BLD AUTO: 5.1 M/UL
SODIUM SERPL-SCNC: 140 MMOL/L
TRIGL SERPL-MCNC: 69 MG/DL
WBC # BLD AUTO: 6.42 K/UL

## 2019-01-11 PROCEDURE — 80048 BASIC METABOLIC PNL TOTAL CA: CPT

## 2019-01-11 PROCEDURE — 36415 COLL VENOUS BLD VENIPUNCTURE: CPT | Mod: PO

## 2019-01-11 PROCEDURE — 85025 COMPLETE CBC W/AUTO DIFF WBC: CPT

## 2019-01-11 PROCEDURE — 80061 LIPID PANEL: CPT

## 2019-01-11 RX ORDER — BENAZEPRIL HYDROCHLORIDE 20 MG/1
20 TABLET ORAL DAILY
Qty: 90 TABLET | Refills: 3 | Status: SHIPPED | OUTPATIENT
Start: 2019-01-11 | End: 2020-01-05

## 2019-01-11 NOTE — TELEPHONE ENCOUNTER
----- Message from Zita Hess sent at 1/11/2019  9:11 AM CST -----  Contact: Kiersten ([facility]/pharmacy ) [phone]  Pharmacy is calling to clarify an RX.  RX name:  benazepril (LOTENSIN) 20 MG tablet  What do they need to clarify:  Refill Request - 90 Day supply   Comments:

## 2019-01-24 ENCOUNTER — LAB VISIT (OUTPATIENT)
Dept: LAB | Facility: HOSPITAL | Age: 74
End: 2019-01-24
Attending: INTERNAL MEDICINE
Payer: MEDICARE

## 2019-01-24 ENCOUNTER — TELEPHONE (OUTPATIENT)
Dept: INTERNAL MEDICINE | Facility: CLINIC | Age: 74
End: 2019-01-24

## 2019-01-24 ENCOUNTER — OFFICE VISIT (OUTPATIENT)
Dept: INTERNAL MEDICINE | Facility: CLINIC | Age: 74
End: 2019-01-24
Payer: MEDICARE

## 2019-01-24 VITALS
OXYGEN SATURATION: 97 % | DIASTOLIC BLOOD PRESSURE: 82 MMHG | HEIGHT: 65 IN | WEIGHT: 180 LBS | HEART RATE: 60 BPM | BODY MASS INDEX: 29.99 KG/M2 | SYSTOLIC BLOOD PRESSURE: 127 MMHG

## 2019-01-24 DIAGNOSIS — R00.0 TACHYCARDIA: ICD-10-CM

## 2019-01-24 DIAGNOSIS — N18.30 CHRONIC KIDNEY DISEASE (CKD), STAGE III (MODERATE): Primary | ICD-10-CM

## 2019-01-24 DIAGNOSIS — I10 ESSENTIAL HYPERTENSION: Primary | ICD-10-CM

## 2019-01-24 DIAGNOSIS — E83.52 HYPERCALCEMIA: ICD-10-CM

## 2019-01-24 DIAGNOSIS — N18.30 CHRONIC KIDNEY DISEASE (CKD), STAGE III (MODERATE): ICD-10-CM

## 2019-01-24 DIAGNOSIS — N18.30 STAGE 3 CHRONIC KIDNEY DISEASE: ICD-10-CM

## 2019-01-24 LAB
25(OH)D3+25(OH)D2 SERPL-MCNC: 17 NG/ML
ALBUMIN SERPL BCP-MCNC: 3.5 G/DL
ANION GAP SERPL CALC-SCNC: 7 MMOL/L
BUN SERPL-MCNC: 24 MG/DL
CALCIUM SERPL-MCNC: 10.2 MG/DL
CHLORIDE SERPL-SCNC: 102 MMOL/L
CO2 SERPL-SCNC: 30 MMOL/L
CREAT SERPL-MCNC: 1.2 MG/DL
EST. GFR  (AFRICAN AMERICAN): 52.2 ML/MIN/1.73 M^2
EST. GFR  (NON AFRICAN AMERICAN): 45.3 ML/MIN/1.73 M^2
GLUCOSE SERPL-MCNC: 86 MG/DL
PHOSPHATE SERPL-MCNC: 3 MG/DL
POTASSIUM SERPL-SCNC: 3.9 MMOL/L
PTH-INTACT SERPL-MCNC: 99 PG/ML
SODIUM SERPL-SCNC: 139 MMOL/L

## 2019-01-24 PROCEDURE — 99499 RISK ADDL DX/OHS AUDIT: ICD-10-PCS | Mod: S$GLB,,, | Performed by: INTERNAL MEDICINE

## 2019-01-24 PROCEDURE — 3074F PR MOST RECENT SYSTOLIC BLOOD PRESSURE < 130 MM HG: ICD-10-PCS | Mod: CPTII,S$GLB,, | Performed by: INTERNAL MEDICINE

## 2019-01-24 PROCEDURE — 82306 VITAMIN D 25 HYDROXY: CPT

## 2019-01-24 PROCEDURE — 99999 PR PBB SHADOW E&M-EST. PATIENT-LVL III: ICD-10-PCS | Mod: PBBFAC,,, | Performed by: INTERNAL MEDICINE

## 2019-01-24 PROCEDURE — 3079F DIAST BP 80-89 MM HG: CPT | Mod: CPTII,S$GLB,, | Performed by: INTERNAL MEDICINE

## 2019-01-24 PROCEDURE — 99499 UNLISTED E&M SERVICE: CPT | Mod: S$GLB,,, | Performed by: INTERNAL MEDICINE

## 2019-01-24 PROCEDURE — 80069 RENAL FUNCTION PANEL: CPT

## 2019-01-24 PROCEDURE — 99999 PR PBB SHADOW E&M-EST. PATIENT-LVL III: CPT | Mod: PBBFAC,,, | Performed by: INTERNAL MEDICINE

## 2019-01-24 PROCEDURE — 1101F PR PT FALLS ASSESS DOC 0-1 FALLS W/OUT INJ PAST YR: ICD-10-PCS | Mod: CPTII,S$GLB,, | Performed by: INTERNAL MEDICINE

## 2019-01-24 PROCEDURE — 3079F PR MOST RECENT DIASTOLIC BLOOD PRESSURE 80-89 MM HG: ICD-10-PCS | Mod: CPTII,S$GLB,, | Performed by: INTERNAL MEDICINE

## 2019-01-24 PROCEDURE — 36415 COLL VENOUS BLD VENIPUNCTURE: CPT | Mod: PO

## 2019-01-24 PROCEDURE — 1101F PT FALLS ASSESS-DOCD LE1/YR: CPT | Mod: CPTII,S$GLB,, | Performed by: INTERNAL MEDICINE

## 2019-01-24 PROCEDURE — 99214 OFFICE O/P EST MOD 30 MIN: CPT | Mod: S$GLB,,, | Performed by: INTERNAL MEDICINE

## 2019-01-24 PROCEDURE — 99214 PR OFFICE/OUTPT VISIT, EST, LEVL IV, 30-39 MIN: ICD-10-PCS | Mod: S$GLB,,, | Performed by: INTERNAL MEDICINE

## 2019-01-24 PROCEDURE — 3074F SYST BP LT 130 MM HG: CPT | Mod: CPTII,S$GLB,, | Performed by: INTERNAL MEDICINE

## 2019-01-24 PROCEDURE — 83970 ASSAY OF PARATHORMONE: CPT

## 2019-01-24 NOTE — PROGRESS NOTES
PAST MEDICAL HISTORY:  Hypertension.  Irritable bowel syndrome, with with diarrhea  Chronic kidney disease stage III, with secondary hyperparathyroid  Tachycardia  Iron deficiency anemia  Multinodular thyroid gland, on ultrasound  Cholecystectomy.  Hysterectomy.  Bladder suspension.  Lumbar degenerative disk disease.  Gluteal muscular tear.  Right foot surgery.  Resection of lipoma from the groin.  Bout of postcholecystectomy diarrhea.  Prior history of shingles.  Hearing impairment.    SOCIAL HISTORY:  Tobacco and alcohol use - none.  Exercises by doing a treadmill   and elliptical.  MEDICATIONS:  Benazepril 20 mg daily  Vitamin D 50,000 units once a week  Dyazide once daily        REASON FOR VISIT:  This is a 73-year-old female who comes in for a routine   followup visit.  Overall, in general, she has felt well.  She has had occasion,   a week ago last Friday, after donating blood she felt jittery and her heart   palpitating, but it passed.  She then had another episode two days ago in the   morning where her pulse rate was 119, blood pressure reading was 122/117.  After   it passed, she checked it again and was 93 and 119/77.  There were no acute   circumstances other than she might have been stressed over trying to get some   things done.    She otherwise has been feeling well in the sense there has been no chest pain,   palpitations, shortness of breath or abdominal pain.  Bowel function is regular.    There has been no difficulty urinating.  Has nocturia x1-2.  No indigestion or   heartburn and no arthralgias.    RECENT LABS:  Cholesterol came back at 192 with .  Basic metabolic   profile was normal with the exception of creatinine 1.3 and calcium was 10.7.    CBC normal.    PHYSICAL EXAMINATION:  VITAL SIGNS:  Weight is 180 pounds, pulse rate is 64, blood pressure 128/76.  NECK:  No thyromegaly.  No masses.  LUNGS:  Clear breath sounds, good effort.  HEART:  Regular rate and rhythm.  No murmurs or  gallops.  ABDOMEN:  Active bowel sounds, soft, nontender.  No hepatosplenomegaly or   abdominal masses.  PULSES:  2+ carotid pulses, 2+ pedal pulses.  EXTREMITIES:  No edema.    IMPRESSION:  1.  Hypertension.  2.  Chronic kidney disease, stage III.  3.  Hypercalcemia, may be related to secondary hyperparathyroid or that of use   of thiazide diuretic.  4.  Episode of tachycardia.    PLAN:  Maintain proper hydration.  Today, we will repeat a renal function panel   with PTH and vitamin D.  Continue with attention to proper diet and physical   activity and phone review to follow up.        /brandon [id number] review        JAM/HN  dd: 01/24/2019 09:39:31 (CST)  td: 01/24/2019 22:39:53 (CST)  Doc ID   #[id number]  Job ID #[id number]    CC:

## 2019-01-27 ENCOUNTER — PATIENT MESSAGE (OUTPATIENT)
Dept: INTERNAL MEDICINE | Facility: CLINIC | Age: 74
End: 2019-01-27

## 2019-02-07 ENCOUNTER — PATIENT MESSAGE (OUTPATIENT)
Dept: INTERNAL MEDICINE | Facility: CLINIC | Age: 74
End: 2019-02-07

## 2019-02-08 ENCOUNTER — DOCUMENTATION ONLY (OUTPATIENT)
Dept: INTERNAL MEDICINE | Facility: CLINIC | Age: 74
End: 2019-02-08

## 2019-02-08 ENCOUNTER — TELEPHONE (OUTPATIENT)
Dept: INTERNAL MEDICINE | Facility: CLINIC | Age: 74
End: 2019-02-08

## 2019-02-08 DIAGNOSIS — N18.30 CKD (CHRONIC KIDNEY DISEASE), STAGE III: ICD-10-CM

## 2019-02-08 DIAGNOSIS — I10 ESSENTIAL HYPERTENSION: ICD-10-CM

## 2019-02-08 DIAGNOSIS — Z00.00 ANNUAL PHYSICAL EXAM: Primary | ICD-10-CM

## 2019-02-08 NOTE — PROGRESS NOTES
The lab studies from the visit January 24th was review    Renal function panel showed a creatinine 1.2, slightly better but stable    Calcium was 10.2, previously 10.7    PTH 99 which is stable    Vitamin-D was 17, lower than before    back in July she was on vitamin-D 50325 units a week for 7 doses    Now she will take vitamin D 3 over-the-counter 2000 units     she has a follow-up visit with the nephrologist February 20th    For now I see no change in management      she is to continue with Dyazide which is a thiazide diuretic and to maintain proper hydration    If the calcium was to elevate higher, would give consideration to change in the diuretic to either chlorthalidone or furosemide    Set up return visit for routine physical in 4 months

## 2019-02-13 ENCOUNTER — TELEPHONE (OUTPATIENT)
Dept: INTERNAL MEDICINE | Facility: CLINIC | Age: 74
End: 2019-02-13

## 2019-02-20 ENCOUNTER — OFFICE VISIT (OUTPATIENT)
Dept: NEPHROLOGY | Facility: CLINIC | Age: 74
End: 2019-02-20
Payer: MEDICARE

## 2019-02-20 VITALS
SYSTOLIC BLOOD PRESSURE: 120 MMHG | HEART RATE: 97 BPM | OXYGEN SATURATION: 97 % | DIASTOLIC BLOOD PRESSURE: 70 MMHG | WEIGHT: 182.56 LBS | BODY MASS INDEX: 30.42 KG/M2 | HEIGHT: 65 IN

## 2019-02-20 DIAGNOSIS — I10 ESSENTIAL HYPERTENSION: ICD-10-CM

## 2019-02-20 DIAGNOSIS — N18.30 CKD (CHRONIC KIDNEY DISEASE), STAGE III: Primary | ICD-10-CM

## 2019-02-20 PROCEDURE — 3078F PR MOST RECENT DIASTOLIC BLOOD PRESSURE < 80 MM HG: ICD-10-PCS | Mod: CPTII,S$GLB,, | Performed by: INTERNAL MEDICINE

## 2019-02-20 PROCEDURE — 99499 UNLISTED E&M SERVICE: CPT | Mod: S$GLB,,, | Performed by: INTERNAL MEDICINE

## 2019-02-20 PROCEDURE — 99999 PR PBB SHADOW E&M-EST. PATIENT-LVL III: ICD-10-PCS | Mod: PBBFAC,,, | Performed by: INTERNAL MEDICINE

## 2019-02-20 PROCEDURE — 3078F DIAST BP <80 MM HG: CPT | Mod: CPTII,S$GLB,, | Performed by: INTERNAL MEDICINE

## 2019-02-20 PROCEDURE — 99999 PR PBB SHADOW E&M-EST. PATIENT-LVL III: CPT | Mod: PBBFAC,,, | Performed by: INTERNAL MEDICINE

## 2019-02-20 PROCEDURE — 99499 RISK ADDL DX/OHS AUDIT: ICD-10-PCS | Mod: S$GLB,,, | Performed by: INTERNAL MEDICINE

## 2019-02-20 PROCEDURE — 3074F PR MOST RECENT SYSTOLIC BLOOD PRESSURE < 130 MM HG: ICD-10-PCS | Mod: CPTII,S$GLB,, | Performed by: INTERNAL MEDICINE

## 2019-02-20 PROCEDURE — 99214 PR OFFICE/OUTPT VISIT, EST, LEVL IV, 30-39 MIN: ICD-10-PCS | Mod: S$GLB,,, | Performed by: INTERNAL MEDICINE

## 2019-02-20 PROCEDURE — 99214 OFFICE O/P EST MOD 30 MIN: CPT | Mod: S$GLB,,, | Performed by: INTERNAL MEDICINE

## 2019-02-20 PROCEDURE — 1101F PT FALLS ASSESS-DOCD LE1/YR: CPT | Mod: CPTII,S$GLB,, | Performed by: INTERNAL MEDICINE

## 2019-02-20 PROCEDURE — 1101F PR PT FALLS ASSESS DOC 0-1 FALLS W/OUT INJ PAST YR: ICD-10-PCS | Mod: CPTII,S$GLB,, | Performed by: INTERNAL MEDICINE

## 2019-02-20 PROCEDURE — 3074F SYST BP LT 130 MM HG: CPT | Mod: CPTII,S$GLB,, | Performed by: INTERNAL MEDICINE

## 2019-02-20 NOTE — PROGRESS NOTES
HISTORY OF PRESENT ILLNESS:  This is a 73-year-old -American female with   longstanding history of hypertension, irritable bowel syndrome and CKD stage   III, who is coming in for f/u.  The patient states her blood pressures   are fairly well controlled in the 130's-140's/70s-80's.   No recent hospitalizations.  Occasional allieve's.    PAST MEDICAL HISTORY:  Significant for hypertension for five years, CKD stage   III, IBS.    REVIEW OF SYSTEMS:  She denies any frequency, urgency, hematuria, dysuria,   nausea, vomiting, chest pain or shortness of breath.     PHYSICAL EXAMINATION:  GENERAL:  Well-nourished, well-developed individual, in no acute distress.  HEENT:  PERRLA, EOMI.  NECK:  No cervical lymphadenopathy.  CARDIOVASCULAR:  Rate and rhythm regular murmurs, gallops.  ABDOMEN:  Soft, nontender, nondistended.  EXTREMITIES:  Trace edema in the ankles, more so on the left side, which is   chronic for her.  SKIN:  No erythema, warm to touch.  PSYCHIATRIC:  Alert and oriented x3.  The patient has good judgment and insight.    ASSESSMENT AND PLAN:  This is a 73-year-old -American female with   longstanding history of hypertension and chronic kidney disease stage III, who   is coming in for f/u.  1.  Chronic kidney disease stage III with an MDRD GFR of 52 mL per minute.  Her   baseline creatinine appears to be around 1.1 to 1.2 and occasionally lower and   higher with the most recent creatinine of 1.2.  Her CKD is likely secondary to   longstanding hypertension and age-related nephron loss.    I advised her to stay away from NSAIDs and to hydrate well.  She understands the importance of hydration.  On  benazepril 20 mg for nephro-protection.  She is also on triamterene and hydrochlorothiazide and discussed low-potassium diet in the past while she was on both, has not had any potassium issues.  We will monitor closely and take her off triamterene if needed if potassium becomes an issue.  2.   Hypertension.  Blood pressures are stable.   She will monitor blood pressures, currently stable, she   will notify us if they are high.  3.  Renal osteodystrophy.   intact PTH stable .  Ca/phos stable. On vitamin for low vitamin.   Her calciums are borderline high and is not  taking  calcium supplements.  On HCTZ-will con't to monitor. SPEP stable.  4.  Anemia.  Hemoglobin is stable.  5.  She also had a  ultrasound, which was stable with a simple cyst.    RTC in 6 months.

## 2019-02-21 ENCOUNTER — TELEPHONE (OUTPATIENT)
Dept: NEPHROLOGY | Facility: CLINIC | Age: 74
End: 2019-02-21

## 2019-03-18 ENCOUNTER — PATIENT MESSAGE (OUTPATIENT)
Dept: INTERNAL MEDICINE | Facility: CLINIC | Age: 74
End: 2019-03-18

## 2019-03-18 NOTE — PROGRESS NOTES
PAST MEDICAL HISTORY:  Hypertension.  Irritable bowel syndrome, with with diarrhea  Chronic kidney disease stage III, with secondary hyperparathyroid  Tachycardia  Iron deficiency anemia  Multinodular thyroid gland, on ultrasound  Cholecystectomy.  Hysterectomy.  Bladder suspension.  Lumbar degenerative disk disease.  Gluteal muscular tear.  Right foot surgery.  Resection of lipoma from the groin.  Bout of postcholecystectomy diarrhea.  Prior history of shingles.  Hearing impairment.     SOCIAL HISTORY:  Tobacco and alcohol use - none.  Exercises by doing a treadmill   and elliptical.  MEDICATIONS:  Benazepril 20 mg daily  Vitamin D 50,000 units once a week  Dyazide once daily        REASON FOR VISIT:  This is a 73-year-old female who within the past month, maybe   two, has been getting episodes of a sharp shooting pain that she can feel over   her left temporal and extends to the left occipital even posterior cervical.  At   times, she felt that even it is in the supraclavicular region.  Last week for   two to three straight days, she felt it constantly.  She can feel like there   might be a fullness within her left thigh, although there has been no change in   vision.  She has no other associated symptoms as far as nausea, visual or   hearing disturbance, no disturbance in smell and taste.  There are no radicular   or neurologic symptoms involving the extremities and she is not aware of   anything that triggers this.  It is not changed by any position or movement of   her neck or arm.  It has awoken her from sleep last week when it was persistent   for a couple of days.  She took Tylenol to help go to sleep.    PAST MEDICAL HISTORY:  Outlined above where she has hypertension and also   history of chronic kidney disease stage II, low vitamin D.    PHYSICAL EXAMINATION:  VITAL SIGNS:  Weight is 180 pounds, pulse 72, blood pressure 122/78.  HEENT:  Both tympanic membranes normal.  Nasal mucosa is clear.  Oropharynx,  no   abnormal findings.  NECK:  No adenopathy.  LUNGS:  Clear.  HEART:  Regular rate and rhythm.  MUSCULOSKELETAL:  2+ biceps, triceps reflexes.  No TMJ tenderness.  Cranial   nerves II-XII are normal, intact, and she has intact sensation to monofilament.    Actually, the prickly feeling that she can feel on the face with monofilament   is the feeling that she gets.    IMPRESSION:  1. Head neuralgia with the possibility of being occipital neuralgia.  2. Cervical myofascial pain.  3. Hypertension.  4. Type 2 diabetes.  5. Low vitamin D.    PLAN:  Today, I am going to repeat a CBC, basic metabolic profile, vitamin D,   magnesium, also inflammatory markers, cervical spine film.  We will give a trial   of methocarbamol 500 mg three times a day for the next seven days and then   phone review to follow up.        KATHIA/DONNIE  dd: 03/19/2019 14:41:48 (CDT)  td: 03/20/2019 04:16:51 (CDT)  Doc ID   #[id number]  Job ID #[id number]    CC:         Answers for HPI/ROS submitted by the patient on 3/18/2019   activity change: No  unexpected weight change: No  neck pain: Yes  hearing loss: No  rhinorrhea: No  trouble swallowing: No  eye discharge: No  visual disturbance: No  chest tightness: No  wheezing: No  chest pain: No  palpitations: Yes  blood in stool: No  constipation: No  vomiting: No  diarrhea: No  polydipsia: No  polyuria: No  difficulty urinating: No  hematuria: No  menstrual problem: No  dysuria: No  joint swelling: No  arthralgias: No  headaches: Yes  weakness: No  confusion: No  dysphoric mood: No

## 2019-03-19 ENCOUNTER — OFFICE VISIT (OUTPATIENT)
Dept: INTERNAL MEDICINE | Facility: CLINIC | Age: 74
End: 2019-03-19
Payer: MEDICARE

## 2019-03-19 ENCOUNTER — LAB VISIT (OUTPATIENT)
Dept: LAB | Facility: HOSPITAL | Age: 74
End: 2019-03-19
Attending: INTERNAL MEDICINE
Payer: MEDICARE

## 2019-03-19 ENCOUNTER — HOSPITAL ENCOUNTER (OUTPATIENT)
Dept: RADIOLOGY | Facility: HOSPITAL | Age: 74
Discharge: HOME | End: 2019-03-19
Attending: INTERNAL MEDICINE
Payer: MEDICARE

## 2019-03-19 VITALS
DIASTOLIC BLOOD PRESSURE: 82 MMHG | OXYGEN SATURATION: 99 % | HEART RATE: 70 BPM | SYSTOLIC BLOOD PRESSURE: 130 MMHG | WEIGHT: 180 LBS | BODY MASS INDEX: 29.99 KG/M2 | HEIGHT: 65 IN

## 2019-03-19 DIAGNOSIS — N18.30 CKD (CHRONIC KIDNEY DISEASE), STAGE III: ICD-10-CM

## 2019-03-19 DIAGNOSIS — M79.18 CERVICAL MYOFASCIAL PAIN SYNDROME: ICD-10-CM

## 2019-03-19 DIAGNOSIS — I10 ESSENTIAL HYPERTENSION: ICD-10-CM

## 2019-03-19 DIAGNOSIS — M79.2 NEURALGIA: ICD-10-CM

## 2019-03-19 DIAGNOSIS — R79.89 LOW VITAMIN D LEVEL: ICD-10-CM

## 2019-03-19 DIAGNOSIS — M79.2 NEURALGIA: Primary | ICD-10-CM

## 2019-03-19 LAB
BASOPHILS # BLD AUTO: 0.06 K/UL
BASOPHILS NFR BLD: 0.7 %
DIFFERENTIAL METHOD: ABNORMAL
EOSINOPHIL # BLD AUTO: 0.5 K/UL
EOSINOPHIL NFR BLD: 6.4 %
ERYTHROCYTE [DISTWIDTH] IN BLOOD BY AUTOMATED COUNT: 15.1 %
ERYTHROCYTE [SEDIMENTATION RATE] IN BLOOD BY WESTERGREN METHOD: 14 MM/HR
HCT VFR BLD AUTO: 44.6 %
HGB BLD-MCNC: 13.4 G/DL
IMM GRANULOCYTES # BLD AUTO: 0.02 K/UL
IMM GRANULOCYTES NFR BLD AUTO: 0.2 %
LYMPHOCYTES # BLD AUTO: 3.5 K/UL
LYMPHOCYTES NFR BLD: 41.2 %
MCH RBC QN AUTO: 25.5 PG
MCHC RBC AUTO-ENTMCNC: 30 G/DL
MCV RBC AUTO: 85 FL
MONOCYTES # BLD AUTO: 0.9 K/UL
MONOCYTES NFR BLD: 11 %
NEUTROPHILS # BLD AUTO: 3.4 K/UL
NEUTROPHILS NFR BLD: 40.5 %
NRBC BLD-RTO: 0 /100 WBC
PLATELET # BLD AUTO: 323 K/UL
PMV BLD AUTO: 11.4 FL
RBC # BLD AUTO: 5.26 M/UL
WBC # BLD AUTO: 8.44 K/UL

## 2019-03-19 PROCEDURE — 72040 XR CERVICAL SPINE AP LATERAL: ICD-10-PCS | Mod: 26,,, | Performed by: RADIOLOGY

## 2019-03-19 PROCEDURE — 72040 X-RAY EXAM NECK SPINE 2-3 VW: CPT | Mod: 26,,, | Performed by: RADIOLOGY

## 2019-03-19 PROCEDURE — 99999 PR PBB SHADOW E&M-EST. PATIENT-LVL III: ICD-10-PCS | Mod: PBBFAC,,, | Performed by: INTERNAL MEDICINE

## 2019-03-19 PROCEDURE — 99499 RISK ADDL DX/OHS AUDIT: ICD-10-PCS | Mod: ,,, | Performed by: INTERNAL MEDICINE

## 2019-03-19 PROCEDURE — 3075F PR MOST RECENT SYSTOLIC BLOOD PRESS GE 130-139MM HG: ICD-10-PCS | Mod: CPTII,S$GLB,, | Performed by: INTERNAL MEDICINE

## 2019-03-19 PROCEDURE — 99214 OFFICE O/P EST MOD 30 MIN: CPT | Mod: S$GLB,,, | Performed by: INTERNAL MEDICINE

## 2019-03-19 PROCEDURE — 72040 X-RAY EXAM NECK SPINE 2-3 VW: CPT | Mod: TC,FY,PO

## 2019-03-19 PROCEDURE — 85025 COMPLETE CBC W/AUTO DIFF WBC: CPT

## 2019-03-19 PROCEDURE — 82306 VITAMIN D 25 HYDROXY: CPT

## 2019-03-19 PROCEDURE — 36415 COLL VENOUS BLD VENIPUNCTURE: CPT | Mod: PO

## 2019-03-19 PROCEDURE — 3079F PR MOST RECENT DIASTOLIC BLOOD PRESSURE 80-89 MM HG: ICD-10-PCS | Mod: CPTII,S$GLB,, | Performed by: INTERNAL MEDICINE

## 2019-03-19 PROCEDURE — 80048 BASIC METABOLIC PNL TOTAL CA: CPT

## 2019-03-19 PROCEDURE — 3079F DIAST BP 80-89 MM HG: CPT | Mod: CPTII,S$GLB,, | Performed by: INTERNAL MEDICINE

## 2019-03-19 PROCEDURE — 99499 RISK ADDL DX/OHS AUDIT: ICD-10-PCS | Mod: S$GLB,,, | Performed by: INTERNAL MEDICINE

## 2019-03-19 PROCEDURE — 83735 ASSAY OF MAGNESIUM: CPT

## 2019-03-19 PROCEDURE — 3075F SYST BP GE 130 - 139MM HG: CPT | Mod: CPTII,S$GLB,, | Performed by: INTERNAL MEDICINE

## 2019-03-19 PROCEDURE — 83970 ASSAY OF PARATHORMONE: CPT

## 2019-03-19 PROCEDURE — 1101F PT FALLS ASSESS-DOCD LE1/YR: CPT | Mod: CPTII,S$GLB,, | Performed by: INTERNAL MEDICINE

## 2019-03-19 PROCEDURE — 86140 C-REACTIVE PROTEIN: CPT

## 2019-03-19 PROCEDURE — 1101F PR PT FALLS ASSESS DOC 0-1 FALLS W/OUT INJ PAST YR: ICD-10-PCS | Mod: CPTII,S$GLB,, | Performed by: INTERNAL MEDICINE

## 2019-03-19 PROCEDURE — 99214 PR OFFICE/OUTPT VISIT, EST, LEVL IV, 30-39 MIN: ICD-10-PCS | Mod: S$GLB,,, | Performed by: INTERNAL MEDICINE

## 2019-03-19 PROCEDURE — 85652 RBC SED RATE AUTOMATED: CPT

## 2019-03-19 PROCEDURE — 99499 UNLISTED E&M SERVICE: CPT | Mod: S$GLB,,, | Performed by: INTERNAL MEDICINE

## 2019-03-19 PROCEDURE — 99999 PR PBB SHADOW E&M-EST. PATIENT-LVL III: CPT | Mod: PBBFAC,,, | Performed by: INTERNAL MEDICINE

## 2019-03-19 PROCEDURE — 99499 UNLISTED E&M SERVICE: CPT | Mod: ,,, | Performed by: INTERNAL MEDICINE

## 2019-03-19 RX ORDER — TIZANIDINE 4 MG/1
4 TABLET ORAL EVERY 8 HOURS
Qty: 30 TABLET | Refills: 0 | Status: SHIPPED | OUTPATIENT
Start: 2019-03-19 | End: 2019-03-29

## 2019-03-19 NOTE — PROGRESS NOTES
The x-ray did reveal a degree of degenerative changes involving the disc described on the report, but this involves the lower vertebrae and not the upper vertebrae    for the most part the radiograph herself was fine    Will contact the after the results of the lab studies

## 2019-03-20 ENCOUNTER — PATIENT MESSAGE (OUTPATIENT)
Dept: INTERNAL MEDICINE | Facility: CLINIC | Age: 74
End: 2019-03-20

## 2019-03-20 DIAGNOSIS — E21.3 HYPERPARATHYROIDISM: Primary | ICD-10-CM

## 2019-03-20 LAB
25(OH)D3+25(OH)D2 SERPL-MCNC: 45 NG/ML
ANION GAP SERPL CALC-SCNC: 8 MMOL/L
BUN SERPL-MCNC: 19 MG/DL
CALCIUM SERPL-MCNC: 11.1 MG/DL
CHLORIDE SERPL-SCNC: 101 MMOL/L
CO2 SERPL-SCNC: 30 MMOL/L
CREAT SERPL-MCNC: 1.1 MG/DL
CRP SERPL-MCNC: 2.4 MG/L
EST. GFR  (AFRICAN AMERICAN): 58 ML/MIN/1.73 M^2
EST. GFR  (NON AFRICAN AMERICAN): 50.3 ML/MIN/1.73 M^2
GLUCOSE SERPL-MCNC: 89 MG/DL
MAGNESIUM SERPL-MCNC: 2 MG/DL
POTASSIUM SERPL-SCNC: 4.5 MMOL/L
PTH-INTACT SERPL-MCNC: 102 PG/ML
SODIUM SERPL-SCNC: 139 MMOL/L

## 2019-03-20 NOTE — PROGRESS NOTES
The parathyroid hormone is still elevated along with elevated calcium    Certainly it could be secondary due to chronic kidney disease stage III but this is mild    Will arrange for a parathyroid scan

## 2019-03-29 ENCOUNTER — HOSPITAL ENCOUNTER (OUTPATIENT)
Dept: RADIOLOGY | Facility: HOSPITAL | Age: 74
Discharge: HOME | End: 2019-03-29
Attending: INTERNAL MEDICINE
Payer: MEDICARE

## 2019-03-29 DIAGNOSIS — E21.3 HYPERPARATHYROIDISM: ICD-10-CM

## 2019-03-29 PROCEDURE — A9500 TC99M SESTAMIBI: HCPCS

## 2019-03-29 PROCEDURE — 78071 NM PARATHYROID SCAN WITH SPECT ROUTINE: ICD-10-PCS | Mod: 26,,, | Performed by: RADIOLOGY

## 2019-03-29 PROCEDURE — 78071 PARATHYRD PLANAR W/WO SUBTRJ: CPT | Mod: 26,,, | Performed by: RADIOLOGY

## 2019-03-30 NOTE — PROGRESS NOTES
The parathyroid nuclear medicine scan did not reveal any abnormality or any abnormal growth involving the parathyroid gland    For now, I would recommend continue with the current medications, maintain proper hydration which is always been discuss, and will follow with this on the visit in June 2019 in which part of the testing will be that of the parathyroid hormone and calcium    Contact me if there is  any questions regarding the testing

## 2019-06-04 ENCOUNTER — LAB VISIT (OUTPATIENT)
Dept: LAB | Facility: HOSPITAL | Age: 74
End: 2019-06-04
Attending: INTERNAL MEDICINE
Payer: MEDICARE

## 2019-06-04 DIAGNOSIS — I10 ESSENTIAL HYPERTENSION: ICD-10-CM

## 2019-06-04 DIAGNOSIS — N18.30 CKD (CHRONIC KIDNEY DISEASE), STAGE III: ICD-10-CM

## 2019-06-04 DIAGNOSIS — Z00.00 ANNUAL PHYSICAL EXAM: ICD-10-CM

## 2019-06-04 LAB
25(OH)D3+25(OH)D2 SERPL-MCNC: 49 NG/ML (ref 30–96)
ALBUMIN SERPL BCP-MCNC: 3.3 G/DL (ref 3.5–5.2)
ALP SERPL-CCNC: 61 U/L (ref 55–135)
ALT SERPL W/O P-5'-P-CCNC: 15 U/L (ref 10–44)
ANION GAP SERPL CALC-SCNC: 7 MMOL/L (ref 8–16)
AST SERPL-CCNC: 18 U/L (ref 10–40)
BASOPHILS # BLD AUTO: 0.03 K/UL (ref 0–0.2)
BASOPHILS NFR BLD: 0.5 % (ref 0–1.9)
BILIRUB SERPL-MCNC: 0.5 MG/DL (ref 0.1–1)
BUN SERPL-MCNC: 14 MG/DL (ref 8–23)
CALCIUM SERPL-MCNC: 10.5 MG/DL (ref 8.7–10.5)
CHLORIDE SERPL-SCNC: 103 MMOL/L (ref 95–110)
CHOLEST SERPL-MCNC: 192 MG/DL (ref 120–199)
CHOLEST/HDLC SERPL: 3.5 {RATIO} (ref 2–5)
CO2 SERPL-SCNC: 29 MMOL/L (ref 23–29)
CREAT SERPL-MCNC: 1.1 MG/DL (ref 0.5–1.4)
DIFFERENTIAL METHOD: ABNORMAL
EOSINOPHIL # BLD AUTO: 0.3 K/UL (ref 0–0.5)
EOSINOPHIL NFR BLD: 5.1 % (ref 0–8)
ERYTHROCYTE [DISTWIDTH] IN BLOOD BY AUTOMATED COUNT: 17.4 % (ref 11.5–14.5)
EST. GFR  (AFRICAN AMERICAN): 57.6 ML/MIN/1.73 M^2
EST. GFR  (NON AFRICAN AMERICAN): 49.9 ML/MIN/1.73 M^2
GLUCOSE SERPL-MCNC: 84 MG/DL (ref 70–110)
HCT VFR BLD AUTO: 41.2 % (ref 37–48.5)
HDLC SERPL-MCNC: 55 MG/DL (ref 40–75)
HDLC SERPL: 28.6 % (ref 20–50)
HGB BLD-MCNC: 12.6 G/DL (ref 12–16)
IMM GRANULOCYTES # BLD AUTO: 0.01 K/UL (ref 0–0.04)
IMM GRANULOCYTES NFR BLD AUTO: 0.2 % (ref 0–0.5)
LDLC SERPL CALC-MCNC: 123 MG/DL (ref 63–159)
LYMPHOCYTES # BLD AUTO: 2.8 K/UL (ref 1–4.8)
LYMPHOCYTES NFR BLD: 42.6 % (ref 18–48)
MCH RBC QN AUTO: 25.1 PG (ref 27–31)
MCHC RBC AUTO-ENTMCNC: 30.6 G/DL (ref 32–36)
MCV RBC AUTO: 82 FL (ref 82–98)
MONOCYTES # BLD AUTO: 0.8 K/UL (ref 0.3–1)
MONOCYTES NFR BLD: 12 % (ref 4–15)
NEUTROPHILS # BLD AUTO: 2.6 K/UL (ref 1.8–7.7)
NEUTROPHILS NFR BLD: 39.6 % (ref 38–73)
NONHDLC SERPL-MCNC: 137 MG/DL
NRBC BLD-RTO: 0 /100 WBC
PLATELET # BLD AUTO: 266 K/UL (ref 150–350)
PMV BLD AUTO: 11.4 FL (ref 9.2–12.9)
POTASSIUM SERPL-SCNC: 3.7 MMOL/L (ref 3.5–5.1)
PROT SERPL-MCNC: 6.9 G/DL (ref 6–8.4)
PTH-INTACT SERPL-MCNC: 79 PG/ML (ref 9–77)
RBC # BLD AUTO: 5.02 M/UL (ref 4–5.4)
SODIUM SERPL-SCNC: 139 MMOL/L (ref 136–145)
TRIGL SERPL-MCNC: 70 MG/DL (ref 30–150)
TSH SERPL DL<=0.005 MIU/L-ACNC: 3.07 UIU/ML (ref 0.4–4)
WBC # BLD AUTO: 6.5 K/UL (ref 3.9–12.7)

## 2019-06-04 PROCEDURE — 80061 LIPID PANEL: CPT

## 2019-06-04 PROCEDURE — 82306 VITAMIN D 25 HYDROXY: CPT

## 2019-06-04 PROCEDURE — 36415 COLL VENOUS BLD VENIPUNCTURE: CPT | Mod: PO

## 2019-06-04 PROCEDURE — 84443 ASSAY THYROID STIM HORMONE: CPT

## 2019-06-04 PROCEDURE — 80053 COMPREHEN METABOLIC PANEL: CPT

## 2019-06-04 PROCEDURE — 85025 COMPLETE CBC W/AUTO DIFF WBC: CPT

## 2019-06-04 PROCEDURE — 83970 ASSAY OF PARATHORMONE: CPT

## 2019-06-10 NOTE — PROGRESS NOTES
PAST MEDICAL HISTORY:  Hypertension.  Irritable bowel syndrome, with with diarrhea  Chronic kidney disease stage III, with secondary hyperparathyroid  Tachycardia  Iron deficiency anemia  Multinodular thyroid gland, on ultrasound  Cholecystectomy.  Hysterectomy.  Bladder suspension.  Lumbar degenerative disk disease.  Gluteal muscular tear.  Right foot surgery.  Resection of lipoma from the groin.  Bout of postcholecystectomy diarrhea.  Prior history of shingles.  Hearing impairment.     SOCIAL HISTORY:  Tobacco and alcohol use - none.  Exercises by doing a treadmill and elliptical.    FAMILY HISTORY:  Very extensive.  Six sisters and seven brothers, thyroid conditions in three sisters, one brother with diabetes, another brother with prostate cancer, but all 13 siblings are living.     SCREENING:  Colonoscopy in October 2015 revealed diverticulosis.       MEDICATIONS:    Vitamin D 50,000 units once a week  Dyazide once daily            REASON FOR VISIT:  This is a 74-year-old female who comes in for an annual   routine visit.    Couple of things she has been noticing is this past week she has been having   discomfort involving the right hip or buttocks region after getting on the   elliptical, but it is better compared to before.  It is localized.    Another issue is that she has been having issues with her right hand where she   feels a discomfort along the palm of the hand at the MCP joints where it feels   swollen and at times in the morning the third finger locks on at the PIP joint.    She has no problems using the hand.    RECENT LABS:  Reviewed.  CBC normal.  Chemistry normal except creatinine is   still at 1.1.  TSH and vitamin D normal.  PTH is now 79, low.  Total cholesterol   192 with HDL 55, .    REVIEW OF SYMPTOMS:  Reports no pains in the chest, palpitations, shortness of   breath or abdominal pain.  Bowel function is regular.  Still at times may be   loose.  No difficulty urinating, no  incontinence.  No indigestion, heartburn,   regurgitation or headaches.    On May 22nd, she put a hold on her benazepril the reason being is that she is no   longer working and sitting down and now she has been more active doing exercise   and walking around more.  However, blood pressure readings at home past few   times since stopping has been in the high 130s/90s.    PHYSICAL EXAMINATION:  VITAL SIGNS:  Weight is 178 pounds, pulse 88, blood pressure 140/82.  HEENT:  Tympanic membranes normal.  Nasal mucosa is clear.  Oropharynx, no   abnormal findings.  NECK:  No thyromegaly.  No masses.  LUNGS:  Clear breath sounds, good effort.  HEART:  Regular rate and rhythm.  ABDOMEN:  Active bowel sounds, soft, nontender.  No hepatosplenomegaly or   abdominal masses.  PULSES:  2+ carotid pulses.  2+ pedal pulses.  EXTREMITIES:  No edema.  MUSCULOSKELETAL:  2+ knee and ankle jerk reflexes.  No pain when I abduct or   rotate the legs at the hip, and she was pointing where she feels the pain, but   it is not now, it is more in the upper gluts.  Evaluation of the hand, there is   a little bit of discomfort when I palpate over the third metacarpal near the   MCP.    IMPRESSION:  1. General exam.  2. Hypertension.  3. Chronic kidney disease III with mild secondary hyperparathyroid.  4. Tenosynovitis of the hand with trigger finger.  5. As per medical problem list.    PLAN:  I have recommended restarting the medication, but we would recommend that   of losartan at 25 mg daily and to monitor the blood pressure.  Arrange for a   FIT test and make a return appointment again in six months.  In regard to the   hand, soak with warm pack over the area and take Tylenol Extra Strength as   needed.  If it gets progressed, we will refer to the [facility].        JAM/HN  dd: 06/11/2019 09:18:29 (CDT)  td: 06/11/2019 22:28:57 (CDT)  Doc ID   #[id number]  Job ID #[id number]    CC:

## 2019-06-11 ENCOUNTER — OFFICE VISIT (OUTPATIENT)
Dept: INTERNAL MEDICINE | Facility: CLINIC | Age: 74
End: 2019-06-11
Payer: MEDICARE

## 2019-06-11 VITALS
HEART RATE: 94 BPM | HEIGHT: 65 IN | OXYGEN SATURATION: 98 % | SYSTOLIC BLOOD PRESSURE: 140 MMHG | BODY MASS INDEX: 29.66 KG/M2 | DIASTOLIC BLOOD PRESSURE: 82 MMHG | WEIGHT: 178 LBS

## 2019-06-11 DIAGNOSIS — I10 ESSENTIAL HYPERTENSION: ICD-10-CM

## 2019-06-11 DIAGNOSIS — N25.81 SECONDARY HYPERPARATHYROIDISM: ICD-10-CM

## 2019-06-11 DIAGNOSIS — Z00.00 ANNUAL PHYSICAL EXAM: Primary | ICD-10-CM

## 2019-06-11 DIAGNOSIS — M65.9 TENOSYNOVITIS: ICD-10-CM

## 2019-06-11 DIAGNOSIS — N18.30 CKD (CHRONIC KIDNEY DISEASE), STAGE III: ICD-10-CM

## 2019-06-11 PROCEDURE — 99999 PR PBB SHADOW E&M-EST. PATIENT-LVL III: CPT | Mod: PBBFAC,,, | Performed by: INTERNAL MEDICINE

## 2019-06-11 PROCEDURE — 3079F PR MOST RECENT DIASTOLIC BLOOD PRESSURE 80-89 MM HG: ICD-10-PCS | Mod: CPTII,S$GLB,, | Performed by: INTERNAL MEDICINE

## 2019-06-11 PROCEDURE — 99499 UNLISTED E&M SERVICE: CPT | Mod: S$GLB,,, | Performed by: INTERNAL MEDICINE

## 2019-06-11 PROCEDURE — 99499 RISK ADDL DX/OHS AUDIT: ICD-10-PCS | Mod: S$GLB,,, | Performed by: INTERNAL MEDICINE

## 2019-06-11 PROCEDURE — 99214 PR OFFICE/OUTPT VISIT, EST, LEVL IV, 30-39 MIN: ICD-10-PCS | Mod: S$GLB,,, | Performed by: INTERNAL MEDICINE

## 2019-06-11 PROCEDURE — 3074F SYST BP LT 130 MM HG: CPT | Mod: CPTII,S$GLB,, | Performed by: INTERNAL MEDICINE

## 2019-06-11 PROCEDURE — 3079F DIAST BP 80-89 MM HG: CPT | Mod: CPTII,S$GLB,, | Performed by: INTERNAL MEDICINE

## 2019-06-11 PROCEDURE — 3074F PR MOST RECENT SYSTOLIC BLOOD PRESSURE < 130 MM HG: ICD-10-PCS | Mod: CPTII,S$GLB,, | Performed by: INTERNAL MEDICINE

## 2019-06-11 PROCEDURE — 99999 PR PBB SHADOW E&M-EST. PATIENT-LVL III: ICD-10-PCS | Mod: PBBFAC,,, | Performed by: INTERNAL MEDICINE

## 2019-06-11 PROCEDURE — 99214 OFFICE O/P EST MOD 30 MIN: CPT | Mod: S$GLB,,, | Performed by: INTERNAL MEDICINE

## 2019-06-12 ENCOUNTER — PATIENT MESSAGE (OUTPATIENT)
Dept: INTERNAL MEDICINE | Facility: CLINIC | Age: 74
End: 2019-06-12

## 2019-06-12 DIAGNOSIS — Z12.11 SCREENING FOR COLON CANCER: Primary | ICD-10-CM

## 2019-06-25 ENCOUNTER — LAB VISIT (OUTPATIENT)
Dept: LAB | Facility: HOSPITAL | Age: 74
End: 2019-06-25
Attending: INTERNAL MEDICINE
Payer: MEDICARE

## 2019-06-25 DIAGNOSIS — Z12.11 SCREENING FOR COLON CANCER: ICD-10-CM

## 2019-06-25 PROCEDURE — 82274 ASSAY TEST FOR BLOOD FECAL: CPT

## 2019-07-02 LAB — HEMOCCULT STL QL IA: NEGATIVE

## 2019-07-22 ENCOUNTER — PATIENT MESSAGE (OUTPATIENT)
Dept: INTERNAL MEDICINE | Facility: CLINIC | Age: 74
End: 2019-07-22

## 2019-07-22 NOTE — TELEPHONE ENCOUNTER
Call patient regarding this message  On last visit, it was the right hand finger but what is going on with the arm and shoulder

## 2019-07-22 NOTE — TELEPHONE ENCOUNTER
Pt states she has been experiencing right shoulder blade pain tylenol is not helping , she told you about the trigger finger on the same arm she think it is triggering from the shoulder

## 2019-07-23 ENCOUNTER — HOSPITAL ENCOUNTER (OUTPATIENT)
Dept: RADIOLOGY | Facility: HOSPITAL | Age: 74
Discharge: HOME | End: 2019-07-23
Attending: FAMILY MEDICINE
Payer: MEDICARE

## 2019-07-23 ENCOUNTER — OFFICE VISIT (OUTPATIENT)
Dept: SPORTS MEDICINE | Facility: CLINIC | Age: 74
End: 2019-07-23
Payer: MEDICARE

## 2019-07-23 VITALS — TEMPERATURE: 98 F | HEIGHT: 65 IN | BODY MASS INDEX: 29.66 KG/M2 | WEIGHT: 178 LBS

## 2019-07-23 DIAGNOSIS — M65.20 CALCIFIC TENDINITIS: ICD-10-CM

## 2019-07-23 DIAGNOSIS — M25.511 RIGHT SHOULDER PAIN, UNSPECIFIED CHRONICITY: ICD-10-CM

## 2019-07-23 DIAGNOSIS — M67.911 ROTATOR CUFF DYSFUNCTION, RIGHT: Primary | ICD-10-CM

## 2019-07-23 PROCEDURE — 99999 PR PBB SHADOW E&M-EST. PATIENT-LVL III: ICD-10-PCS | Mod: PBBFAC,,, | Performed by: FAMILY MEDICINE

## 2019-07-23 PROCEDURE — 73030 X-RAY EXAM OF SHOULDER: CPT | Mod: TC,FY,PO,RT

## 2019-07-23 PROCEDURE — 20611 DRAIN/INJ JOINT/BURSA W/US: CPT | Mod: RT,S$GLB,, | Performed by: FAMILY MEDICINE

## 2019-07-23 PROCEDURE — 99999 PR PBB SHADOW E&M-EST. PATIENT-LVL III: CPT | Mod: PBBFAC,,, | Performed by: FAMILY MEDICINE

## 2019-07-23 PROCEDURE — 73030 XR SHOULDER COMPLETE 2 OR MORE VIEWS RIGHT: ICD-10-PCS | Mod: 26,RT,, | Performed by: RADIOLOGY

## 2019-07-23 PROCEDURE — 20611 LARGE JOINT ASPIRATION/INJECTION: R SUBACROMIAL BURSA: ICD-10-PCS | Mod: RT,S$GLB,, | Performed by: FAMILY MEDICINE

## 2019-07-23 PROCEDURE — 1101F PT FALLS ASSESS-DOCD LE1/YR: CPT | Mod: CPTII,S$GLB,, | Performed by: FAMILY MEDICINE

## 2019-07-23 PROCEDURE — 1101F PR PT FALLS ASSESS DOC 0-1 FALLS W/OUT INJ PAST YR: ICD-10-PCS | Mod: CPTII,S$GLB,, | Performed by: FAMILY MEDICINE

## 2019-07-23 PROCEDURE — 73030 X-RAY EXAM OF SHOULDER: CPT | Mod: 26,RT,, | Performed by: RADIOLOGY

## 2019-07-23 PROCEDURE — 99204 OFFICE O/P NEW MOD 45 MIN: CPT | Mod: 25,S$GLB,, | Performed by: FAMILY MEDICINE

## 2019-07-23 PROCEDURE — 99204 PR OFFICE/OUTPT VISIT, NEW, LEVL IV, 45-59 MIN: ICD-10-PCS | Mod: 25,S$GLB,, | Performed by: FAMILY MEDICINE

## 2019-07-23 RX ORDER — TRIAMCINOLONE ACETONIDE 40 MG/ML
40 INJECTION, SUSPENSION INTRA-ARTICULAR; INTRAMUSCULAR
Status: DISCONTINUED | OUTPATIENT
Start: 2019-07-23 | End: 2019-07-23 | Stop reason: HOSPADM

## 2019-07-23 RX ADMIN — TRIAMCINOLONE ACETONIDE 40 MG: 40 INJECTION, SUSPENSION INTRA-ARTICULAR; INTRAMUSCULAR at 02:07

## 2019-07-23 NOTE — LETTER
July 23, 2019      Deniz Redman MD  [address]  [facility] [address]           [facility]  [address]  [facility] [address]  Phone: [phone]          Patient: Carmen Wang   MR Number: [id number]   YOB: 1945   Date of Visit: 7/23/2019       Dear Dr. Deniz Redman:    Thank you for referring Carmen Wang to me for evaluation. Attached you will find relevant portions of my assessment and plan of care.    If you have questions, please do not hesitate to call me. I look forward to following Carmen Wang along with you.    Sincerely,    Michael Salazar MD    Enclosure  CC:  No Recipients    If you would like to receive this communication electronically, please contact [email or web address] or [phone] to request more information on [employer] access.    For providers and/or their staff who would like to refer a patient to [facility], please contact us through our one-stop-shop provider referral line, [facility], [place], at [phone].    If you feel you have received this communication in error or would no longer like to receive these types of communications, please e-mail [email or web address]

## 2019-07-23 NOTE — PROGRESS NOTES
Carmen Wang, a 74 y.o. female, is here for evaluation of Right shoulder pain.     HISTORY OF PRESENT ILLNESS  Hand dominance, right    Location:  anterior and lateral, right  Onset:  Chronic   Palliative:     Relative rest   Oral analgesics - Tylenol prn      Provocative:    ADLs  Gh abd/flx  Prior:  none  Progression:  worsening discomfort  Quality:    sharp  wakes her up at night  Radiation:  none  Severity:  per nursing documentation  Timing:  intermittent with use  Trauma:  none specific     Review of systems (ROS):  A 10+ review of systems was performed with pertinent positives and negatives noted above in the history of present illness. Other systems were negative unless otherwise specified.      PHYSICAL EXAMINATION  General:  The patient is alert and oriented x 3. Mood is pleasant. Observation of ears, eyes and nose reveal no gross abnormalities. HEENT: NCAT, sclera anicteric. Lungs: Respirations are equal and unlabored.     RIGHT SHOULDER EXAMINATION     OBSERVATION:     Swelling  none  Deformity  none   Discoloration  none   Scapular winging none   Scars   none  Atrophy  none    TENDERNESS / CREPITUS (T/C):          T/C      T/C   Clavicle   -/-  SUPRAspinatus    -/-     AC Jt.    -/-  INFRAspinatus  +/-    SC Jt.    -/-  Deltoid    -/-      G. Tuberosity  -/-  LH BICEP groove  -/-   Acromion:  -/-  Midline Neck   -/-     Scapular Spine -/-  Trapezium   -/-   SMA Scapula  -/-  GH jt. line - post  -/-     Scapulothoracic  -/-         ROM:     Right shoulder   Left shoulder        AROM (PROM)   AROM (PROM)   FE    170° (175°)     170° (175°)     ER at 0°    60°  (65°)    60°  (65°)   ER at 90° ABD  90°  (90°)    90°  (90°)   IR at 90°  ABD   NA  (40°)     NA  (40°)      IR (spine level)   T10     T10    STRENGTH: (* = with pain) RIGHT SHOULDER  LEFT SHOULDER   SCAPTION   5/5    5/5    IR    5/5    5/5   ER    5/5    5/5   BICEPS   5/5    5/5   Deltoid    5/5    5/5     SIGNS:  Painful  side       NEER   +   OMARLOS        -    SINGH   -   SPEEDS        -   DROP ARM   -   BELLY PRESS       -    X-Body ADD    -   LIFT-OFF        +   HORNBLOWERS      -              STABILITY TESTING   RIGHT SHOULDER  LEFT SHOULDER     Translation     Anterior up face    up face    Posterior up face   up face    Sulcus  < 10mm   < 10 mm     Signs   Apprehension   neg     neg       Relocation   no change    no change      Jerk test  neg    neg    EXTREMITY NEURO-VASCULAR EXAM    Sensation grossly intact to light touch all dermatomal regions.    DTR 2+ Biceps, Triceps, BR and Negative Freds sign   Grossly intact motor function at Elbow, Wrist and Hand   Distal pulses radial and ulnar 2+, brisk cap refill, symmetric.      NECK:  Painless FROM and spinous processes non-tender. Negative Spurlings sign.       Other Findings:    ASSESSMENT & PLAN   Assessment:   #1 chronic supraspinatus tendinosis, right d   W/ calcification    No evidence of neurologic pathology  No evidence of vascular pathology    Imaging studies reviewed:   X-ray shoulder, right 19.07    Plan:  We discussed options including:  #1 watchful waiting  #2 physical therapy aimed at:   RoM glenohumeral joint   Strengthening rotator cuff   Scapular stability  #3 injection therapy:   CSI SAB    Right,     Left,    CSI iaGH    Right,     Left,    orthobiologics   #4 MRI for further evaluation     The patient chooses #2 and #3 csi sab right    Pain management: handout given  Bracing:   Physical therapy: fPT, @ [facility], begin as above   Activity (e.g. sports, work) restrictions: as tolerated   school/vocation: works out at [employer]    Follow up in 12w  Should symptoms worsen or fail to resolve, consider:  Revisiting the above options

## 2019-07-23 NOTE — PROCEDURES
"Large Joint Aspiration/Injection: R subacromial bursa  Date/Time: 7/23/2019 2:51 PM  Performed by: Michael Salazar MD  Authorized by: Michael Salazar MD     Consent Done?:  Yes (Verbal)  Indications:  Pain  Procedure site marked: Yes    Timeout: Prior to procedure the correct patient, procedure, and site was verified      Location:  Shoulder  Site:  R subacromial bursa  Prep: Patient was prepped and draped in usual sterile fashion    Ultrasonic Guidance for needle placement: Yes  Images are saved and documented.  Needle size:  20 G  Approach:  Posterior  Medications:  40 mg triamcinolone acetonide 40 mg/mL  Patient tolerance:  Patient tolerated the procedure well with no immediate complications    Additional Comments: Description of ultrasound utilization for needle guidance:   Ultrasound guidance used for needle localization. Images saved and stored for documentation. The subacromial / subdeltoid bursa was visualized. Dynamic visualization of the 20g x 1.5" needle was continuous throughout the procedure.        "

## 2019-07-25 ENCOUNTER — TELEPHONE (OUTPATIENT)
Dept: SPORTS MEDICINE | Facility: CLINIC | Age: 74
End: 2019-07-25

## 2019-07-25 NOTE — TELEPHONE ENCOUNTER
Faxed fPT order    Fidencio Hernandez   Sports Medicine Assistant   [facility] [place]     ----- Message from Krupa Bermudez sent at 7/25/2019 10:14 AM CDT -----  Contact: [employer]ish[employer] [facility] [employer]   Please call Manish at [phone]    Fax# [phone]    Please fax the physical therapy order to the insurance company for approval    Thank you

## 2019-07-29 ENCOUNTER — PATIENT MESSAGE (OUTPATIENT)
Dept: SPORTS MEDICINE | Facility: CLINIC | Age: 74
End: 2019-07-29

## 2019-08-20 ENCOUNTER — TELEPHONE (OUTPATIENT)
Dept: SPORTS MEDICINE | Facility: CLINIC | Age: 74
End: 2019-08-20

## 2019-08-20 NOTE — TELEPHONE ENCOUNTER
Called PT clinic to get them to fax back over.    Fidencio Hernandez   Sports Medicine Assistant   [facility] [place]     ----- Message from Fidencio Hernandez MA sent at 8/20/2019  9:29 AM CDT -----  Contact: [facility]      ----- Message -----  From: Elizabeth Gustafson  Sent: 8/20/2019   9:20 AM  To: Marie MCNALLY Staff    Requesting to speak with Fidencio regarding plan of care that was faxed over to sign. Wants update on status.   Requesting that it be signed and faxed back.       Ph: [phone]  Fax: [phone]

## 2019-09-09 ENCOUNTER — TELEPHONE (OUTPATIENT)
Dept: NEPHROLOGY | Facility: CLINIC | Age: 74
End: 2019-09-09

## 2019-09-09 DIAGNOSIS — N18.30 STAGE 3 CHRONIC KIDNEY DISEASE: Primary | ICD-10-CM

## 2019-09-09 NOTE — TELEPHONE ENCOUNTER
Camila Jiménez Staff   Caller: pt (Today, 10:56 AM)             Pt has upcoming appt       Called to schedule labs    I scheduled them    Except for Vit D     BUT    The Vitamin D     Warning -   Peoples insurance will only pay for  3 test a year.   Dr Holland ran vit D on  1/24/19,  3/19, and 6/4     Pt was low in 2017, 2018     Pt said he start started her on Vit D

## 2019-09-12 ENCOUNTER — TELEPHONE (OUTPATIENT)
Dept: SPORTS MEDICINE | Facility: CLINIC | Age: 74
End: 2019-09-12

## 2019-09-13 ENCOUNTER — PATIENT MESSAGE (OUTPATIENT)
Dept: INTERNAL MEDICINE | Facility: CLINIC | Age: 74
End: 2019-09-13

## 2019-09-13 ENCOUNTER — TELEPHONE (OUTPATIENT)
Dept: ORTHOPEDICS | Facility: CLINIC | Age: 74
End: 2019-09-13

## 2019-09-13 ENCOUNTER — LAB VISIT (OUTPATIENT)
Dept: LAB | Facility: HOSPITAL | Age: 74
End: 2019-09-13
Attending: INTERNAL MEDICINE
Payer: MEDICARE

## 2019-09-13 DIAGNOSIS — I10 ESSENTIAL HYPERTENSION: ICD-10-CM

## 2019-09-13 DIAGNOSIS — N18.30 CKD (CHRONIC KIDNEY DISEASE), STAGE III: ICD-10-CM

## 2019-09-13 LAB
ALBUMIN SERPL BCP-MCNC: 3.6 G/DL (ref 3.5–5.2)
ANION GAP SERPL CALC-SCNC: 12 MMOL/L (ref 8–16)
BUN SERPL-MCNC: 16 MG/DL (ref 8–23)
CALCIUM SERPL-MCNC: 10.1 MG/DL (ref 8.7–10.5)
CHLORIDE SERPL-SCNC: 99 MMOL/L (ref 95–110)
CO2 SERPL-SCNC: 26 MMOL/L (ref 23–29)
CREAT SERPL-MCNC: 1.2 MG/DL (ref 0.5–1.4)
EST. GFR  (AFRICAN AMERICAN): 51.8 ML/MIN/1.73 M^2
EST. GFR  (NON AFRICAN AMERICAN): 44.9 ML/MIN/1.73 M^2
GLUCOSE SERPL-MCNC: 94 MG/DL (ref 70–110)
PHOSPHATE SERPL-MCNC: 3 MG/DL (ref 2.7–4.5)
POTASSIUM SERPL-SCNC: 3.7 MMOL/L (ref 3.5–5.1)
PTH-INTACT SERPL-MCNC: 77 PG/ML (ref 9–77)
SODIUM SERPL-SCNC: 137 MMOL/L (ref 136–145)

## 2019-09-13 PROCEDURE — 36415 COLL VENOUS BLD VENIPUNCTURE: CPT | Mod: PO

## 2019-09-13 PROCEDURE — 83970 ASSAY OF PARATHORMONE: CPT

## 2019-09-13 PROCEDURE — 80069 RENAL FUNCTION PANEL: CPT

## 2019-09-13 NOTE — TELEPHONE ENCOUNTER
----- Message from Inga Polanco sent at 9/13/2019  1:25 PM CDT -----  Contact: SALVATORE BENNETT ([facility])  Name of Who is Calling: SALVATORE BENNETT ([facility])    What is the request in detail:SALVATORE BENNETT ([facility]) is requesting a sooner appointment for a broken right wrist, Next appointment was 9/20/19....Please contact to further discuss and advise      Can the clinic reply by [facility]: No     What Number to Call Back [facility] not [facility]: [phone]

## 2019-09-13 NOTE — TELEPHONE ENCOUNTER
Called patient in regard to phone message. Left a detailed message for the patient along with a call back number so we can discuss getting her in for an appt. Have a few availabilities on Monday the 16th at [facility] [place].

## 2019-09-14 ENCOUNTER — PATIENT OUTREACH (OUTPATIENT)
Dept: ADMINISTRATIVE | Facility: OTHER | Age: 74
End: 2019-09-14

## 2019-09-16 ENCOUNTER — HOSPITAL ENCOUNTER (OUTPATIENT)
Dept: RADIOLOGY | Facility: OTHER | Age: 74
Discharge: HOME | End: 2019-09-16
Attending: ORTHOPAEDIC SURGERY
Payer: MEDICARE

## 2019-09-16 ENCOUNTER — OFFICE VISIT (OUTPATIENT)
Dept: ORTHOPEDICS | Facility: CLINIC | Age: 74
End: 2019-09-16
Payer: MEDICARE

## 2019-09-16 ENCOUNTER — DOCUMENTATION ONLY (OUTPATIENT)
Dept: ORTHOPEDICS | Facility: CLINIC | Age: 74
End: 2019-09-16

## 2019-09-16 ENCOUNTER — HOSPITAL ENCOUNTER (OUTPATIENT)
Dept: CARDIOLOGY | Facility: OTHER | Age: 74
Discharge: HOME | End: 2019-09-16
Attending: ORTHOPAEDIC SURGERY
Payer: MEDICARE

## 2019-09-16 VITALS — BODY MASS INDEX: 29.65 KG/M2 | HEIGHT: 65 IN | WEIGHT: 177.94 LBS

## 2019-09-16 DIAGNOSIS — Z01.810 PREOP CARDIOVASCULAR EXAM: ICD-10-CM

## 2019-09-16 DIAGNOSIS — S52.572A OTHER CLOSED INTRA-ARTICULAR FRACTURE OF DISTAL END OF LEFT RADIUS, INITIAL ENCOUNTER: ICD-10-CM

## 2019-09-16 DIAGNOSIS — Z01.810 PREOP CARDIOVASCULAR EXAM: Primary | ICD-10-CM

## 2019-09-16 PROCEDURE — 73110 XR WRIST COMPLETE 3 VIEWS RIGHT: ICD-10-PCS | Mod: 26,RT,, | Performed by: RADIOLOGY

## 2019-09-16 PROCEDURE — 71045 XR CHEST 1 VIEW: ICD-10-PCS | Mod: 26,,, | Performed by: RADIOLOGY

## 2019-09-16 PROCEDURE — 71045 X-RAY EXAM CHEST 1 VIEW: CPT | Mod: TC,FY

## 2019-09-16 PROCEDURE — 1101F PT FALLS ASSESS-DOCD LE1/YR: CPT | Mod: CPTII,S$GLB,, | Performed by: ORTHOPAEDIC SURGERY

## 2019-09-16 PROCEDURE — 73090 XR FOREARM RIGHT: ICD-10-PCS | Mod: 26,RT,, | Performed by: RADIOLOGY

## 2019-09-16 PROCEDURE — 93005 ELECTROCARDIOGRAM TRACING: CPT

## 2019-09-16 PROCEDURE — 71045 X-RAY EXAM CHEST 1 VIEW: CPT | Mod: 26,,, | Performed by: RADIOLOGY

## 2019-09-16 PROCEDURE — 73110 X-RAY EXAM OF WRIST: CPT | Mod: TC,FY,RT

## 2019-09-16 PROCEDURE — 99999 PR PBB SHADOW E&M-EST. PATIENT-LVL IV: ICD-10-PCS | Mod: PBBFAC,,, | Performed by: ORTHOPAEDIC SURGERY

## 2019-09-16 PROCEDURE — 99999 PR PBB SHADOW E&M-EST. PATIENT-LVL IV: CPT | Mod: PBBFAC,,, | Performed by: ORTHOPAEDIC SURGERY

## 2019-09-16 PROCEDURE — 73110 X-RAY EXAM OF WRIST: CPT | Mod: 26,RT,, | Performed by: RADIOLOGY

## 2019-09-16 PROCEDURE — 1101F PR PT FALLS ASSESS DOC 0-1 FALLS W/OUT INJ PAST YR: ICD-10-PCS | Mod: CPTII,S$GLB,, | Performed by: ORTHOPAEDIC SURGERY

## 2019-09-16 PROCEDURE — 73090 X-RAY EXAM OF FOREARM: CPT | Mod: TC,FY,RT

## 2019-09-16 PROCEDURE — 99204 PR OFFICE/OUTPT VISIT, NEW, LEVL IV, 45-59 MIN: ICD-10-PCS | Mod: S$GLB,,, | Performed by: ORTHOPAEDIC SURGERY

## 2019-09-16 PROCEDURE — 93010 EKG 12-LEAD: ICD-10-PCS | Mod: ,,, | Performed by: INTERNAL MEDICINE

## 2019-09-16 PROCEDURE — 73090 X-RAY EXAM OF FOREARM: CPT | Mod: 26,RT,, | Performed by: RADIOLOGY

## 2019-09-16 PROCEDURE — 93010 ELECTROCARDIOGRAM REPORT: CPT | Mod: ,,, | Performed by: INTERNAL MEDICINE

## 2019-09-16 PROCEDURE — 99204 OFFICE O/P NEW MOD 45 MIN: CPT | Mod: S$GLB,,, | Performed by: ORTHOPAEDIC SURGERY

## 2019-09-16 RX ORDER — SODIUM CHLORIDE 9 MG/ML
INJECTION, SOLUTION INTRAVENOUS CONTINUOUS
Status: CANCELLED | OUTPATIENT
Start: 2019-09-16

## 2019-09-16 RX ORDER — MUPIROCIN 20 MG/G
OINTMENT TOPICAL
Status: CANCELLED | OUTPATIENT
Start: 2019-09-16

## 2019-09-16 NOTE — LETTER
September 16, 2019      Deniz Redman MD  [address]  [facility] [address]           [facility] [place] [address] [facility] [address]  [address] [place] [address], [address]  [facility] [address]  Phone: [phone]          Patient: Carmen Wang   MR Number: [id number]   YOB: 1945   Date of Visit: 9/16/2019       Dear Dr. Deniz Redman:    Thank you for referring Carmen Wang to me for evaluation. Attached you will find relevant portions of my assessment and plan of care.    If you have questions, please do not hesitate to call me. I look forward to following Carmen Wang along with you.    Sincerely,    Deejay Bragg MD    Enclosure  CC:  No Recipients    If you would like to receive this communication electronically, please contact [email or web address] or [phone] to request more information on [employer] Link access.    For providers and/or their staff who would like to refer a patient to [facility], please contact us through our one-stop-shop provider referral line, [facility], at [phone].    If you feel you have received this communication in error or would no longer like to receive these types of communications, please e-mail [email or web address]

## 2019-09-16 NOTE — PROGRESS NOTES
[facility]  History & Physical  Orthopedics    SUBJECTIVE:      Chief Complaint:  Right distal radius fracture    Referring Provider: Deniz Redman MD     History of Present Illness:  Patient is a 74 y.o. right hand dominant female who presents today with complaints of right distal radius fracture. States that she was out camping in a rigid for senses the other day when a dog ran a binder.  She was attenuated the dog and fell onto the right wrist she had immediate pain and deformity.  She was taken to [facility] where she was conscious sedated and a reduction and splinting was performed. She presents for follow-up.  Denies numbness tingling does have some itchiness underneath the splint.  She is in overall good health and had no other injuries during the incident.      The patient is a/an semi-retired..    Onset of symptoms/DOI was 5 days.    Symptoms are aggravated by activity and movement.    Symptoms are alleviated by rest.    Symptoms consist of pain.    The patient rates their pain as a 1/10.    Attempted treatment(s) and/or interventions include rest and closed reduction in the emergency department.     The patient denies any fevers, chills, N/V, D/C and presents for evaluation.       Past Medical History:   Diagnosis Date    Cataract     Chronic diarrhea     Hypertension     IBS (irritable bowel syndrome)      Past Surgical History:   Procedure Laterality Date    APPENDECTOMY      with the hysterectomy    BUNIONECTOMY      CHOLECYSTECTOMY      COLONOSCOPY N/A 10/5/2015    Performed by Teja Olivarez MD at [facility] ([facility])    HYSTERECTOMY      RUPERT secondary to pelvic pain, sacrocolpoplexy     Review of patient's allergies indicates:   Allergen Reactions    Codeine      Other reaction(s): Hallucinations    Penicillins Hives     Other reaction(s): Rash     Social History     Social History Narrative    Not on file     Family History   Problem Relation Age of  Onset    Hypertension Mother     Arthritis Mother     Thyroid disease Mother     Glaucoma Mother     Arthritis Father     Hypertension Father     Thyroid disease Sister     Breast cancer Sister 76    Thyroid disease Brother     Diabetes Brother     Thyroid disease Sister     Thyroid disease Sister     Cancer Brother         prostate    No Known Problems Sister     No Known Problems Sister     Lupus Daughter     Arthritis Son     Hypertension Son     Hypertension Son     No Known Problems Sister     Cancer Brother         prostate    No Known Problems Brother     No Known Problems Brother     No Known Problems Brother     No Known Problems Brother     Glaucoma Maternal Aunt     Stroke Maternal Grandmother     Glaucoma Maternal Aunt     No Known Problems Maternal Uncle     No Known Problems Paternal Aunt     No Known Problems Paternal Uncle     No Known Problems Maternal Grandfather     No Known Problems Paternal Grandmother     No Known Problems Paternal Grandfather     Colon cancer Neg Hx     Ovarian cancer Neg Hx     Amblyopia Neg Hx     Blindness Neg Hx     Cataracts Neg Hx     Macular degeneration Neg Hx     Retinal detachment Neg Hx     Strabismus Neg Hx     Esophageal cancer Neg Hx          Current Outpatient Medications:     benazepril (LOTENSIN) 20 MG tablet, Take 1 tablet (20 mg total) by mouth once daily., Disp: 90 tablet, Rfl: 3    cholecalciferol, vitamin D3, (D3-2000 ORAL), , Disp: , Rfl:     triamterene-hydrochlorothiazide 37.5-25 mg (DYAZIDE) 37.5-25 mg per capsule, TAKE 1 CAPSULE BY MOUTH EVERY MORNING., Disp: 90 capsule, Rfl: 3      Review of Systems:  Constitutional: no fever or chills  Eyes: no visual changes  ENT: no nasal congestion or sore throat  Respiratory: no cough or shortness of breath  Cardiovascular: no chest pain  Gastrointestinal: no nausea or vomiting, tolerating diet  Musculoskeletal: pain    OBJECTIVE:      Vital Signs (Most  "Recent):  Vitals:    09/16/19 1333   Weight: 80.7 kg (177 lb 14.6 oz)   Height: 5' 5" (1.651 m)     Body mass index is 29.61 kg/m².      Physical Exam:  Constitutional: The patient appears well-developed and well-nourished. No distress.   Head: Normocephalic and atraumatic.   Nose: Nose normal.   Eyes: Conjunctivae and EOM are normal.   Neck: No tracheal deviation present.   Cardiovascular: Normal rate and intact distal pulses.    Pulmonary/Chest: Effort normal. No respiratory distress.   Abdominal: There is no guarding.   Neurological: The patient is alert.   Psychiatric: The patient has a normal mood and affect.     Right Hand/Wrist Examination:    Observation/Inspection:  Swelling  mild    Deformity  positive  Discoloration  none     Scars   none    Atrophy  None    Splint in place, clean dry and intact. Taken down to reveal no evidence of open fracture.    HAND/WRIST EXAMINATION:  Finkelstein's Test   not assessed secondary to condition  WHAT Test    not assessed secondary to condition  Snuff box tenderness   Neg  Barcenas's Test    Neg  Hook of Hamate Tenderness  Neg  CMC grind    not assessed secondary to condition  Circumduction test   not assessed secondary to condition    Neurovascular Exam:  Digits WWP, brisk CR < 3s throughout  NVI motor/LTS to M/R/U nerves, radial pulse 2+  Tinel's Test - Carpal Tunnel  not assessed secondary to condition  Tinel's Test - Cubital Tunnel  not assessed secondary to condition  Phalen's Test    not assessed secondary to condition  Median Nerve Compression Test not assessed secondary to condition    ROM not assessed secondary to condition    RRR  CTAB  Abd S/NT/ND +BS    Diagnostic Results:     Xray -  right dorsally displaced distal radius fracture.      ASSESSMENT/PLAN:      Carmen Wang is a 74 y.o. female with right distal radius fracture closed neurovascular intact.    Plan:  Patient is 5 days status post distal radius fracture she was close reduced and splinted in the " emergency room however there is still significant dorsal displacement of the fracture. There are no post reduction films in the splint so we went ahead and took her to the C-arm room and after examination and the C-arm was determined that the best course of action would be to take her to the operating room for operative fixation of her distal radius fracture. After discussing risks and benefits patient's was wishes to proceed with ORIF.    The risks, benefits and alternatives to surgery were discussed with the patient at great length.  These include bleeding, infection, vessel/nerve damage, pain, numbness, tingling, complex regional pain syndrome, recurrent infection need for further surgery, cartilage damage, DVT, PE, arthritis and death.  Patient states an understanding and wishes to proceed with surgery.   All questions were answered.  No guarantees were implied or stated.  Informed consent was obtained.        I have seen the patient, reviewed the Resident's history and physical. I have personally interviewed and examined the patient at bedside and agree with the findings.      Patient with displaced right distal radius fracture sure status post closed reduction at outside emergency department.  X-rays in mini C-arm today reveal significant dorsal displacement.  Operative intervention recommended.  Patient wishes to proceed with ORIF right distal radius and any other indicated procedures on September 19, 2019.  Will remove sugar-tong splint as patient notes it is mildly tight and replaced with a volar short-arm splint until surgery. Patient is to ice and elevate in the meantime.  She denies numbness or tingling.     The patient has not responded to adequate non operative treatment at this time and/or non operative treatment is not indicated. Thus, the risks, benefits and alternatives to surgery were discussed with the patient in detail.  Specific risks include but are not limited to bleeding, infection, vessel  and/or nerve damage, pain, numbness, tingling, compartment syndrome, need for additional surgery, failure to return to pre-injury and/or preoperative functional status, inability to return to work, scar sensitivity, delayed healing, complex regional pain syndrome, weakness, pulley injury, tendon injury, bowstringing, partial and/or incomplete relief of symptoms, persistence of and/or worsening of symptoms, hardware and/or surgical failure, prominent and/or symptomatic hardware possibly necessitating future removal, osteomyelitis, amputation, loss of function, stiffness, rotational malalignment, functional debility, dysfunction, decreased  strength, need for prolonged postoperative rehabilitation, malunion, nonunion, deep venous thrombosis, pulmonary embolism, arthritis and death.  The patient states an understanding and wishes to proceed with surgery.   All questions were answered.  No guarantees were implied or stated.  Written informed consent was obtained.    Deejay Bragg M.D.     Please be aware that this note has been generated with the assistance of [employer] voice-to-text.  Please excuse any spelling or grammatical errors.

## 2019-09-16 NOTE — H&P (VIEW-ONLY)
Attestation signed by Deejay Bragg MD at 9/16/2019 2:05 PM   I have seen the patient, reviewed the Resident's history and physical. I have personally interviewed and examined the patient at bedside and agree with the findings.         Deejay Bragg MD  [facility] [address]      Expand All Collapse All      []Hide copied text    []Hover for details  [facility]  History & Physical  Orthopedics     SUBJECTIVE:       Chief Complaint:  Right distal radius fracture     Referring Provider: Deniz Redman MD      History of Present Illness:  Patient is a 74 y.o. right hand dominant female who presents today with complaints of right distal radius fracture. States that she was out camping in a rigid for senses the other day when a dog ran a binder.  She was attenuated the dog and fell onto the right wrist she had immediate pain and deformity.  [facility] was [facility] where she was conscious sedated and a reduction and splinting was performed. She presents for follow-up.  Denies numbness tingling does have some itchiness underneath the splint.  She is in overall good health and had no other injuries during the incident.       The patient is a/an semi-retired..     Onset of symptoms/DOI was 5 days.     Symptoms are aggravated by activity and movement.     Symptoms are alleviated by rest.     Symptoms consist of pain.     The patient rates their pain as a 1/10.     Attempted treatment(s) and/or interventions include rest and closed reduction in the emergency department.     The patient denies any fevers, chills, N/V, D/C and presents for evaluation.             Past Medical History:   Diagnosis Date    Cataract      Chronic diarrhea      Hypertension      IBS (irritable bowel syndrome)              Past Surgical History:   Procedure Laterality Date    APPENDECTOMY         with the hysterectomy    BUNIONECTOMY        CHOLECYSTECTOMY        COLONOSCOPY N/A 10/5/2015      Performed by Teja Olivarez MD at [facility] (4TH FLR)    HYSTERECTOMY         RUPERT secondary to pelvic pain, sacrocolpoplexy            Review of patient's allergies indicates:   Allergen Reactions    Codeine         Other reaction(s): Hallucinations    Penicillins Hives       Other reaction(s): Rash      Social History          Social History Narrative    Not on file            Family History   Problem Relation Age of Onset    Hypertension Mother      Arthritis Mother      Thyroid disease Mother      Glaucoma Mother      Arthritis Father      Hypertension Father      Thyroid disease Sister      Breast cancer Sister 76    Thyroid disease Brother      Diabetes Brother      Thyroid disease Sister      Thyroid disease Sister      Cancer Brother           prostate    No Known Problems Sister      No Known Problems Sister      Lupus Daughter      Arthritis Son      Hypertension Son      Hypertension Son      No Known Problems Sister      Cancer Brother           prostate    No Known Problems Brother      No Known Problems Brother      No Known Problems Brother      No Known Problems Brother      Glaucoma Maternal Aunt      Stroke Maternal Grandmother      Glaucoma Maternal Aunt      No Known Problems Maternal Uncle      No Known Problems Paternal Aunt      No Known Problems Paternal Uncle      No Known Problems Maternal Grandfather      No Known Problems Paternal Grandmother      No Known Problems Paternal Grandfather      Colon cancer Neg Hx      Ovarian cancer Neg Hx      Amblyopia Neg Hx      Blindness Neg Hx      Cataracts Neg Hx      Macular degeneration Neg Hx      Retinal detachment Neg Hx      Strabismus Neg Hx      Esophageal cancer Neg Hx              Current Outpatient Medications:     benazepril (LOTENSIN) 20 MG tablet, Take 1 tablet (20 mg total) by mouth once daily., Disp: 90 tablet, Rfl: 3    cholecalciferol, vitamin D3, (D3-2000 ORAL), , Disp: , Rfl:  "    triamterene-hydrochlorothiazide 37.5-25 mg (DYAZIDE) 37.5-25 mg per capsule, TAKE 1 CAPSULE BY MOUTH EVERY MORNING., Disp: 90 capsule, Rfl: 3        Review of Systems:  Constitutional: no fever or chills  Eyes: no visual changes  ENT: no nasal congestion or sore throat  Respiratory: no cough or shortness of breath  Cardiovascular: no chest pain  Gastrointestinal: no nausea or vomiting, tolerating diet  Musculoskeletal: pain     OBJECTIVE:       Vital Signs (Most Recent):  Vitals       Vitals:     09/16/19 1333   Weight: 80.7 kg (177 lb 14.6 oz)   Height: 5' 5" (1.651 m)         Body mass index is 29.61 kg/m².        Physical Exam:  Constitutional: The patient appears well-developed and well-nourished. No distress.   Head: Normocephalic and atraumatic.   Nose: Nose normal.   Eyes: Conjunctivae and EOM are normal.   Neck: No tracheal deviation present.   Cardiovascular: Normal rate and intact distal pulses.    Pulmonary/Chest: Effort normal. No respiratory distress.   Abdominal: There is no guarding.   Neurological: The patient is alert.   Psychiatric: The patient has a normal mood and affect.      Right Hand/Wrist Examination:     Observation/Inspection:  Swelling                       mild                    Deformity                     positive  Discoloration               none                  Scars                           none                  Atrophy                        None     Splint in place, clean dry and intact. Taken down to reveal no evidence of open fracture.     HAND/WRIST EXAMINATION:  Finkelstein's Test                                not assessed secondary to condition  WHAT Test                                         not assessed secondary to condition  Snuff box tenderness                          Neg  Barcenas's Test                                     Neg  Hook of Hamate Tenderness              Neg  CMC grind                                           not assessed secondary to " condition  Circumduction test                              not assessed secondary to condition     Neurovascular Exam:  Digits WWP, brisk CR < 3s throughout  NVI motor/LTS to M/R/U nerves, radial pulse 2+  Tinel's Test - Carpal Tunnel                not assessed secondary to condition  Tinel's Test - Cubital Tunnel               not assessed secondary to condition  Phalen's Test                                      not assessed secondary to condition  Median Nerve Compression Test       not assessed secondary to condition     ROM not assessed secondary to condition     RRR  CTAB  Abd S/NT/ND +BS     Diagnostic Results:     Xray -  right dorsally displaced distal radius fracture.        ASSESSMENT/PLAN:       Carmen Wang is a 74 y.o. female with right distal radius fracture closed neurovascular intact.     Plan:  Patient is 5 days status post distal radius fracture she was close reduced and splinted in the emergency room however there is still significant dorsal displacement of the fracture. There are no post reduction films in the splint so we went ahead and took her to the C-arm room and after examination and the C-arm was determined that the best course of action would be to take her to the operating room for operative fixation of her distal radius fracture. After discussing risks and benefits patient's was wishes to proceed with ORIF.     The risks, benefits and alternatives to surgery were discussed with the patient at great length.  These include bleeding, infection, vessel/nerve damage, pain, numbness, tingling, complex regional pain syndrome, recurrent infection need for further surgery, cartilage damage, DVT, PE, arthritis and death.  Patient states an understanding and wishes to proceed with surgery.   All questions were answered.  No guarantees were implied or stated.  Informed consent was obtained.         I have seen the patient, reviewed the Resident's history and physical. I have personally  interviewed and examined the patient at bedside and agree with the findings.      Patient with displaced right distal radius fracture sure status post closed reduction at outside emergency department.  X-rays in mini C-arm today reveal significant dorsal displacement.  Operative intervention recommended.  Patient wishes to proceed with ORIF right distal radius and any other indicated procedures on September 19, 2019.  Will remove sugar-tong splint as patient notes it is mildly tight and replaced with a volar short-arm splint until surgery. Patient is to ice and elevate in the meantime.  She denies numbness or tingling.     The patient has not responded to adequate non operative treatment at this time and/or non operative treatment is not indicated. Thus, the risks, benefits and alternatives to surgery were discussed with the patient in detail.  Specific risks include but are not limited to bleeding, infection, vessel and/or nerve damage, pain, numbness, tingling, compartment syndrome, need for additional surgery, failure to return to pre-injury and/or preoperative functional status, inability to return to work, scar sensitivity, delayed healing, complex regional pain syndrome, weakness, pulley injury, tendon injury, bowstringing, partial and/or incomplete relief of symptoms, persistence of and/or worsening of symptoms, hardware and/or surgical failure, prominent and/or symptomatic hardware possibly necessitating future removal, osteomyelitis, amputation, loss of function, stiffness, rotational malalignment, functional debility, dysfunction, decreased  strength, need for prolonged postoperative rehabilitation, malunion, nonunion, deep venous thrombosis, pulmonary embolism, arthritis and death.  The patient states an understanding and wishes to proceed with surgery.   All questions were answered.  No guarantees were implied or stated.  Written informed consent was obtained.    Deejay Bragg M.D.     · Please be  aware that this note has been generated with the assistance of [facility] voice-to-text.  Please excuse any spelling or grammatical errors.

## 2019-09-16 NOTE — H&P
Attestation signed by Deejay Bragg MD at 9/16/2019 2:05 PM   I have seen the patient, reviewed the Resident's history and physical. I have personally interviewed and examined the patient at bedside and agree with the findings.         Deejay Bragg MD  [facility] [address]      Expand All Collapse All      []Hide copied text    []Hover for details  [facility]  History & Physical  Orthopedics     SUBJECTIVE:       Chief Complaint:  Right distal radius fracture     Referring Provider: Deniz Redman MD      History of Present Illness:  Patient is a 74 y.o. right hand dominant female who presents today with complaints of right distal radius fracture. States that she was out camping in a rigid for senses the other day when a dog ran a binder.  She was attenuated the dog and fell onto the right wrist she had immediate pain and deformity.  She was taken to [facility] where she was conscious sedated and a reduction and splinting was performed. She presents for follow-up.  Denies numbness tingling does have some itchiness underneath the splint.  She is in overall good health and had no other injuries during the incident.       The patient is a/an semi-retired..     Onset of symptoms/DOI was 5 days.     Symptoms are aggravated by activity and movement.     Symptoms are alleviated by rest.     Symptoms consist of pain.     The patient rates their pain as a 1/10.     Attempted treatment(s) and/or interventions include rest and closed reduction in the emergency department.     The patient denies any fevers, chills, N/V, D/C and presents for evaluation.             Past Medical History:   Diagnosis Date    Cataract      Chronic diarrhea      Hypertension      IBS (irritable bowel syndrome)              Past Surgical History:   Procedure Laterality Date    APPENDECTOMY         with the hysterectomy    BUNIONECTOMY        CHOLECYSTECTOMY        COLONOSCOPY N/A 10/5/2015      Performed by Teja Olivarez MD at [facility] (4TH FLR)    HYSTERECTOMY         RUPERT secondary to pelvic pain, sacrocolpoplexy            Review of patient's allergies indicates:   Allergen Reactions    Codeine         Other reaction(s): Hallucinations    Penicillins Hives       Other reaction(s): Rash      Social History          Social History Narrative    Not on file            Family History   Problem Relation Age of Onset    Hypertension Mother      Arthritis Mother      Thyroid disease Mother      Glaucoma Mother      Arthritis Father      Hypertension Father      Thyroid disease Sister      Breast cancer Sister 76    Thyroid disease Brother      Diabetes Brother      Thyroid disease Sister      Thyroid disease Sister      Cancer Brother           prostate    No Known Problems Sister      No Known Problems Sister      Lupus Daughter      Arthritis Son      Hypertension Son      Hypertension Son      No Known Problems Sister      Cancer Brother           prostate    No Known Problems Brother      No Known Problems Brother      No Known Problems Brother      No Known Problems Brother      Glaucoma Maternal Aunt      Stroke Maternal Grandmother      Glaucoma Maternal Aunt      No Known Problems Maternal Uncle      No Known Problems Paternal Aunt      No Known Problems Paternal Uncle      No Known Problems Maternal Grandfather      No Known Problems Paternal Grandmother      No Known Problems Paternal Grandfather      Colon cancer Neg Hx      Ovarian cancer Neg Hx      Amblyopia Neg Hx      Blindness Neg Hx      Cataracts Neg Hx      Macular degeneration Neg Hx      Retinal detachment Neg Hx      Strabismus Neg Hx      Esophageal cancer Neg Hx              Current Outpatient Medications:     benazepril (LOTENSIN) 20 MG tablet, Take 1 tablet (20 mg total) by mouth once daily., Disp: 90 tablet, Rfl: 3    cholecalciferol, vitamin D3, (D3-2000 ORAL), , Disp: , Rfl:  "    triamterene-hydrochlorothiazide 37.5-25 mg (DYAZIDE) 37.5-25 mg per capsule, TAKE 1 CAPSULE BY MOUTH EVERY MORNING., Disp: 90 capsule, Rfl: 3        Review of Systems:  Constitutional: no fever or chills  Eyes: no visual changes  ENT: no nasal congestion or sore throat  Respiratory: no cough or shortness of breath  Cardiovascular: no chest pain  Gastrointestinal: no nausea or vomiting, tolerating diet  Musculoskeletal: pain     OBJECTIVE:       Vital Signs (Most Recent):  Vitals       Vitals:     09/16/19 1333   Weight: 80.7 kg (177 lb 14.6 oz)   Height: 5' 5" (1.651 m)         Body mass index is 29.61 kg/m².        Physical Exam:  Constitutional: The patient appears well-developed and well-nourished. No distress.   Head: Normocephalic and atraumatic.   Nose: Nose normal.   Eyes: Conjunctivae and EOM are normal.   Neck: No tracheal deviation present.   Cardiovascular: Normal rate and intact distal pulses.    Pulmonary/Chest: Effort normal. No respiratory distress.   Abdominal: There is no guarding.   Neurological: The patient is alert.   Psychiatric: The patient has a normal mood and affect.      Right Hand/Wrist Examination:     Observation/Inspection:  Swelling                       mild                    Deformity                     positive  Discoloration               none                  Scars                           none                  Atrophy                        None     Splint in place, clean dry and intact. Taken down to reveal no evidence of open fracture.     HAND/WRIST EXAMINATION:  Finkelstein's Test                                not assessed secondary to condition  WHAT Test                                         not assessed secondary to condition  Snuff box tenderness                          Neg  Barcenas's Test                                     Neg  Hook of Hamate Tenderness              Neg  CMC grind                                           not assessed secondary to " condition  Circumduction test                              not assessed secondary to condition     Neurovascular Exam:  Digits WWP, brisk CR < 3s throughout  NVI motor/LTS to M/R/U nerves, radial pulse 2+  Tinel's Test - Carpal Tunnel                not assessed secondary to condition  Tinel's Test - Cubital Tunnel               not assessed secondary to condition  Phalen's Test                                      not assessed secondary to condition  Median Nerve Compression Test       not assessed secondary to condition     ROM not assessed secondary to condition     RRR  CTAB  Abd S/NT/ND +BS     Diagnostic Results:     Xray -  right dorsally displaced distal radius fracture.        ASSESSMENT/PLAN:       Carmen Wang is a 74 y.o. female with right distal radius fracture closed neurovascular intact.     Plan:  Patient is 5 days status post distal radius fracture she was close reduced and splinted in the emergency room however there is still significant dorsal displacement of the fracture. There are no post reduction films in the splint so we went ahead and took her to the C-arm room and after examination and the C-arm was determined that the best course of action would be to take her to the operating room for operative fixation of her distal radius fracture. After discussing risks and benefits patient's was wishes to proceed with ORIF.     The risks, benefits and alternatives to surgery were discussed with the patient at great length.  These include bleeding, infection, vessel/nerve damage, pain, numbness, tingling, complex regional pain syndrome, recurrent infection need for further surgery, cartilage damage, DVT, PE, arthritis and death.  Patient states an understanding and wishes to proceed with surgery.   All questions were answered.  No guarantees were implied or stated.  Informed consent was obtained.         I have seen the patient, reviewed the Resident's history and physical. I have personally  interviewed and examined the patient at bedside and agree with the findings.      Patient with displaced right distal radius fracture sure status post closed reduction at outside emergency department.  X-rays in mini C-arm today reveal significant dorsal displacement.  Operative intervention recommended.  Patient wishes to proceed with ORIF right distal radius and any other indicated procedures on September 19, 2019.  Will remove sugar-tong splint as patient notes it is mildly tight and replaced with a volar short-arm splint until surgery. Patient is to ice and elevate in the meantime.  She denies numbness or tingling.     The patient has not responded to adequate non operative treatment at this time and/or non operative treatment is not indicated. Thus, the risks, benefits and alternatives to surgery were discussed with the patient in detail.  Specific risks include but are not limited to bleeding, infection, vessel and/or nerve damage, pain, numbness, tingling, compartment syndrome, need for additional surgery, failure to return to pre-injury and/or preoperative functional status, inability to return to work, scar sensitivity, delayed healing, complex regional pain syndrome, weakness, pulley injury, tendon injury, bowstringing, partial and/or incomplete relief of symptoms, persistence of and/or worsening of symptoms, hardware and/or surgical failure, prominent and/or symptomatic hardware possibly necessitating future removal, osteomyelitis, amputation, loss of function, stiffness, rotational malalignment, functional debility, dysfunction, decreased  strength, need for prolonged postoperative rehabilitation, malunion, nonunion, deep venous thrombosis, pulmonary embolism, arthritis and death.  The patient states an understanding and wishes to proceed with surgery.   All questions were answered.  No guarantees were implied or stated.  Written informed consent was obtained.    Deejay Bragg M.D.     · Please be  aware that this note has been generated with the assistance of [facility] voice-to-text.  Please excuse any spelling or grammatical errors.

## 2019-09-17 ENCOUNTER — PATIENT MESSAGE (OUTPATIENT)
Dept: SURGERY | Facility: HOSPITAL | Age: 74
End: 2019-09-17

## 2019-09-17 ENCOUNTER — TELEPHONE (OUTPATIENT)
Dept: NEPHROLOGY | Facility: CLINIC | Age: 74
End: 2019-09-17

## 2019-09-17 NOTE — TELEPHONE ENCOUNTER
----- Message from Petra Jaimes MA sent at 9/17/2019 10:25 AM CDT -----  Contact: self/[phone]  Pt states she is calling to R/S her appt, due to her having surgery on that day and wants to R/S her appt to Oct 1st after 12 noon.

## 2019-09-18 ENCOUNTER — ANESTHESIA EVENT (OUTPATIENT)
Dept: SURGERY | Facility: HOSPITAL | Age: 74
End: 2019-09-18
Payer: MEDICARE

## 2019-09-18 ENCOUNTER — TELEPHONE (OUTPATIENT)
Dept: ORTHOPEDICS | Facility: CLINIC | Age: 74
End: 2019-09-18

## 2019-09-18 NOTE — TELEPHONE ENCOUNTER
Called patient to give her time of arrival for surgery on 9/19/19 with Dr. Bragg. Arrival time is 9:45 am with surgery scheduled at 11:45 am. Patient verbalized understanding.     2 week post op appt has been made.

## 2019-09-18 NOTE — PRE-PROCEDURE INSTRUCTIONS
Preop instructions:     NPO solids/ milk products after midnight or clears up to 2 hours before arrival (clear liquids are: water, apple juice, Gatorade & Jell-O, black coffee/no milk, cream or creamer), shower instructions, directions, leave all valuables at home, medication instructions for PM prior & am of procedure explained. Patient stated an understanding.      Patient denies any side effects or issues with anesthesia or sedation.

## 2019-09-19 ENCOUNTER — HOSPITAL ENCOUNTER (OUTPATIENT)
Facility: HOSPITAL | Age: 74
Discharge: HOME | End: 2019-09-19
Attending: ORTHOPAEDIC SURGERY | Admitting: ORTHOPAEDIC SURGERY
Payer: MEDICARE

## 2019-09-19 ENCOUNTER — ANESTHESIA (OUTPATIENT)
Dept: SURGERY | Facility: HOSPITAL | Age: 74
End: 2019-09-19
Payer: MEDICARE

## 2019-09-19 VITALS
HEIGHT: 65 IN | WEIGHT: 177 LBS | TEMPERATURE: 98 F | DIASTOLIC BLOOD PRESSURE: 67 MMHG | BODY MASS INDEX: 29.49 KG/M2 | SYSTOLIC BLOOD PRESSURE: 151 MMHG | OXYGEN SATURATION: 100 % | HEART RATE: 72 BPM | RESPIRATION RATE: 14 BRPM

## 2019-09-19 DIAGNOSIS — S52.572A OTHER CLOSED INTRA-ARTICULAR FRACTURE OF DISTAL END OF LEFT RADIUS, INITIAL ENCOUNTER: Primary | ICD-10-CM

## 2019-09-19 DIAGNOSIS — Z01.810 PREOP CARDIOVASCULAR EXAM: ICD-10-CM

## 2019-09-19 PROCEDURE — 71000033 HC RECOVERY, INTIAL HOUR: Performed by: ORTHOPAEDIC SURGERY

## 2019-09-19 PROCEDURE — 25000003 PHARM REV CODE 250: Performed by: ORTHOPAEDIC SURGERY

## 2019-09-19 PROCEDURE — C1713 ANCHOR/SCREW BN/BN,TIS/BN: HCPCS | Performed by: ORTHOPAEDIC SURGERY

## 2019-09-19 PROCEDURE — 64415 NJX AA&/STRD BRCH PLXS IMG: CPT | Mod: 59,RT,, | Performed by: ANESTHESIOLOGY

## 2019-09-19 PROCEDURE — D9220A PRA ANESTHESIA: ICD-10-PCS | Mod: ANES,,, | Performed by: ANESTHESIOLOGY

## 2019-09-19 PROCEDURE — 25000003 PHARM REV CODE 250: Performed by: STUDENT IN AN ORGANIZED HEALTH CARE EDUCATION/TRAINING PROGRAM

## 2019-09-19 PROCEDURE — D9220A PRA ANESTHESIA: ICD-10-PCS | Mod: CRNA,,, | Performed by: NURSE ANESTHETIST, CERTIFIED REGISTERED

## 2019-09-19 PROCEDURE — D9220A PRA ANESTHESIA: Mod: ANES,,, | Performed by: ANESTHESIOLOGY

## 2019-09-19 PROCEDURE — 63600175 PHARM REV CODE 636 W HCPCS: Performed by: STUDENT IN AN ORGANIZED HEALTH CARE EDUCATION/TRAINING PROGRAM

## 2019-09-19 PROCEDURE — C1769 GUIDE WIRE: HCPCS | Performed by: ORTHOPAEDIC SURGERY

## 2019-09-19 PROCEDURE — 64415 SUPRACLAVICULAR BRACHIAL PLEXUS SINGLE INJECTION BLOCK: ICD-10-PCS | Mod: 59,RT,, | Performed by: ANESTHESIOLOGY

## 2019-09-19 PROCEDURE — 36000710: Performed by: ORTHOPAEDIC SURGERY

## 2019-09-19 PROCEDURE — 76942 ECHO GUIDE FOR BIOPSY: CPT | Performed by: STUDENT IN AN ORGANIZED HEALTH CARE EDUCATION/TRAINING PROGRAM

## 2019-09-19 PROCEDURE — S0077 INJECTION, CLINDAMYCIN PHOSP: HCPCS | Performed by: ORTHOPAEDIC SURGERY

## 2019-09-19 PROCEDURE — D9220A PRA ANESTHESIA: Mod: CRNA,,, | Performed by: NURSE ANESTHETIST, CERTIFIED REGISTERED

## 2019-09-19 PROCEDURE — 25609 OPTX DST RD XART FX/EP SEP3+: CPT | Mod: RT,,, | Performed by: ORTHOPAEDIC SURGERY

## 2019-09-19 PROCEDURE — 25609 PR OPEN RX DISTAL RADIUS FX, INTRA-ARTICULAR, 3+ FRAG: ICD-10-PCS | Mod: RT,,, | Performed by: ORTHOPAEDIC SURGERY

## 2019-09-19 PROCEDURE — 71000039 HC RECOVERY, EACH ADD'L HOUR: Performed by: ORTHOPAEDIC SURGERY

## 2019-09-19 PROCEDURE — 27201423 OPTIME MED/SURG SUP & DEVICES STERILE SUPPLY: Performed by: ORTHOPAEDIC SURGERY

## 2019-09-19 PROCEDURE — 63600175 PHARM REV CODE 636 W HCPCS: Performed by: ORTHOPAEDIC SURGERY

## 2019-09-19 PROCEDURE — 37000008 HC ANESTHESIA 1ST 15 MINUTES: Performed by: ORTHOPAEDIC SURGERY

## 2019-09-19 PROCEDURE — 64415 NJX AA&/STRD BRCH PLXS IMG: CPT | Performed by: STUDENT IN AN ORGANIZED HEALTH CARE EDUCATION/TRAINING PROGRAM

## 2019-09-19 PROCEDURE — 76942 ECHO GUIDE FOR BIOPSY: CPT | Mod: 26,,, | Performed by: ANESTHESIOLOGY

## 2019-09-19 PROCEDURE — 71000016 HC POSTOP RECOV ADDL HR: Performed by: ORTHOPAEDIC SURGERY

## 2019-09-19 PROCEDURE — 63600175 PHARM REV CODE 636 W HCPCS: Performed by: NURSE ANESTHETIST, CERTIFIED REGISTERED

## 2019-09-19 PROCEDURE — 25000003 PHARM REV CODE 250: Performed by: NURSE ANESTHETIST, CERTIFIED REGISTERED

## 2019-09-19 PROCEDURE — 76942 SUPRACLAVICULAR BRACHIAL PLEXUS SINGLE INJECTION BLOCK: ICD-10-PCS | Mod: 26,,, | Performed by: ANESTHESIOLOGY

## 2019-09-19 PROCEDURE — 71000015 HC POSTOP RECOV 1ST HR: Performed by: ORTHOPAEDIC SURGERY

## 2019-09-19 PROCEDURE — 37000009 HC ANESTHESIA EA ADD 15 MINS: Performed by: ORTHOPAEDIC SURGERY

## 2019-09-19 PROCEDURE — 36000711: Performed by: ORTHOPAEDIC SURGERY

## 2019-09-19 DEVICE — SCREW BNE LOK HEXLB 3.5X16: Type: IMPLANTABLE DEVICE | Site: RADIUS | Status: FUNCTIONAL

## 2019-09-19 DEVICE — SCREW BNE N LOK HEXLB 3.5X14: Type: IMPLANTABLE DEVICE | Site: RADIUS | Status: FUNCTIONAL

## 2019-09-19 DEVICE — PLATE VDR NARROW RIGHT: Type: IMPLANTABLE DEVICE | Site: RADIUS | Status: FUNCTIONAL

## 2019-09-19 DEVICE — SCREW BONE 2.3 X 16MM: Type: IMPLANTABLE DEVICE | Site: RADIUS | Status: FUNCTIONAL

## 2019-09-19 DEVICE — SCREW BONE 2.3 X 18MM: Type: IMPLANTABLE DEVICE | Site: RADIUS | Status: FUNCTIONAL

## 2019-09-19 DEVICE — SCREW BONE NL HEXALOBE 3.5 X 1: Type: IMPLANTABLE DEVICE | Site: RADIUS | Status: FUNCTIONAL

## 2019-09-19 DEVICE — SCREW BONE LOK CONG 2.3X20MM: Type: IMPLANTABLE DEVICE | Site: RADIUS | Status: FUNCTIONAL

## 2019-09-19 RX ORDER — OXYCODONE AND ACETAMINOPHEN 5; 325 MG/1; MG/1
1 TABLET ORAL EVERY 4 HOURS PRN
Qty: 42 TABLET | Refills: 0 | Status: SHIPPED | OUTPATIENT
Start: 2019-09-19 | End: 2020-11-18

## 2019-09-19 RX ORDER — MIDAZOLAM HYDROCHLORIDE 1 MG/ML
0.5 INJECTION INTRAMUSCULAR; INTRAVENOUS
Status: DISCONTINUED | OUTPATIENT
Start: 2019-09-19 | End: 2019-09-19 | Stop reason: HOSPADM

## 2019-09-19 RX ORDER — DIPHENHYDRAMINE HYDROCHLORIDE 50 MG/ML
25 INJECTION INTRAMUSCULAR; INTRAVENOUS EVERY 6 HOURS PRN
Status: DISCONTINUED | OUTPATIENT
Start: 2019-09-19 | End: 2019-09-19 | Stop reason: HOSPADM

## 2019-09-19 RX ORDER — FENTANYL CITRATE 50 UG/ML
25 INJECTION, SOLUTION INTRAMUSCULAR; INTRAVENOUS EVERY 5 MIN PRN
Status: DISCONTINUED | OUTPATIENT
Start: 2019-09-19 | End: 2019-09-19 | Stop reason: HOSPADM

## 2019-09-19 RX ORDER — MUPIROCIN 20 MG/G
OINTMENT TOPICAL
Status: DISCONTINUED | OUTPATIENT
Start: 2019-09-19 | End: 2019-09-19 | Stop reason: HOSPADM

## 2019-09-19 RX ORDER — DOCUSATE SODIUM 100 MG/1
100 CAPSULE, LIQUID FILLED ORAL 2 TIMES DAILY
Qty: 60 CAPSULE | Refills: 0 | Status: SHIPPED | OUTPATIENT
Start: 2019-09-19 | End: 2019-10-19

## 2019-09-19 RX ORDER — HYDROCODONE BITARTRATE AND ACETAMINOPHEN 5; 325 MG/1; MG/1
1 TABLET ORAL EVERY 4 HOURS PRN
Status: DISCONTINUED | OUTPATIENT
Start: 2019-09-19 | End: 2019-09-19 | Stop reason: HOSPADM

## 2019-09-19 RX ORDER — PROPOFOL 10 MG/ML
VIAL (ML) INTRAVENOUS
Status: DISCONTINUED | OUTPATIENT
Start: 2019-09-19 | End: 2019-09-19

## 2019-09-19 RX ORDER — LIDOCAINE HCL/PF 100 MG/5ML
SYRINGE (ML) INTRAVENOUS
Status: DISCONTINUED | OUTPATIENT
Start: 2019-09-19 | End: 2019-09-19

## 2019-09-19 RX ORDER — FENTANYL CITRATE 50 UG/ML
INJECTION, SOLUTION INTRAMUSCULAR; INTRAVENOUS
Status: DISCONTINUED | OUTPATIENT
Start: 2019-09-19 | End: 2019-09-19

## 2019-09-19 RX ORDER — METOCLOPRAMIDE HYDROCHLORIDE 5 MG/ML
5 INJECTION INTRAMUSCULAR; INTRAVENOUS EVERY 6 HOURS PRN
Status: DISCONTINUED | OUTPATIENT
Start: 2019-09-19 | End: 2019-09-19 | Stop reason: HOSPADM

## 2019-09-19 RX ORDER — OXYCODONE HYDROCHLORIDE 5 MG/1
10 TABLET ORAL EVERY 4 HOURS PRN
Status: DISCONTINUED | OUTPATIENT
Start: 2019-09-19 | End: 2019-09-19 | Stop reason: HOSPADM

## 2019-09-19 RX ORDER — ONDANSETRON 2 MG/ML
INJECTION INTRAMUSCULAR; INTRAVENOUS
Status: DISCONTINUED | OUTPATIENT
Start: 2019-09-19 | End: 2019-09-19

## 2019-09-19 RX ORDER — SODIUM CHLORIDE 9 MG/ML
INJECTION, SOLUTION INTRAVENOUS CONTINUOUS
Status: DISCONTINUED | OUTPATIENT
Start: 2019-09-19 | End: 2019-09-19 | Stop reason: HOSPADM

## 2019-09-19 RX ORDER — OXYCODONE HYDROCHLORIDE 5 MG/1
TABLET ORAL
Status: DISCONTINUED
Start: 2019-09-19 | End: 2019-09-19 | Stop reason: HOSPADM

## 2019-09-19 RX ORDER — ACETAMINOPHEN 325 MG/1
650 TABLET ORAL EVERY 4 HOURS PRN
Status: DISCONTINUED | OUTPATIENT
Start: 2019-09-19 | End: 2019-09-19 | Stop reason: HOSPADM

## 2019-09-19 RX ORDER — ONDANSETRON 2 MG/ML
4 INJECTION INTRAMUSCULAR; INTRAVENOUS EVERY 12 HOURS PRN
Status: DISCONTINUED | OUTPATIENT
Start: 2019-09-19 | End: 2019-09-19 | Stop reason: HOSPADM

## 2019-09-19 RX ORDER — HYDROMORPHONE HYDROCHLORIDE 1 MG/ML
0.2 INJECTION, SOLUTION INTRAMUSCULAR; INTRAVENOUS; SUBCUTANEOUS EVERY 5 MIN PRN
Status: DISCONTINUED | OUTPATIENT
Start: 2019-09-19 | End: 2019-09-19 | Stop reason: HOSPADM

## 2019-09-19 RX ORDER — HYDROCODONE BITARTRATE AND ACETAMINOPHEN 5; 325 MG/1; MG/1
1 TABLET ORAL EVERY 6 HOURS PRN
Status: ON HOLD | COMMUNITY
End: 2019-09-19 | Stop reason: HOSPADM

## 2019-09-19 RX ORDER — CLINDAMYCIN PHOSPHATE 900 MG/50ML
900 INJECTION, SOLUTION INTRAVENOUS
Status: COMPLETED | OUTPATIENT
Start: 2019-09-19 | End: 2019-09-19

## 2019-09-19 RX ORDER — PROMETHAZINE HYDROCHLORIDE 25 MG/1
25 TABLET ORAL EVERY 4 HOURS PRN
Qty: 61 TABLET | Refills: 0 | Status: SHIPPED | OUTPATIENT
Start: 2019-09-19 | End: 2020-11-18

## 2019-09-19 RX ORDER — LORAZEPAM 2 MG/ML
0.25 INJECTION INTRAMUSCULAR ONCE AS NEEDED
Status: DISCONTINUED | OUTPATIENT
Start: 2019-09-19 | End: 2019-09-19 | Stop reason: HOSPADM

## 2019-09-19 RX ADMIN — OXYCODONE HYDROCHLORIDE 10 MG: 5 TABLET ORAL at 04:09

## 2019-09-19 RX ADMIN — PROPOFOL 200 MG: 10 INJECTION, EMULSION INTRAVENOUS at 01:09

## 2019-09-19 RX ADMIN — FENTANYL CITRATE 25 MCG: 50 INJECTION, SOLUTION INTRAMUSCULAR; INTRAVENOUS at 03:09

## 2019-09-19 RX ADMIN — SODIUM CHLORIDE: 0.9 INJECTION, SOLUTION INTRAVENOUS at 01:09

## 2019-09-19 RX ADMIN — LIDOCAINE HYDROCHLORIDE 100 MG: 20 INJECTION, SOLUTION INTRAVENOUS at 01:09

## 2019-09-19 RX ADMIN — MUPIROCIN: 20 OINTMENT TOPICAL at 11:09

## 2019-09-19 RX ADMIN — SODIUM CHLORIDE, SODIUM GLUCONATE, SODIUM ACETATE, POTASSIUM CHLORIDE, MAGNESIUM CHLORIDE, SODIUM PHOSPHATE, DIBASIC, AND POTASSIUM PHOSPHATE: .53; .5; .37; .037; .03; .012; .00082 INJECTION, SOLUTION INTRAVENOUS at 03:09

## 2019-09-19 RX ADMIN — CLINDAMYCIN PHOSPHATE 900 MG: 18 INJECTION, SOLUTION INTRAVENOUS at 01:09

## 2019-09-19 RX ADMIN — MIDAZOLAM HYDROCHLORIDE 2 MG: 1 INJECTION, SOLUTION INTRAMUSCULAR; INTRAVENOUS at 11:09

## 2019-09-19 RX ADMIN — ONDANSETRON 4 MG: 2 INJECTION INTRAMUSCULAR; INTRAVENOUS at 03:09

## 2019-09-19 RX ADMIN — SODIUM CHLORIDE 1000 ML: 0.9 INJECTION, SOLUTION INTRAVENOUS at 11:09

## 2019-09-19 RX ADMIN — FENTANYL CITRATE 50 MCG: 50 INJECTION INTRAMUSCULAR; INTRAVENOUS at 11:09

## 2019-09-19 NOTE — ANESTHESIA PROCEDURE NOTES
Supraclavicular Brachial Plexus Single Injection Block    Patient location during procedure: pre-op   Block not for primary anesthetic.  Reason for block: at surgeon's request and post-op pain management   Post-op Pain Location: Rt hand  Start time: 9/19/2019 11:45 AM  Timeout: 9/19/2019 11:41 AM   End time: 9/19/2019 11:52 AM    Staffing  Authorizing Provider: Terese Pagan MD  Performing Provider: Mane Patel MD    Preanesthetic Checklist  Completed: patient identified, site marked, surgical consent, pre-op evaluation, timeout performed, IV checked, risks and benefits discussed and monitors and equipment checked  Peripheral Block  Patient position: supine  Prep: ChloraPrep  Patient monitoring: heart rate, cardiac monitor, continuous pulse ox, continuous capnometry and frequent blood pressure checks  Block type: supraclavicular  Laterality: right  Injection technique: single shot  Needle  Needle type: Stimuplex   Needle gauge: 22 G  Needle length: 2 in  Needle localization: anatomical landmarks and ultrasound guidance   -ultrasound image captured on disc.  Assessment  Injection assessment: negative aspiration, negative parasthesia and local visualized surrounding nerve  Paresthesia pain: none  Heart rate change: no  Slow fractionated injection: yes  Additional Notes  30 cc Ropivacaine 0.5%-Epi 1:300K. VSS.  [facility] RN monitoring vitals throughout procedure.  Patient tolerated procedure well.

## 2019-09-19 NOTE — PLAN OF CARE
Discharge instructions discussed with patient. Pt verbalizes understanding. Consents in chart, vital signs stable, no complaints. Awaiting prescriptions per bedside delivery.

## 2019-09-19 NOTE — TRANSFER OF CARE
"Anesthesia Transfer of Care Note    Patient: Carmen Wang    Procedure(s) Performed: Procedure(s) (LRB):  ORIF, FRACTURE, RADIUS, DISTAL - right accumed (Right)    Patient location: PACU    Anesthesia Type: general    Transport from OR: Transported from OR on 6-10 L/min O2 by face mask with adequate spontaneous ventilation    Post pain: adequate analgesia    Post assessment: no apparent anesthetic complications and tolerated procedure well    Post vital signs: stable    Level of consciousness: awake    Nausea/Vomiting: no nausea/vomiting    Complications: none    Transfer of care protocol was followed      Last vitals:   Visit Vitals  /87 (BP Location: Left arm, Patient Position: Lying)   Pulse 86   Temp 36.1 °C (97 °F) (Temporal)   Resp 16   Ht 5' 5" (1.651 m)   Wt 80.3 kg (177 lb)   LMP 01/01/1982 (Approximate)   SpO2 100%   Breastfeeding? No   BMI 29.45 kg/m²     "

## 2019-09-19 NOTE — BRIEF OP NOTE
[facility]  Brief Operative Note     SUMMARY     Surgery Date: 9/19/2019     Surgeon(s) and Role:     * Deejay Bragg MD - Primary     * Jj Yu MD - Resident - Assisting        Pre-op Diagnosis:  Other closed intra-articular fracture of distal end of right radius, initial encounter [S52.571A]    Post-op Diagnosis:  Post-Op Diagnosis Codes:     * Other closed intra-articular fracture of distal end of right radius, initial encounter [S52.571A]    Procedure(s) (LRB):  ORIF, FRACTURE, RADIUS, DISTAL - right accumed (Right)    Anesthesia: General    Description of the findings of the procedure: see op note    Findings/Key Components: see op note    Estimated Blood Loss: * No values recorded between 9/19/2019  2:16 PM and 9/19/2019  3:53 PM *         Specimens:   Specimen (12h ago, onward)    None          Discharge Note    SUMMARY     Admit Date: 9/19/2019    Discharge Date and Time: 09/19/2019     Hospital Course (synopsis of major diagnoses, care, treatment, and services provided during the course of the hospital stay): Hospital Course:  On 9/19/2019, the patient arrived to pre-op area for proper pre-operative management.  Upon completion of pre-operative preparation, the patient was taken back to the operative theatre.  A ORIF right distal radius was performed without complication and the patient was transported to the post anesthesia care unit in stable condition. The patient suffered minimal blood loss and electrolyte imbalances during the procedure, which were correct accordingly if neccessary. After appropriate recovery from the anaesthetic agents used during the surgery the patient was discharged home.        Final Diagnosis: Post-Op Diagnosis Codes:     * Other closed intra-articular fracture of distal end of right radius, initial encounter [S52.571A]    Disposition: Home or Self Care    Follow Up/Patient Instructions:     Medications:  Reconciled Home Medications:       Medication List      START taking these medications    docusate sodium 100 MG capsule  Commonly known as:  COLACE  Take 1 capsule (100 mg total) by mouth 2 (two) times daily.     oxyCODONE-acetaminophen 5-325 mg per tablet  Commonly known as:  PERCOCET  Take 1 tablet by mouth every 4 (four) hours as needed for Pain.     promethazine 25 MG tablet  Commonly known as:  PHENERGAN  Take 1 tablet (25 mg total) by mouth every 4 (four) hours as needed for Nausea.        CONTINUE taking these medications    benazepril 20 MG tablet  Commonly known as:  LOTENSIN  Take 1 tablet (20 mg total) by mouth once daily.     D3-2000 ORAL     triamterene-hydrochlorothiazide 37.5-25 mg 37.5-25 mg per capsule  Commonly known as:  DYAZIDE  TAKE 1 CAPSULE BY MOUTH EVERY MORNING.        STOP taking these medications    HYDROcodone-acetaminophen 5-325 mg per tablet  Commonly known as:  NORCO          Discharge Procedure Orders   Diet general     Sponge bath only until clinic visit     Lifting restrictions   Order Comments: No lifting operative extremity     No driving, operating heavy equipment or signing legal documents while taking pain medication     Call MD for:  temperature >100.4     Call MD for:  persistent nausea and vomiting     Call MD for:  severe uncontrolled pain     Call MD for:  difficulty breathing, headache or visual disturbances     Call MD for:  redness, tenderness, or signs of infection (pain, swelling, redness, odor or green/yellow discharge around incision site)     Call MD for:  hives     Call MD for:  persistent dizziness or light-headedness     Call MD for:  extreme fatigue     Leave dressing on - Keep it clean, dry, and intact until clinic visit

## 2019-09-19 NOTE — ANESTHESIA PREPROCEDURE EVALUATION
09/19/2019  Carmen Wang is a 74 y.o., female.    Anesthesia Evaluation    I have reviewed the Patient Summary Reports.    I have reviewed the Nursing Notes.   I have reviewed the Medications.     Review of Systems  Anesthesia Hx:  No problems with previous Anesthesia  History of prior surgery of interest to airway management or planning: Previous anesthesia: General Denies Family Hx of Anesthesia complications.   Denies Personal Hx of Anesthesia complications.   Social:  Non-Smoker    Hematology/Oncology:  Hematology Normal   Oncology Normal     EENT/Dental:EENT/Dental Normal   Cardiovascular:   Exercise tolerance: good Hypertension    Pulmonary:  Pulmonary Normal    Renal/:   Chronic Renal Disease, CRI    Hepatic/GI:   IBS   Musculoskeletal:   Distal radius fx   Neurological:  Neurology Normal    Endocrine:  Endocrine Normal    Dermatological:  Skin Normal    Psych:  Psychiatric Normal           Physical Exam  General:  Well nourished    Airway/Jaw/Neck:  Airway Findings: Mouth Opening: Normal Tongue: Normal  General Airway Assessment: Adult, Average  Mallampati: III  Improves to II with phonation.  TM Distance: Normal, at least 6 cm        Eyes/Ears/Nose:  EYES/EARS/NOSE FINDINGS: Normal   Dental:  Dental Findings: In tact, Molar caps   Chest/Lungs:  Chest/Lungs Findings: Clear to auscultation, Normal Respiratory Rate     Heart/Vascular:  Heart Findings: Rate: Normal  Rhythm: Regular Rhythm  Sounds: Normal  Heart murmur: negative Vascular Findings: Normal    Abdomen:  Abdomen Findings: Normal    Musculoskeletal:  Musculoskeletal Findings: Normal   Skin:  Skin Findings: Normal    Mental Status:  Mental Status Findings:  Cooperative, Alert and Oriented         Anesthesia Plan  Type of Anesthesia, risks & benefits discussed:  Anesthesia Type:  general  Patient's Preference:   Intra-op Monitoring Plan:  standard ASA monitors  Intra-op Monitoring Plan Comments:   Post Op Pain Control Plan: peripheral nerve block  Post Op Pain Control Plan Comments:   Induction:   IV  Beta Blocker:  Patient is not currently on a Beta-Blocker (No further documentation required).       Informed Consent: Patient understands risks and agrees with Anesthesia plan.  Questions answered. Anesthesia consent signed with patient.  ASA Score: 2     Day of Surgery Review of History & Physical:            Ready For Surgery From Anesthesia Perspective.

## 2019-09-19 NOTE — DISCHARGE INSTRUCTIONS
Recovery After Procedural Sedation (Adult)  You have been given medicine by vein to make you sleep during your surgery. This may have included both a pain medicine and sleeping medicine. Most of the effects have worn off. But you may still have some drowsiness for the next 6 to 8 hours.  Home care  Follow these guidelines when you get home:  · For the next 8 hours, you should be watched by a responsible adult. This person should make sure your condition is not getting worse.  · Don't drink any alcohol for the next 24 hours.  · Don't drive, operate dangerous machinery, or make important business or personal decisions during the next 24 hours.  Note: Your healthcare provider may tell you not to take any medicine by mouth for pain or sleep in the next 4 hours. These medicines may react with the medicines you were given in the hospital. This could cause a much stronger response than usual.  Follow-up care  Follow up with your healthcare provider if you are not alert and back to your usual level of activity within 12 hours.  When to seek medical advice  Call your healthcare provider right away if any of these occur:  · Drowsiness gets worse  · Weakness or dizziness gets worse  · Repeated vomiting  · You can't be awakened   Date Last Reviewed: 10/18/2016  © 6122-2204 [employer]. [address] [place] [address], [place] [address]. All rights reserved. This information is not intended as a substitute for professional medical care. Always follow your healthcare professional's instructions.

## 2019-09-20 ENCOUNTER — PATIENT MESSAGE (OUTPATIENT)
Dept: ORTHOPEDICS | Facility: CLINIC | Age: 74
End: 2019-09-20

## 2019-09-20 NOTE — OP NOTE
DATE OF PROCEDURE:  9/19/19     SERVICE:  Orthopedics.     SURGEON:  Deejay Bragg M.D.     FIRST ASSISTANT:  Jj Yu M.D. (RES)     PREOPERATIVE DIAGNOSES:  1.  right intra-articular distal radius fracture     POSTOPERATIVE DIAGNOSES:  1.  right intra-articular distal radius fracture     PROCEDURES PERFORMED:  1.  Open reduction and internal fixation of right distal radius fracture,   intraarticular, 3+ parts.     TOURNIQUET TIME:  64 minutes at 250 mmHg.     ESTIMATED BLOOD LOSS:  4 mL.     IMPLANTS:  Acumed volar distal radius plate with a combination of   cortical and locking screws.     COMPLICATIONS:  None.     PACKS AND DRAINS:  None.     SPECIMENS:  None.     ANESTHESIA:  Regional MAC.     INTRAVENOUS FLUIDS:  Crystalloid.     CONDITION:  Stable.     MICROBIOLOGY:  None.     INDICATIONS FOR PROCEDURE:  The patient is a 74-year-old female who   previously sustained significant trauma to the right upper extremity.  The patient was evaluated in the Orthopedics Clinic and found to have a displaced distal radius fracture.   The risks, benefits and alternatives to operative intervention were   discussed with the patient in detail.  Specific risks include, but   Are not limited to bleeding, infection, neurovascular damage, pain, numbness,   tingling, compartment syndrome, need for additional surgery, failure to return   to pre-injury and/or preoperative functional status, inability to return to   work, scar sensitivity, delayed healing, complex regional pain syndrome,   weakness, tendon injury, bowstringing, partial and/or incomplete relief of   symptoms, persistence of and/or worsening of symptoms, hardware and/or surgical   failure, prominent and/or symptomatic hardware, possibly necessitating future   removal, osteomyelitis, amputation, loss of function, stiffness, rotational   malalignment, functional debility, dysfunction, decreased  strength, need   for prolonged postoperative rehabilitation,  malunion, nonunion, DVT, PE,   arthritis, death, and others.  The patient consented to the risks of surgical intervention and wished to   proceed with surgery.  All questions were answered and no guarantees were   implied or stated.  Written informed consent was obtained.     PROCEDURE IN DETAIL:  On the date of the operative intervention, the patient was   evaluated in the preoperative holding area.  With their participation, the right   upper extremity was marked as the operative site.  The patient then underwent regional   anesthesia and was transferred to the Operative Suite.  The patient was then transferred   to the OR table with all superficial bony prominences well padded.  The right   upper extremity was placed on a hand table and then a nonsterile tourniquet was   placed high on the patient's upper arm.  The operative upper extremity was then   prepped and draped in the usual sterile fashion and a timeout was taken and   confirmed by all present to confirm the correct patient, site, procedure and   administration of preoperative antibiotics.  All were in agreement, so I   proceeded.  The standard FCR approach to the distal radius was   marked out for a length of approximately 6 cm.  Esmarch was utilized for exsanguination.   Tourniquet was then inflated to 250 mmHg.  Skin   incision to the volar aspect of the right distal radius was then made sharply   with a scalpel and dissection was carried down to the flexor carpi radialis   tendon.  The tendon was retracted ulnarly and the fascia deep to this was   incised sharply with a scalpel.  All flexor tendons including the flexor carpi   radialis and flexor pollicis longus were then swept ulnarly and the radial   artery was swept radially.  Great care was taken throughout the duration of the procedure to protect all neurovascular and tendinous structures.  The pronator quadratus was then identified and   sharply divided at the red-white junction distally and  also the radial aspect on   the radius.  A key elevator was then utilized to sweep the pronator quadratus   muscle off the volar surface of the radius.  The fracture site was then readily   identified.  There was intraarticular extension and comminution.  The fracture site was then   cleaned of fracture hematoma and debris. A reduction maneuver was   performed.  I was able to restore length alignment and rotation to the fracture.  An appropriate Acumed volar distal radius plate was then   chosen and provisionally fixed to the radius with 3 K-wires.  Fluoroscopy was   then brought into the field, which verified placement of all hardware as well as   reduction.  Once I was happy with our reduction and placement of the hardware,   I then fixed the plate to the bone with a combination of distal locking screws   and 3 proximal shaft screws in succession.  Fluoroscopy was again utilized to   verify placement of the hardware and maintain the reduction.  Once all hardware was in its final position, I then took final   fluoroscopic x-rays, which again verified maintenance of our reduction and that   all hardware was in appropriate position and extraarticular.  I was pleased with   the reduction and then I performed a Shuck maneuver to stress the distal   radioulnar joint, which I found to be stable.  The wrist was taken through range   of motion and all hardware was extraarticular and there was no crepitus with   wrist range of motion.  The wound was then copiously irrigated with sterile   saline.  The tourniquet was let down.  There was no significant bleeding, and  hemostasis was achieved with bipolar electrocautery.  The wound was then closed   in a layered fashion with 4-0 Vicryl suture in the subcutaneous and dermal   tissues followed by 4-0 nylon in a horizontal mattress fashion to close the   skin.  Sterile dressings were then applied consisting of Xeroform, 4 x 4s, gauze   and a short-arm volar slab splint.  The  patient was then awakened from   anesthesia and returned to the Postanesthesia Care Unit in stable condition.    There were no complications and as the attending surgeon, I was present for and   performed the critical portions of the procedure.     POSTOPERATIVE PLAN FOR THE PATIENT:  The patient will be discharged home in stable condition. They will remain nonweightbearing to the operative upper extremity during   the healing process.  I will re-evaluate the patient in approximately 2 weeks for suture removal and re-evaluation of the postoperative plan.

## 2019-09-23 ENCOUNTER — TELEPHONE (OUTPATIENT)
Dept: NEPHROLOGY | Facility: CLINIC | Age: 74
End: 2019-09-23

## 2019-09-23 ENCOUNTER — PATIENT OUTREACH (OUTPATIENT)
Dept: ADMINISTRATIVE | Facility: OTHER | Age: 74
End: 2019-09-23

## 2019-09-23 NOTE — PROGRESS NOTES
"Subjective:       Patient ID: Carmen Wang is a 74 y.o. Black or  female who presents for follow-up evaluation of CKD No chief complaint on file.    HPI   Patient is new to me. Last seen by Dr. Hoang in Feb 2019.  Prior pertinent chart reviewed since this is patient's first appointment with me.    Patient presents for f/u of CKD III.  Significant hx includes HTN since 2014, IBS p cholecystectomy.  Baseline creatinine of 1.1-1.2 mg/dL since 2015. Most recent sCr is 1.2 mg/dL. Is not eating low salt/ low potassium diet.    Fell on 9/12/19 when running from a dog. Broke right wrist.  Had surgery on wrist on 9/19/19.  Currently on percocet and colace since surgery.      Significant family hx includes: HTN, thyroid disease, DM, lupus    The patient denies taking NSAIDs, herbal supplements, or new antibiotics, recreational drugs, recent episode of dehydration, diarrhea, nausea or vomiting, acute illness, hospitalization or exposure to IV radiocontrast.     Last renal US: June 2017, reviewed.    Review of Systems   Constitutional: Negative for appetite change, fatigue and fever.   HENT: Negative for facial swelling.    Respiratory: Negative for cough, shortness of breath, wheezing and stridor.    Cardiovascular: Negative for chest pain, palpitations and leg swelling.   Gastrointestinal: Positive for constipation. Negative for diarrhea, nausea and vomiting.   Genitourinary: Negative for difficulty urinating, dysuria, flank pain, frequency, hematuria and urgency.   Musculoskeletal: Positive for arthralgias (recent right wrist surgery). Negative for myalgias.   Neurological: Negative for dizziness, weakness and light-headedness.       Objective:     Blood pressure 120/80, pulse 72, height 5' 5" (1.651 m), weight 80.9 kg (178 lb 5.6 oz), last menstrual period 01/01/1982, SpO2 98 %.    Physical Exam   Constitutional: She is oriented to person, place, and time. She appears well-developed and well-nourished. " No distress.   Eyes: Conjunctivae are normal.   Neck: Neck supple.   Cardiovascular: Normal rate, regular rhythm and normal heart sounds. Exam reveals no gallop and no friction rub.   No murmur heard.  Pulmonary/Chest: Effort normal and breath sounds normal. No stridor. No respiratory distress. She has no wheezes. She has no rales.   Abdominal: Soft. Bowel sounds are normal. She exhibits no distension. There is no tenderness.   Musculoskeletal: She exhibits no edema.   Cast and sling to right wrist   Neurological: She is alert and oriented to person, place, and time.   Skin: Skin is warm and dry. She is not diaphoretic. No pallor.   Psychiatric: She has a normal mood and affect.       Crt:1.2 mg/dL  Hgb: 12.6 g/dL  CO2: 26 mmol/L  UPCR: non-proteinuric  Assessment:       1. CKD (chronic kidney disease), stage III    2. Essential hypertension        Plan:     CKD stage 3A/B c eGFR 41-50 mL/min - likely secondary to longstanding hypertension and age-related nephron loss. Non-proteinuric. Stable.  Avoid NSAIDs and hydrate well.     UPCR Non-proteinuric. On benazepril   Acid-base WNL. K WNL.  On triamterene and hydrochlorothiazide and discussed low-potassium diet while she was on both, has not had any potassium issues.  Will continue to monitor K level and take her off triamterene if needed if potassium becomes an issue.   Renal osteodystrophy PTH WNL. Ca currently WNL, but has been high in past. Phos okay. On vit D.  Will decrease vitamin D to qod.   Anemia WNL     HTN - BP excelllent on Benazepril, triamterene-HCTZ. Will continue to monitor K levels. Recommended low salt diet.    All questions patient had were answered.  Asked if further questions. None. F/u in clinic 8 mos with labs and urine prior to next visit or sooner if needed.  ER for emergency concerns.    Summary of Plan:  1. Decrease vitamin D to qod from daily  2. Continue benazepril, triamterene-HCTZ  3. Avoid NSAIDs.  4. RTC in 8 mos

## 2019-09-24 ENCOUNTER — OFFICE VISIT (OUTPATIENT)
Dept: NEPHROLOGY | Facility: CLINIC | Age: 74
End: 2019-09-24
Payer: MEDICARE

## 2019-09-24 VITALS
WEIGHT: 178.38 LBS | BODY MASS INDEX: 29.72 KG/M2 | DIASTOLIC BLOOD PRESSURE: 80 MMHG | HEART RATE: 72 BPM | SYSTOLIC BLOOD PRESSURE: 120 MMHG | OXYGEN SATURATION: 98 % | HEIGHT: 65 IN

## 2019-09-24 DIAGNOSIS — N18.30 CKD (CHRONIC KIDNEY DISEASE), STAGE III: Primary | ICD-10-CM

## 2019-09-24 DIAGNOSIS — I10 ESSENTIAL HYPERTENSION: ICD-10-CM

## 2019-09-24 PROCEDURE — 99213 OFFICE O/P EST LOW 20 MIN: CPT | Mod: S$GLB,,, | Performed by: NURSE PRACTITIONER

## 2019-09-24 PROCEDURE — 99213 PR OFFICE/OUTPT VISIT, EST, LEVL III, 20-29 MIN: ICD-10-PCS | Mod: S$GLB,,, | Performed by: NURSE PRACTITIONER

## 2019-09-24 PROCEDURE — 3079F PR MOST RECENT DIASTOLIC BLOOD PRESSURE 80-89 MM HG: ICD-10-PCS | Mod: CPTII,S$GLB,, | Performed by: NURSE PRACTITIONER

## 2019-09-24 PROCEDURE — 3074F SYST BP LT 130 MM HG: CPT | Mod: CPTII,S$GLB,, | Performed by: NURSE PRACTITIONER

## 2019-09-24 PROCEDURE — 99999 PR PBB SHADOW E&M-EST. PATIENT-LVL III: ICD-10-PCS | Mod: PBBFAC,,, | Performed by: NURSE PRACTITIONER

## 2019-09-24 PROCEDURE — 3074F PR MOST RECENT SYSTOLIC BLOOD PRESSURE < 130 MM HG: ICD-10-PCS | Mod: CPTII,S$GLB,, | Performed by: NURSE PRACTITIONER

## 2019-09-24 PROCEDURE — 1100F PTFALLS ASSESS-DOCD GE2>/YR: CPT | Mod: CPTII,S$GLB,, | Performed by: NURSE PRACTITIONER

## 2019-09-24 PROCEDURE — 3288F PR FALLS RISK ASSESSMENT DOCUMENTED: ICD-10-PCS | Mod: CPTII,S$GLB,, | Performed by: NURSE PRACTITIONER

## 2019-09-24 PROCEDURE — 3079F DIAST BP 80-89 MM HG: CPT | Mod: CPTII,S$GLB,, | Performed by: NURSE PRACTITIONER

## 2019-09-24 PROCEDURE — 3288F FALL RISK ASSESSMENT DOCD: CPT | Mod: CPTII,S$GLB,, | Performed by: NURSE PRACTITIONER

## 2019-09-24 PROCEDURE — 99999 PR PBB SHADOW E&M-EST. PATIENT-LVL III: CPT | Mod: PBBFAC,,, | Performed by: NURSE PRACTITIONER

## 2019-09-24 PROCEDURE — 1100F PR PT FALLS ASSESS DOC 2+ FALLS/FALL W/INJURY/YR: ICD-10-PCS | Mod: CPTII,S$GLB,, | Performed by: NURSE PRACTITIONER

## 2019-09-24 RX ORDER — ACETAMINOPHEN 500 MG
1 TABLET ORAL EVERY OTHER DAY
COMMUNITY
Start: 2019-09-24

## 2019-09-24 NOTE — PATIENT INSTRUCTIONS
Decrease vitamin D to every other day.  Reccomend low sodium diet.  Avoid NSAID (ibuprofen, advil, motrin, aleve, naprosyn, naproxen, Stanback, Goody's Powder, Mobic). Tylenol is okay, but do not take extra tylenol while on Percocet.  Avoid bactrim/ IV contrast/ gadolinium/ aminoglycoside where possible.  Avoid herbal supplements.  Stay hydrated.  Return to clinic in 8 months with labs (bloodwork and urine) or sooner if needed.  Go to the ER with any emergency concerns.

## 2019-09-24 NOTE — ANESTHESIA POSTPROCEDURE EVALUATION
Anesthesia Post Evaluation    Patient: Carmen Wang    Procedure(s) Performed: Procedure(s) (LRB):  ORIF, FRACTURE, RADIUS, DISTAL - right accumed (Right)    Final Anesthesia Type: general  Patient location during evaluation: PACU  Patient participation: Yes- Able to Participate  Level of consciousness: awake and alert  Post-procedure vital signs: reviewed and stable  Pain management: adequate  Airway patency: patent  PONV status at discharge: No PONV  Anesthetic complications: no      Cardiovascular status: hemodynamically stable  Respiratory status: unassisted, spontaneous ventilation and room air  Hydration status: euvolemic  Follow-up not needed.                                              Event Time     Out of Recovery 17:10:00          Pain/Emily Score: No data recorded

## 2019-09-24 NOTE — ANESTHESIA RELEASE NOTE
"Anesthesia Release from PACU Note    Patient: Carmen Wang    Procedure(s) Performed: Procedure(s) (LRB):  ORIF, FRACTURE, RADIUS, DISTAL - right accumed (Right)    Anesthesia type: general    Post pain: Adequate analgesia    Post assessment: no apparent anesthetic complications and tolerated procedure well    Last Vitals:   Visit Vitals  BP (!) 151/67   Pulse 72   Temp 36.8 °C (98.2 °F) (Temporal)   Resp 14   Ht 5' 5" (1.651 m)   Wt 80.3 kg (177 lb)   LMP 01/01/1982 (Approximate)   SpO2 100%   Breastfeeding? No   BMI 29.45 kg/m²       Post vital signs: stable    Level of consciousness: awake and alert     Nausea/Vomiting: no nausea/no vomiting    Complications: none    Airway Patency: patent    Respiratory: unassisted, spontaneous ventilation, room air    Cardiovascular: stable and blood pressure at baseline    Hydration: euvolemic  "

## 2019-10-01 ENCOUNTER — OFFICE VISIT (OUTPATIENT)
Dept: ORTHOPEDICS | Facility: CLINIC | Age: 74
End: 2019-10-01
Payer: MEDICARE

## 2019-10-01 ENCOUNTER — PATIENT MESSAGE (OUTPATIENT)
Dept: SURGERY | Facility: HOSPITAL | Age: 74
End: 2019-10-01

## 2019-10-01 VITALS — BODY MASS INDEX: 29.66 KG/M2 | HEIGHT: 65 IN | WEIGHT: 178 LBS

## 2019-10-01 DIAGNOSIS — S52.572A OTHER CLOSED INTRA-ARTICULAR FRACTURE OF DISTAL END OF LEFT RADIUS, INITIAL ENCOUNTER: Primary | ICD-10-CM

## 2019-10-01 PROCEDURE — 99024 POSTOP FOLLOW-UP VISIT: CPT | Mod: S$GLB,,, | Performed by: ORTHOPAEDIC SURGERY

## 2019-10-01 PROCEDURE — 99999 PR PBB SHADOW E&M-EST. PATIENT-LVL III: ICD-10-PCS | Mod: PBBFAC,,, | Performed by: ORTHOPAEDIC SURGERY

## 2019-10-01 PROCEDURE — 99024 PR POST-OP FOLLOW-UP VISIT: ICD-10-PCS | Mod: S$GLB,,, | Performed by: ORTHOPAEDIC SURGERY

## 2019-10-01 PROCEDURE — 99999 PR PBB SHADOW E&M-EST. PATIENT-LVL III: CPT | Mod: PBBFAC,,, | Performed by: ORTHOPAEDIC SURGERY

## 2019-10-01 NOTE — PROGRESS NOTES
Carmenleidy Wang presents for post-operative evaluation.  The patient is now 2 weeks s/p ORIF right distal radius.  Overall the patient reports doing well.  The patient reports appropriate postoperative soreness with well controlled overall pain.  She denies any numbness or tingling or any further pain and she is off of her narcotics and doing quite well.    PE:    AA&O x 4.  NAD  HEENT:  NCAT, sclera nonicteric  Lungs:  Respirations are equal and unlabored.  CV:  2+ bilateral upper and lower extremity pulses.  MSK: The wound is healing well with no signs of erythema or warmth.  There is no drainage.  No clinical signs or symptoms of infection are present.  All sutures were removed today. Right upper extremity neurovascularly intact.  Thumb IP flexion and extension intact. Range of motion of the wrist not tested.  The patient has full range of motion at the IP joints of the fingers but some stiffness in the MCP joints.    A/P: Status post above, doing well  1) Continue with non weight bearing  2) F/U 4 weeks with new x-rays right wrist  3) Call with any questions/concerns in the interim  4) will place patient into a right short-arm cast today     Please be aware that this note has been generated with the assistance of [employer] voice-to-text.  Please excuse any spelling or grammatical errors.

## 2019-10-09 ENCOUNTER — PATIENT MESSAGE (OUTPATIENT)
Dept: ORTHOPEDICS | Facility: CLINIC | Age: 74
End: 2019-10-09

## 2019-10-09 RX ORDER — TRIAMTERENE AND HYDROCHLOROTHIAZIDE 37.5; 25 MG/1; MG/1
CAPSULE ORAL
Qty: 90 CAPSULE | Refills: 3 | Status: SHIPPED | OUTPATIENT
Start: 2019-10-09 | End: 2020-08-08

## 2019-10-17 ENCOUNTER — TELEPHONE (OUTPATIENT)
Dept: INTERNAL MEDICINE | Facility: CLINIC | Age: 74
End: 2019-10-17

## 2019-10-17 ENCOUNTER — PATIENT MESSAGE (OUTPATIENT)
Dept: INTERNAL MEDICINE | Facility: CLINIC | Age: 74
End: 2019-10-17

## 2019-10-17 DIAGNOSIS — Z12.31 ENCOUNTER FOR SCREENING MAMMOGRAM FOR BREAST CANCER: Primary | ICD-10-CM

## 2019-10-17 NOTE — TELEPHONE ENCOUNTER
----- Message from Teja Boyd sent at 10/17/2019  1:53 PM CDT -----  Contact: Pt  Type:  Needs Medical Advice    Who Called: The Pt would like for you to send over her Mammo order.  Please contact the Pt after so that she will know to call us back.    Best Call Back Number: [phone]

## 2019-10-25 ENCOUNTER — IMMUNIZATION (OUTPATIENT)
Dept: INTERNAL MEDICINE | Facility: CLINIC | Age: 74
End: 2019-10-25
Payer: MEDICARE

## 2019-10-25 ENCOUNTER — HOSPITAL ENCOUNTER (OUTPATIENT)
Dept: RADIOLOGY | Facility: HOSPITAL | Age: 74
Discharge: HOME | End: 2019-10-25
Attending: ORTHOPAEDIC SURGERY
Payer: MEDICARE

## 2019-10-25 DIAGNOSIS — S52.572A OTHER CLOSED INTRA-ARTICULAR FRACTURE OF DISTAL END OF LEFT RADIUS, INITIAL ENCOUNTER: ICD-10-CM

## 2019-10-25 PROCEDURE — 90662 FLU VACCINE - HIGH DOSE (65+) PRESERVATIVE FREE IM: ICD-10-PCS | Mod: S$GLB,,, | Performed by: FAMILY MEDICINE

## 2019-10-25 PROCEDURE — 90662 IIV NO PRSV INCREASED AG IM: CPT | Mod: S$GLB,,, | Performed by: FAMILY MEDICINE

## 2019-10-25 PROCEDURE — G0008 ADMIN INFLUENZA VIRUS VAC: HCPCS | Mod: S$GLB,,, | Performed by: FAMILY MEDICINE

## 2019-10-25 PROCEDURE — 73110 X-RAY EXAM OF WRIST: CPT | Mod: 26,RT,, | Performed by: RADIOLOGY

## 2019-10-25 PROCEDURE — 73110 XR WRIST COMPLETE 3 VIEWS RIGHT: ICD-10-PCS | Mod: 26,RT,, | Performed by: RADIOLOGY

## 2019-10-25 PROCEDURE — G0008 FLU VACCINE - HIGH DOSE (65+) PRESERVATIVE FREE IM: ICD-10-PCS | Mod: S$GLB,,, | Performed by: FAMILY MEDICINE

## 2019-10-25 PROCEDURE — 73110 X-RAY EXAM OF WRIST: CPT | Mod: TC,FY,PO,RT

## 2019-10-29 ENCOUNTER — HOSPITAL ENCOUNTER (OUTPATIENT)
Dept: RADIOLOGY | Facility: HOSPITAL | Age: 74
Discharge: HOME | End: 2019-10-29
Attending: INTERNAL MEDICINE
Payer: MEDICARE

## 2019-10-29 ENCOUNTER — TELEPHONE (OUTPATIENT)
Dept: ORTHOPEDICS | Facility: CLINIC | Age: 74
End: 2019-10-29

## 2019-10-29 ENCOUNTER — OFFICE VISIT (OUTPATIENT)
Dept: ORTHOPEDICS | Facility: CLINIC | Age: 74
End: 2019-10-29
Payer: MEDICARE

## 2019-10-29 VITALS — BODY MASS INDEX: 29.62 KG/M2 | WEIGHT: 178 LBS

## 2019-10-29 VITALS — BODY MASS INDEX: 29.65 KG/M2 | WEIGHT: 177.94 LBS | HEIGHT: 65 IN

## 2019-10-29 DIAGNOSIS — S52.571D OTHER CLOSED INTRA-ARTICULAR FRACTURE OF DISTAL END OF RIGHT RADIUS WITH ROUTINE HEALING, SUBSEQUENT ENCOUNTER: ICD-10-CM

## 2019-10-29 DIAGNOSIS — Z12.31 ENCOUNTER FOR SCREENING MAMMOGRAM FOR BREAST CANCER: ICD-10-CM

## 2019-10-29 DIAGNOSIS — S52.572G OTHER CLOSED INTRA-ARTICULAR FRACTURE OF DISTAL END OF LEFT RADIUS WITH DELAYED HEALING, SUBSEQUENT ENCOUNTER: Primary | ICD-10-CM

## 2019-10-29 DIAGNOSIS — S52.572A OTHER CLOSED INTRA-ARTICULAR FRACTURE OF DISTAL END OF LEFT RADIUS, INITIAL ENCOUNTER: Primary | ICD-10-CM

## 2019-10-29 PROCEDURE — 99024 POSTOP FOLLOW-UP VISIT: CPT | Mod: S$GLB,,, | Performed by: ORTHOPAEDIC SURGERY

## 2019-10-29 PROCEDURE — 99999 PR PBB SHADOW E&M-EST. PATIENT-LVL III: ICD-10-PCS | Mod: PBBFAC,,, | Performed by: ORTHOPAEDIC SURGERY

## 2019-10-29 PROCEDURE — 77067 SCR MAMMO BI INCL CAD: CPT | Mod: TC

## 2019-10-29 PROCEDURE — 77063 BREAST TOMOSYNTHESIS BI: CPT | Mod: 26,,, | Performed by: RADIOLOGY

## 2019-10-29 PROCEDURE — 77063 MAMMO DIGITAL SCREENING BILAT WITH TOMOSYNTHESIS_CAD: ICD-10-PCS | Mod: 26,,, | Performed by: RADIOLOGY

## 2019-10-29 PROCEDURE — 77067 MAMMO DIGITAL SCREENING BILAT WITH TOMOSYNTHESIS_CAD: ICD-10-PCS | Mod: 26,,, | Performed by: RADIOLOGY

## 2019-10-29 PROCEDURE — 77067 SCR MAMMO BI INCL CAD: CPT | Mod: 26,,, | Performed by: RADIOLOGY

## 2019-10-29 PROCEDURE — 99024 PR POST-OP FOLLOW-UP VISIT: ICD-10-PCS | Mod: S$GLB,,, | Performed by: ORTHOPAEDIC SURGERY

## 2019-10-29 PROCEDURE — 99999 PR PBB SHADOW E&M-EST. PATIENT-LVL III: CPT | Mod: PBBFAC,,, | Performed by: ORTHOPAEDIC SURGERY

## 2019-10-29 NOTE — PROGRESS NOTES
Carmenleidy Wang presents for post-operative evaluation.  The patient is now 6 weeks s/p ORIF right distal radius.  Overall the patient reports doing well.  The patient reports appropriate postoperative soreness with well controlled overall pain.  She denies any numbness or tingling or any further pain and she is off of her narcotics and doing quite well.    PE:    AA&O x 4.  NAD  HEENT:  NCAT, sclera nonicteric  Lungs:  Respirations are equal and unlabored.  CV:  2+ bilateral upper and lower extremity pulses.  MSK: The wound is healing well with no signs of erythema or warmth.  There is no drainage.  No clinical signs or symptoms of infection are present.  Right upper extremity neurovascularly intact.  Thumb IP flexion and extension intact. Range of motion of the wrist not tested.  The patient has full range of motion at the IP joints of the fingers but some stiffness in the MCP joints.    A/P: Status post above, doing well  1) Continue with non weight bearing  2) F/U 6 weeks with new x-rays right wrist  3) Call with any questions/concerns in the interim  4) will place patient into a right wrist brace today.  External OT referral provided per pt request.     Please be aware that this note has been generated with the assistance of [employer] voice-to-text.  Please excuse any spelling or grammatical errors.

## 2019-10-30 ENCOUNTER — PATIENT MESSAGE (OUTPATIENT)
Dept: ORTHOPEDICS | Facility: CLINIC | Age: 74
End: 2019-10-30

## 2019-11-05 DIAGNOSIS — S52.572G OTHER CLOSED INTRA-ARTICULAR FRACTURE OF DISTAL END OF LEFT RADIUS WITH DELAYED HEALING, SUBSEQUENT ENCOUNTER: Primary | ICD-10-CM

## 2019-11-07 ENCOUNTER — TELEPHONE (OUTPATIENT)
Dept: URGENT CARE | Facility: CLINIC | Age: 74
End: 2019-11-07

## 2019-11-23 ENCOUNTER — PATIENT MESSAGE (OUTPATIENT)
Dept: ADMINISTRATIVE | Facility: OTHER | Age: 74
End: 2019-11-23

## 2019-12-02 ENCOUNTER — TELEPHONE (OUTPATIENT)
Dept: ORTHOPEDICS | Facility: CLINIC | Age: 74
End: 2019-12-02

## 2019-12-02 NOTE — TELEPHONE ENCOUNTER
----- Message from Jessika Hood MA sent at 12/2/2019 10:21 AM CST -----  Regarding: PT  Tia Weber With  [facility] is requesting a call from Dr Jesus staff regarding patient at [phone].   They have been trying to reach someone in Dr. Bragg office.

## 2019-12-02 NOTE — TELEPHONE ENCOUNTER
Called Tia Weber with therapy in regard to the message. Gave her the correct contact information for the office and she is going to refax the information that is needed for the MD to sign.

## 2019-12-05 ENCOUNTER — PATIENT MESSAGE (OUTPATIENT)
Dept: ORTHOPEDICS | Facility: CLINIC | Age: 74
End: 2019-12-05

## 2019-12-05 ENCOUNTER — HOSPITAL ENCOUNTER (OUTPATIENT)
Dept: RADIOLOGY | Facility: HOSPITAL | Age: 74
Discharge: HOME | End: 2019-12-05
Attending: ORTHOPAEDIC SURGERY
Payer: MEDICARE

## 2019-12-05 DIAGNOSIS — S52.572G OTHER CLOSED INTRA-ARTICULAR FRACTURE OF DISTAL END OF LEFT RADIUS WITH DELAYED HEALING, SUBSEQUENT ENCOUNTER: ICD-10-CM

## 2019-12-05 DIAGNOSIS — S52.571D OTHER CLOSED INTRA-ARTICULAR FRACTURE OF DISTAL END OF RIGHT RADIUS WITH ROUTINE HEALING, SUBSEQUENT ENCOUNTER: ICD-10-CM

## 2019-12-05 PROCEDURE — 73110 X-RAY EXAM OF WRIST: CPT | Mod: TC,FY,PO,RT

## 2019-12-05 PROCEDURE — 73110 XR WRIST COMPLETE 3 VIEWS RIGHT: ICD-10-PCS | Mod: 26,RT,, | Performed by: RADIOLOGY

## 2019-12-05 PROCEDURE — 73110 X-RAY EXAM OF WRIST: CPT | Mod: 26,RT,, | Performed by: RADIOLOGY

## 2019-12-10 ENCOUNTER — HOSPITAL ENCOUNTER (OUTPATIENT)
Dept: RADIOLOGY | Facility: HOSPITAL | Age: 74
Discharge: HOME | End: 2019-12-10
Attending: ORTHOPAEDIC SURGERY
Payer: MEDICARE

## 2019-12-10 ENCOUNTER — OFFICE VISIT (OUTPATIENT)
Dept: ORTHOPEDICS | Facility: CLINIC | Age: 74
End: 2019-12-10
Payer: MEDICARE

## 2019-12-10 VITALS — BODY MASS INDEX: 29.48 KG/M2 | WEIGHT: 176.94 LBS | HEIGHT: 65 IN

## 2019-12-10 DIAGNOSIS — S52.572G OTHER CLOSED INTRA-ARTICULAR FRACTURE OF DISTAL END OF LEFT RADIUS WITH DELAYED HEALING, SUBSEQUENT ENCOUNTER: Primary | ICD-10-CM

## 2019-12-10 DIAGNOSIS — S52.572G OTHER CLOSED INTRA-ARTICULAR FRACTURE OF DISTAL END OF LEFT RADIUS WITH DELAYED HEALING, SUBSEQUENT ENCOUNTER: ICD-10-CM

## 2019-12-10 PROCEDURE — 99024 PR POST-OP FOLLOW-UP VISIT: ICD-10-PCS | Mod: S$GLB,,, | Performed by: ORTHOPAEDIC SURGERY

## 2019-12-10 PROCEDURE — 99999 PR PBB SHADOW E&M-EST. PATIENT-LVL III: CPT | Mod: PBBFAC,,, | Performed by: ORTHOPAEDIC SURGERY

## 2019-12-10 PROCEDURE — 99024 POSTOP FOLLOW-UP VISIT: CPT | Mod: S$GLB,,, | Performed by: ORTHOPAEDIC SURGERY

## 2019-12-10 PROCEDURE — 99999 PR PBB SHADOW E&M-EST. PATIENT-LVL III: ICD-10-PCS | Mod: PBBFAC,,, | Performed by: ORTHOPAEDIC SURGERY

## 2019-12-10 NOTE — PROGRESS NOTES
Carmen Wang presents for post-operative evaluation.  The patient is now 12 weeks s/p ORIF right distal radius.  Overall the patient reports doing well.  Denies pain. She does report some stiffness of right hand. Denies numbness and tingling. She has been in external occupational hand therapy and feels that she is continuing to improve.  No new complaints today.      PE:    AA&O x 4.  NAD  HEENT:  NCAT, sclera nonicteric  Lungs:  Respirations are equal and unlabored.  CV:  2+ bilateral upper and lower extremity pulses.  MSK: well healed scar volar wrist.  1 small retained piece of nylon which was easily and atraumatically removed without difficulty.  Right upper extremity neurovascularly intact.  Thumb IP flexion and extension intact. FROM wrist. Mildly Decreased ROM DIP all digits  at the IP joints actively.  Passively, I can individually get each finger into the palm with some mild resistance secondary to joint tightness.    Imaging:  Status post ORIF right distal radius.  Fracture line still evident.  No evidence of any complication.      A/P: Status post above, doing well  1) Advance to full weight bearing  2) continue hand therapy focusing on finger ROM.  Showed patient and instructed her on aggressive range of motion exercises today.  She is to be performing these constantly.  Follow-up in 3 months with new x-rays right wrist.  3) Call with any questions/concerns in the interim       Please be aware that this note has been generated with the assistance of [facility] voice-to-text.  Please excuse any spelling or grammatical errors.

## 2019-12-19 ENCOUNTER — PATIENT MESSAGE (OUTPATIENT)
Dept: ORTHOPEDICS | Facility: CLINIC | Age: 74
End: 2019-12-19

## 2019-12-23 ENCOUNTER — PATIENT MESSAGE (OUTPATIENT)
Dept: ADMINISTRATIVE | Facility: OTHER | Age: 74
End: 2019-12-23

## 2020-01-03 ENCOUNTER — TELEPHONE (OUTPATIENT)
Dept: ORTHOPEDICS | Facility: CLINIC | Age: 75
End: 2020-01-03

## 2020-01-03 NOTE — TELEPHONE ENCOUNTER
Indy Mathias in regard to phone message. Informed her that Dr. Bragg and I are not at the same location every day during the week and that we go to multiple clinics. She verbalized understanding and stated that she faxed the orders for additional therapy to the [facility] location. I informed her that first thing Monday morning when we get back to [facility], I'll have Dr. Bragg sign those forms per her request.

## 2020-01-03 NOTE — TELEPHONE ENCOUNTER
----- Message from Katharine Hawley MA sent at 1/2/2020  5:43 PM CST -----  Contact: Sharonda/ [employer]      ----- Message -----  From: Krupa Bermudez  Sent: 1/2/2020   3:11 PM CST  To: Yemi Villalba Staff    Please call Sharonda [phone]    Fax# [phone]    Claim# [id number]    Please send the last office notes     Thank you

## 2020-01-03 NOTE — TELEPHONE ENCOUNTER
Called Sharonda per epic message. Left a detailed message on her machine with a call back number for her to return my call.

## 2020-01-03 NOTE — TELEPHONE ENCOUNTER
----- Message from Vanessa You sent at 1/3/2020 10:05 AM CST -----  Contact: Mey     Name of Who is Calling:Mey     What is the request in detail: [facility] would like a call back regarding orders being signed for patient states they were faxed on yesterday and this morning Please contact to further discuss and advise    Can the clinic reply by [facility]: no  What Number to Call Back if not in [facility]: [phone]

## 2020-01-05 DIAGNOSIS — I10 ESSENTIAL HYPERTENSION: ICD-10-CM

## 2020-01-05 RX ORDER — BENAZEPRIL HYDROCHLORIDE 20 MG/1
TABLET ORAL
Qty: 90 TABLET | Refills: 3 | Status: SHIPPED | OUTPATIENT
Start: 2020-01-05 | End: 2020-12-12

## 2020-01-06 ENCOUNTER — PATIENT MESSAGE (OUTPATIENT)
Dept: ORTHOPEDICS | Facility: CLINIC | Age: 75
End: 2020-01-06

## 2020-01-06 ENCOUNTER — TELEPHONE (OUTPATIENT)
Dept: ORTHOPEDICS | Facility: CLINIC | Age: 75
End: 2020-01-06

## 2020-01-06 NOTE — TELEPHONE ENCOUNTER
Called Sharonda in regard to phone message. Left a message stating to her that the paperwork was signed off on this morning by Dr. Bragg and sent back to the gentleman handling the WC case. Left a call back number for additional questions.

## 2020-01-06 NOTE — TELEPHONE ENCOUNTER
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 She states doctor did not sign the return to work paper..    Can the clinic reply by [facility]? No     Would the patient rather a call back or a response via [facility]? Call back     Best call back number: [phone]    Additional Information:

## 2020-01-27 ENCOUNTER — PATIENT MESSAGE (OUTPATIENT)
Dept: ORTHOPEDICS | Facility: CLINIC | Age: 75
End: 2020-01-27

## 2020-02-26 ENCOUNTER — HOSPITAL ENCOUNTER (OUTPATIENT)
Dept: RADIOLOGY | Facility: HOSPITAL | Age: 75
Discharge: HOME | End: 2020-02-26
Attending: ORTHOPAEDIC SURGERY
Payer: OTHER GOVERNMENT

## 2020-02-26 PROCEDURE — 73110 X-RAY EXAM OF WRIST: CPT | Mod: 26,RT,, | Performed by: RADIOLOGY

## 2020-02-26 PROCEDURE — 73110 X-RAY EXAM OF WRIST: CPT | Mod: TC,FY,PO,RT

## 2020-02-26 PROCEDURE — 73110 XR WRIST COMPLETE 3 VIEWS RIGHT: ICD-10-PCS | Mod: 26,RT,, | Performed by: RADIOLOGY

## 2020-02-28 ENCOUNTER — PATIENT MESSAGE (OUTPATIENT)
Dept: INTERNAL MEDICINE | Facility: CLINIC | Age: 75
End: 2020-02-28

## 2020-02-28 DIAGNOSIS — N25.81 SECONDARY HYPERPARATHYROIDISM: ICD-10-CM

## 2020-02-28 DIAGNOSIS — N18.30 CKD (CHRONIC KIDNEY DISEASE), STAGE III: ICD-10-CM

## 2020-02-28 DIAGNOSIS — I10 ESSENTIAL HYPERTENSION: ICD-10-CM

## 2020-02-28 DIAGNOSIS — Z00.00 ANNUAL PHYSICAL EXAM: Primary | ICD-10-CM

## 2020-03-02 ENCOUNTER — OFFICE VISIT (OUTPATIENT)
Dept: ORTHOPEDICS | Facility: CLINIC | Age: 75
End: 2020-03-02
Payer: OTHER GOVERNMENT

## 2020-03-02 VITALS — BODY MASS INDEX: 29.49 KG/M2 | WEIGHT: 177 LBS | HEIGHT: 65 IN

## 2020-03-02 DIAGNOSIS — S52.572G OTHER CLOSED INTRA-ARTICULAR FRACTURE OF DISTAL END OF LEFT RADIUS WITH DELAYED HEALING, SUBSEQUENT ENCOUNTER: Primary | ICD-10-CM

## 2020-03-02 PROCEDURE — 99999 PR PBB SHADOW E&M-EST. PATIENT-LVL III: ICD-10-PCS | Mod: PBBFAC,,, | Performed by: ORTHOPAEDIC SURGERY

## 2020-03-02 PROCEDURE — 99213 PR OFFICE/OUTPT VISIT, EST, LEVL III, 20-29 MIN: ICD-10-PCS | Mod: S$GLB,,, | Performed by: ORTHOPAEDIC SURGERY

## 2020-03-02 PROCEDURE — 99213 OFFICE O/P EST LOW 20 MIN: CPT | Mod: S$GLB,,, | Performed by: ORTHOPAEDIC SURGERY

## 2020-03-02 PROCEDURE — 99999 PR PBB SHADOW E&M-EST. PATIENT-LVL III: CPT | Mod: PBBFAC,,, | Performed by: ORTHOPAEDIC SURGERY

## 2020-03-02 NOTE — PROGRESS NOTES
Carmen Wang presents for post-operative evaluation.  The patient is now 6 months s/p ORIF right distal radius.  The patient denies any significant pain today. She does report some stiffness of right hand.  She notes some numbness adjacent to her surgical incision but none into the hand or finger tips.  She has been in external occupational hand therapy and feels that she is continuing to improve.  No new complaints today.      PE:    AA&O x 4.  NAD  HEENT:  NCAT, sclera nonicteric  Lungs:  Respirations are equal and unlabored.  CV:  2+ bilateral upper and lower extremity pulses.  MSK: well healed scar volar wrist.  Right upper extremity neurovascularly intact.  Thumb IP flexion and extension intact. FROM wrist. Mildly Decreased active ROM of the IP joints which she can get to within 1 cm of her palm.  Passively, I can individually get each finger into the palm with some mild resistance secondary to joint tightness.    Imaging:  Status post ORIF right distal radius.  Fracture healed.  No evidence of any complication.      A/P: Status post above, doing well  1) continue full weight bearing and range of motion as tolerated  2) continue hand therapy focusing on finger ROM.  I again showed patient and instructed her on aggressive range of motion exercises today.  She is to be performing these constantly.  Follow-up in 3 months with new x-rays right wrist. It is medically necessary for her to continue a formal and dedicated hand therapy rehabilitation program given her significant stiffness still present.  3) Call with any questions/concerns in the interim       Please be aware that this note has been generated with the assistance of [employer] voice-to-text.  Please excuse any spelling or grammatical errors.

## 2020-03-05 ENCOUNTER — PATIENT OUTREACH (OUTPATIENT)
Dept: ADMINISTRATIVE | Facility: HOSPITAL | Age: 75
End: 2020-03-05

## 2020-03-08 ENCOUNTER — PATIENT MESSAGE (OUTPATIENT)
Dept: OPHTHALMOLOGY | Facility: CLINIC | Age: 75
End: 2020-03-08

## 2020-03-12 ENCOUNTER — OFFICE VISIT (OUTPATIENT)
Dept: OPHTHALMOLOGY | Facility: CLINIC | Age: 75
End: 2020-03-12
Payer: MEDICARE

## 2020-03-12 DIAGNOSIS — H25.013 CORTICAL AGE-RELATED CATARACT OF BOTH EYES: ICD-10-CM

## 2020-03-12 DIAGNOSIS — H52.4 PRESBYOPIA: ICD-10-CM

## 2020-03-12 DIAGNOSIS — H52.223 REGULAR ASTIGMATISM OF BOTH EYES: ICD-10-CM

## 2020-03-12 DIAGNOSIS — H25.13 NUCLEAR SCLEROSIS, BILATERAL: Primary | ICD-10-CM

## 2020-03-12 PROCEDURE — 99999 PR PBB SHADOW E&M-EST. PATIENT-LVL II: ICD-10-PCS | Mod: PBBFAC,,, | Performed by: OPTOMETRIST

## 2020-03-12 PROCEDURE — 92014 COMPRE OPH EXAM EST PT 1/>: CPT | Mod: S$GLB,,, | Performed by: OPTOMETRIST

## 2020-03-12 PROCEDURE — 92015 PR REFRACTION: ICD-10-PCS | Mod: S$GLB,,, | Performed by: OPTOMETRIST

## 2020-03-12 PROCEDURE — 99999 PR PBB SHADOW E&M-EST. PATIENT-LVL II: CPT | Mod: PBBFAC,,, | Performed by: OPTOMETRIST

## 2020-03-12 PROCEDURE — 92015 DETERMINE REFRACTIVE STATE: CPT | Mod: S$GLB,,, | Performed by: OPTOMETRIST

## 2020-03-12 PROCEDURE — 92014 PR EYE EXAM, EST PATIENT,COMPREHESV: ICD-10-PCS | Mod: S$GLB,,, | Performed by: OPTOMETRIST

## 2020-03-12 NOTE — PROGRESS NOTES
HPI     Pts last exam was 2017 with Dr. Espinal. PT c/o blurred overall va and   wears gls full time.   HPI    Any vision changes since last exam: decreased overall va   Eye pain: no  Other ocular symptoms: no    Do you wear currently wear glasses or contacts? gls    Interested in contacts today? no    Do you plan on getting new glasses today? If needed          Last edited by Daniela Weston MA on 3/12/2020  8:40 AM. (History)            Assessment /Plan     For exam results, see Encounter Report.    Nuclear sclerosis, bilateral  Cortical age-related cataract of both eyes  Surgery is not indicated at this time.   Monitor 12 months.    Regular astigmatism of both eyes  Presbyopia  Eyeglass Final Rx     Eyeglass Final Rx       Sphere Cylinder Axis Add    Right +0.25 +1.50 180 +2.50    Left -0.50 +1.00 015 +2.50    Expiration Date:  3/13/2021    Comments:  Signs are correct                RTC 1 yr for dilated eye exam or PRN if any problems.   Discussed above and answered questions.

## 2020-03-16 ENCOUNTER — LAB VISIT (OUTPATIENT)
Dept: LAB | Facility: HOSPITAL | Age: 75
End: 2020-03-16
Attending: INTERNAL MEDICINE
Payer: MEDICARE

## 2020-03-16 DIAGNOSIS — I10 ESSENTIAL HYPERTENSION: ICD-10-CM

## 2020-03-16 DIAGNOSIS — N18.30 CKD (CHRONIC KIDNEY DISEASE), STAGE III: ICD-10-CM

## 2020-03-16 DIAGNOSIS — Z00.00 ANNUAL PHYSICAL EXAM: ICD-10-CM

## 2020-03-16 DIAGNOSIS — N25.81 SECONDARY HYPERPARATHYROIDISM: ICD-10-CM

## 2020-03-16 LAB
ALBUMIN SERPL BCP-MCNC: 3.5 G/DL (ref 3.5–5.2)
ALP SERPL-CCNC: 59 U/L (ref 55–135)
ALT SERPL W/O P-5'-P-CCNC: 16 U/L (ref 10–44)
ANION GAP SERPL CALC-SCNC: 9 MMOL/L (ref 8–16)
AST SERPL-CCNC: 20 U/L (ref 10–40)
BASOPHILS # BLD AUTO: 0.03 K/UL (ref 0–0.2)
BASOPHILS NFR BLD: 0.4 % (ref 0–1.9)
BILIRUB SERPL-MCNC: 0.5 MG/DL (ref 0.1–1)
BUN SERPL-MCNC: 19 MG/DL (ref 8–23)
CALCIUM SERPL-MCNC: 10.3 MG/DL (ref 8.7–10.5)
CHLORIDE SERPL-SCNC: 103 MMOL/L (ref 95–110)
CHOLEST SERPL-MCNC: 196 MG/DL (ref 120–199)
CHOLEST/HDLC SERPL: 3.6 {RATIO} (ref 2–5)
CO2 SERPL-SCNC: 27 MMOL/L (ref 23–29)
CREAT SERPL-MCNC: 1.3 MG/DL (ref 0.5–1.4)
DIFFERENTIAL METHOD: ABNORMAL
EOSINOPHIL # BLD AUTO: 0.2 K/UL (ref 0–0.5)
EOSINOPHIL NFR BLD: 3.5 % (ref 0–8)
ERYTHROCYTE [DISTWIDTH] IN BLOOD BY AUTOMATED COUNT: 15.6 % (ref 11.5–14.5)
EST. GFR  (AFRICAN AMERICAN): 47 ML/MIN/1.73 M^2
EST. GFR  (NON AFRICAN AMERICAN): 40.8 ML/MIN/1.73 M^2
GLUCOSE SERPL-MCNC: 72 MG/DL (ref 70–110)
HCT VFR BLD AUTO: 40.6 % (ref 37–48.5)
HDLC SERPL-MCNC: 55 MG/DL (ref 40–75)
HDLC SERPL: 28.1 % (ref 20–50)
HGB BLD-MCNC: 11.8 G/DL (ref 12–16)
IMM GRANULOCYTES # BLD AUTO: 0.01 K/UL (ref 0–0.04)
IMM GRANULOCYTES NFR BLD AUTO: 0.1 % (ref 0–0.5)
LDLC SERPL CALC-MCNC: 130.6 MG/DL (ref 63–159)
LYMPHOCYTES # BLD AUTO: 2.7 K/UL (ref 1–4.8)
LYMPHOCYTES NFR BLD: 39.6 % (ref 18–48)
MCH RBC QN AUTO: 24 PG (ref 27–31)
MCHC RBC AUTO-ENTMCNC: 29.1 G/DL (ref 32–36)
MCV RBC AUTO: 83 FL (ref 82–98)
MONOCYTES # BLD AUTO: 0.8 K/UL (ref 0.3–1)
MONOCYTES NFR BLD: 10.8 % (ref 4–15)
NEUTROPHILS # BLD AUTO: 3.2 K/UL (ref 1.8–7.7)
NEUTROPHILS NFR BLD: 45.6 % (ref 38–73)
NONHDLC SERPL-MCNC: 141 MG/DL
NRBC BLD-RTO: 0 /100 WBC
PLATELET # BLD AUTO: 281 K/UL (ref 150–350)
PMV BLD AUTO: 11.7 FL (ref 9.2–12.9)
POTASSIUM SERPL-SCNC: 4 MMOL/L (ref 3.5–5.1)
PROT SERPL-MCNC: 7.5 G/DL (ref 6–8.4)
RBC # BLD AUTO: 4.91 M/UL (ref 4–5.4)
SODIUM SERPL-SCNC: 139 MMOL/L (ref 136–145)
TRIGL SERPL-MCNC: 52 MG/DL (ref 30–150)
WBC # BLD AUTO: 6.92 K/UL (ref 3.9–12.7)

## 2020-03-16 PROCEDURE — 85025 COMPLETE CBC W/AUTO DIFF WBC: CPT

## 2020-03-16 PROCEDURE — 80053 COMPREHEN METABOLIC PANEL: CPT

## 2020-03-16 PROCEDURE — 80061 LIPID PANEL: CPT

## 2020-03-16 PROCEDURE — 36415 COLL VENOUS BLD VENIPUNCTURE: CPT | Mod: PO

## 2020-03-19 ENCOUNTER — PATIENT MESSAGE (OUTPATIENT)
Dept: INTERNAL MEDICINE | Facility: CLINIC | Age: 75
End: 2020-03-19

## 2020-03-30 ENCOUNTER — OFFICE VISIT (OUTPATIENT)
Dept: INTERNAL MEDICINE | Facility: CLINIC | Age: 75
End: 2020-03-30
Payer: MEDICARE

## 2020-03-30 VITALS
DIASTOLIC BLOOD PRESSURE: 80 MMHG | BODY MASS INDEX: 30.12 KG/M2 | HEIGHT: 65 IN | HEART RATE: 63 BPM | SYSTOLIC BLOOD PRESSURE: 126 MMHG | OXYGEN SATURATION: 98 % | WEIGHT: 180.75 LBS

## 2020-03-30 DIAGNOSIS — I10 ESSENTIAL HYPERTENSION: Primary | ICD-10-CM

## 2020-03-30 DIAGNOSIS — N18.30 CKD (CHRONIC KIDNEY DISEASE), STAGE III: ICD-10-CM

## 2020-03-30 DIAGNOSIS — E78.5 HYPERLIPIDEMIA, UNSPECIFIED HYPERLIPIDEMIA TYPE: ICD-10-CM

## 2020-03-30 PROCEDURE — 1159F MED LIST DOCD IN RCRD: CPT | Mod: S$GLB,,, | Performed by: INTERNAL MEDICINE

## 2020-03-30 PROCEDURE — 3074F SYST BP LT 130 MM HG: CPT | Mod: CPTII,S$GLB,, | Performed by: INTERNAL MEDICINE

## 2020-03-30 PROCEDURE — 1126F PR PAIN SEVERITY QUANTIFIED, NO PAIN PRESENT: ICD-10-PCS | Mod: S$GLB,,, | Performed by: INTERNAL MEDICINE

## 2020-03-30 PROCEDURE — 3079F DIAST BP 80-89 MM HG: CPT | Mod: CPTII,S$GLB,, | Performed by: INTERNAL MEDICINE

## 2020-03-30 PROCEDURE — 99214 PR OFFICE/OUTPT VISIT, EST, LEVL IV, 30-39 MIN: ICD-10-PCS | Mod: S$GLB,,, | Performed by: INTERNAL MEDICINE

## 2020-03-30 PROCEDURE — 1159F PR MEDICATION LIST DOCUMENTED IN MEDICAL RECORD: ICD-10-PCS | Mod: S$GLB,,, | Performed by: INTERNAL MEDICINE

## 2020-03-30 PROCEDURE — 99999 PR PBB SHADOW E&M-EST. PATIENT-LVL III: CPT | Mod: PBBFAC,,, | Performed by: INTERNAL MEDICINE

## 2020-03-30 PROCEDURE — 99499 RISK ADDL DX/OHS AUDIT: ICD-10-PCS | Mod: S$GLB,,, | Performed by: INTERNAL MEDICINE

## 2020-03-30 PROCEDURE — 1126F AMNT PAIN NOTED NONE PRSNT: CPT | Mod: S$GLB,,, | Performed by: INTERNAL MEDICINE

## 2020-03-30 PROCEDURE — 99214 OFFICE O/P EST MOD 30 MIN: CPT | Mod: S$GLB,,, | Performed by: INTERNAL MEDICINE

## 2020-03-30 PROCEDURE — 3079F PR MOST RECENT DIASTOLIC BLOOD PRESSURE 80-89 MM HG: ICD-10-PCS | Mod: CPTII,S$GLB,, | Performed by: INTERNAL MEDICINE

## 2020-03-30 PROCEDURE — 99499 UNLISTED E&M SERVICE: CPT | Mod: S$GLB,,, | Performed by: INTERNAL MEDICINE

## 2020-03-30 PROCEDURE — 1101F PT FALLS ASSESS-DOCD LE1/YR: CPT | Mod: CPTII,S$GLB,, | Performed by: INTERNAL MEDICINE

## 2020-03-30 PROCEDURE — 1101F PR PT FALLS ASSESS DOC 0-1 FALLS W/OUT INJ PAST YR: ICD-10-PCS | Mod: CPTII,S$GLB,, | Performed by: INTERNAL MEDICINE

## 2020-03-30 PROCEDURE — 99999 PR PBB SHADOW E&M-EST. PATIENT-LVL III: ICD-10-PCS | Mod: PBBFAC,,, | Performed by: INTERNAL MEDICINE

## 2020-03-30 PROCEDURE — 3074F PR MOST RECENT SYSTOLIC BLOOD PRESSURE < 130 MM HG: ICD-10-PCS | Mod: CPTII,S$GLB,, | Performed by: INTERNAL MEDICINE

## 2020-03-30 NOTE — PROGRESS NOTES
PAST MEDICAL HISTORY:  Hypertension.  Irritable bowel syndrome, with with diarrhea  Chronic kidney disease stage III, with secondary hyperparathyroid  Tachycardia  Iron deficiency anemia  Multinodular thyroid gland, on ultrasound  Cholecystectomy.  Hysterectomy.  Bladder suspension.  Lumbar degenerative disk disease.  Gluteal muscular tear.  Right foot surgery.  Resection of lipoma from the groin.  Bout of postcholecystectomy diarrhea.  Prior history of shingles.  Hearing impairment.     SOCIAL HISTORY:  Tobacco and alcohol use - none.  Exercises by doing a treadmill and elliptical.    MEDICATIONS:     Benazepril 20 mg  Dyazide once daily          74-year-old female  Routine follow-up  Overall in general been feeling well  Sustain right wrist fracture September 2019 which required surgery  She is recovering well  Still with some degree of diminished handgrip      Followed up with Nephrology in September 2019  Visit went well  Advised to take vitamin-D every other day      Review of symptoms  No chest pain  No shortness of breath  No abdominal pain  Regular bowel function  No difficulty urinating  No other throughout is other than right wrist which is improved    Blood pressure noted by my assistant  126/80  Pulse 64  Weight 180 lb      Recent labs reviewed March 16th  Creatinine 1.3  Hematocrit 40.7  Total cholesterol 196 with     Impression  Hypertension  Chronic kidney disease stage 3  Borderline hyperlipidemia    Plan  Discussed above adding a statin medication such as pravastatin 20 mg  She will think about this  Consider use of Metamucil  Maintain proper diet physical activity and hydration  Return in 4 months

## 2020-04-23 ENCOUNTER — PATIENT MESSAGE (OUTPATIENT)
Dept: INTERNAL MEDICINE | Facility: CLINIC | Age: 75
End: 2020-04-23

## 2020-05-08 ENCOUNTER — PATIENT MESSAGE (OUTPATIENT)
Dept: ORTHOPEDICS | Facility: CLINIC | Age: 75
End: 2020-05-08

## 2020-05-12 ENCOUNTER — TELEPHONE (OUTPATIENT)
Dept: ORTHOPEDICS | Facility: CLINIC | Age: 75
End: 2020-05-12

## 2020-05-14 ENCOUNTER — HOSPITAL ENCOUNTER (OUTPATIENT)
Dept: RADIOLOGY | Facility: OTHER | Age: 75
Discharge: HOME | End: 2020-05-14
Attending: ORTHOPAEDIC SURGERY
Payer: OTHER GOVERNMENT

## 2020-05-14 ENCOUNTER — OFFICE VISIT (OUTPATIENT)
Dept: ORTHOPEDICS | Facility: CLINIC | Age: 75
End: 2020-05-14
Payer: OTHER GOVERNMENT

## 2020-05-14 VITALS
HEIGHT: 65 IN | WEIGHT: 180 LBS | SYSTOLIC BLOOD PRESSURE: 117 MMHG | HEART RATE: 64 BPM | BODY MASS INDEX: 29.99 KG/M2 | DIASTOLIC BLOOD PRESSURE: 76 MMHG

## 2020-05-14 DIAGNOSIS — M65.30 TRIGGER FINGER, UNSPECIFIED FINGER, UNSPECIFIED LATERALITY: Primary | ICD-10-CM

## 2020-05-14 DIAGNOSIS — S52.571D OTHER CLOSED INTRA-ARTICULAR FRACTURE OF DISTAL END OF RIGHT RADIUS WITH ROUTINE HEALING, SUBSEQUENT ENCOUNTER: Primary | ICD-10-CM

## 2020-05-14 DIAGNOSIS — M65.30 TRIGGER FINGER, UNSPECIFIED FINGER, UNSPECIFIED LATERALITY: ICD-10-CM

## 2020-05-14 DIAGNOSIS — R52 PAIN: ICD-10-CM

## 2020-05-14 DIAGNOSIS — R52 PAIN: Primary | ICD-10-CM

## 2020-05-14 PROCEDURE — 99999 PR PBB SHADOW E&M-EST. PATIENT-LVL III: ICD-10-PCS | Mod: PBBFAC,,, | Performed by: ORTHOPAEDIC SURGERY

## 2020-05-14 PROCEDURE — 73140 X-RAY EXAM OF FINGER(S): CPT | Mod: 26,RT,, | Performed by: RADIOLOGY

## 2020-05-14 PROCEDURE — 73140 XR FINGER 2 OR MORE VIEWS RIGHT: ICD-10-PCS | Mod: 26,RT,, | Performed by: RADIOLOGY

## 2020-05-14 PROCEDURE — 99213 OFFICE O/P EST LOW 20 MIN: CPT | Mod: S$GLB,,, | Performed by: ORTHOPAEDIC SURGERY

## 2020-05-14 PROCEDURE — 99999 PR PBB SHADOW E&M-EST. PATIENT-LVL III: CPT | Mod: PBBFAC,,, | Performed by: ORTHOPAEDIC SURGERY

## 2020-05-14 PROCEDURE — 73140 X-RAY EXAM OF FINGER(S): CPT | Mod: TC,FY,RT

## 2020-05-14 PROCEDURE — 99213 PR OFFICE/OUTPT VISIT, EST, LEVL III, 20-29 MIN: ICD-10-PCS | Mod: S$GLB,,, | Performed by: ORTHOPAEDIC SURGERY

## 2020-05-14 PROCEDURE — 73110 X-RAY EXAM OF WRIST: CPT | Mod: 26,RT,, | Performed by: RADIOLOGY

## 2020-05-14 PROCEDURE — 73110 XR WRIST COMPLETE 3 VIEWS RIGHT: ICD-10-PCS | Mod: 26,RT,, | Performed by: RADIOLOGY

## 2020-05-14 PROCEDURE — 73110 X-RAY EXAM OF WRIST: CPT | Mod: TC,FY,RT

## 2020-05-14 NOTE — PROGRESS NOTES
Carmen Wang presents for post-operative evaluation.  The patient is now 9 months s/p ORIF right distal radius.  The patient denies any significant pain today. She does report some stiffness of right hand which she has been working on with OT although there has been some interruptions 2/2 paperwork issues.  She reports she has been slowly improving since last evaluation and has no new complaints today.      PE:    AA&O x 4.  NAD  HEENT:  NCAT, sclera nonicteric  Lungs:  Respirations are equal and unlabored.  CV:  2+ bilateral upper and lower extremity pulses.  MSK: well healed scar volar wrist.  Right upper extremity neurovascularly intact.  Thumb IP flexion and extension intact. FROM wrist. Minimally Decreased active ROM of the IP joints which she can get into her palm today which is a definite improvement.  She also has chronic deformity of her small finger DIP which she reports preceded her injury.      Imaging:  Xrays right thumb and wrist reveal expected popstop changes Status post ORIF right distal radius.  Fracture healed.  No evidence of any complication.  No acute findings.      A/P: Status post above, doing well  1) continue full weight bearing and range of motion as tolerated  2) continue hand therapy focusing on finger ROM.  I again showed patient and instructed her on aggressive range of motion exercises today.  She is to be performing these constantly.  Follow-up in 3 months with new x-rays right wrist. It is medically necessary for her to continue a formal and dedicated hand therapy rehabilitation program given her significant stiffness still present.  New external OT referral provided.  3) Call with any questions/concerns in the interim       Please be aware that this note has been generated with the assistance of [employer] voice-to-text.  Please excuse any spelling or grammatical errors.

## 2020-05-22 ENCOUNTER — TELEPHONE (OUTPATIENT)
Dept: ORTHOPEDICS | Facility: CLINIC | Age: 75
End: 2020-05-22

## 2020-05-22 NOTE — TELEPHONE ENCOUNTER
Patient has been told that Dr Bragg is on vacation and will return on Monday and that her paperwork would be signed and faxed then.  She was also told this when se dropped off the paperwork.    Aletha Peres LPN    ----- Message from Charleen Reyes sent at 5/22/2020  1:09 PM CDT -----  Contact: Master from [facility]   Who called:Master from [facility]     What is the request in detail:  Master is requesting a call back. She states some paperwork was faxed to Aletha Peres on 5/14. She states the paperwork needed to be signed.  Please advise.    Can the clinic reply by [facility]? No    Best call back number: [phone]    Additional Information: N/A

## 2020-06-02 ENCOUNTER — TELEPHONE (OUTPATIENT)
Dept: ORTHOPEDICS | Facility: CLINIC | Age: 75
End: 2020-06-02

## 2020-06-02 NOTE — TELEPHONE ENCOUNTER
Forwarded to Dr Bragg for orders.    Aletha Peres LPN       ----- Message from Dyan Greene sent at 6/2/2020  1:25 PM CDT -----  Contact: Antoni - Scheduling Coord for [employer]  Name of Who is Calling: Antoni - Scheduling Coord for [employer]    What is the request in detail: Workers [employer] states the patient needs a prescription for physical therapy. Fax: [phone]. Please contact to further discuss and advise      [facility] [phone]

## 2020-08-24 DIAGNOSIS — S52.571D OTHER CLOSED INTRA-ARTICULAR FRACTURE OF DISTAL END OF RIGHT RADIUS WITH ROUTINE HEALING, SUBSEQUENT ENCOUNTER: Primary | ICD-10-CM

## 2020-08-28 ENCOUNTER — HOSPITAL ENCOUNTER (OUTPATIENT)
Dept: RADIOLOGY | Facility: HOSPITAL | Age: 75
Discharge: HOME | End: 2020-08-28
Attending: ORTHOPAEDIC SURGERY
Payer: OTHER GOVERNMENT

## 2020-08-28 DIAGNOSIS — S52.571D OTHER CLOSED INTRA-ARTICULAR FRACTURE OF DISTAL END OF RIGHT RADIUS WITH ROUTINE HEALING, SUBSEQUENT ENCOUNTER: ICD-10-CM

## 2020-08-28 PROCEDURE — 73110 X-RAY EXAM OF WRIST: CPT | Mod: 26,RT,, | Performed by: RADIOLOGY

## 2020-08-28 PROCEDURE — 73110 XR WRIST COMPLETE 3 VIEWS RIGHT: ICD-10-PCS | Mod: 26,RT,, | Performed by: RADIOLOGY

## 2020-08-28 PROCEDURE — 73110 X-RAY EXAM OF WRIST: CPT | Mod: TC,FY,PO,RT

## 2020-09-01 ENCOUNTER — PATIENT OUTREACH (OUTPATIENT)
Dept: ADMINISTRATIVE | Facility: OTHER | Age: 75
End: 2020-09-01

## 2020-09-01 ENCOUNTER — HOSPITAL ENCOUNTER (OUTPATIENT)
Dept: RADIOLOGY | Facility: HOSPITAL | Age: 75
Discharge: HOME | End: 2020-09-01
Attending: ORTHOPAEDIC SURGERY
Payer: OTHER GOVERNMENT

## 2020-09-01 ENCOUNTER — OFFICE VISIT (OUTPATIENT)
Dept: ORTHOPEDICS | Facility: CLINIC | Age: 75
End: 2020-09-01
Payer: OTHER GOVERNMENT

## 2020-09-01 DIAGNOSIS — M79.641 RIGHT HAND PAIN: Primary | ICD-10-CM

## 2020-09-01 DIAGNOSIS — S52.571D OTHER CLOSED INTRA-ARTICULAR FRACTURE OF DISTAL END OF RIGHT RADIUS WITH ROUTINE HEALING, SUBSEQUENT ENCOUNTER: Primary | ICD-10-CM

## 2020-09-01 DIAGNOSIS — M79.641 RIGHT HAND PAIN: ICD-10-CM

## 2020-09-01 PROCEDURE — 99213 OFFICE O/P EST LOW 20 MIN: CPT | Mod: S$GLB,,, | Performed by: ORTHOPAEDIC SURGERY

## 2020-09-01 PROCEDURE — 73130 X-RAY EXAM OF HAND: CPT | Mod: 26,RT,, | Performed by: RADIOLOGY

## 2020-09-01 PROCEDURE — 73130 X-RAY EXAM OF HAND: CPT | Mod: TC,RT

## 2020-09-01 PROCEDURE — 73130 XR HAND COMPLETE 3 VIEW RIGHT: ICD-10-PCS | Mod: 26,RT,, | Performed by: RADIOLOGY

## 2020-09-01 PROCEDURE — 99213 PR OFFICE/OUTPT VISIT, EST, LEVL III, 20-29 MIN: ICD-10-PCS | Mod: S$GLB,,, | Performed by: ORTHOPAEDIC SURGERY

## 2020-09-01 NOTE — PROGRESS NOTES
Carmen Wang presents for post-operative evaluation.  The patient is now 12 months s/p ORIF right distal radius.  The patient denies any pain today. She does report some stiffness of right hand which she has been working on with OT although there has been some interruptions 2/2 paperwork issues.  She reports the wrist is feeling quite well and her only complaint is some mild stiffness in the hand which improves with therapy and exercises.  Denies numbness or tingling.    PE:    AA&O x 4.  NAD  HEENT:  NCAT, sclera nonicteric  Lungs:  Respirations are equal and unlabored.  CV:  2+ bilateral upper and lower extremity pulses.  MSK: well healed scar volar wrist.  Right upper extremity neurovascularly intact.  Thumb IP flexion and extension intact. FROM wrist.  At 1st request the patient has some difficulty making a full composite fist but after a very short duration of passive stretching of her MP and IP joints the patient can make a full composite fist without any difficulty.  She also has chronic deformity of her small finger DIP which she reports preceded her injury.      Imaging:  Xrays right thumb and wrist reveal expected popstop changes Status post ORIF right distal radius.  Fracture healed.  No evidence of any complication.  No acute findings.      A/P: Status post above, doing well  1) continue full weight bearing and range of motion as tolerated  2) the patient does have some difficulty making a full composite fist upon 1st attempt but with very minimal passive stretching of her MP and IP joints she can subsequently perform a full composite fist without any difficulty and she confirms that this is improved after some stretching in clinic today.  She has mild pain with passive stretching of her joints and I believe this is likely secondary to decreased effort with therapy and home exercise.  I have instructed her she needs aggressive stretching of her fingers which should improve her range of motion back  to full as was done in clinic today.  At this time, I recommend she continue occupational therapy and may return to see me on an as-needed basis.  We discussed switching to an [facility] ir therapy facility although the patient prefers to continue with her facility near her home.    3) Call with any questions/concerns in the interim or for any further developments or desire for any additional follow-up or should her symptoms worsen persist or fail to improve       Please be aware that this note has been generated with the assistance of [employer] voice-to-text.  Please excuse any spelling or grammatical errors.

## 2020-10-01 ENCOUNTER — IMMUNIZATION (OUTPATIENT)
Dept: INTERNAL MEDICINE | Facility: CLINIC | Age: 75
End: 2020-10-01
Payer: MEDICARE

## 2020-10-01 PROCEDURE — 99999 PR PBB SHADOW E&M-EST. PATIENT-LVL I: CPT | Mod: PBBFAC,,,

## 2020-10-01 PROCEDURE — 90694 VACC AIIV4 NO PRSRV 0.5ML IM: CPT | Mod: S$GLB,,, | Performed by: INTERNAL MEDICINE

## 2020-10-01 PROCEDURE — 90694 FLU VACCINE - QUADRIVALENT - ADJUVANTED: ICD-10-PCS | Mod: S$GLB,,, | Performed by: INTERNAL MEDICINE

## 2020-10-01 PROCEDURE — 99999 PR PBB SHADOW E&M-EST. PATIENT-LVL I: ICD-10-PCS | Mod: PBBFAC,,,

## 2020-10-01 PROCEDURE — G0008 FLU VACCINE - QUADRIVALENT - ADJUVANTED: ICD-10-PCS | Mod: S$GLB,,, | Performed by: INTERNAL MEDICINE

## 2020-10-01 PROCEDURE — G0008 ADMIN INFLUENZA VIRUS VAC: HCPCS | Mod: S$GLB,,, | Performed by: INTERNAL MEDICINE

## 2020-10-14 ENCOUNTER — PATIENT MESSAGE (OUTPATIENT)
Dept: INTERNAL MEDICINE | Facility: CLINIC | Age: 75
End: 2020-10-14

## 2020-10-19 ENCOUNTER — TELEPHONE (OUTPATIENT)
Dept: OBSTETRICS AND GYNECOLOGY | Facility: CLINIC | Age: 75
End: 2020-10-19

## 2020-10-19 NOTE — TELEPHONE ENCOUNTER
Returned patient call, unable scheduled annual as schedule is book.       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 Haven't seen Dr Arias in 5 years . Please contact pt to schedule

## 2020-10-22 ENCOUNTER — PATIENT MESSAGE (OUTPATIENT)
Dept: ORTHOPEDICS | Facility: CLINIC | Age: 75
End: 2020-10-22

## 2020-10-23 ENCOUNTER — PATIENT MESSAGE (OUTPATIENT)
Dept: ORTHOPEDICS | Facility: CLINIC | Age: 75
End: 2020-10-23

## 2020-10-23 DIAGNOSIS — M65.30 TRIGGER FINGER, UNSPECIFIED FINGER, UNSPECIFIED LATERALITY: Primary | ICD-10-CM

## 2020-10-25 ENCOUNTER — PATIENT MESSAGE (OUTPATIENT)
Dept: INTERNAL MEDICINE | Facility: CLINIC | Age: 75
End: 2020-10-25

## 2020-10-25 DIAGNOSIS — M65.319 TRIGGER FINGER OF THUMB, UNSPECIFIED LATERALITY: Primary | ICD-10-CM

## 2020-10-26 ENCOUNTER — HOSPITAL ENCOUNTER (OUTPATIENT)
Dept: RADIOLOGY | Facility: OTHER | Age: 75
Discharge: HOME | End: 2020-10-26
Attending: ORTHOPAEDIC SURGERY
Payer: MEDICARE

## 2020-10-26 ENCOUNTER — OFFICE VISIT (OUTPATIENT)
Dept: ORTHOPEDICS | Facility: CLINIC | Age: 75
End: 2020-10-26
Payer: MEDICARE

## 2020-10-26 VITALS
HEIGHT: 65 IN | DIASTOLIC BLOOD PRESSURE: 72 MMHG | WEIGHT: 180 LBS | SYSTOLIC BLOOD PRESSURE: 124 MMHG | BODY MASS INDEX: 29.99 KG/M2 | HEART RATE: 76 BPM

## 2020-10-26 DIAGNOSIS — M65.30 TRIGGER FINGER, UNSPECIFIED FINGER, UNSPECIFIED LATERALITY: ICD-10-CM

## 2020-10-26 DIAGNOSIS — M65.319 TRIGGER FINGER OF THUMB, UNSPECIFIED LATERALITY: ICD-10-CM

## 2020-10-26 DIAGNOSIS — M65.30 TRIGGER FINGER, UNSPECIFIED FINGER, UNSPECIFIED LATERALITY: Primary | ICD-10-CM

## 2020-10-26 PROCEDURE — 3008F PR BODY MASS INDEX (BMI) DOCUMENTED: ICD-10-PCS | Mod: CPTII,S$GLB,, | Performed by: ORTHOPAEDIC SURGERY

## 2020-10-26 PROCEDURE — 99999 PR PBB SHADOW E&M-EST. PATIENT-LVL III: CPT | Mod: PBBFAC,,, | Performed by: ORTHOPAEDIC SURGERY

## 2020-10-26 PROCEDURE — 73130 XR HAND COMPLETE 3 VIEW LEFT: ICD-10-PCS | Mod: 26,LT,, | Performed by: RADIOLOGY

## 2020-10-26 PROCEDURE — 73130 X-RAY EXAM OF HAND: CPT | Mod: 26,LT,, | Performed by: RADIOLOGY

## 2020-10-26 PROCEDURE — 1159F PR MEDICATION LIST DOCUMENTED IN MEDICAL RECORD: ICD-10-PCS | Mod: S$GLB,,, | Performed by: ORTHOPAEDIC SURGERY

## 2020-10-26 PROCEDURE — 99213 PR OFFICE/OUTPT VISIT, EST, LEVL III, 20-29 MIN: ICD-10-PCS | Mod: S$GLB,,, | Performed by: ORTHOPAEDIC SURGERY

## 2020-10-26 PROCEDURE — 99999 PR PBB SHADOW E&M-EST. PATIENT-LVL III: ICD-10-PCS | Mod: PBBFAC,,, | Performed by: ORTHOPAEDIC SURGERY

## 2020-10-26 PROCEDURE — 1125F PR PAIN SEVERITY QUANTIFIED, PAIN PRESENT: ICD-10-PCS | Mod: S$GLB,,, | Performed by: ORTHOPAEDIC SURGERY

## 2020-10-26 PROCEDURE — 1125F AMNT PAIN NOTED PAIN PRSNT: CPT | Mod: S$GLB,,, | Performed by: ORTHOPAEDIC SURGERY

## 2020-10-26 PROCEDURE — 3078F DIAST BP <80 MM HG: CPT | Mod: CPTII,S$GLB,, | Performed by: ORTHOPAEDIC SURGERY

## 2020-10-26 PROCEDURE — 1101F PR PT FALLS ASSESS DOC 0-1 FALLS W/OUT INJ PAST YR: ICD-10-PCS | Mod: CPTII,S$GLB,, | Performed by: ORTHOPAEDIC SURGERY

## 2020-10-26 PROCEDURE — 3074F PR MOST RECENT SYSTOLIC BLOOD PRESSURE < 130 MM HG: ICD-10-PCS | Mod: CPTII,S$GLB,, | Performed by: ORTHOPAEDIC SURGERY

## 2020-10-26 PROCEDURE — 99213 OFFICE O/P EST LOW 20 MIN: CPT | Mod: S$GLB,,, | Performed by: ORTHOPAEDIC SURGERY

## 2020-10-26 PROCEDURE — 73130 X-RAY EXAM OF HAND: CPT | Mod: TC,FY,LT

## 2020-10-26 PROCEDURE — 1159F MED LIST DOCD IN RCRD: CPT | Mod: S$GLB,,, | Performed by: ORTHOPAEDIC SURGERY

## 2020-10-26 PROCEDURE — 1101F PT FALLS ASSESS-DOCD LE1/YR: CPT | Mod: CPTII,S$GLB,, | Performed by: ORTHOPAEDIC SURGERY

## 2020-10-26 PROCEDURE — 3074F SYST BP LT 130 MM HG: CPT | Mod: CPTII,S$GLB,, | Performed by: ORTHOPAEDIC SURGERY

## 2020-10-26 PROCEDURE — 3008F BODY MASS INDEX DOCD: CPT | Mod: CPTII,S$GLB,, | Performed by: ORTHOPAEDIC SURGERY

## 2020-10-26 PROCEDURE — 3078F PR MOST RECENT DIASTOLIC BLOOD PRESSURE < 80 MM HG: ICD-10-PCS | Mod: CPTII,S$GLB,, | Performed by: ORTHOPAEDIC SURGERY

## 2020-10-26 NOTE — PROGRESS NOTES
[facility]  History & Physical  Orthopedics    SUBJECTIVE:      Chief Complaint:  Right distal radius fracture    Referring Provider: No ref. provider found     History of Present Illness:  Patient is a 75 y.o. right hand dominant female known to me from previous right distal radius ORIF who presents today complaining of some left thumb pain with associated triggering and clicking symptoms.  She notes these have been present for approximately 2 months and have recently began to improve.  She currently rates her pain 2/10 in severity today and she presents today for initial evaluation with no other new complaints.      The patient is a/an semi-retired..    Onset of symptoms/DOI was 2 months ago    Symptoms are aggravated by activity and movement.    Symptoms are alleviated by rest.    Symptoms consist of pain.    The patient rates their pain as a 2/10    Attempted treatment(s) and/or interventions include rest and closed reduction in the emergency department.     The patient denies any fevers, chills, N/V, D/C and presents for evaluation.       Past Medical History:   Diagnosis Date    Cataract     Chronic diarrhea     Hypertension     IBS (irritable bowel syndrome)      Past Surgical History:   Procedure Laterality Date    APPENDECTOMY      with the hysterectomy    BUNIONECTOMY      CHOLECYSTECTOMY      COLONOSCOPY N/A 10/5/2015    Procedure: COLONOSCOPY;  Surgeon: Teja Olivarez MD;  Location: [facility] (4TH FLR);  Service: Endoscopy;  Laterality: N/A;    HYSTERECTOMY      RUPERT secondary to pelvic pain, sacrocolpoplexy    OPEN REDUCTION AND INTERNAL FIXATION (ORIF) OF FRACTURE OF DISTAL RADIUS Right 9/19/2019    Procedure: ORIF, FRACTURE, RADIUS, DISTAL - right accumed;  Surgeon: Deejay Bragg MD;  Location: [facility] OR 2ND FLR;  Service: Orthopedics;  Laterality: Right;     Review of patient's allergies indicates:   Allergen Reactions    Codeine Hallucinations     Other  reaction(s): Hallucinations    Penicillins Hives     Other reaction(s): Rash    Penicillin Rash     Social History     Social History Narrative    Not on file     Family History   Problem Relation Age of Onset    Hypertension Mother     Arthritis Mother     Thyroid disease Mother     Glaucoma Mother     Arthritis Father     Hypertension Father     Thyroid disease Sister     Breast cancer Sister 76    Thyroid disease Brother     Diabetes Brother     Thyroid disease Sister     Thyroid disease Sister     Cancer Brother         prostate    No Known Problems Sister     No Known Problems Sister     Lupus Daughter     Arthritis Son     Hypertension Son     Hypertension Son     No Known Problems Sister     Cancer Brother         prostate    No Known Problems Brother     No Known Problems Brother     No Known Problems Brother     No Known Problems Brother     Glaucoma Maternal Aunt     Stroke Maternal Grandmother     Glaucoma Maternal Aunt     No Known Problems Maternal Uncle     No Known Problems Paternal Aunt     No Known Problems Paternal Uncle     No Known Problems Maternal Grandfather     No Known Problems Paternal Grandmother     No Known Problems Paternal Grandfather     Colon cancer Neg Hx     Ovarian cancer Neg Hx     Amblyopia Neg Hx     Blindness Neg Hx     Cataracts Neg Hx     Macular degeneration Neg Hx     Retinal detachment Neg Hx     Strabismus Neg Hx     Esophageal cancer Neg Hx          Current Outpatient Medications:     benazepril (LOTENSIN) 20 MG tablet, TAKE 1 TABLET BY MOUTH EVERY DAY, Disp: 90 tablet, Rfl: 3    cholecalciferol, vitamin D3, (D3-2000) 2,000 unit Cap, Take 1 capsule (2,000 Units total) by mouth every other day., Disp: , Rfl:     oxyCODONE-acetaminophen (PERCOCET) 5-325 mg per tablet, Take 1 tablet by mouth every 4 (four) hours as needed for Pain. (Patient not taking: Reported on 3/30/2020), Disp: 42 tablet, Rfl: 0    promethazine  "(PHENERGAN) 25 MG tablet, Take 1 tablet (25 mg total) by mouth every 4 (four) hours as needed for Nausea. (Patient not taking: Reported on 3/30/2020), Disp: 61 tablet, Rfl: 0    triamterene-hydrochlorothiazide 37.5-25 mg (DYAZIDE) 37.5-25 mg per capsule, TAKE 1 CAPSULE BY MOUTH  EVERY MORNING, Disp: 90 capsule, Rfl: 3      Review of Systems:  Constitutional: no fever or chills  Eyes: no visual changes  ENT: no nasal congestion or sore throat  Respiratory: no cough or shortness of breath  Cardiovascular: no chest pain  Gastrointestinal: no nausea or vomiting, tolerating diet  Musculoskeletal: pain    OBJECTIVE:      Vital Signs (Most Recent):  Vitals:    10/26/20 0923   BP: 124/72   Pulse: 76   Weight: 81.6 kg (180 lb)   Height: 5' 5" (1.651 m)     Body mass index is 29.95 kg/m².      Physical Exam:  Constitutional: The patient appears well-developed and well-nourished. No distress.   Head: Normocephalic and atraumatic.   Nose: Nose normal.   Eyes: Conjunctivae and EOM are normal.   Neck: No tracheal deviation present.   Cardiovascular: Normal rate and intact distal pulses.    Pulmonary/Chest: Effort normal. No respiratory distress.   Abdominal: There is no guarding.   Neurological: The patient is alert.   Psychiatric: The patient has a normal mood and affect.     Left Hand/Wrist Examination:    Observation/Inspection:  Swelling  mild    Deformity  none  Discoloration  none     Scars   well healed right distal radius ORIF    Atrophy  Non  Patient with tenderness to palpation and senthil triggering seen today at the left thumb A1 pulley      HAND/WRIST EXAMINATION:  Finkelstein's Test   negative  WHAT Test    Negative  Snuff box tenderness   Neg  Barcenas's Test    Neg  Hook of Hamate Tenderness  Neg  CMC grind    negative  Circumduction test   negative    Neurovascular Exam:  Digits WWP, brisk CR < 3s throughout  NVI motor/LTS to M/R/U nerves, radial pulse 2+  Tinel's Test - Carpal Tunnel  negative  Tinel's Test - " Cubital Tunnel  negative  Phalen's Test    negative  Median Nerve Compression Test negative    ROM left hand wrist and elbow full and painless elbow than left thumb    RRR  CTAB  Abd S/NT/ND +BS    Diagnostic Results:     Xray -  x-rays left hand demonstrate no acute fractures or dislocations or significant degenerative changes    ASSESSMENT/PLAN:      Carmen Wang is a 75 y.o. female with left trigger thumb    Plan:  Treatment options discussed.  We discussed activity modifications, NSAIDs, therapy, splinting, injections, and surgery.  At this time the patient like to proceed with a trial of activity modifications and Tylenol Arthritis over-the-counter as needed for pain.  We discussed trigger thumb splints which can be obtained online.  She declines therapy injections or surgery today and should she wish to further discuss any of these issues or should she desire re-evaluation she may return any time should her symptoms worsen persist or fail to improve.      Deejay Bragg M.D.     Please be aware that this note has been generated with the assistance of [employer] voice-to-text.  Please excuse any spelling or grammatical errors.

## 2020-11-09 ENCOUNTER — PATIENT MESSAGE (OUTPATIENT)
Dept: INTERNAL MEDICINE | Facility: CLINIC | Age: 75
End: 2020-11-09

## 2020-11-09 DIAGNOSIS — Z12.31 ENCOUNTER FOR SCREENING MAMMOGRAM FOR BREAST CANCER: Primary | ICD-10-CM

## 2020-11-11 ENCOUNTER — TELEPHONE (OUTPATIENT)
Dept: NEPHROLOGY | Facility: CLINIC | Age: 75
End: 2020-11-11

## 2020-11-11 DIAGNOSIS — N18.30 STAGE 3 CHRONIC KIDNEY DISEASE, UNSPECIFIED WHETHER STAGE 3A OR 3B CKD: Primary | ICD-10-CM

## 2020-11-18 ENCOUNTER — OFFICE VISIT (OUTPATIENT)
Dept: OBSTETRICS AND GYNECOLOGY | Facility: CLINIC | Age: 75
End: 2020-11-18
Payer: MEDICARE

## 2020-11-18 ENCOUNTER — HOSPITAL ENCOUNTER (OUTPATIENT)
Dept: RADIOLOGY | Facility: HOSPITAL | Age: 75
Discharge: HOME | End: 2020-11-18
Attending: INTERNAL MEDICINE
Payer: MEDICARE

## 2020-11-18 VITALS
WEIGHT: 186.06 LBS | DIASTOLIC BLOOD PRESSURE: 70 MMHG | BODY MASS INDEX: 31 KG/M2 | HEIGHT: 65 IN | SYSTOLIC BLOOD PRESSURE: 122 MMHG

## 2020-11-18 DIAGNOSIS — Z12.31 ENCOUNTER FOR SCREENING MAMMOGRAM FOR BREAST CANCER: ICD-10-CM

## 2020-11-18 DIAGNOSIS — Z01.419 ENCOUNTER FOR GYNECOLOGICAL EXAMINATION WITHOUT ABNORMAL FINDING: Primary | ICD-10-CM

## 2020-11-18 DIAGNOSIS — N95.8 OTHER SPECIFIED MENOPAUSAL AND PERIMENOPAUSAL DISORDERS: ICD-10-CM

## 2020-11-18 DIAGNOSIS — Z13.820 SCREENING FOR OSTEOPOROSIS: ICD-10-CM

## 2020-11-18 PROCEDURE — 99999 PR PBB SHADOW E&M-EST. PATIENT-LVL III: CPT | Mod: PBBFAC,,, | Performed by: NURSE PRACTITIONER

## 2020-11-18 PROCEDURE — 3008F PR BODY MASS INDEX (BMI) DOCUMENTED: ICD-10-PCS | Mod: CPTII,S$GLB,, | Performed by: NURSE PRACTITIONER

## 2020-11-18 PROCEDURE — 77067 SCR MAMMO BI INCL CAD: CPT | Mod: 26,,, | Performed by: RADIOLOGY

## 2020-11-18 PROCEDURE — 3288F FALL RISK ASSESSMENT DOCD: CPT | Mod: CPTII,S$GLB,, | Performed by: NURSE PRACTITIONER

## 2020-11-18 PROCEDURE — 1101F PT FALLS ASSESS-DOCD LE1/YR: CPT | Mod: CPTII,S$GLB,, | Performed by: NURSE PRACTITIONER

## 2020-11-18 PROCEDURE — 3008F BODY MASS INDEX DOCD: CPT | Mod: CPTII,S$GLB,, | Performed by: NURSE PRACTITIONER

## 2020-11-18 PROCEDURE — G0101 CA SCREEN;PELVIC/BREAST EXAM: HCPCS | Mod: GZ,S$GLB,, | Performed by: NURSE PRACTITIONER

## 2020-11-18 PROCEDURE — 1126F AMNT PAIN NOTED NONE PRSNT: CPT | Mod: S$GLB,,, | Performed by: NURSE PRACTITIONER

## 2020-11-18 PROCEDURE — G0101 PR CA SCREEN;PELVIC/BREAST EXAM: ICD-10-PCS | Mod: GZ,S$GLB,, | Performed by: NURSE PRACTITIONER

## 2020-11-18 PROCEDURE — 1126F PR PAIN SEVERITY QUANTIFIED, NO PAIN PRESENT: ICD-10-PCS | Mod: S$GLB,,, | Performed by: NURSE PRACTITIONER

## 2020-11-18 PROCEDURE — 99999 PR PBB SHADOW E&M-EST. PATIENT-LVL III: ICD-10-PCS | Mod: PBBFAC,,, | Performed by: NURSE PRACTITIONER

## 2020-11-18 PROCEDURE — 1157F PR ADVANCE CARE PLAN OR EQUIV PRESENT IN MEDICAL RECORD: ICD-10-PCS | Mod: S$GLB,,, | Performed by: NURSE PRACTITIONER

## 2020-11-18 PROCEDURE — 77063 BREAST TOMOSYNTHESIS BI: CPT | Mod: 26,,, | Performed by: RADIOLOGY

## 2020-11-18 PROCEDURE — 77067 SCR MAMMO BI INCL CAD: CPT | Mod: TC

## 2020-11-18 PROCEDURE — 1157F ADVNC CARE PLAN IN RCRD: CPT | Mod: S$GLB,,, | Performed by: NURSE PRACTITIONER

## 2020-11-18 PROCEDURE — 1101F PR PT FALLS ASSESS DOC 0-1 FALLS W/OUT INJ PAST YR: ICD-10-PCS | Mod: CPTII,S$GLB,, | Performed by: NURSE PRACTITIONER

## 2020-11-18 PROCEDURE — 77063 MAMMO DIGITAL SCREENING BILAT WITH TOMO: ICD-10-PCS | Mod: 26,,, | Performed by: RADIOLOGY

## 2020-11-18 PROCEDURE — 77067 MAMMO DIGITAL SCREENING BILAT WITH TOMO: ICD-10-PCS | Mod: 26,,, | Performed by: RADIOLOGY

## 2020-11-18 PROCEDURE — 3288F PR FALLS RISK ASSESSMENT DOCUMENTED: ICD-10-PCS | Mod: CPTII,S$GLB,, | Performed by: NURSE PRACTITIONER

## 2020-11-18 NOTE — PROGRESS NOTES
Subjective:       Patient ID: Carmen Wang is a 75 y.o. female.    Chief Complaint:  Well Woman      History of Present Illness  HPI  Annual Exam-Postmenopausal   presents for annual exam. The patient has no complaints today. The patient is not currently sexually active -- r/t 's medical hx. GYN screening history: last pap: patient does not recall when last pap was and last mammogram: 10/2019; was done this morning. The patient has never taken hormone replacement therapy.  The patient wears seatbelts: yes. The patient participates in regular exercise: yes. Has the patient ever been transfused or tattooed?: no. The patient reports that there is not domestic violence in her life.    Denies vaginal dryness or incontinence.     Has never had dexa scan.  colonoscopy done  next due 10 years.    GYN & OB History  Patient's last menstrual period was 1982 (approximate).   Date of Last Pap: No result found    OB History    Para Term  AB Living   4 4 4     3   SAB TAB Ectopic Multiple Live Births           4      # Outcome Date GA Lbr Jesus/2nd Weight Sex Delivery Anes PTL Lv   4 Term         DEC   3 Term         CELSO   2 Term         CELSO   1 Term         CELSO       Review of Systems  Review of Systems   Constitutional: Negative for activity change, appetite change, chills and fatigue.   HENT: Negative for nasal congestion and mouth sores.    Respiratory: Negative for cough, shortness of breath and wheezing.    Cardiovascular: Negative for chest pain.   Gastrointestinal: Negative for abdominal pain, constipation, diarrhea, nausea and vomiting.        Hemorrhoids   Endocrine: Negative for hair loss and hot flashes.   Genitourinary: Negative for bladder incontinence, decreased libido, dyspareunia, dysuria, frequency, genital sores, pelvic pain, urgency, vaginal bleeding, vaginal discharge, vaginal pain, urinary incontinence, postcoital bleeding, vaginal dryness and vaginal odor.    Musculoskeletal: Negative for back pain.   Integumentary:  Negative for breast mass, nipple discharge, breast skin changes and breast tenderness.   Neurological: Negative for headaches.   Hematological: Negative for adenopathy.   Psychiatric/Behavioral: Negative for depression. The patient is not nervous/anxious.    All other systems reviewed and are negative.  Breast: Negative for breast self exam, lump, mass, mastodynia, nipple discharge, skin changes and tenderness          Objective:      Physical Exam:   Constitutional: She is oriented to person, place, and time. She appears well-developed and well-nourished. No distress.    HENT:   Head: Normocephalic and atraumatic.   Nose: Nose normal.    Eyes: Pupils are equal, round, and reactive to light. Conjunctivae and EOM are normal. Right eye exhibits no discharge. Left eye exhibits no discharge.    Neck: Normal range of motion. Neck supple.    Cardiovascular: Normal rate, regular rhythm and normal heart sounds.     Pulmonary/Chest: Effort normal and breath sounds normal. No respiratory distress. She has no decreased breath sounds. She has no wheezes. She has no rhonchi. She has no rales. She exhibits no tenderness. Right breast exhibits no inverted nipple, no mass, no nipple discharge, no skin change, no tenderness, no bleeding and no swelling. Left breast exhibits no inverted nipple, no mass, no nipple discharge, no skin change, no tenderness, no bleeding and no swelling. Breasts are symmetrical.        Abdominal: Soft. Bowel sounds are normal. She exhibits no distension. There is no abdominal tenderness. There is no rebound and no guarding. Hernia confirmed negative in the right inguinal area and confirmed negative in the left inguinal area.     Genitourinary:    Inguinal canal normal.   Rectum:      External hemorrhoid (do not cause pain) present.      Pelvic exam was performed with patient supine.   There is no rash, tenderness, lesion or injury on the right  labia. There is no rash, tenderness, lesion or injury on the left labia. Uterus is absent. Right adnexum displays no mass, no tenderness and no fullness. Left adnexum displays no mass, no tenderness and no fullness. No erythema, tenderness or bleeding in the vagina.    No foreign body in the vagina.      No signs of injury in the vagina.   Vaginal cuff normal.Labial bartholins normal.Cervix exhibits absence. negative for vaginal discharge          Musculoskeletal: Normal range of motion and moves all extremeties.      Lymphadenopathy: No inguinal adenopathy noted on the right or left side.    Neurological: She is alert and oriented to person, place, and time.    Skin: Skin is warm and dry. No rash noted. She is not diaphoretic. No erythema. No pallor.    Psychiatric: She has a normal mood and affect. Her speech is normal and behavior is normal. Judgment and thought content normal.           Assessment:        1. Encounter for gynecological examination without abnormal finding    2. Screening for osteoporosis    3. Other specified menopausal and perimenopausal disorders                Plan:   Ensure adequate calcium and vitamin D intake and daily weight bearing exercises.    paps no longer indicated.  Recommend pelvic exams every two years.  Reviewed recommendations for annual CBE.  Yearly mammograms starting at 40 until age 76 -- can continue after next year if she would like  RTC 1 year or sooner prn.    Encounter for gynecological examination without abnormal finding  -     DXA Bone Density Spine And Hip; Future; Expected date: 11/18/2020    Screening for osteoporosis  -     DXA Bone Density Spine And Hip; Future; Expected date: 11/18/2020    Other specified menopausal and perimenopausal disorders   -     DXA Bone Density Spine And Hip; Future; Expected date: 11/18/2020

## 2020-11-24 ENCOUNTER — TELEPHONE (OUTPATIENT)
Dept: ORTHOPEDICS | Facility: CLINIC | Age: 75
End: 2020-11-24

## 2020-11-24 NOTE — TELEPHONE ENCOUNTER
Spoke with the patient. Expressed that I would reach out to WC department for update and call her back as needed. Answered questions regarding her last visit for her wrist. Patient verbalized understanding and was thankful.     AD      ----- Message from Tory Renee sent at 11/23/2020  5:00 PM CST -----  Regarding: FW: Callback    ----- Message -----  From: Jessica Oh  Sent: 11/23/2020  12:54 PM CST  To: [facility] Villalba Staff  Subject: Callback                                         Name of Who is Calling: ADELFO RM [[id number]] What is the request in detail: PT is requesting a callback concerning paperwork that was faxed over pt advised that she haven't heard anything back  Can the clinic reply by [facility]: NO  What Number to Call Back [facility] not [facility]: [phone]

## 2020-11-30 ENCOUNTER — PATIENT MESSAGE (OUTPATIENT)
Dept: INTERNAL MEDICINE | Facility: CLINIC | Age: 75
End: 2020-11-30

## 2020-11-30 ENCOUNTER — APPOINTMENT (OUTPATIENT)
Dept: RADIOLOGY | Facility: HOSPITAL | Age: 75
End: 2020-11-30
Attending: NURSE PRACTITIONER
Payer: MEDICARE

## 2020-11-30 DIAGNOSIS — N95.8 OTHER SPECIFIED MENOPAUSAL AND PERIMENOPAUSAL DISORDERS: ICD-10-CM

## 2020-11-30 DIAGNOSIS — N18.31 STAGE 3A CHRONIC KIDNEY DISEASE: ICD-10-CM

## 2020-11-30 DIAGNOSIS — E78.5 HYPERLIPIDEMIA, UNSPECIFIED HYPERLIPIDEMIA TYPE: ICD-10-CM

## 2020-11-30 DIAGNOSIS — Z01.419 ENCOUNTER FOR GYNECOLOGICAL EXAMINATION WITHOUT ABNORMAL FINDING: ICD-10-CM

## 2020-11-30 DIAGNOSIS — I10 ESSENTIAL HYPERTENSION: Primary | ICD-10-CM

## 2020-11-30 DIAGNOSIS — Z13.820 SCREENING FOR OSTEOPOROSIS: ICD-10-CM

## 2020-11-30 PROCEDURE — 77080 DXA BONE DENSITY AXIAL: CPT | Mod: 26,,, | Performed by: RADIOLOGY

## 2020-11-30 PROCEDURE — 77080 DEXA BONE DENSITY SPINE HIP: ICD-10-PCS | Mod: 26,,, | Performed by: RADIOLOGY

## 2020-11-30 PROCEDURE — 77080 DXA BONE DENSITY AXIAL: CPT | Mod: TC

## 2020-12-03 ENCOUNTER — PATIENT MESSAGE (OUTPATIENT)
Dept: INTERNAL MEDICINE | Facility: CLINIC | Age: 75
End: 2020-12-03

## 2020-12-03 ENCOUNTER — LAB VISIT (OUTPATIENT)
Dept: LAB | Facility: HOSPITAL | Age: 75
End: 2020-12-03
Attending: INTERNAL MEDICINE
Payer: MEDICARE

## 2020-12-03 DIAGNOSIS — I10 ESSENTIAL HYPERTENSION: ICD-10-CM

## 2020-12-03 DIAGNOSIS — E78.5 HYPERLIPIDEMIA, UNSPECIFIED HYPERLIPIDEMIA TYPE: ICD-10-CM

## 2020-12-03 DIAGNOSIS — N18.31 STAGE 3A CHRONIC KIDNEY DISEASE: ICD-10-CM

## 2020-12-03 LAB
BASOPHILS # BLD AUTO: 0.04 K/UL (ref 0–0.2)
BASOPHILS NFR BLD: 0.5 % (ref 0–1.9)
DIFFERENTIAL METHOD: ABNORMAL
EOSINOPHIL # BLD AUTO: 0.3 K/UL (ref 0–0.5)
EOSINOPHIL NFR BLD: 3.5 % (ref 0–8)
ERYTHROCYTE [DISTWIDTH] IN BLOOD BY AUTOMATED COUNT: 16.8 % (ref 11.5–14.5)
HCT VFR BLD AUTO: 42.1 % (ref 37–48.5)
HGB BLD-MCNC: 12.8 G/DL (ref 12–16)
IMM GRANULOCYTES # BLD AUTO: 0.02 K/UL (ref 0–0.04)
IMM GRANULOCYTES NFR BLD AUTO: 0.2 % (ref 0–0.5)
LYMPHOCYTES # BLD AUTO: 3.3 K/UL (ref 1–4.8)
LYMPHOCYTES NFR BLD: 41 % (ref 18–48)
MCH RBC QN AUTO: 25.1 PG (ref 27–31)
MCHC RBC AUTO-ENTMCNC: 30.4 G/DL (ref 32–36)
MCV RBC AUTO: 83 FL (ref 82–98)
MONOCYTES # BLD AUTO: 1 K/UL (ref 0.3–1)
MONOCYTES NFR BLD: 11.9 % (ref 4–15)
NEUTROPHILS # BLD AUTO: 3.5 K/UL (ref 1.8–7.7)
NEUTROPHILS NFR BLD: 42.9 % (ref 38–73)
NRBC BLD-RTO: 0 /100 WBC
PLATELET # BLD AUTO: 262 K/UL (ref 150–350)
PMV BLD AUTO: 12.5 FL (ref 9.2–12.9)
RBC # BLD AUTO: 5.1 M/UL (ref 4–5.4)
WBC # BLD AUTO: 8.05 K/UL (ref 3.9–12.7)

## 2020-12-03 PROCEDURE — 36415 COLL VENOUS BLD VENIPUNCTURE: CPT | Mod: PO

## 2020-12-03 PROCEDURE — 83970 ASSAY OF PARATHORMONE: CPT

## 2020-12-03 PROCEDURE — 85025 COMPLETE CBC W/AUTO DIFF WBC: CPT

## 2020-12-03 PROCEDURE — 80069 RENAL FUNCTION PANEL: CPT

## 2020-12-03 PROCEDURE — 80061 LIPID PANEL: CPT

## 2020-12-03 PROCEDURE — 80076 HEPATIC FUNCTION PANEL: CPT

## 2020-12-03 PROCEDURE — 84443 ASSAY THYROID STIM HORMONE: CPT

## 2020-12-03 PROCEDURE — 82306 VITAMIN D 25 HYDROXY: CPT

## 2020-12-04 ENCOUNTER — LAB VISIT (OUTPATIENT)
Dept: LAB | Facility: HOSPITAL | Age: 75
End: 2020-12-04
Attending: INTERNAL MEDICINE
Payer: MEDICARE

## 2020-12-04 ENCOUNTER — OFFICE VISIT (OUTPATIENT)
Dept: INTERNAL MEDICINE | Facility: CLINIC | Age: 75
End: 2020-12-04
Payer: MEDICARE

## 2020-12-04 VITALS
WEIGHT: 186 LBS | BODY MASS INDEX: 30.99 KG/M2 | OXYGEN SATURATION: 98 % | HEART RATE: 76 BPM | HEIGHT: 65 IN | DIASTOLIC BLOOD PRESSURE: 70 MMHG | SYSTOLIC BLOOD PRESSURE: 126 MMHG

## 2020-12-04 DIAGNOSIS — N18.31 STAGE 3A CHRONIC KIDNEY DISEASE: ICD-10-CM

## 2020-12-04 DIAGNOSIS — Z00.00 ANNUAL PHYSICAL EXAM: Primary | ICD-10-CM

## 2020-12-04 DIAGNOSIS — N18.30 STAGE 3 CHRONIC KIDNEY DISEASE, UNSPECIFIED WHETHER STAGE 3A OR 3B CKD: ICD-10-CM

## 2020-12-04 DIAGNOSIS — E78.5 HYPERLIPIDEMIA, UNSPECIFIED HYPERLIPIDEMIA TYPE: ICD-10-CM

## 2020-12-04 DIAGNOSIS — R60.0 BILATERAL LEG EDEMA: ICD-10-CM

## 2020-12-04 DIAGNOSIS — I10 ESSENTIAL HYPERTENSION: ICD-10-CM

## 2020-12-04 LAB
25(OH)D3+25(OH)D2 SERPL-MCNC: 51 NG/ML (ref 30–96)
ALBUMIN SERPL BCP-MCNC: 3.6 G/DL (ref 3.5–5.2)
ALBUMIN SERPL BCP-MCNC: 3.6 G/DL (ref 3.5–5.2)
ALP SERPL-CCNC: 65 U/L (ref 55–135)
ALT SERPL W/O P-5'-P-CCNC: 17 U/L (ref 10–44)
ANION GAP SERPL CALC-SCNC: 11 MMOL/L (ref 8–16)
AST SERPL-CCNC: 20 U/L (ref 10–40)
BILIRUB DIRECT SERPL-MCNC: 0.2 MG/DL (ref 0.1–0.3)
BILIRUB SERPL-MCNC: 0.4 MG/DL (ref 0.1–1)
BUN SERPL-MCNC: 20 MG/DL (ref 8–23)
CALCIUM SERPL-MCNC: 10.1 MG/DL (ref 8.7–10.5)
CHLORIDE SERPL-SCNC: 101 MMOL/L (ref 95–110)
CHOLEST SERPL-MCNC: 209 MG/DL (ref 120–199)
CHOLEST/HDLC SERPL: 3.7 {RATIO} (ref 2–5)
CO2 SERPL-SCNC: 27 MMOL/L (ref 23–29)
CREAT SERPL-MCNC: 1.4 MG/DL (ref 0.5–1.4)
EST. GFR  (AFRICAN AMERICAN): 42.7 ML/MIN/1.73 M^2
EST. GFR  (NON AFRICAN AMERICAN): 37 ML/MIN/1.73 M^2
GLUCOSE SERPL-MCNC: 66 MG/DL (ref 70–110)
HDLC SERPL-MCNC: 57 MG/DL (ref 40–75)
HDLC SERPL: 27.3 % (ref 20–50)
LDLC SERPL CALC-MCNC: 125.4 MG/DL (ref 63–159)
NONHDLC SERPL-MCNC: 152 MG/DL
PHOSPHATE SERPL-MCNC: 2.9 MG/DL (ref 2.7–4.5)
POTASSIUM SERPL-SCNC: 3.9 MMOL/L (ref 3.5–5.1)
PROT SERPL-MCNC: 7.6 G/DL (ref 6–8.4)
PTH-INTACT SERPL-MCNC: 88 PG/ML (ref 9–77)
SODIUM SERPL-SCNC: 139 MMOL/L (ref 136–145)
TRIGL SERPL-MCNC: 133 MG/DL (ref 30–150)
TSH SERPL DL<=0.005 MIU/L-ACNC: 1.8 UIU/ML (ref 0.4–4)

## 2020-12-04 PROCEDURE — 3288F FALL RISK ASSESSMENT DOCD: CPT | Mod: CPTII,S$GLB,, | Performed by: INTERNAL MEDICINE

## 2020-12-04 PROCEDURE — 99214 OFFICE O/P EST MOD 30 MIN: CPT | Mod: S$GLB,,, | Performed by: INTERNAL MEDICINE

## 2020-12-04 PROCEDURE — 99999 PR PBB SHADOW E&M-EST. PATIENT-LVL III: CPT | Mod: PBBFAC,,, | Performed by: INTERNAL MEDICINE

## 2020-12-04 PROCEDURE — 1157F PR ADVANCE CARE PLAN OR EQUIV PRESENT IN MEDICAL RECORD: ICD-10-PCS | Mod: S$GLB,,, | Performed by: INTERNAL MEDICINE

## 2020-12-04 PROCEDURE — 81003 URINALYSIS AUTO W/O SCOPE: CPT

## 2020-12-04 PROCEDURE — 3078F DIAST BP <80 MM HG: CPT | Mod: CPTII,S$GLB,, | Performed by: INTERNAL MEDICINE

## 2020-12-04 PROCEDURE — 1126F PR PAIN SEVERITY QUANTIFIED, NO PAIN PRESENT: ICD-10-PCS | Mod: S$GLB,,, | Performed by: INTERNAL MEDICINE

## 2020-12-04 PROCEDURE — 99999 PR PBB SHADOW E&M-EST. PATIENT-LVL III: ICD-10-PCS | Mod: PBBFAC,,, | Performed by: INTERNAL MEDICINE

## 2020-12-04 PROCEDURE — 3008F PR BODY MASS INDEX (BMI) DOCUMENTED: ICD-10-PCS | Mod: CPTII,S$GLB,, | Performed by: INTERNAL MEDICINE

## 2020-12-04 PROCEDURE — 84156 ASSAY OF PROTEIN URINE: CPT

## 2020-12-04 PROCEDURE — 3008F BODY MASS INDEX DOCD: CPT | Mod: CPTII,S$GLB,, | Performed by: INTERNAL MEDICINE

## 2020-12-04 PROCEDURE — 3078F PR MOST RECENT DIASTOLIC BLOOD PRESSURE < 80 MM HG: ICD-10-PCS | Mod: CPTII,S$GLB,, | Performed by: INTERNAL MEDICINE

## 2020-12-04 PROCEDURE — 3288F PR FALLS RISK ASSESSMENT DOCUMENTED: ICD-10-PCS | Mod: CPTII,S$GLB,, | Performed by: INTERNAL MEDICINE

## 2020-12-04 PROCEDURE — 3074F SYST BP LT 130 MM HG: CPT | Mod: CPTII,S$GLB,, | Performed by: INTERNAL MEDICINE

## 2020-12-04 PROCEDURE — 99214 PR OFFICE/OUTPT VISIT, EST, LEVL IV, 30-39 MIN: ICD-10-PCS | Mod: S$GLB,,, | Performed by: INTERNAL MEDICINE

## 2020-12-04 PROCEDURE — 3074F PR MOST RECENT SYSTOLIC BLOOD PRESSURE < 130 MM HG: ICD-10-PCS | Mod: CPTII,S$GLB,, | Performed by: INTERNAL MEDICINE

## 2020-12-04 PROCEDURE — 1157F ADVNC CARE PLAN IN RCRD: CPT | Mod: S$GLB,,, | Performed by: INTERNAL MEDICINE

## 2020-12-04 PROCEDURE — 1101F PR PT FALLS ASSESS DOC 0-1 FALLS W/OUT INJ PAST YR: ICD-10-PCS | Mod: CPTII,S$GLB,, | Performed by: INTERNAL MEDICINE

## 2020-12-04 PROCEDURE — 1101F PT FALLS ASSESS-DOCD LE1/YR: CPT | Mod: CPTII,S$GLB,, | Performed by: INTERNAL MEDICINE

## 2020-12-04 PROCEDURE — 1126F AMNT PAIN NOTED NONE PRSNT: CPT | Mod: S$GLB,,, | Performed by: INTERNAL MEDICINE

## 2020-12-04 NOTE — PROGRESS NOTES
MEDICAL HISTORY:  Hypertension.  Irritable bowel syndrome, with with diarrhea  Chronic kidney disease stage III, with secondary hyperparathyroid  Tachycardia  Iron deficiency anemia  Multinodular thyroid gland, on ultrasound  Cholecystectomy.  Hysterectomy.  Bladder suspension.  Lumbar degenerative disk disease.  Gluteal muscular tear.  Right foot surgery.  Resection of lipoma from the groin.  Bout of postcholecystectomy diarrhea.  Prior history of shingles.  Hearing impairment.     SOCIAL HISTORY:  Tobacco and alcohol use - none.  Exercises by doing a treadmill and elliptical.     FAMILY HISTORY:  Very extensive.  Six sisters and seven brothers, thyroid conditions in three sisters, one brother with diabetes, another brother with prostate cancer, but all 13 siblings are living.     SCREENING:  Colonoscopy in October 2015 revealed diverticulosis.        MEDICATIONS:  Benazepril 20 mg   Vitamin D 50,000 units once a week  Dyazide once daily        75-year-old female  Routine visit  Overall in general feels well    Which she has noted is that there is some edema or swelling involving the feet and ankle sometimes the leg, more toward the end of the day, about 1-2 months now    Also she sustained previously right radial fracture the required surgery.  She has undergone physical therapy.  But still feels that there is some diminished  in involving her right hand      Review of symptoms  Negative for chest pain, palpitations, shortness of breath, abdominal pain  Regular bowel function  No difficulty urinating  No heartburn ingestion  No headaches  On occasion she may have a numbness along no  Forearm a both arms    Examination  Weight 186  Pulse 72  Blood pressure 126/70  HEENT exam no abnormal findings  Neck no thyromegaly no masses  Chest clear breath sounds  Heart regular rate and rhythm  Abdominal exam active bowel sounds soft nontender no hepatosplenomegaly abdominal masses  Pulses 2+ carotid pulses no bruits 2+  pedal pulses  Extremities trace pedal and pretibial edema  Positive Tinel sign right arm  Hand  both hands feel normal    Impression  General exam  Hypertension  Chronic kidney disease stage 3 was secondary hyperparathyroid  Lower extremity edema which may be due to dependent edema    Plan  She needs to be set up for renal function found on PTH  Discussed about doing the EMG order continue with physical therapy involving the right hand      In regard to her lipid status as well as the edema.  She has been going to the gym which is usually 4 days a week because a COVID for number of months.  She has gained 6 lb.  The other option is here back in physical activity and weight loss  Answers for HPI/ROS submitted by the patient on 12/3/2020   activity change: No  unexpected weight change: No  neck pain: No  hearing loss: No  rhinorrhea: No  trouble swallowing: No  eye discharge: No  visual disturbance: No  chest tightness: No  wheezing: No  chest pain: No  palpitations: No  blood in stool: No  constipation: No  vomiting: No  diarrhea: No  polydipsia: No  polyuria: No  difficulty urinating: No  hematuria: No  menstrual problem: No  dysuria: No  joint swelling: Yes  arthralgias: No  headaches: No  weakness: No  confusion: No  dysphoric mood: No

## 2020-12-05 LAB
BILIRUB UR QL STRIP: NEGATIVE
CLARITY UR REFRACT.AUTO: ABNORMAL
COLOR UR AUTO: YELLOW
CREAT UR-MCNC: 96 MG/DL (ref 15–325)
GLUCOSE UR QL STRIP: NEGATIVE
HGB UR QL STRIP: NEGATIVE
KETONES UR QL STRIP: NEGATIVE
LEUKOCYTE ESTERASE UR QL STRIP: NEGATIVE
NITRITE UR QL STRIP: NEGATIVE
PH UR STRIP: 6 [PH] (ref 5–8)
PROT UR QL STRIP: NEGATIVE
PROT UR-MCNC: 9 MG/DL (ref 0–15)
PROT/CREAT UR: 0.09 MG/G{CREAT} (ref 0–0.2)
SP GR UR STRIP: 1.01 (ref 1–1.03)
URN SPEC COLLECT METH UR: ABNORMAL

## 2020-12-10 ENCOUNTER — PATIENT MESSAGE (OUTPATIENT)
Dept: INTERNAL MEDICINE | Facility: CLINIC | Age: 75
End: 2020-12-10

## 2020-12-10 NOTE — PROGRESS NOTES
Test results from a visit reviewed      There was a slight bump in the cholesterol to 209.  She had gained 6 lb from last visit and discussed importance of diet habits physical activity and weight management      The creatinine is now at 1.4      previous readings last few times have been 1.3, 1.2       not certain if this is a progression of a chronic kidney disease.  She admits that she may not have been drinking enough fluids prior to her visit.    She has a follow-up appointment coming up with Nephrology to discuss this       she does not take nsaids       I wonder if her medications need to be adjusted particularly with benazepril or the diuretic Dyazide

## 2020-12-15 ENCOUNTER — OFFICE VISIT (OUTPATIENT)
Dept: NEPHROLOGY | Facility: CLINIC | Age: 75
End: 2020-12-15
Payer: MEDICARE

## 2020-12-15 VITALS
BODY MASS INDEX: 30.82 KG/M2 | SYSTOLIC BLOOD PRESSURE: 132 MMHG | WEIGHT: 185.19 LBS | OXYGEN SATURATION: 98 % | DIASTOLIC BLOOD PRESSURE: 84 MMHG | HEART RATE: 71 BPM

## 2020-12-15 DIAGNOSIS — N18.30 STAGE 3 CHRONIC KIDNEY DISEASE, UNSPECIFIED WHETHER STAGE 3A OR 3B CKD: Primary | ICD-10-CM

## 2020-12-15 DIAGNOSIS — I10 HYPERTENSION, UNSPECIFIED TYPE: ICD-10-CM

## 2020-12-15 PROCEDURE — 99999 PR PBB SHADOW E&M-EST. PATIENT-LVL III: ICD-10-PCS | Mod: PBBFAC,,, | Performed by: INTERNAL MEDICINE

## 2020-12-15 PROCEDURE — 1159F MED LIST DOCD IN RCRD: CPT | Mod: S$GLB,,, | Performed by: INTERNAL MEDICINE

## 2020-12-15 PROCEDURE — 1157F ADVNC CARE PLAN IN RCRD: CPT | Mod: S$GLB,,, | Performed by: INTERNAL MEDICINE

## 2020-12-15 PROCEDURE — 3079F DIAST BP 80-89 MM HG: CPT | Mod: CPTII,S$GLB,, | Performed by: INTERNAL MEDICINE

## 2020-12-15 PROCEDURE — 1157F PR ADVANCE CARE PLAN OR EQUIV PRESENT IN MEDICAL RECORD: ICD-10-PCS | Mod: S$GLB,,, | Performed by: INTERNAL MEDICINE

## 2020-12-15 PROCEDURE — 3075F SYST BP GE 130 - 139MM HG: CPT | Mod: CPTII,S$GLB,, | Performed by: INTERNAL MEDICINE

## 2020-12-15 PROCEDURE — 99999 PR PBB SHADOW E&M-EST. PATIENT-LVL III: CPT | Mod: PBBFAC,,, | Performed by: INTERNAL MEDICINE

## 2020-12-15 PROCEDURE — 1126F PR PAIN SEVERITY QUANTIFIED, NO PAIN PRESENT: ICD-10-PCS | Mod: S$GLB,,, | Performed by: INTERNAL MEDICINE

## 2020-12-15 PROCEDURE — 99214 PR OFFICE/OUTPT VISIT, EST, LEVL IV, 30-39 MIN: ICD-10-PCS | Mod: S$GLB,,, | Performed by: INTERNAL MEDICINE

## 2020-12-15 PROCEDURE — 3075F PR MOST RECENT SYSTOLIC BLOOD PRESS GE 130-139MM HG: ICD-10-PCS | Mod: CPTII,S$GLB,, | Performed by: INTERNAL MEDICINE

## 2020-12-15 PROCEDURE — 3008F BODY MASS INDEX DOCD: CPT | Mod: CPTII,S$GLB,, | Performed by: INTERNAL MEDICINE

## 2020-12-15 PROCEDURE — 3079F PR MOST RECENT DIASTOLIC BLOOD PRESSURE 80-89 MM HG: ICD-10-PCS | Mod: CPTII,S$GLB,, | Performed by: INTERNAL MEDICINE

## 2020-12-15 PROCEDURE — 99214 OFFICE O/P EST MOD 30 MIN: CPT | Mod: S$GLB,,, | Performed by: INTERNAL MEDICINE

## 2020-12-15 PROCEDURE — 1126F AMNT PAIN NOTED NONE PRSNT: CPT | Mod: S$GLB,,, | Performed by: INTERNAL MEDICINE

## 2020-12-15 PROCEDURE — 3008F PR BODY MASS INDEX (BMI) DOCUMENTED: ICD-10-PCS | Mod: CPTII,S$GLB,, | Performed by: INTERNAL MEDICINE

## 2020-12-15 PROCEDURE — 1159F PR MEDICATION LIST DOCUMENTED IN MEDICAL RECORD: ICD-10-PCS | Mod: S$GLB,,, | Performed by: INTERNAL MEDICINE

## 2020-12-15 NOTE — PROGRESS NOTES
HISTORY OF PRESENT ILLNESS:  This is a 75-year-old -American female with   longstanding history of hypertension, irritable bowel syndrome and CKD stage   III, who is coming in for f/u.  The patient states her blood pressures   are fairly well controlled in the 1teens-130's/70s-80's.   No recent hospitalizations.  Occasional allieve's in the past but none now.    PAST MEDICAL HISTORY:  Significant for hypertension for five years, CKD stage   III, IBS.    REVIEW OF SYSTEMS:  She denies any frequency, urgency, hematuria, dysuria,   nausea, vomiting, chest pain or shortness of breath., diahrrea, N/V. Gained weight 6 lbs in 9 months.    PHYSICAL EXAMINATION:limited  GENERAL:  Well-nourished, well-developed individual, in no acute distress.  EXTREMITIES:  Trace edema in the ankles  PSYCHIATRIC:  Alert and oriented x3.  The patient has good judgment and insight.    ASSESSMENT AND PLAN:  This is a 75-year-old -American female with   longstanding history of hypertension and chronic kidney disease stage III, who   is coming in for f/u.  1.  Chronic kidney disease stage III with an MDRD GFR of 42 mL per minute.  Her   baseline creatinine appears to be around 1.1 to 1.3 and occasionally lower and   higher with the most recent creatinine of 1.4.  Pt not hydrating well and is in the sun 3-4 hrs a day for work.  Hydrate and repeat rfp. Her CKD is likely secondary to   longstanding hypertension and age-related nephron loss.    I advised her to stay away from NSAIDs and to hydrate well.  She understands the importance of hydration.  On  benazepril 20 mg for nephro-protection.  She is also on triamterene and hydrochlorothiazide and discussed low-potassium diet in the past while she was on both, has not had any potassium issues.  We will monitor closely and take her off triamterene if needed if potassium becomes an issue.  2.  Hypertension.  Blood pressures are stable.   She will monitor blood pressures, currently  stable, she   will notify us if they are high.  3.  Renal osteodystrophy.   intact PTH stable .  Ca/phos stable.  Her calciums are borderline high and is not  taking  calcium supplements.  On HCTZ-will con't to monitor. SPEP stable.  4.  Anemia.  Hemoglobin is stable.  5.  She also had a  ultrasound, which was stable with a simple cyst.    RTC in 5 months.

## 2020-12-16 ENCOUNTER — LAB VISIT (OUTPATIENT)
Dept: PRIMARY CARE CLINIC | Facility: OTHER | Age: 75
End: 2020-12-16
Attending: INTERNAL MEDICINE
Payer: MEDICARE

## 2020-12-16 DIAGNOSIS — Z03.818 ENCOUNTER FOR OBSERVATION FOR SUSPECTED EXPOSURE TO OTHER BIOLOGICAL AGENTS RULED OUT: Primary | ICD-10-CM

## 2020-12-16 PROCEDURE — U0003 INFECTIOUS AGENT DETECTION BY NUCLEIC ACID (DNA OR RNA); SEVERE ACUTE RESPIRATORY SYNDROME CORONAVIRUS 2 (SARS-COV-2) (CORONAVIRUS DISEASE [COVID-19]), AMPLIFIED PROBE TECHNIQUE, MAKING USE OF HIGH THROUGHPUT TECHNOLOGIES AS DESCRIBED BY CMS-2020-01-R: HCPCS

## 2020-12-18 LAB — SARS-COV-2 RNA RESP QL NAA+PROBE: NOT DETECTED

## 2020-12-21 ENCOUNTER — LAB VISIT (OUTPATIENT)
Dept: LAB | Facility: HOSPITAL | Age: 75
End: 2020-12-21
Attending: INTERNAL MEDICINE
Payer: MEDICARE

## 2020-12-21 DIAGNOSIS — N18.30 STAGE 3 CHRONIC KIDNEY DISEASE, UNSPECIFIED WHETHER STAGE 3A OR 3B CKD: ICD-10-CM

## 2020-12-21 DIAGNOSIS — I10 HYPERTENSION, UNSPECIFIED TYPE: ICD-10-CM

## 2020-12-21 LAB
ALBUMIN SERPL BCP-MCNC: 3.4 G/DL (ref 3.5–5.2)
ANION GAP SERPL CALC-SCNC: 9 MMOL/L (ref 8–16)
BUN SERPL-MCNC: 17 MG/DL (ref 8–23)
CALCIUM SERPL-MCNC: 9.9 MG/DL (ref 8.7–10.5)
CHLORIDE SERPL-SCNC: 101 MMOL/L (ref 95–110)
CO2 SERPL-SCNC: 30 MMOL/L (ref 23–29)
CREAT SERPL-MCNC: 1.2 MG/DL (ref 0.5–1.4)
EST. GFR  (AFRICAN AMERICAN): 51.4 ML/MIN/1.73 M^2
EST. GFR  (NON AFRICAN AMERICAN): 44.6 ML/MIN/1.73 M^2
GLUCOSE SERPL-MCNC: 87 MG/DL (ref 70–110)
PHOSPHATE SERPL-MCNC: 2.4 MG/DL (ref 2.7–4.5)
POTASSIUM SERPL-SCNC: 4 MMOL/L (ref 3.5–5.1)
SODIUM SERPL-SCNC: 140 MMOL/L (ref 136–145)

## 2020-12-21 PROCEDURE — 80069 RENAL FUNCTION PANEL: CPT

## 2020-12-21 PROCEDURE — 36415 COLL VENOUS BLD VENIPUNCTURE: CPT | Mod: PO

## 2021-03-02 ENCOUNTER — PATIENT MESSAGE (OUTPATIENT)
Dept: INTERNAL MEDICINE | Facility: CLINIC | Age: 76
End: 2021-03-02

## 2021-03-02 DIAGNOSIS — G56.01 CARPAL TUNNEL SYNDROME OF RIGHT WRIST: Primary | ICD-10-CM

## 2021-03-13 DIAGNOSIS — I10 ESSENTIAL HYPERTENSION: ICD-10-CM

## 2021-03-13 RX ORDER — BENAZEPRIL HYDROCHLORIDE 20 MG/1
20 TABLET ORAL DAILY
Qty: 90 TABLET | Refills: 3 | Status: SHIPPED | OUTPATIENT
Start: 2021-03-13 | End: 2022-02-22

## 2021-03-16 ENCOUNTER — TELEPHONE (OUTPATIENT)
Dept: SPORTS MEDICINE | Facility: CLINIC | Age: 76
End: 2021-03-16

## 2021-03-16 ENCOUNTER — OFFICE VISIT (OUTPATIENT)
Dept: ORTHOPEDICS | Facility: CLINIC | Age: 76
End: 2021-03-16
Payer: MEDICARE

## 2021-03-16 VITALS — BODY MASS INDEX: 30.82 KG/M2 | WEIGHT: 185 LBS | HEIGHT: 65 IN

## 2021-03-16 DIAGNOSIS — G56.01 CARPAL TUNNEL SYNDROME OF RIGHT WRIST: ICD-10-CM

## 2021-03-16 PROCEDURE — 1157F PR ADVANCE CARE PLAN OR EQUIV PRESENT IN MEDICAL RECORD: ICD-10-PCS | Mod: S$GLB,,, | Performed by: ORTHOPAEDIC SURGERY

## 2021-03-16 PROCEDURE — 99999 PR PBB SHADOW E&M-EST. PATIENT-LVL III: ICD-10-PCS | Mod: PBBFAC,,, | Performed by: ORTHOPAEDIC SURGERY

## 2021-03-16 PROCEDURE — 99999 PR PBB SHADOW E&M-EST. PATIENT-LVL III: CPT | Mod: PBBFAC,,, | Performed by: ORTHOPAEDIC SURGERY

## 2021-03-16 PROCEDURE — 1125F AMNT PAIN NOTED PAIN PRSNT: CPT | Mod: S$GLB,,, | Performed by: ORTHOPAEDIC SURGERY

## 2021-03-16 PROCEDURE — 1101F PR PT FALLS ASSESS DOC 0-1 FALLS W/OUT INJ PAST YR: ICD-10-PCS | Mod: CPTII,S$GLB,, | Performed by: ORTHOPAEDIC SURGERY

## 2021-03-16 PROCEDURE — 3008F BODY MASS INDEX DOCD: CPT | Mod: CPTII,S$GLB,, | Performed by: ORTHOPAEDIC SURGERY

## 2021-03-16 PROCEDURE — 3008F PR BODY MASS INDEX (BMI) DOCUMENTED: ICD-10-PCS | Mod: CPTII,S$GLB,, | Performed by: ORTHOPAEDIC SURGERY

## 2021-03-16 PROCEDURE — 1157F ADVNC CARE PLAN IN RCRD: CPT | Mod: S$GLB,,, | Performed by: ORTHOPAEDIC SURGERY

## 2021-03-16 PROCEDURE — 1159F PR MEDICATION LIST DOCUMENTED IN MEDICAL RECORD: ICD-10-PCS | Mod: S$GLB,,, | Performed by: ORTHOPAEDIC SURGERY

## 2021-03-16 PROCEDURE — 1101F PT FALLS ASSESS-DOCD LE1/YR: CPT | Mod: CPTII,S$GLB,, | Performed by: ORTHOPAEDIC SURGERY

## 2021-03-16 PROCEDURE — 99213 OFFICE O/P EST LOW 20 MIN: CPT | Mod: S$GLB,,, | Performed by: ORTHOPAEDIC SURGERY

## 2021-03-16 PROCEDURE — 3288F PR FALLS RISK ASSESSMENT DOCUMENTED: ICD-10-PCS | Mod: CPTII,S$GLB,, | Performed by: ORTHOPAEDIC SURGERY

## 2021-03-16 PROCEDURE — 1159F MED LIST DOCD IN RCRD: CPT | Mod: S$GLB,,, | Performed by: ORTHOPAEDIC SURGERY

## 2021-03-16 PROCEDURE — 1125F PR PAIN SEVERITY QUANTIFIED, PAIN PRESENT: ICD-10-PCS | Mod: S$GLB,,, | Performed by: ORTHOPAEDIC SURGERY

## 2021-03-16 PROCEDURE — 3288F FALL RISK ASSESSMENT DOCD: CPT | Mod: CPTII,S$GLB,, | Performed by: ORTHOPAEDIC SURGERY

## 2021-03-16 PROCEDURE — 99213 PR OFFICE/OUTPT VISIT, EST, LEVL III, 20-29 MIN: ICD-10-PCS | Mod: S$GLB,,, | Performed by: ORTHOPAEDIC SURGERY

## 2021-03-19 ENCOUNTER — TELEPHONE (OUTPATIENT)
Dept: ORTHOPEDICS | Facility: CLINIC | Age: 76
End: 2021-03-19

## 2021-03-19 DIAGNOSIS — G56.01 CARPAL TUNNEL SYNDROME OF RIGHT WRIST: ICD-10-CM

## 2021-03-19 DIAGNOSIS — G56.03 BILATERAL CARPAL TUNNEL SYNDROME: Primary | ICD-10-CM

## 2021-03-23 ENCOUNTER — PROCEDURE VISIT (OUTPATIENT)
Dept: NEUROLOGY | Facility: CLINIC | Age: 76
End: 2021-03-23
Payer: MEDICARE

## 2021-03-23 DIAGNOSIS — G56.03 BILATERAL CARPAL TUNNEL SYNDROME: ICD-10-CM

## 2021-03-23 PROCEDURE — 95913 PR NERVE CONDUCTION STUDY; 13 OR MORE STUDIES: ICD-10-PCS | Mod: S$GLB,,, | Performed by: PSYCHIATRY & NEUROLOGY

## 2021-03-23 PROCEDURE — 95886 MUSC TEST DONE W/N TEST COMP: CPT | Mod: S$GLB,,, | Performed by: PSYCHIATRY & NEUROLOGY

## 2021-03-23 PROCEDURE — 95913 NRV CNDJ TEST 13/> STUDIES: CPT | Mod: S$GLB,,, | Performed by: PSYCHIATRY & NEUROLOGY

## 2021-03-23 PROCEDURE — 95886 PR EMG COMPLETE, W/ NERVE CONDUCTION STUDIES, 5+ MUSCLES: ICD-10-PCS | Mod: S$GLB,,, | Performed by: PSYCHIATRY & NEUROLOGY

## 2021-03-29 ENCOUNTER — HOSPITAL ENCOUNTER (OUTPATIENT)
Dept: RADIOLOGY | Facility: HOSPITAL | Age: 76
Discharge: HOME | End: 2021-03-29
Attending: FAMILY MEDICINE
Payer: MEDICARE

## 2021-03-29 ENCOUNTER — OFFICE VISIT (OUTPATIENT)
Dept: SPORTS MEDICINE | Facility: CLINIC | Age: 76
End: 2021-03-29
Payer: MEDICARE

## 2021-03-29 VITALS — HEIGHT: 65 IN | WEIGHT: 185 LBS | BODY MASS INDEX: 30.82 KG/M2 | TEMPERATURE: 97 F

## 2021-03-29 DIAGNOSIS — M25.512 LEFT SHOULDER PAIN, UNSPECIFIED CHRONICITY: ICD-10-CM

## 2021-03-29 DIAGNOSIS — G56.21 NEURITIS OF RIGHT ULNAR NERVE: ICD-10-CM

## 2021-03-29 DIAGNOSIS — M25.532 CHRONIC PAIN OF BOTH WRISTS: ICD-10-CM

## 2021-03-29 DIAGNOSIS — G89.29 CHRONIC PAIN OF BOTH WRISTS: ICD-10-CM

## 2021-03-29 DIAGNOSIS — G89.29 CHRONIC PAIN OF LEFT ELBOW: ICD-10-CM

## 2021-03-29 DIAGNOSIS — M25.531 CHRONIC PAIN OF BOTH WRISTS: ICD-10-CM

## 2021-03-29 DIAGNOSIS — G56.03 BILATERAL CARPAL TUNNEL SYNDROME: Primary | ICD-10-CM

## 2021-03-29 DIAGNOSIS — M25.521 CHRONIC PAIN OF RIGHT ELBOW: ICD-10-CM

## 2021-03-29 DIAGNOSIS — M25.522 CHRONIC PAIN OF LEFT ELBOW: ICD-10-CM

## 2021-03-29 DIAGNOSIS — G56.22 NEURITIS OF LEFT ULNAR NERVE: ICD-10-CM

## 2021-03-29 DIAGNOSIS — G89.29 CHRONIC PAIN OF RIGHT ELBOW: ICD-10-CM

## 2021-03-29 PROCEDURE — 3008F BODY MASS INDEX DOCD: CPT | Mod: CPTII,S$GLB,, | Performed by: FAMILY MEDICINE

## 2021-03-29 PROCEDURE — 1126F PR PAIN SEVERITY QUANTIFIED, NO PAIN PRESENT: ICD-10-PCS | Mod: S$GLB,,, | Performed by: FAMILY MEDICINE

## 2021-03-29 PROCEDURE — 99214 PR OFFICE/OUTPT VISIT, EST, LEVL IV, 30-39 MIN: ICD-10-PCS | Mod: S$GLB,,, | Performed by: FAMILY MEDICINE

## 2021-03-29 PROCEDURE — 99214 OFFICE O/P EST MOD 30 MIN: CPT | Mod: S$GLB,,, | Performed by: FAMILY MEDICINE

## 2021-03-29 PROCEDURE — 1157F ADVNC CARE PLAN IN RCRD: CPT | Mod: S$GLB,,, | Performed by: FAMILY MEDICINE

## 2021-03-29 PROCEDURE — 1126F AMNT PAIN NOTED NONE PRSNT: CPT | Mod: S$GLB,,, | Performed by: FAMILY MEDICINE

## 2021-03-29 PROCEDURE — 73030 XR SHOULDER COMPLETE 2 OR MORE VIEWS LEFT: ICD-10-PCS | Mod: 26,LT,, | Performed by: RADIOLOGY

## 2021-03-29 PROCEDURE — 99999 PR PBB SHADOW E&M-EST. PATIENT-LVL III: ICD-10-PCS | Mod: PBBFAC,,, | Performed by: FAMILY MEDICINE

## 2021-03-29 PROCEDURE — 3288F PR FALLS RISK ASSESSMENT DOCUMENTED: ICD-10-PCS | Mod: CPTII,S$GLB,, | Performed by: FAMILY MEDICINE

## 2021-03-29 PROCEDURE — 73030 X-RAY EXAM OF SHOULDER: CPT | Mod: TC,LT

## 2021-03-29 PROCEDURE — 1157F PR ADVANCE CARE PLAN OR EQUIV PRESENT IN MEDICAL RECORD: ICD-10-PCS | Mod: S$GLB,,, | Performed by: FAMILY MEDICINE

## 2021-03-29 PROCEDURE — 1101F PT FALLS ASSESS-DOCD LE1/YR: CPT | Mod: CPTII,S$GLB,, | Performed by: FAMILY MEDICINE

## 2021-03-29 PROCEDURE — 1101F PR PT FALLS ASSESS DOC 0-1 FALLS W/OUT INJ PAST YR: ICD-10-PCS | Mod: CPTII,S$GLB,, | Performed by: FAMILY MEDICINE

## 2021-03-29 PROCEDURE — 3288F FALL RISK ASSESSMENT DOCD: CPT | Mod: CPTII,S$GLB,, | Performed by: FAMILY MEDICINE

## 2021-03-29 PROCEDURE — 1159F MED LIST DOCD IN RCRD: CPT | Mod: S$GLB,,, | Performed by: FAMILY MEDICINE

## 2021-03-29 PROCEDURE — 73030 X-RAY EXAM OF SHOULDER: CPT | Mod: 26,LT,, | Performed by: RADIOLOGY

## 2021-03-29 PROCEDURE — 99999 PR PBB SHADOW E&M-EST. PATIENT-LVL III: CPT | Mod: PBBFAC,,, | Performed by: FAMILY MEDICINE

## 2021-03-29 PROCEDURE — 3008F PR BODY MASS INDEX (BMI) DOCUMENTED: ICD-10-PCS | Mod: CPTII,S$GLB,, | Performed by: FAMILY MEDICINE

## 2021-03-29 PROCEDURE — 1159F PR MEDICATION LIST DOCUMENTED IN MEDICAL RECORD: ICD-10-PCS | Mod: S$GLB,,, | Performed by: FAMILY MEDICINE

## 2021-03-29 RX ORDER — MELOXICAM 7.5 MG/1
7.5 TABLET ORAL DAILY
Qty: 15 TABLET | Refills: 0 | Status: SHIPPED | OUTPATIENT
Start: 2021-03-29 | End: 2021-06-14

## 2021-03-30 ENCOUNTER — PATIENT MESSAGE (OUTPATIENT)
Dept: SPORTS MEDICINE | Facility: CLINIC | Age: 76
End: 2021-03-30

## 2021-03-31 ENCOUNTER — TELEPHONE (OUTPATIENT)
Dept: SPORTS MEDICINE | Facility: CLINIC | Age: 76
End: 2021-03-31

## 2021-03-31 ENCOUNTER — PATIENT MESSAGE (OUTPATIENT)
Dept: SPORTS MEDICINE | Facility: CLINIC | Age: 76
End: 2021-03-31

## 2021-04-06 ENCOUNTER — OFFICE VISIT (OUTPATIENT)
Dept: ORTHOPEDICS | Facility: CLINIC | Age: 76
End: 2021-04-06
Payer: MEDICARE

## 2021-04-06 ENCOUNTER — PATIENT MESSAGE (OUTPATIENT)
Dept: SPORTS MEDICINE | Facility: CLINIC | Age: 76
End: 2021-04-06

## 2021-04-06 ENCOUNTER — PATIENT MESSAGE (OUTPATIENT)
Dept: ORTHOPEDICS | Facility: CLINIC | Age: 76
End: 2021-04-06

## 2021-04-06 VITALS — WEIGHT: 185 LBS | HEIGHT: 65 IN | BODY MASS INDEX: 30.82 KG/M2

## 2021-04-06 DIAGNOSIS — G56.22 CUBITAL TUNNEL SYNDROME, LEFT: Primary | ICD-10-CM

## 2021-04-06 DIAGNOSIS — G56.01 CARPAL TUNNEL SYNDROME ON RIGHT: ICD-10-CM

## 2021-04-06 DIAGNOSIS — G56.02 CARPAL TUNNEL SYNDROME OF LEFT WRIST: ICD-10-CM

## 2021-04-06 PROCEDURE — 3008F BODY MASS INDEX DOCD: CPT | Mod: CPTII,S$GLB,, | Performed by: ORTHOPAEDIC SURGERY

## 2021-04-06 PROCEDURE — 1159F MED LIST DOCD IN RCRD: CPT | Mod: S$GLB,,, | Performed by: ORTHOPAEDIC SURGERY

## 2021-04-06 PROCEDURE — 99213 PR OFFICE/OUTPT VISIT, EST, LEVL III, 20-29 MIN: ICD-10-PCS | Mod: S$GLB,,, | Performed by: ORTHOPAEDIC SURGERY

## 2021-04-06 PROCEDURE — 3288F FALL RISK ASSESSMENT DOCD: CPT | Mod: CPTII,S$GLB,, | Performed by: ORTHOPAEDIC SURGERY

## 2021-04-06 PROCEDURE — 99999 PR PBB SHADOW E&M-EST. PATIENT-LVL III: CPT | Mod: PBBFAC,,, | Performed by: ORTHOPAEDIC SURGERY

## 2021-04-06 PROCEDURE — 1101F PR PT FALLS ASSESS DOC 0-1 FALLS W/OUT INJ PAST YR: ICD-10-PCS | Mod: CPTII,S$GLB,, | Performed by: ORTHOPAEDIC SURGERY

## 2021-04-06 PROCEDURE — 1157F ADVNC CARE PLAN IN RCRD: CPT | Mod: S$GLB,,, | Performed by: ORTHOPAEDIC SURGERY

## 2021-04-06 PROCEDURE — 1125F AMNT PAIN NOTED PAIN PRSNT: CPT | Mod: S$GLB,,, | Performed by: ORTHOPAEDIC SURGERY

## 2021-04-06 PROCEDURE — 1159F PR MEDICATION LIST DOCUMENTED IN MEDICAL RECORD: ICD-10-PCS | Mod: S$GLB,,, | Performed by: ORTHOPAEDIC SURGERY

## 2021-04-06 PROCEDURE — 1101F PT FALLS ASSESS-DOCD LE1/YR: CPT | Mod: CPTII,S$GLB,, | Performed by: ORTHOPAEDIC SURGERY

## 2021-04-06 PROCEDURE — 3008F PR BODY MASS INDEX (BMI) DOCUMENTED: ICD-10-PCS | Mod: CPTII,S$GLB,, | Performed by: ORTHOPAEDIC SURGERY

## 2021-04-06 PROCEDURE — 99999 PR PBB SHADOW E&M-EST. PATIENT-LVL III: ICD-10-PCS | Mod: PBBFAC,,, | Performed by: ORTHOPAEDIC SURGERY

## 2021-04-06 PROCEDURE — 1125F PR PAIN SEVERITY QUANTIFIED, PAIN PRESENT: ICD-10-PCS | Mod: S$GLB,,, | Performed by: ORTHOPAEDIC SURGERY

## 2021-04-06 PROCEDURE — 99213 OFFICE O/P EST LOW 20 MIN: CPT | Mod: S$GLB,,, | Performed by: ORTHOPAEDIC SURGERY

## 2021-04-06 PROCEDURE — 1157F PR ADVANCE CARE PLAN OR EQUIV PRESENT IN MEDICAL RECORD: ICD-10-PCS | Mod: S$GLB,,, | Performed by: ORTHOPAEDIC SURGERY

## 2021-04-06 PROCEDURE — 3288F PR FALLS RISK ASSESSMENT DOCUMENTED: ICD-10-PCS | Mod: CPTII,S$GLB,, | Performed by: ORTHOPAEDIC SURGERY

## 2021-04-07 ENCOUNTER — TELEPHONE (OUTPATIENT)
Dept: SPORTS MEDICINE | Facility: CLINIC | Age: 76
End: 2021-04-07

## 2021-05-12 DIAGNOSIS — N18.30 STAGE 3 CHRONIC KIDNEY DISEASE, UNSPECIFIED WHETHER STAGE 3A OR 3B CKD: Primary | ICD-10-CM

## 2021-06-07 ENCOUNTER — LAB VISIT (OUTPATIENT)
Dept: LAB | Facility: HOSPITAL | Age: 76
End: 2021-06-07
Attending: INTERNAL MEDICINE
Payer: MEDICARE

## 2021-06-07 DIAGNOSIS — N18.30 STAGE 3 CHRONIC KIDNEY DISEASE, UNSPECIFIED WHETHER STAGE 3A OR 3B CKD: ICD-10-CM

## 2021-06-07 LAB
25(OH)D3+25(OH)D2 SERPL-MCNC: 44 NG/ML (ref 30–96)
HCT VFR BLD AUTO: 39.5 % (ref 37–48.5)
HGB BLD-MCNC: 12.1 G/DL (ref 12–16)

## 2021-06-07 PROCEDURE — 80069 RENAL FUNCTION PANEL: CPT | Performed by: INTERNAL MEDICINE

## 2021-06-07 PROCEDURE — 85014 HEMATOCRIT: CPT | Performed by: INTERNAL MEDICINE

## 2021-06-07 PROCEDURE — 83970 ASSAY OF PARATHORMONE: CPT | Performed by: INTERNAL MEDICINE

## 2021-06-07 PROCEDURE — 82306 VITAMIN D 25 HYDROXY: CPT | Performed by: INTERNAL MEDICINE

## 2021-06-07 PROCEDURE — 85018 HEMOGLOBIN: CPT | Performed by: INTERNAL MEDICINE

## 2021-06-07 PROCEDURE — 36415 COLL VENOUS BLD VENIPUNCTURE: CPT | Mod: PO | Performed by: INTERNAL MEDICINE

## 2021-06-08 LAB
ALBUMIN SERPL BCP-MCNC: 3.4 G/DL (ref 3.5–5.2)
ANION GAP SERPL CALC-SCNC: 9 MMOL/L (ref 8–16)
BUN SERPL-MCNC: 16 MG/DL (ref 8–23)
CALCIUM SERPL-MCNC: 10.4 MG/DL (ref 8.7–10.5)
CHLORIDE SERPL-SCNC: 101 MMOL/L (ref 95–110)
CO2 SERPL-SCNC: 28 MMOL/L (ref 23–29)
CREAT SERPL-MCNC: 1.1 MG/DL (ref 0.5–1.4)
EST. GFR  (AFRICAN AMERICAN): 56.8 ML/MIN/1.73 M^2
EST. GFR  (NON AFRICAN AMERICAN): 49.2 ML/MIN/1.73 M^2
GLUCOSE SERPL-MCNC: 77 MG/DL (ref 70–110)
PHOSPHATE SERPL-MCNC: 3 MG/DL (ref 2.7–4.5)
POTASSIUM SERPL-SCNC: 3.9 MMOL/L (ref 3.5–5.1)
PTH-INTACT SERPL-MCNC: 72 PG/ML (ref 9–77)
PTH-INTACT SERPL-MCNC: 72 PG/ML (ref 9–77)
SODIUM SERPL-SCNC: 138 MMOL/L (ref 136–145)

## 2021-06-09 ENCOUNTER — OFFICE VISIT (OUTPATIENT)
Dept: NEPHROLOGY | Facility: CLINIC | Age: 76
End: 2021-06-09
Payer: MEDICARE

## 2021-06-09 DIAGNOSIS — I10 HYPERTENSION, UNSPECIFIED TYPE: ICD-10-CM

## 2021-06-09 DIAGNOSIS — N18.30 STAGE 3 CHRONIC KIDNEY DISEASE, UNSPECIFIED WHETHER STAGE 3A OR 3B CKD: Primary | ICD-10-CM

## 2021-06-09 PROCEDURE — 99499 RISK ADDL DX/OHS AUDIT: ICD-10-PCS | Mod: 95,,, | Performed by: INTERNAL MEDICINE

## 2021-06-09 PROCEDURE — 99215 OFFICE O/P EST HI 40 MIN: CPT | Mod: 95,,, | Performed by: INTERNAL MEDICINE

## 2021-06-09 PROCEDURE — 1157F ADVNC CARE PLAN IN RCRD: CPT | Mod: 95,,, | Performed by: INTERNAL MEDICINE

## 2021-06-09 PROCEDURE — 1159F PR MEDICATION LIST DOCUMENTED IN MEDICAL RECORD: ICD-10-PCS | Mod: 95,,, | Performed by: INTERNAL MEDICINE

## 2021-06-09 PROCEDURE — 99499 UNLISTED E&M SERVICE: CPT | Mod: 95,,, | Performed by: INTERNAL MEDICINE

## 2021-06-09 PROCEDURE — 1157F PR ADVANCE CARE PLAN OR EQUIV PRESENT IN MEDICAL RECORD: ICD-10-PCS | Mod: 95,,, | Performed by: INTERNAL MEDICINE

## 2021-06-09 PROCEDURE — 99215 PR OFFICE/OUTPT VISIT, EST, LEVL V, 40-54 MIN: ICD-10-PCS | Mod: 95,,, | Performed by: INTERNAL MEDICINE

## 2021-06-09 PROCEDURE — 1159F MED LIST DOCD IN RCRD: CPT | Mod: 95,,, | Performed by: INTERNAL MEDICINE

## 2021-06-13 ENCOUNTER — PATIENT MESSAGE (OUTPATIENT)
Dept: INTERNAL MEDICINE | Facility: CLINIC | Age: 76
End: 2021-06-13

## 2021-06-14 ENCOUNTER — PATIENT MESSAGE (OUTPATIENT)
Dept: INTERNAL MEDICINE | Facility: CLINIC | Age: 76
End: 2021-06-14

## 2021-06-14 RX ORDER — FUROSEMIDE 20 MG/1
20 TABLET ORAL DAILY
Qty: 7 TABLET | Refills: 0 | Status: SHIPPED | OUTPATIENT
Start: 2021-06-14 | End: 2021-06-22

## 2021-06-18 ENCOUNTER — PATIENT MESSAGE (OUTPATIENT)
Dept: INTERNAL MEDICINE | Facility: CLINIC | Age: 76
End: 2021-06-18

## 2021-06-22 ENCOUNTER — OFFICE VISIT (OUTPATIENT)
Dept: INTERNAL MEDICINE | Facility: CLINIC | Age: 76
End: 2021-06-22
Payer: MEDICARE

## 2021-06-22 ENCOUNTER — PATIENT MESSAGE (OUTPATIENT)
Dept: INTERNAL MEDICINE | Facility: CLINIC | Age: 76
End: 2021-06-22

## 2021-06-22 DIAGNOSIS — R60.0 BILATERAL LEG EDEMA: Primary | ICD-10-CM

## 2021-06-22 DIAGNOSIS — N18.31 STAGE 3A CHRONIC KIDNEY DISEASE: ICD-10-CM

## 2021-06-22 DIAGNOSIS — I10 ESSENTIAL HYPERTENSION: ICD-10-CM

## 2021-06-22 PROCEDURE — 99214 OFFICE O/P EST MOD 30 MIN: CPT | Mod: 95,,, | Performed by: INTERNAL MEDICINE

## 2021-06-22 PROCEDURE — 1159F MED LIST DOCD IN RCRD: CPT | Mod: 95,,, | Performed by: INTERNAL MEDICINE

## 2021-06-22 PROCEDURE — 1157F ADVNC CARE PLAN IN RCRD: CPT | Mod: 95,,, | Performed by: INTERNAL MEDICINE

## 2021-06-22 PROCEDURE — 1157F PR ADVANCE CARE PLAN OR EQUIV PRESENT IN MEDICAL RECORD: ICD-10-PCS | Mod: 95,,, | Performed by: INTERNAL MEDICINE

## 2021-06-22 PROCEDURE — 1159F PR MEDICATION LIST DOCUMENTED IN MEDICAL RECORD: ICD-10-PCS | Mod: 95,,, | Performed by: INTERNAL MEDICINE

## 2021-06-22 PROCEDURE — 99214 PR OFFICE/OUTPT VISIT, EST, LEVL IV, 30-39 MIN: ICD-10-PCS | Mod: 95,,, | Performed by: INTERNAL MEDICINE

## 2021-06-22 RX ORDER — FUROSEMIDE 20 MG/1
20 TABLET ORAL DAILY
Qty: 30 TABLET | Refills: 0 | Status: SHIPPED | OUTPATIENT
Start: 2021-06-22 | End: 2021-07-17 | Stop reason: SDUPTHER

## 2021-06-28 ENCOUNTER — TELEPHONE (OUTPATIENT)
Dept: RADIOLOGY | Facility: HOSPITAL | Age: 76
End: 2021-06-28

## 2021-06-29 ENCOUNTER — HOSPITAL ENCOUNTER (OUTPATIENT)
Dept: RADIOLOGY | Facility: HOSPITAL | Age: 76
Discharge: HOME | End: 2021-06-29
Attending: INTERNAL MEDICINE
Payer: MEDICARE

## 2021-06-29 DIAGNOSIS — R60.0 BILATERAL LEG EDEMA: ICD-10-CM

## 2021-06-29 PROCEDURE — 93970 EXTREMITY STUDY: CPT | Mod: 26,,, | Performed by: RADIOLOGY

## 2021-06-29 PROCEDURE — 93970 US LOWER EXTREMITY VEINS BILATERAL: ICD-10-PCS | Mod: 26,,, | Performed by: RADIOLOGY

## 2021-06-29 PROCEDURE — 93970 EXTREMITY STUDY: CPT | Mod: TC

## 2021-06-30 ENCOUNTER — PATIENT MESSAGE (OUTPATIENT)
Dept: INTERNAL MEDICINE | Facility: CLINIC | Age: 76
End: 2021-06-30

## 2021-07-19 ENCOUNTER — PATIENT MESSAGE (OUTPATIENT)
Dept: INTERNAL MEDICINE | Facility: CLINIC | Age: 76
End: 2021-07-19

## 2021-08-02 ENCOUNTER — PATIENT MESSAGE (OUTPATIENT)
Dept: INTERNAL MEDICINE | Facility: CLINIC | Age: 76
End: 2021-08-02

## 2021-09-20 RX ORDER — TRIAMTERENE AND HYDROCHLOROTHIAZIDE 37.5; 25 MG/1; MG/1
CAPSULE ORAL
Qty: 90 CAPSULE | Refills: 3 | Status: SHIPPED | OUTPATIENT
Start: 2021-09-20 | End: 2022-08-25

## 2021-10-11 ENCOUNTER — TELEPHONE (OUTPATIENT)
Dept: NEPHROLOGY | Facility: CLINIC | Age: 76
End: 2021-10-11

## 2021-10-12 ENCOUNTER — TELEPHONE (OUTPATIENT)
Dept: NEPHROLOGY | Facility: CLINIC | Age: 76
End: 2021-10-12

## 2021-10-12 DIAGNOSIS — N18.31 STAGE 3A CHRONIC KIDNEY DISEASE: Primary | ICD-10-CM

## 2021-10-21 ENCOUNTER — PATIENT MESSAGE (OUTPATIENT)
Dept: NEPHROLOGY | Facility: CLINIC | Age: 76
End: 2021-10-21
Payer: MEDICARE

## 2021-10-28 ENCOUNTER — LAB VISIT (OUTPATIENT)
Dept: LAB | Facility: HOSPITAL | Age: 76
End: 2021-10-28
Attending: INTERNAL MEDICINE
Payer: MEDICARE

## 2021-10-28 DIAGNOSIS — N18.31 STAGE 3A CHRONIC KIDNEY DISEASE: ICD-10-CM

## 2021-10-28 LAB
ALBUMIN SERPL BCP-MCNC: 3.5 G/DL (ref 3.5–5.2)
ANION GAP SERPL CALC-SCNC: 8 MMOL/L (ref 8–16)
BUN SERPL-MCNC: 18 MG/DL (ref 8–23)
CALCIUM SERPL-MCNC: 10.8 MG/DL (ref 8.7–10.5)
CHLORIDE SERPL-SCNC: 100 MMOL/L (ref 95–110)
CO2 SERPL-SCNC: 31 MMOL/L (ref 23–29)
CREAT SERPL-MCNC: 1.2 MG/DL (ref 0.5–1.4)
EST. GFR  (AFRICAN AMERICAN): 51.1 ML/MIN/1.73 M^2
EST. GFR  (NON AFRICAN AMERICAN): 44.3 ML/MIN/1.73 M^2
GLUCOSE SERPL-MCNC: 90 MG/DL (ref 70–110)
HCT VFR BLD AUTO: 38.5 % (ref 37–48.5)
HGB BLD-MCNC: 12 G/DL (ref 12–16)
PHOSPHATE SERPL-MCNC: 2.4 MG/DL (ref 2.7–4.5)
POTASSIUM SERPL-SCNC: 3.6 MMOL/L (ref 3.5–5.1)
PTH-INTACT SERPL-MCNC: 73.3 PG/ML (ref 9–77)
SODIUM SERPL-SCNC: 139 MMOL/L (ref 136–145)

## 2021-10-28 PROCEDURE — 36415 COLL VENOUS BLD VENIPUNCTURE: CPT | Mod: PO | Performed by: INTERNAL MEDICINE

## 2021-10-28 PROCEDURE — 85014 HEMATOCRIT: CPT | Performed by: INTERNAL MEDICINE

## 2021-10-28 PROCEDURE — 80069 RENAL FUNCTION PANEL: CPT | Performed by: INTERNAL MEDICINE

## 2021-10-28 PROCEDURE — 83970 ASSAY OF PARATHORMONE: CPT | Performed by: INTERNAL MEDICINE

## 2021-10-28 PROCEDURE — 85018 HEMOGLOBIN: CPT | Performed by: INTERNAL MEDICINE

## 2021-11-01 ENCOUNTER — PATIENT OUTREACH (OUTPATIENT)
Dept: ADMINISTRATIVE | Facility: OTHER | Age: 76
End: 2021-11-01
Payer: MEDICARE

## 2021-11-02 ENCOUNTER — OFFICE VISIT (OUTPATIENT)
Dept: NEPHROLOGY | Facility: CLINIC | Age: 76
End: 2021-11-02
Payer: MEDICARE

## 2021-11-02 DIAGNOSIS — I10 HYPERTENSION, UNSPECIFIED TYPE: ICD-10-CM

## 2021-11-02 DIAGNOSIS — N18.30 STAGE 3 CHRONIC KIDNEY DISEASE, UNSPECIFIED WHETHER STAGE 3A OR 3B CKD: Primary | ICD-10-CM

## 2021-11-02 PROCEDURE — 1157F ADVNC CARE PLAN IN RCRD: CPT | Mod: CPTII,95,, | Performed by: INTERNAL MEDICINE

## 2021-11-02 PROCEDURE — 99214 PR OFFICE/OUTPT VISIT, EST, LEVL IV, 30-39 MIN: ICD-10-PCS | Mod: 95,,, | Performed by: INTERNAL MEDICINE

## 2021-11-02 PROCEDURE — 99214 OFFICE O/P EST MOD 30 MIN: CPT | Mod: 95,,, | Performed by: INTERNAL MEDICINE

## 2021-11-02 PROCEDURE — 3066F PR DOCUMENTATION OF TREATMENT FOR NEPHROPATHY: ICD-10-PCS | Mod: CPTII,95,, | Performed by: INTERNAL MEDICINE

## 2021-11-02 PROCEDURE — 4010F ACE/ARB THERAPY RXD/TAKEN: CPT | Mod: CPTII,95,, | Performed by: INTERNAL MEDICINE

## 2021-11-02 PROCEDURE — 1157F PR ADVANCE CARE PLAN OR EQUIV PRESENT IN MEDICAL RECORD: ICD-10-PCS | Mod: CPTII,95,, | Performed by: INTERNAL MEDICINE

## 2021-11-02 PROCEDURE — 3066F NEPHROPATHY DOC TX: CPT | Mod: CPTII,95,, | Performed by: INTERNAL MEDICINE

## 2021-11-02 PROCEDURE — 99499 UNLISTED E&M SERVICE: CPT | Mod: 95,,, | Performed by: INTERNAL MEDICINE

## 2021-11-02 PROCEDURE — 4010F PR ACE/ARB THEARPY RXD/TAKEN: ICD-10-PCS | Mod: CPTII,95,, | Performed by: INTERNAL MEDICINE

## 2021-11-02 PROCEDURE — 99499 RISK ADDL DX/OHS AUDIT: ICD-10-PCS | Mod: 95,,, | Performed by: INTERNAL MEDICINE

## 2021-11-19 ENCOUNTER — PATIENT MESSAGE (OUTPATIENT)
Dept: INTERNAL MEDICINE | Facility: CLINIC | Age: 76
End: 2021-11-19
Payer: MEDICARE

## 2021-12-13 ENCOUNTER — HOSPITAL ENCOUNTER (OUTPATIENT)
Dept: RADIOLOGY | Facility: HOSPITAL | Age: 76
Discharge: HOME | End: 2021-12-13
Attending: INTERNAL MEDICINE
Payer: MEDICARE

## 2021-12-13 ENCOUNTER — IMMUNIZATION (OUTPATIENT)
Dept: INTERNAL MEDICINE | Facility: CLINIC | Age: 76
End: 2021-12-13
Payer: MEDICARE

## 2021-12-13 DIAGNOSIS — R92.8 ABNORMAL MAMMOGRAM: ICD-10-CM

## 2021-12-13 DIAGNOSIS — Z12.31 BREAST CANCER SCREENING BY MAMMOGRAM: ICD-10-CM

## 2021-12-13 PROCEDURE — G0008 FLU VACCINE - QUADRIVALENT - ADJUVANTED: ICD-10-PCS | Mod: S$GLB,,, | Performed by: FAMILY MEDICINE

## 2021-12-13 PROCEDURE — 77067 MAMMO DIGITAL SCREENING BILAT WITH TOMO: ICD-10-PCS | Mod: 26,,, | Performed by: RADIOLOGY

## 2021-12-13 PROCEDURE — 77063 BREAST TOMOSYNTHESIS BI: CPT | Mod: 26,,, | Performed by: RADIOLOGY

## 2021-12-13 PROCEDURE — 90694 VACC AIIV4 NO PRSRV 0.5ML IM: CPT | Mod: S$GLB,,, | Performed by: FAMILY MEDICINE

## 2021-12-13 PROCEDURE — 77067 SCR MAMMO BI INCL CAD: CPT | Mod: TC

## 2021-12-13 PROCEDURE — 77067 SCR MAMMO BI INCL CAD: CPT | Mod: 26,,, | Performed by: RADIOLOGY

## 2021-12-13 PROCEDURE — 77063 MAMMO DIGITAL SCREENING BILAT WITH TOMO: ICD-10-PCS | Mod: 26,,, | Performed by: RADIOLOGY

## 2021-12-13 PROCEDURE — G0008 ADMIN INFLUENZA VIRUS VAC: HCPCS | Mod: S$GLB,,, | Performed by: FAMILY MEDICINE

## 2021-12-13 PROCEDURE — 90694 FLU VACCINE - QUADRIVALENT - ADJUVANTED: ICD-10-PCS | Mod: S$GLB,,, | Performed by: FAMILY MEDICINE

## 2021-12-15 ENCOUNTER — PATIENT MESSAGE (OUTPATIENT)
Dept: INTERNAL MEDICINE | Facility: CLINIC | Age: 76
End: 2021-12-15
Payer: MEDICARE

## 2022-01-04 ENCOUNTER — PATIENT MESSAGE (OUTPATIENT)
Dept: INTERNAL MEDICINE | Facility: CLINIC | Age: 77
End: 2022-01-04
Payer: MEDICARE

## 2022-01-13 ENCOUNTER — LAB VISIT (OUTPATIENT)
Dept: LAB | Facility: HOSPITAL | Age: 77
End: 2022-01-13
Attending: INTERNAL MEDICINE
Payer: MEDICARE

## 2022-01-13 ENCOUNTER — OFFICE VISIT (OUTPATIENT)
Dept: INTERNAL MEDICINE | Facility: CLINIC | Age: 77
End: 2022-01-13
Payer: MEDICARE

## 2022-01-13 VITALS
HEIGHT: 65 IN | HEART RATE: 76 BPM | DIASTOLIC BLOOD PRESSURE: 88 MMHG | WEIGHT: 185 LBS | BODY MASS INDEX: 30.82 KG/M2 | SYSTOLIC BLOOD PRESSURE: 128 MMHG | OXYGEN SATURATION: 98 %

## 2022-01-13 DIAGNOSIS — R60.0 BILATERAL LEG EDEMA: ICD-10-CM

## 2022-01-13 DIAGNOSIS — I10 ESSENTIAL HYPERTENSION: ICD-10-CM

## 2022-01-13 DIAGNOSIS — R00.0 TACHYCARDIA: ICD-10-CM

## 2022-01-13 DIAGNOSIS — Z00.00 ANNUAL PHYSICAL EXAM: ICD-10-CM

## 2022-01-13 DIAGNOSIS — E04.2 MULTINODULAR THYROID: ICD-10-CM

## 2022-01-13 DIAGNOSIS — R42 VERTIGO: ICD-10-CM

## 2022-01-13 DIAGNOSIS — N18.31 STAGE 3A CHRONIC KIDNEY DISEASE: ICD-10-CM

## 2022-01-13 DIAGNOSIS — Z00.00 ANNUAL PHYSICAL EXAM: Primary | ICD-10-CM

## 2022-01-13 LAB
ALBUMIN SERPL BCP-MCNC: 3.7 G/DL (ref 3.5–5.2)
ALP SERPL-CCNC: 67 U/L (ref 55–135)
ALT SERPL W/O P-5'-P-CCNC: 15 U/L (ref 10–44)
ANION GAP SERPL CALC-SCNC: 12 MMOL/L (ref 8–16)
AST SERPL-CCNC: 22 U/L (ref 10–40)
BASOPHILS # BLD AUTO: 0.04 K/UL (ref 0–0.2)
BASOPHILS NFR BLD: 0.4 % (ref 0–1.9)
BILIRUB SERPL-MCNC: 0.4 MG/DL (ref 0.1–1)
BUN SERPL-MCNC: 22 MG/DL (ref 8–23)
CALCIUM SERPL-MCNC: 11 MG/DL (ref 8.7–10.5)
CHLORIDE SERPL-SCNC: 100 MMOL/L (ref 95–110)
CHOLEST SERPL-MCNC: 199 MG/DL (ref 120–199)
CHOLEST/HDLC SERPL: 3.6 {RATIO} (ref 2–5)
CO2 SERPL-SCNC: 27 MMOL/L (ref 23–29)
CREAT SERPL-MCNC: 1.3 MG/DL (ref 0.5–1.4)
DIFFERENTIAL METHOD: ABNORMAL
EOSINOPHIL # BLD AUTO: 0.2 K/UL (ref 0–0.5)
EOSINOPHIL NFR BLD: 1.9 % (ref 0–8)
ERYTHROCYTE [DISTWIDTH] IN BLOOD BY AUTOMATED COUNT: 16.7 % (ref 11.5–14.5)
EST. GFR  (AFRICAN AMERICAN): 46.4 ML/MIN/1.73 M^2
EST. GFR  (NON AFRICAN AMERICAN): 40.2 ML/MIN/1.73 M^2
GLUCOSE SERPL-MCNC: 80 MG/DL (ref 70–110)
HCT VFR BLD AUTO: 39.9 % (ref 37–48.5)
HDLC SERPL-MCNC: 56 MG/DL (ref 40–75)
HDLC SERPL: 28.1 % (ref 20–50)
HGB BLD-MCNC: 12 G/DL (ref 12–16)
IMM GRANULOCYTES # BLD AUTO: 0.04 K/UL (ref 0–0.04)
IMM GRANULOCYTES NFR BLD AUTO: 0.4 % (ref 0–0.5)
LDLC SERPL CALC-MCNC: 127.2 MG/DL (ref 63–159)
LYMPHOCYTES # BLD AUTO: 3.1 K/UL (ref 1–4.8)
LYMPHOCYTES NFR BLD: 33.6 % (ref 18–48)
MAGNESIUM SERPL-MCNC: 2.1 MG/DL (ref 1.6–2.6)
MCH RBC QN AUTO: 23.5 PG (ref 27–31)
MCHC RBC AUTO-ENTMCNC: 30.1 G/DL (ref 32–36)
MCV RBC AUTO: 78 FL (ref 82–98)
MONOCYTES # BLD AUTO: 1.2 K/UL (ref 0.3–1)
MONOCYTES NFR BLD: 13.1 % (ref 4–15)
NEUTROPHILS # BLD AUTO: 4.6 K/UL (ref 1.8–7.7)
NEUTROPHILS NFR BLD: 50.6 % (ref 38–73)
NONHDLC SERPL-MCNC: 143 MG/DL
NRBC BLD-RTO: 0 /100 WBC
PLATELET # BLD AUTO: 345 K/UL (ref 150–450)
PMV BLD AUTO: 11.4 FL (ref 9.2–12.9)
POTASSIUM SERPL-SCNC: 3.9 MMOL/L (ref 3.5–5.1)
PROT SERPL-MCNC: 7.9 G/DL (ref 6–8.4)
PTH-INTACT SERPL-MCNC: 105.4 PG/ML (ref 9–77)
RBC # BLD AUTO: 5.11 M/UL (ref 4–5.4)
SODIUM SERPL-SCNC: 139 MMOL/L (ref 136–145)
TRIGL SERPL-MCNC: 79 MG/DL (ref 30–150)
TSH SERPL DL<=0.005 MIU/L-ACNC: 2.45 UIU/ML (ref 0.4–4)
WBC # BLD AUTO: 9.08 K/UL (ref 3.9–12.7)

## 2022-01-13 PROCEDURE — 1157F PR ADVANCE CARE PLAN OR EQUIV PRESENT IN MEDICAL RECORD: ICD-10-PCS | Mod: CPTII,S$GLB,, | Performed by: INTERNAL MEDICINE

## 2022-01-13 PROCEDURE — 84443 ASSAY THYROID STIM HORMONE: CPT | Performed by: INTERNAL MEDICINE

## 2022-01-13 PROCEDURE — 3074F SYST BP LT 130 MM HG: CPT | Mod: CPTII,S$GLB,, | Performed by: INTERNAL MEDICINE

## 2022-01-13 PROCEDURE — 80061 LIPID PANEL: CPT | Performed by: INTERNAL MEDICINE

## 2022-01-13 PROCEDURE — 99499 RISK ADDL DX/OHS AUDIT: ICD-10-PCS | Mod: S$GLB,,, | Performed by: INTERNAL MEDICINE

## 2022-01-13 PROCEDURE — 3288F PR FALLS RISK ASSESSMENT DOCUMENTED: ICD-10-PCS | Mod: CPTII,S$GLB,, | Performed by: INTERNAL MEDICINE

## 2022-01-13 PROCEDURE — 1101F PT FALLS ASSESS-DOCD LE1/YR: CPT | Mod: CPTII,S$GLB,, | Performed by: INTERNAL MEDICINE

## 2022-01-13 PROCEDURE — 1101F PR PT FALLS ASSESS DOC 0-1 FALLS W/OUT INJ PAST YR: ICD-10-PCS | Mod: CPTII,S$GLB,, | Performed by: INTERNAL MEDICINE

## 2022-01-13 PROCEDURE — 85025 COMPLETE CBC W/AUTO DIFF WBC: CPT | Performed by: INTERNAL MEDICINE

## 2022-01-13 PROCEDURE — 99999 PR PBB SHADOW E&M-EST. PATIENT-LVL III: ICD-10-PCS | Mod: PBBFAC,,, | Performed by: INTERNAL MEDICINE

## 2022-01-13 PROCEDURE — 3288F FALL RISK ASSESSMENT DOCD: CPT | Mod: CPTII,S$GLB,, | Performed by: INTERNAL MEDICINE

## 2022-01-13 PROCEDURE — 3079F DIAST BP 80-89 MM HG: CPT | Mod: CPTII,S$GLB,, | Performed by: INTERNAL MEDICINE

## 2022-01-13 PROCEDURE — 83735 ASSAY OF MAGNESIUM: CPT | Performed by: INTERNAL MEDICINE

## 2022-01-13 PROCEDURE — 1126F AMNT PAIN NOTED NONE PRSNT: CPT | Mod: CPTII,S$GLB,, | Performed by: INTERNAL MEDICINE

## 2022-01-13 PROCEDURE — 1157F ADVNC CARE PLAN IN RCRD: CPT | Mod: CPTII,S$GLB,, | Performed by: INTERNAL MEDICINE

## 2022-01-13 PROCEDURE — 36415 COLL VENOUS BLD VENIPUNCTURE: CPT | Performed by: INTERNAL MEDICINE

## 2022-01-13 PROCEDURE — 1160F PR REVIEW ALL MEDS BY PRESCRIBER/CLIN PHARMACIST DOCUMENTED: ICD-10-PCS | Mod: CPTII,S$GLB,, | Performed by: INTERNAL MEDICINE

## 2022-01-13 PROCEDURE — 99499 UNLISTED E&M SERVICE: CPT | Mod: S$GLB,,, | Performed by: INTERNAL MEDICINE

## 2022-01-13 PROCEDURE — 1160F RVW MEDS BY RX/DR IN RCRD: CPT | Mod: CPTII,S$GLB,, | Performed by: INTERNAL MEDICINE

## 2022-01-13 PROCEDURE — 1126F PR PAIN SEVERITY QUANTIFIED, NO PAIN PRESENT: ICD-10-PCS | Mod: CPTII,S$GLB,, | Performed by: INTERNAL MEDICINE

## 2022-01-13 PROCEDURE — 3074F PR MOST RECENT SYSTOLIC BLOOD PRESSURE < 130 MM HG: ICD-10-PCS | Mod: CPTII,S$GLB,, | Performed by: INTERNAL MEDICINE

## 2022-01-13 PROCEDURE — 80053 COMPREHEN METABOLIC PANEL: CPT | Performed by: INTERNAL MEDICINE

## 2022-01-13 PROCEDURE — 99214 PR OFFICE/OUTPT VISIT, EST, LEVL IV, 30-39 MIN: ICD-10-PCS | Mod: S$GLB,,, | Performed by: INTERNAL MEDICINE

## 2022-01-13 PROCEDURE — 99214 OFFICE O/P EST MOD 30 MIN: CPT | Mod: S$GLB,,, | Performed by: INTERNAL MEDICINE

## 2022-01-13 PROCEDURE — 83970 ASSAY OF PARATHORMONE: CPT | Performed by: INTERNAL MEDICINE

## 2022-01-13 PROCEDURE — 99999 PR PBB SHADOW E&M-EST. PATIENT-LVL III: CPT | Mod: PBBFAC,,, | Performed by: INTERNAL MEDICINE

## 2022-01-13 PROCEDURE — 3079F PR MOST RECENT DIASTOLIC BLOOD PRESSURE 80-89 MM HG: ICD-10-PCS | Mod: CPTII,S$GLB,, | Performed by: INTERNAL MEDICINE

## 2022-01-13 PROCEDURE — 1159F PR MEDICATION LIST DOCUMENTED IN MEDICAL RECORD: ICD-10-PCS | Mod: CPTII,S$GLB,, | Performed by: INTERNAL MEDICINE

## 2022-01-13 PROCEDURE — 1159F MED LIST DOCD IN RCRD: CPT | Mod: CPTII,S$GLB,, | Performed by: INTERNAL MEDICINE

## 2022-01-13 NOTE — PROGRESS NOTES
MEDICAL HISTORY:  Hypertension.  Irritable bowel syndrome, with with diarrhea  Chronic kidney disease stage III, with secondary hyperparathyroid  Tachycardia  Iron deficiency anemia  Multinodular thyroid gland, on ultrasound  Cholecystectomy.  Hysterectomy.  Bladder suspension.  Lumbar degenerative disk disease.  Gluteal muscular tear.  Right foot surgery.  Resection of lipoma from the groin.  Bout of postcholecystectomy diarrhea.  Prior history of shingles.  Hearing impairment.    SOCIAL HISTORY:  Tobacco and alcohol use - none.  Exercises by doing a treadmill and elliptical.     FAMILY HISTORY:  Very extensive.  Six sisters and seven brothers, thyroid conditions in three sisters, one brother with diabetes, another brother with prostate cancer, but all 13 siblings are living.     SCREENING:  Colonoscopy in October 2015 revealed diverticulosis.           MEDICATIONS:  Benazepril 20 mg   Vitamin D 50,000 units once a week  Dyazide once daily  Lasix 20 mg        76-year-old female  Presents for routine visit      For the past 2 months she has had episodes of dizziness.  It feels like her head is spinning.  It may last 5 seconds.  No associated symptoms such as nausea visual hearing disturbance.  At times it will occur when she is riding in a car, getting out of a car , sitting up moving too fast.  Within the past 2 months it has not progressed as for his being more frequent and more intense    Also she has had couple bouts where she could noted heart beating fast.  It does not feel regular.  It may come on when she feels abdominal discomfort in morning from irritable bowel.  Or when she gets anxious.  It may last for about 5 minutes and has no associated symptoms at the time    Review of symptoms  Negative for chest pain, shortness breath, no difficulty urinating.  When her irritable bowel X up is usually in the morning with cramps with loose stools and depending on what she eats.  No headaches    , heartburn.  With  prolonged activity she may have lower back pain but is relieved with rest and soaking in Epsom salt        Examination  Weight 185 lb  Pulse 72  Blood pressure 128/70  HEENT exam no abnormal findings  Neck no thyromegaly no masses but slight fullness in the left lower thyroid.  Prior ultrasound did reveal multinodular thyroid   Chest clear breath sounds  Heart regular rate rhythm  Abdominal exam nontender soft no hepatosplenomegaly abdominal masses  Pulses 2+ carotid pulses no bruits 2+ pedal pulses  Extremities no edema  Lymph gland palpable adenopathy  Negative straight leg raise    Impression  General examination  Chronic kidney disease stage IIIA  Positional vertigo  Irritable bowel syndrome with diarrhea  Episodic tachycardia  Multinodular thyroid    Plan  Vestibular exercises discussed  Thyroid ultrasound routine labs proper hydration  Use of Metamucil      Answers for HPI/ROS submitted by the patient on 1/13/2022  activity change: No  unexpected weight change: No  neck pain: No  hearing loss: No  rhinorrhea: No  trouble swallowing: No  eye discharge: No  visual disturbance: No  chest tightness: No  wheezing: No  chest pain: No  palpitations: Yes  blood in stool: No  constipation: No  vomiting: No  diarrhea: No  polydipsia: No  polyuria: No  difficulty urinating: No  hematuria: No  menstrual problem: No  dysuria: No  joint swelling: No  arthralgias: No  headaches: No  weakness: No  confusion: No  dysphoric mood: No

## 2022-01-19 ENCOUNTER — HOSPITAL ENCOUNTER (OUTPATIENT)
Dept: RADIOLOGY | Facility: HOSPITAL | Age: 77
Discharge: HOME | End: 2022-01-19
Attending: INTERNAL MEDICINE
Payer: MEDICARE

## 2022-01-19 DIAGNOSIS — E04.2 MULTINODULAR THYROID: ICD-10-CM

## 2022-01-19 PROCEDURE — 76536 US EXAM OF HEAD AND NECK: CPT | Mod: TC

## 2022-01-19 PROCEDURE — 76536 US EXAM OF HEAD AND NECK: CPT | Mod: 26,,, | Performed by: RADIOLOGY

## 2022-01-19 PROCEDURE — 76536 US SOFT TISSUE HEAD NECK THYROID: ICD-10-PCS | Mod: 26,,, | Performed by: RADIOLOGY

## 2022-02-08 ENCOUNTER — DOCUMENTATION ONLY (OUTPATIENT)
Dept: INTERNAL MEDICINE | Facility: CLINIC | Age: 77
End: 2022-02-08
Payer: MEDICARE

## 2022-02-08 NOTE — PROGRESS NOTES
Internal medicine note    The recent labs had been reviewed with the patient.  Because of the calcium, parathyroid hormone, creatinine was recommend put a hold on the triamterene hydrochlorothiazide but after was she was again having edema, therefore the medicine was restarted    In 1 month will make arrangements repeat the labs

## 2022-02-20 DIAGNOSIS — I10 ESSENTIAL HYPERTENSION: ICD-10-CM

## 2022-02-21 ENCOUNTER — PATIENT MESSAGE (OUTPATIENT)
Dept: INTERNAL MEDICINE | Facility: CLINIC | Age: 77
End: 2022-02-21
Payer: MEDICARE

## 2022-02-21 NOTE — TELEPHONE ENCOUNTER
No new care gaps identified.  Powered by [employer]. Reference number: [id number].   2/20/2022 10:52:05 PM CST

## 2022-02-22 RX ORDER — BENAZEPRIL HYDROCHLORIDE 20 MG/1
TABLET ORAL
Qty: 90 TABLET | Refills: 3 | Status: SHIPPED | OUTPATIENT
Start: 2022-02-22 | End: 2023-01-30

## 2022-02-22 NOTE — TELEPHONE ENCOUNTER
Refill Routing Note   Medication(s) are not appropriate for processing by [facility] for the following reason(s):      - Drug-Disease Interaction (CKD (chronic kidney disease), stage III; Stage 3a chronic kidney disease)    [facility] action(s):  Defer Medication-related problems identified: Drug-disease interaction        --->Care Gap information included in message below if applicable.   Medication reconciliation completed: No   Automatic Epic Generated Protocol Data:        Requested Prescriptions   Pending Prescriptions Disp Refills    benazepriL (LOTENSIN) 20 MG tablet [Pharmacy Med Name: Benazepril HCl 20 MG Oral Tablet] 90 tablet 3     Sig: TAKE 1 TABLET BY MOUTH ONCE DAILY       Cardiovascular:  ACE Inhibitors Passed - 2/20/2022 10:51 PM        Passed - Patient is at least 18 years old        Passed - Last BP in normal range within 360 days     BP Readings from Last 1 Encounters:   01/13/22 128/88               Passed - Valid encounter within last 15 months     Recent Visits  Date Type Provider Dept   01/13/22 Office Visit Deniz Redman MD [facility]   06/22/21 Office Visit Deniz Redman MD [facility]   12/04/20 Office Visit Deniz Redman MD [facility]   03/30/20 Office Visit Deniz Redman MD [facility]   Showing recent visits within past 720 days and meeting all other requirements  Future Appointments  No visits were found meeting these conditions.  Showing future appointments within next 150 days and meeting all other requirements                Passed - Cr is 1.39 or below and within 360 days     Lab Results   Component Value Date    CREATININE 1.3 01/13/2022    CREATININE 1.2 10/28/2021    CREATININE 1.3 06/29/2021              Passed - K is 5.2 or below and within 360 days     Potassium   Date Value Ref Range Status   01/13/2022 3.9 3.5 - 5.1 mmol/L Final   10/28/2021 3.6 3.5 - 5.1 mmol/L Final   06/29/2021 3.5 3.5 - 5.1 mmol/L Final               Passed - eGFR within 360 days     Lab Results   Component Value Date    EGFRNONAA 40.2 (A) 01/13/2022    EGFRNONAA 44.3 (A) 10/28/2021    EGFRNONAA 40.2 (A) 06/29/2021                      Appointments  past 12m or future 3m with PCP    Date Provider   Last Visit   1/13/2022 Deniz Redman MD   Next Visit   Visit date not found Deniz Redman MD   ED visits in past 90 days: 0        Note composed:11:24 AM 02/22/2022

## 2022-03-10 ENCOUNTER — PATIENT MESSAGE (OUTPATIENT)
Dept: INTERNAL MEDICINE | Facility: CLINIC | Age: 77
End: 2022-03-10
Payer: MEDICARE

## 2022-03-12 ENCOUNTER — PATIENT OUTREACH (OUTPATIENT)
Dept: ADMINISTRATIVE | Facility: OTHER | Age: 77
End: 2022-03-12
Payer: MEDICARE

## 2022-03-12 NOTE — PROGRESS NOTES
Health Maintenance Due   Topic Date Due    Shingles Vaccine (1 of 2) Never done     Updates were requested from care everywhere.  Chart was reviewed for overdue Proactive [facility] Encounters ([facility]) topics (CRS, Breast Cancer Screening, Eye exam)  Health Maintenance has been updated.  LINKS immunization registry triggered.  Immunizations were reconciled.

## 2022-03-13 ENCOUNTER — PES CALL (OUTPATIENT)
Dept: ADMINISTRATIVE | Facility: OTHER | Age: 77
End: 2022-03-13
Payer: MEDICARE

## 2022-03-14 ENCOUNTER — OFFICE VISIT (OUTPATIENT)
Dept: OPHTHALMOLOGY | Facility: CLINIC | Age: 77
End: 2022-03-14
Payer: MEDICARE

## 2022-03-14 DIAGNOSIS — H25.13 NUCLEAR SCLEROTIC CATARACT OF BOTH EYES: ICD-10-CM

## 2022-03-14 DIAGNOSIS — H43.393 VITREOUS SYNERESIS OF BOTH EYES: ICD-10-CM

## 2022-03-14 DIAGNOSIS — H43.812 POSTERIOR VITREOUS DETACHMENT OF LEFT EYE: Primary | ICD-10-CM

## 2022-03-14 PROCEDURE — 92014 COMPRE OPH EXAM EST PT 1/>: CPT | Mod: S$GLB,,, | Performed by: OPTOMETRIST

## 2022-03-14 PROCEDURE — 1159F PR MEDICATION LIST DOCUMENTED IN MEDICAL RECORD: ICD-10-PCS | Mod: CPTII,S$GLB,, | Performed by: OPTOMETRIST

## 2022-03-14 PROCEDURE — 1157F PR ADVANCE CARE PLAN OR EQUIV PRESENT IN MEDICAL RECORD: ICD-10-PCS | Mod: CPTII,S$GLB,, | Performed by: OPTOMETRIST

## 2022-03-14 PROCEDURE — 99999 PR PBB SHADOW E&M-EST. PATIENT-LVL II: CPT | Mod: PBBFAC,,, | Performed by: OPTOMETRIST

## 2022-03-14 PROCEDURE — 1126F PR PAIN SEVERITY QUANTIFIED, NO PAIN PRESENT: ICD-10-PCS | Mod: CPTII,S$GLB,, | Performed by: OPTOMETRIST

## 2022-03-14 PROCEDURE — 1157F ADVNC CARE PLAN IN RCRD: CPT | Mod: CPTII,S$GLB,, | Performed by: OPTOMETRIST

## 2022-03-14 PROCEDURE — 99999 PR PBB SHADOW E&M-EST. PATIENT-LVL II: ICD-10-PCS | Mod: PBBFAC,,, | Performed by: OPTOMETRIST

## 2022-03-14 PROCEDURE — 1159F MED LIST DOCD IN RCRD: CPT | Mod: CPTII,S$GLB,, | Performed by: OPTOMETRIST

## 2022-03-14 PROCEDURE — 1126F AMNT PAIN NOTED NONE PRSNT: CPT | Mod: CPTII,S$GLB,, | Performed by: OPTOMETRIST

## 2022-03-14 PROCEDURE — 92014 PR EYE EXAM, EST PATIENT,COMPREHESV: ICD-10-PCS | Mod: S$GLB,,, | Performed by: OPTOMETRIST

## 2022-03-14 NOTE — Clinical Note
Can we please get this patient scheduled for 4 week PVD recheck with Dr Sanchez? Thanks so much,  DT

## 2022-03-15 NOTE — PROGRESS NOTES
"HPI     New patient to [facility]   Last eye exam was in 2020    Pt states floaters started March 3rd. Pt c/o "L" shaped black line coming   into her vision OS.   Pt states va has changed. Pt states no pain, no irritation.     Last edited by Hannah Gonzalez MA on 3/14/2022  3:28 PM. (History)            Assessment /Plan     For exam results, see Encounter Report.    Posterior vitreous detachment of left eye  The nature of posterior vitreous detachment was discussed with the patient.  Retina flat today 360 with no holes or tears.   Signs and symptoms of retinal detachment were discussed with patient.   Return to clinic as soon as possible (same day) if you notice any new floaters, flashes of light, curtain/veil over your vision from any direction, or any change in vision.    Nuclear sclerotic cataract of both eyes  Cataracts not significantly affecting activities of daily living and therefore surgery is not indicated at this time.   Will continue to monitor over the next 12 months. Pt to call or RTC with any significant change in vision prior to next visit.     Vitreous syneresis of both eyes  RD precautions reviewed.     RTC 4 weeks with Dr Sanchez for PVD f/u sooner if any changes to vision or worsening symptoms                  "

## 2022-03-17 ENCOUNTER — LAB VISIT (OUTPATIENT)
Dept: LAB | Facility: HOSPITAL | Age: 77
End: 2022-03-17
Attending: INTERNAL MEDICINE
Payer: MEDICARE

## 2022-03-17 DIAGNOSIS — E83.52 HYPERCALCEMIA: ICD-10-CM

## 2022-03-17 DIAGNOSIS — N18.31 STAGE 3A CHRONIC KIDNEY DISEASE: ICD-10-CM

## 2022-03-17 LAB
ALBUMIN SERPL BCP-MCNC: 3.7 G/DL (ref 3.5–5.2)
ANION GAP SERPL CALC-SCNC: 12 MMOL/L (ref 8–16)
BUN SERPL-MCNC: 22 MG/DL (ref 8–23)
CA-I BLDV-SCNC: 1.38 MMOL/L (ref 1.06–1.42)
CALCIUM SERPL-MCNC: 11.2 MG/DL (ref 8.7–10.5)
CHLORIDE SERPL-SCNC: 95 MMOL/L (ref 95–110)
CO2 SERPL-SCNC: 28 MMOL/L (ref 23–29)
CREAT SERPL-MCNC: 1.2 MG/DL (ref 0.5–1.4)
EST. GFR  (AFRICAN AMERICAN): 51.1 ML/MIN/1.73 M^2
EST. GFR  (NON AFRICAN AMERICAN): 44.3 ML/MIN/1.73 M^2
GLUCOSE SERPL-MCNC: 74 MG/DL (ref 70–110)
PHOSPHATE SERPL-MCNC: 3.2 MG/DL (ref 2.7–4.5)
POTASSIUM SERPL-SCNC: 3.3 MMOL/L (ref 3.5–5.1)
PTH-INTACT SERPL-MCNC: 81.5 PG/ML (ref 9–77)
SODIUM SERPL-SCNC: 135 MMOL/L (ref 136–145)

## 2022-03-17 PROCEDURE — 82330 ASSAY OF CALCIUM: CPT | Performed by: INTERNAL MEDICINE

## 2022-03-17 PROCEDURE — 80069 RENAL FUNCTION PANEL: CPT | Performed by: INTERNAL MEDICINE

## 2022-03-17 PROCEDURE — 83970 ASSAY OF PARATHORMONE: CPT | Performed by: INTERNAL MEDICINE

## 2022-03-17 PROCEDURE — 36415 COLL VENOUS BLD VENIPUNCTURE: CPT | Mod: PO | Performed by: INTERNAL MEDICINE

## 2022-03-21 ENCOUNTER — PATIENT MESSAGE (OUTPATIENT)
Dept: INTERNAL MEDICINE | Facility: CLINIC | Age: 77
End: 2022-03-21
Payer: MEDICARE

## 2022-03-21 ENCOUNTER — DOCUMENTATION ONLY (OUTPATIENT)
Dept: INTERNAL MEDICINE | Facility: CLINIC | Age: 77
End: 2022-03-21
Payer: MEDICARE

## 2022-03-21 DIAGNOSIS — E83.52 HYPERCALCEMIA: Primary | ICD-10-CM

## 2022-03-21 DIAGNOSIS — N18.31 STAGE 3A CHRONIC KIDNEY DISEASE: ICD-10-CM

## 2022-03-21 RX ORDER — SPIRONOLACTONE 25 MG/1
25 TABLET ORAL DAILY
Qty: 30 TABLET | Refills: 1 | Status: SHIPPED | OUTPATIENT
Start: 2022-03-21 | End: 2022-08-12

## 2022-03-21 NOTE — PROGRESS NOTES
Recent test results reviewed    It was to be a follow-up on the calcium and PTH, after holding triamterene hydrochlorothiazide.  However after month she had to restart the medicine with her Lasix because of edema    The calcium is still elevated although ionized calcium is normal.  PTH actually went down.  Actually feel that this is related to the hydrochlorothiazide although she can still have secondary elevated PTH due to chronic kidney disease kidney disease stage 3    Recommendation is to hold triamterene hydrochlorothiazide, add Aldactone 25 mg along with Lasix 20 mg, repeat labs 3 months    The other option will be referral to endocrinology

## 2022-03-27 ENCOUNTER — PATIENT MESSAGE (OUTPATIENT)
Dept: INTERNAL MEDICINE | Facility: CLINIC | Age: 77
End: 2022-03-27
Payer: MEDICARE

## 2022-04-06 ENCOUNTER — LAB VISIT (OUTPATIENT)
Dept: LAB | Facility: HOSPITAL | Age: 77
End: 2022-04-06
Attending: INTERNAL MEDICINE
Payer: MEDICARE

## 2022-04-06 DIAGNOSIS — E83.52 HYPERCALCEMIA: ICD-10-CM

## 2022-04-06 DIAGNOSIS — N18.31 STAGE 3A CHRONIC KIDNEY DISEASE: ICD-10-CM

## 2022-04-06 LAB
ALBUMIN SERPL BCP-MCNC: 3.7 G/DL (ref 3.5–5.2)
ANION GAP SERPL CALC-SCNC: 12 MMOL/L (ref 8–16)
BUN SERPL-MCNC: 24 MG/DL (ref 8–23)
CALCIUM SERPL-MCNC: 10.9 MG/DL (ref 8.7–10.5)
CHLORIDE SERPL-SCNC: 96 MMOL/L (ref 95–110)
CO2 SERPL-SCNC: 29 MMOL/L (ref 23–29)
CREAT SERPL-MCNC: 1.3 MG/DL (ref 0.5–1.4)
EST. GFR  (AFRICAN AMERICAN): 46.4 ML/MIN/1.73 M^2
EST. GFR  (NON AFRICAN AMERICAN): 40.2 ML/MIN/1.73 M^2
GLUCOSE SERPL-MCNC: 77 MG/DL (ref 70–110)
PHOSPHATE SERPL-MCNC: 3.4 MG/DL (ref 2.7–4.5)
POTASSIUM SERPL-SCNC: 3.5 MMOL/L (ref 3.5–5.1)
SODIUM SERPL-SCNC: 137 MMOL/L (ref 136–145)

## 2022-04-06 PROCEDURE — 80069 RENAL FUNCTION PANEL: CPT | Performed by: INTERNAL MEDICINE

## 2022-04-06 PROCEDURE — 36415 COLL VENOUS BLD VENIPUNCTURE: CPT | Performed by: INTERNAL MEDICINE

## 2022-04-07 ENCOUNTER — DOCUMENTATION ONLY (OUTPATIENT)
Dept: INTERNAL MEDICINE | Facility: CLINIC | Age: 77
End: 2022-04-07
Payer: MEDICARE

## 2022-04-07 DIAGNOSIS — N18.30 STAGE 3 CHRONIC KIDNEY DISEASE, UNSPECIFIED WHETHER STAGE 3A OR 3B CKD: Primary | ICD-10-CM

## 2022-04-07 NOTE — PROGRESS NOTES
Patient had recent renal function panel      First is noted that she is back to taking Lasix with triamterene hydrochlorothiazide    when she was changed to Lasix with spironolactone, she started having edema in the lower extremities.    It appears Lasix with triamterene hydrochlorothiazide has been the best combination for reducing edema      However is noted that she has developed elevated calcium which I think might be related to the hydrochlorothiazide.  It is stable.  On the previous lab in March the ionized calcium was normal.  PTH was slightly lower but still elevated    For now continue with current medications    It is noted that she has follow-up was visit with Nephrology May 16th and will have a repeat renal function with PTH

## 2022-04-12 ENCOUNTER — PATIENT OUTREACH (OUTPATIENT)
Dept: ADMINISTRATIVE | Facility: OTHER | Age: 77
End: 2022-04-12
Payer: MEDICARE

## 2022-04-12 ENCOUNTER — OFFICE VISIT (OUTPATIENT)
Dept: OPHTHALMOLOGY | Facility: CLINIC | Age: 77
End: 2022-04-12
Payer: MEDICARE

## 2022-04-12 DIAGNOSIS — H43.393 VITREOUS SYNERESIS OF BOTH EYES: ICD-10-CM

## 2022-04-12 DIAGNOSIS — H25.13 NUCLEAR SCLEROTIC CATARACT OF BOTH EYES: ICD-10-CM

## 2022-04-12 DIAGNOSIS — H43.812 POSTERIOR VITREOUS DETACHMENT OF LEFT EYE: Primary | ICD-10-CM

## 2022-04-12 PROCEDURE — 1126F AMNT PAIN NOTED NONE PRSNT: CPT | Mod: CPTII,S$GLB,, | Performed by: OPHTHALMOLOGY

## 2022-04-12 PROCEDURE — 99999 PR PBB SHADOW E&M-EST. PATIENT-LVL II: CPT | Mod: PBBFAC,,, | Performed by: OPHTHALMOLOGY

## 2022-04-12 PROCEDURE — 99499 UNLISTED E&M SERVICE: CPT | Mod: S$GLB,,, | Performed by: OPHTHALMOLOGY

## 2022-04-12 PROCEDURE — 1157F ADVNC CARE PLAN IN RCRD: CPT | Mod: CPTII,S$GLB,, | Performed by: OPHTHALMOLOGY

## 2022-04-12 PROCEDURE — 92134 POSTERIOR SEGMENT OCT RETINA (OCULAR COHERENCE TOMOGRAPHY)-BOTH EYES: ICD-10-PCS | Mod: S$GLB,,, | Performed by: OPHTHALMOLOGY

## 2022-04-12 PROCEDURE — 99499 RISK ADDL DX/OHS AUDIT: ICD-10-PCS | Mod: S$GLB,,, | Performed by: OPHTHALMOLOGY

## 2022-04-12 PROCEDURE — 1126F PR PAIN SEVERITY QUANTIFIED, NO PAIN PRESENT: ICD-10-PCS | Mod: CPTII,S$GLB,, | Performed by: OPHTHALMOLOGY

## 2022-04-12 PROCEDURE — 99203 OFFICE O/P NEW LOW 30 MIN: CPT | Mod: S$GLB,,, | Performed by: OPHTHALMOLOGY

## 2022-04-12 PROCEDURE — 99203 PR OFFICE/OUTPT VISIT, NEW, LEVL III, 30-44 MIN: ICD-10-PCS | Mod: S$GLB,,, | Performed by: OPHTHALMOLOGY

## 2022-04-12 PROCEDURE — 1159F PR MEDICATION LIST DOCUMENTED IN MEDICAL RECORD: ICD-10-PCS | Mod: CPTII,S$GLB,, | Performed by: OPHTHALMOLOGY

## 2022-04-12 PROCEDURE — 1160F RVW MEDS BY RX/DR IN RCRD: CPT | Mod: CPTII,S$GLB,, | Performed by: OPHTHALMOLOGY

## 2022-04-12 PROCEDURE — 1157F PR ADVANCE CARE PLAN OR EQUIV PRESENT IN MEDICAL RECORD: ICD-10-PCS | Mod: CPTII,S$GLB,, | Performed by: OPHTHALMOLOGY

## 2022-04-12 PROCEDURE — 1159F MED LIST DOCD IN RCRD: CPT | Mod: CPTII,S$GLB,, | Performed by: OPHTHALMOLOGY

## 2022-04-12 PROCEDURE — 1160F PR REVIEW ALL MEDS BY PRESCRIBER/CLIN PHARMACIST DOCUMENTED: ICD-10-PCS | Mod: CPTII,S$GLB,, | Performed by: OPHTHALMOLOGY

## 2022-04-12 PROCEDURE — 99999 PR PBB SHADOW E&M-EST. PATIENT-LVL II: ICD-10-PCS | Mod: PBBFAC,,, | Performed by: OPHTHALMOLOGY

## 2022-04-12 PROCEDURE — 92134 CPTRZ OPH DX IMG PST SGM RTA: CPT | Mod: S$GLB,,, | Performed by: OPHTHALMOLOGY

## 2022-04-12 NOTE — PROGRESS NOTES
===============================  Date today is 4/12/2022  Carmen Wang is a 76 y.o. female  Last visit [facility]: :Visit date not found   Last visit eye dept. 3/14/2022    Corrected distance visual acuity was 20/30 in the right eye and 20/30 in the left eye.  Tonometry     Tonometry (Applanation, 1:50 PM)       Right Left    Pressure 16 16              Not recorded       Not recorded       Not recorded       Chief Complaint   Patient presents with    Posterior vitreous detachment of left eye     PVD EVAL PER DR. VALERO     HPI     Posterior vitreous detachment of left eye      Additional comments: PVD EVAL PER DR. VALERO          Last edited by Huma Del Rio on 4/12/2022  1:39 PM. (History)      Problem List Items Addressed This Visit    None     Visit Diagnoses     Posterior vitreous detachment of left eye    -  Primary    Relevant Orders    Posterior Segment OCT Retina-Both eyes (Completed)    Vitreous syneresis of both eyes        Nuclear sclerotic cataract of both eyes              ________________  4/12/2022 today    PVD OS  OCT looks good  No holes or tears seen on exam today  Expect to resolve over the next few weeks  Discussed s/s RD with patient  1-2+ cortical and NS cataract OU-asymptomatic  OD looks good  No glaucoma  No macula degeneration    RTC 1 year with Dr. Valero  Instructed to call 24/7 for any worsening of vision or symptoms. Check OU daily.   Gave my office and cell phone number.    .      ===============================

## 2022-05-16 ENCOUNTER — PATIENT MESSAGE (OUTPATIENT)
Dept: INTERNAL MEDICINE | Facility: CLINIC | Age: 77
End: 2022-05-16
Payer: MEDICARE

## 2022-05-16 ENCOUNTER — LAB VISIT (OUTPATIENT)
Dept: LAB | Facility: HOSPITAL | Age: 77
End: 2022-05-16
Attending: INTERNAL MEDICINE
Payer: MEDICARE

## 2022-05-16 DIAGNOSIS — N18.30 STAGE 3 CHRONIC KIDNEY DISEASE, UNSPECIFIED WHETHER STAGE 3A OR 3B CKD: ICD-10-CM

## 2022-05-16 LAB
ALBUMIN SERPL BCP-MCNC: 3.6 G/DL (ref 3.5–5.2)
ANION GAP SERPL CALC-SCNC: 10 MMOL/L (ref 8–16)
BUN SERPL-MCNC: 20 MG/DL (ref 8–23)
CALCIUM SERPL-MCNC: 10.3 MG/DL (ref 8.7–10.5)
CHLORIDE SERPL-SCNC: 100 MMOL/L (ref 95–110)
CO2 SERPL-SCNC: 29 MMOL/L (ref 23–29)
CREAT SERPL-MCNC: 1.2 MG/DL (ref 0.5–1.4)
EST. GFR  (AFRICAN AMERICAN): 50.7 ML/MIN/1.73 M^2
EST. GFR  (NON AFRICAN AMERICAN): 44 ML/MIN/1.73 M^2
GLUCOSE SERPL-MCNC: 85 MG/DL (ref 70–110)
HCT VFR BLD AUTO: 37.7 % (ref 37–48.5)
HGB BLD-MCNC: 11.3 G/DL (ref 12–16)
PHOSPHATE SERPL-MCNC: 2.9 MG/DL (ref 2.7–4.5)
POTASSIUM SERPL-SCNC: 4.2 MMOL/L (ref 3.5–5.1)
PTH-INTACT SERPL-MCNC: 93.7 PG/ML (ref 9–77)
SODIUM SERPL-SCNC: 139 MMOL/L (ref 136–145)

## 2022-05-16 PROCEDURE — 83970 ASSAY OF PARATHORMONE: CPT | Performed by: INTERNAL MEDICINE

## 2022-05-16 PROCEDURE — 85018 HEMOGLOBIN: CPT | Performed by: INTERNAL MEDICINE

## 2022-05-16 PROCEDURE — 36415 COLL VENOUS BLD VENIPUNCTURE: CPT | Mod: PO | Performed by: INTERNAL MEDICINE

## 2022-05-16 PROCEDURE — 80069 RENAL FUNCTION PANEL: CPT | Performed by: INTERNAL MEDICINE

## 2022-05-16 PROCEDURE — 85014 HEMATOCRIT: CPT | Performed by: INTERNAL MEDICINE

## 2022-05-19 ENCOUNTER — OFFICE VISIT (OUTPATIENT)
Dept: NEPHROLOGY | Facility: CLINIC | Age: 77
End: 2022-05-19
Payer: MEDICARE

## 2022-05-19 ENCOUNTER — PATIENT OUTREACH (OUTPATIENT)
Dept: ADMINISTRATIVE | Facility: OTHER | Age: 77
End: 2022-05-19
Payer: MEDICARE

## 2022-05-19 DIAGNOSIS — I10 HYPERTENSION, UNSPECIFIED TYPE: ICD-10-CM

## 2022-05-19 DIAGNOSIS — N18.30 STAGE 3 CHRONIC KIDNEY DISEASE, UNSPECIFIED WHETHER STAGE 3A OR 3B CKD: Primary | ICD-10-CM

## 2022-05-19 PROCEDURE — 99214 PR OFFICE/OUTPT VISIT, EST, LEVL IV, 30-39 MIN: ICD-10-PCS | Mod: 95,,, | Performed by: INTERNAL MEDICINE

## 2022-05-19 PROCEDURE — 99499 UNLISTED E&M SERVICE: CPT | Mod: 95,,, | Performed by: INTERNAL MEDICINE

## 2022-05-19 PROCEDURE — 99214 OFFICE O/P EST MOD 30 MIN: CPT | Mod: 95,,, | Performed by: INTERNAL MEDICINE

## 2022-05-19 PROCEDURE — 1157F ADVNC CARE PLAN IN RCRD: CPT | Mod: CPTII,95,, | Performed by: INTERNAL MEDICINE

## 2022-05-19 PROCEDURE — 99499 RISK ADDL DX/OHS AUDIT: ICD-10-PCS | Mod: 95,,, | Performed by: INTERNAL MEDICINE

## 2022-05-19 PROCEDURE — 1157F PR ADVANCE CARE PLAN OR EQUIV PRESENT IN MEDICAL RECORD: ICD-10-PCS | Mod: CPTII,95,, | Performed by: INTERNAL MEDICINE

## 2022-05-19 NOTE — PROGRESS NOTES
Health Maintenance Due   Topic Date Due    Shingles Vaccine (1 of 2) Never done    COVID-19 Vaccine (4 - Booster for Moderna series) 03/17/2022     Updates were requested from care everywhere.  Chart was reviewed for overdue Proactive [facility] Encounters (HARDY) topics (CRS, Breast Cancer Screening, Eye exam)  Health Maintenance has been updated.  LINKS immunization registry triggered.  Immunizations were reconciled.

## 2022-05-19 NOTE — PROGRESS NOTES
HISTORY OF PRESENT ILLNESS:  This is a 77-year-old -American female with   longstanding history of hypertension, irritable bowel syndrome and CKD stage   III, who is coming in for f/u.  The patient states her blood pressures   are fairly well controlled in the 1 teens-120's/60's.   No recent hospitalizations.  Denies NSAID's.    PAST MEDICAL HISTORY:  Significant for hypertension for five years, CKD stage   III, IBS.    REVIEW OF SYSTEMS:  She denies any frequency, urgency, hematuria, dysuria,   nausea, vomiting, chest pain or shortness of breath, diahrrea, N/V. Good energy.  Weight stable.    PHYSICAL EXAMINATION:none  GENERAL:  Well-nourished, well-developed individual, in no acute distress.  EXTREMITIES:  Trace edema in the ankles  PSYCHIATRIC:  Alert and oriented x3.  The patient has good judgment and insight.    ASSESSMENT AND PLAN:  This is a 77-year-old -American female with   longstanding history of hypertension and chronic kidney disease stage III, who   is coming in for f/u.  1.  Chronic kidney disease stage III with an MDRD GFR of 44 mL per minute.  Her   baseline creatinine appears to be around 1.1 to 1.3 and occasionally lower and   higher with the most recent creatinine of 1.2.    Hydrate. Her CKD is likely secondary to   longstanding hypertension and age-related nephron loss.    I advised her to stay away from NSAIDs and to hydrate well.  She understands the importance of hydration.  On  benazepril 20 mg for nephro-protection.  She is also on triamterene and hydrochlorothiazide and discussed low-potassium diet in the past while she was on both, has not had any potassium issues.  We will monitor closely and take her off triamterene if needed if potassium becomes an issue. Aldosterone , renin levels stable. Potassium on the lower side and occ alkalosis.   2.  Hypertension.  Blood pressures are stable.   She will monitor blood pressures, currently stable, she   will notify us if they are  high.  3.  Renal osteodystrophy.   intact PTH stable .  Ca borderline high/phos stable.  Her calciums are borderline high and is not  taking  calcium supplements.  On HCTZ-will con't to monitor. SPEP stable.  4.  Anemia.  Hemoglobin is stable.  5.  She also had a  ultrasound, which was stable with a simple cyst.    RTC in 5 months.    The patient location is:home  The chief complaint leading to consultation is: ckd    Visit type: audiovisual    Face to Face time with patient: 15 minutes of total time spent on the encounter, which includes face to face time and non-face to face time preparing to see the patient (eg, review of tests), Obtaining and/or reviewing separately obtained history, Documenting clinical information in the electronic or other health record, Independently interpreting results (not separately reported) and communicating results to the patient/family/caregiver, or Care coordination (not separately reported).         Each patient to whom he or she provides medical services by telemedicine is:  (1) informed of the relationship between the physician and patient and the respective role of any other health care provider with respect to management of the patient; and (2) notified that he or she may decline to receive medical services by telemedicine and may withdraw from such care at any time.

## 2022-07-11 ENCOUNTER — IMMUNIZATION (OUTPATIENT)
Dept: PHARMACY | Facility: CLINIC | Age: 77
End: 2022-07-11
Payer: MEDICARE

## 2022-07-11 DIAGNOSIS — Z23 NEED FOR VACCINATION: Primary | ICD-10-CM

## 2022-08-12 ENCOUNTER — PATIENT MESSAGE (OUTPATIENT)
Dept: INTERNAL MEDICINE | Facility: CLINIC | Age: 77
End: 2022-08-12
Payer: MEDICARE

## 2022-08-18 ENCOUNTER — PES CALL (OUTPATIENT)
Dept: ADMINISTRATIVE | Facility: CLINIC | Age: 77
End: 2022-08-18
Payer: MEDICARE

## 2022-09-09 PROBLEM — Z01.810 PREOP CARDIOVASCULAR EXAM: Status: RESOLVED | Noted: 2019-09-19 | Resolved: 2022-09-09

## 2022-09-13 ENCOUNTER — PES CALL (OUTPATIENT)
Dept: ADMINISTRATIVE | Facility: CLINIC | Age: 77
End: 2022-09-13
Payer: MEDICARE

## 2022-09-15 ENCOUNTER — PES CALL (OUTPATIENT)
Dept: ADMINISTRATIVE | Facility: CLINIC | Age: 77
End: 2022-09-15
Payer: MEDICARE

## 2022-09-21 ENCOUNTER — PATIENT MESSAGE (OUTPATIENT)
Dept: NEPHROLOGY | Facility: CLINIC | Age: 77
End: 2022-09-21
Payer: MEDICARE

## 2022-09-23 ENCOUNTER — PATIENT MESSAGE (OUTPATIENT)
Dept: ADMINISTRATIVE | Facility: CLINIC | Age: 77
End: 2022-09-23
Payer: MEDICARE

## 2022-09-23 ENCOUNTER — TELEPHONE (OUTPATIENT)
Dept: ADMINISTRATIVE | Facility: CLINIC | Age: 77
End: 2022-09-23
Payer: MEDICARE

## 2022-09-23 NOTE — TELEPHONE ENCOUNTER
Called pt; informed pt I was calling to confirm her virtual EAWV on 9/26/22 at 11:00am and to see if she needed any help; pt stated she did not need any help and would complete e-pre check later today; pt informed to login 15 minutes prior to appt time; sent message through portal

## 2022-09-26 ENCOUNTER — TELEPHONE (OUTPATIENT)
Dept: ADMINISTRATIVE | Facility: CLINIC | Age: 77
End: 2022-09-26
Payer: MEDICARE

## 2022-09-26 ENCOUNTER — TELEPHONE (OUTPATIENT)
Dept: NEPHROLOGY | Facility: CLINIC | Age: 77
End: 2022-09-26
Payer: MEDICARE

## 2022-09-26 ENCOUNTER — OFFICE VISIT (OUTPATIENT)
Dept: HOME HEALTH SERVICES | Facility: CLINIC | Age: 77
End: 2022-09-26
Payer: MEDICARE

## 2022-09-26 VITALS — WEIGHT: 186 LBS | BODY MASS INDEX: 30.99 KG/M2 | HEIGHT: 65 IN

## 2022-09-26 DIAGNOSIS — N18.30 STAGE 3 CHRONIC KIDNEY DISEASE, UNSPECIFIED WHETHER STAGE 3A OR 3B CKD: Primary | ICD-10-CM

## 2022-09-26 DIAGNOSIS — Z00.00 ENCOUNTER FOR PREVENTIVE HEALTH EXAMINATION: Primary | ICD-10-CM

## 2022-09-26 DIAGNOSIS — E66.9 OBESITY (BMI 30.0-34.9): ICD-10-CM

## 2022-09-26 DIAGNOSIS — I10 HYPERTENSION, UNSPECIFIED TYPE: ICD-10-CM

## 2022-09-26 DIAGNOSIS — H25.013 CORTICAL SENILE CATARACT OF BOTH EYES: ICD-10-CM

## 2022-09-26 DIAGNOSIS — N18.31 STAGE 3A CHRONIC KIDNEY DISEASE: ICD-10-CM

## 2022-09-26 DIAGNOSIS — N25.81 SECONDARY HYPERPARATHYROIDISM OF RENAL ORIGIN: ICD-10-CM

## 2022-09-26 PROCEDURE — 99499 UNLISTED E&M SERVICE: CPT | Mod: 95,,, | Performed by: NURSE PRACTITIONER

## 2022-09-26 PROCEDURE — 1125F PR PAIN SEVERITY QUANTIFIED, PAIN PRESENT: ICD-10-PCS | Mod: CPTII,95,, | Performed by: NURSE PRACTITIONER

## 2022-09-26 PROCEDURE — 1159F MED LIST DOCD IN RCRD: CPT | Mod: CPTII,95,, | Performed by: NURSE PRACTITIONER

## 2022-09-26 PROCEDURE — 1101F PR PT FALLS ASSESS DOC 0-1 FALLS W/OUT INJ PAST YR: ICD-10-PCS | Mod: CPTII,95,, | Performed by: NURSE PRACTITIONER

## 2022-09-26 PROCEDURE — G0439 PR MEDICARE ANNUAL WELLNESS SUBSEQUENT VISIT: ICD-10-PCS | Mod: 95,,, | Performed by: NURSE PRACTITIONER

## 2022-09-26 PROCEDURE — 1125F AMNT PAIN NOTED PAIN PRSNT: CPT | Mod: CPTII,95,, | Performed by: NURSE PRACTITIONER

## 2022-09-26 PROCEDURE — 1101F PT FALLS ASSESS-DOCD LE1/YR: CPT | Mod: CPTII,95,, | Performed by: NURSE PRACTITIONER

## 2022-09-26 PROCEDURE — 1157F ADVNC CARE PLAN IN RCRD: CPT | Mod: CPTII,95,, | Performed by: NURSE PRACTITIONER

## 2022-09-26 PROCEDURE — 1160F PR REVIEW ALL MEDS BY PRESCRIBER/CLIN PHARMACIST DOCUMENTED: ICD-10-PCS | Mod: CPTII,95,, | Performed by: NURSE PRACTITIONER

## 2022-09-26 PROCEDURE — 1160F RVW MEDS BY RX/DR IN RCRD: CPT | Mod: CPTII,95,, | Performed by: NURSE PRACTITIONER

## 2022-09-26 PROCEDURE — 1157F PR ADVANCE CARE PLAN OR EQUIV PRESENT IN MEDICAL RECORD: ICD-10-PCS | Mod: CPTII,95,, | Performed by: NURSE PRACTITIONER

## 2022-09-26 PROCEDURE — 3288F PR FALLS RISK ASSESSMENT DOCUMENTED: ICD-10-PCS | Mod: CPTII,95,, | Performed by: NURSE PRACTITIONER

## 2022-09-26 PROCEDURE — 99499 RISK ADDL DX/OHS AUDIT: ICD-10-PCS | Mod: 95,,, | Performed by: NURSE PRACTITIONER

## 2022-09-26 PROCEDURE — 1159F PR MEDICATION LIST DOCUMENTED IN MEDICAL RECORD: ICD-10-PCS | Mod: CPTII,95,, | Performed by: NURSE PRACTITIONER

## 2022-09-26 PROCEDURE — 3288F FALL RISK ASSESSMENT DOCD: CPT | Mod: CPTII,95,, | Performed by: NURSE PRACTITIONER

## 2022-09-26 PROCEDURE — G0439 PPPS, SUBSEQ VISIT: HCPCS | Mod: 95,,, | Performed by: NURSE PRACTITIONER

## 2022-09-26 NOTE — PATIENT INSTRUCTIONS
----- Message from Shanna Valdez sent at 11/15/2019  9:30 AM CST -----  ..Type:  Patient Returning Call    Who Called:pt   Who Left Message for Patient:  Does the patient know what this is regarding?: test results   Would the patient rather a call back or a response via [facility]? Call back   Best Call Back Number: [phone]  Additional Information: Pt is requesting a call from nurse to review lab results.   Counseling and Referral of Other Preventative  (Italic type indicates deductible and co-insurance are waived)    Patient Name: Carmen Wang  Today's Date: 9/26/2022    Health Maintenance       Date Due Completion Date    Shingles Vaccine (1 of 2) Never done ---    Influenza Vaccine (1) 09/01/2022 12/13/2021    COVID-19 Vaccine (5 - Booster for Moderna series) 09/05/2022 7/11/2022    DEXA Scan 11/30/2023 11/30/2020    Override on 1/29/2013: Not Clinically Appropriate    TETANUS VACCINE 01/06/2026 1/6/2016    Lipid Panel 01/13/2027 1/13/2022        No orders of the defined types were placed in this encounter.    The following information is provided to all patients.  This information is to help you find resources for any of the problems found today that may be affecting your health:                Living healthy guide: www.[facility].[place].[facility]      Understanding Diabetes: www.diabetes.org      Eating healthy: www.cdc.gov/healthyweight      CDC home safety checklist: www.cdc.gov/steadi/patient.html      Agency on Aging: www.goea.[place].[facility]      Alcoholics anonymous (AA): www.aa.org      Physical Activity: www.raffi.nih.gov/gl8kcvg      Tobacco use: www.quitwithusla.org

## 2022-09-26 NOTE — TELEPHONE ENCOUNTER
----- Message from Isis Calderon NP sent at 9/26/2022 11:12 AM CDT -----  Hi, can someone in office give MsShalom Kathleen a phone call to assist with scheduling an appointment with Dr. Hoang. Thanks!

## 2022-09-26 NOTE — PROGRESS NOTES
"The patient location is: [place]  The chief complaint leading to consultation is: Health Risk Assessment    Visit type: audiovisual    Face to Face time with patient: 20  35 minutes of total time spent on the encounter, which includes face to face time and non-face to face time preparing to see the patient (eg, review of tests), Obtaining and/or reviewing separately obtained history, Documenting clinical information in the electronic or other health record, Independently interpreting results (not separately reported) and communicating results to the patient/family/caregiver, or Care coordination (not separately reported).         Each patient to whom he or she provides medical services by telemedicine is:  (1) informed of the relationship between the physician and patient and the respective role of any other health care provider with respect to management of the patient; and (2) notified that he or she may decline to receive medical services by telemedicine and may withdraw from such care at any time.    Notes:       Carmen Wang presented for a  Medicare AWV and comprehensive Health Risk Assessment today. The following components were reviewed and updated:    Medical history  Family History  Social history  Allergies and Current Medications  Health Risk Assessment  Health Maintenance  Care Team         ** See Completed Assessments for Annual Wellness Visit within the encounter summary.**         The following assessments were completed:  Living Situation  CAGE  Depression Screening  Fall Risk Assessment (MACH 10)  Hearing Assessment(HHI)  Cognitive Function Screening  Nutrition Screening  ADL Screening  PAQ Screening      Vitals:    09/26/22 1057   Weight: 84.4 kg (186 lb)   Height: 5' 5" (1.651 m)     Body mass index is 30.95 kg/m².  Physical Exam  Constitutional:       Appearance: She is obese.   Neurological:      General: No focal deficit present.      Mental Status: She is alert and oriented to person, " place, and time.             Diagnoses and health risks identified today and associated recommendations/orders:    1. Encounter for preventive health examination  Assessments completed. Preventive measures and health maintenance reviewed with patient.  Review for Opioid screening: Patient does not have any prescriptions for Opioids.  Review for Substance use disorder: Patient does not use any substances.    2. Stage 3a chronic kidney disease  Stable, followed by Nephrology.    3. Secondary hyperparathyroidism of renal origin  Stable, followed by Nephrology.    4. Hypertension, unspecified type  Stable, patient on Lotensin, Lasix, and Dyazide. Followed by PCP.    5. Cortical senile cataract of both eyes  Stable, followed by Optometry.    6. Obesity (BMI 30.0-34.9)  Stable, followed by PCP. Healthy diet and exercise encouraged.      Provided Carmen with a 5-10 year written screening schedule and personal prevention plan. Recommendations were developed using the USPSTF age appropriate recommendations. Education, counseling, and referrals were provided as needed. After Visit Summary printed and given to patient which includes a list of additional screenings\tests needed.    Follow up in about 1 year (around 9/26/2023) for your next annual wellness visit.    Isis Calderon NP  I offered to discuss advanced care planning, including how to pick a person who would make decisions for you if you were unable to make them for yourself, called a health care power of , and what kind of decisions you might make such as use of life sustaining treatments such as ventilators and tube feeding when faced with a life limiting illness recorded on a living will that they will need to know. (How you want to be cared for as you near the end of your natural life)     X  Patient has advanced directives on file, which we reviewed, and they do not wish to make changes.

## 2022-09-27 PROBLEM — E66.9 OBESITY (BMI 30.0-34.9): Status: ACTIVE | Noted: 2022-09-27

## 2022-09-27 PROBLEM — E66.811 OBESITY (BMI 30.0-34.9): Status: ACTIVE | Noted: 2022-09-27

## 2022-10-12 ENCOUNTER — LAB VISIT (OUTPATIENT)
Dept: LAB | Facility: HOSPITAL | Age: 77
End: 2022-10-12
Attending: INTERNAL MEDICINE
Payer: MEDICARE

## 2022-10-12 ENCOUNTER — IMMUNIZATION (OUTPATIENT)
Dept: PRIMARY CARE CLINIC | Facility: CLINIC | Age: 77
End: 2022-10-12
Payer: MEDICARE

## 2022-10-12 DIAGNOSIS — N18.30 STAGE 3 CHRONIC KIDNEY DISEASE, UNSPECIFIED WHETHER STAGE 3A OR 3B CKD: ICD-10-CM

## 2022-10-12 LAB
ALBUMIN SERPL BCP-MCNC: 3.6 G/DL (ref 3.5–5.2)
ANION GAP SERPL CALC-SCNC: 8 MMOL/L (ref 8–16)
BUN SERPL-MCNC: 19 MG/DL (ref 8–23)
CALCIUM SERPL-MCNC: 10.5 MG/DL (ref 8.7–10.5)
CHLORIDE SERPL-SCNC: 101 MMOL/L (ref 95–110)
CO2 SERPL-SCNC: 31 MMOL/L (ref 23–29)
CREAT SERPL-MCNC: 1.3 MG/DL (ref 0.5–1.4)
EST. GFR  (NO RACE VARIABLE): 42.6 ML/MIN/1.73 M^2
GLUCOSE SERPL-MCNC: 80 MG/DL (ref 70–110)
HCT VFR BLD AUTO: 36.9 % (ref 37–48.5)
HGB BLD-MCNC: 11.1 G/DL (ref 12–16)
PHOSPHATE SERPL-MCNC: 2.7 MG/DL (ref 2.7–4.5)
POTASSIUM SERPL-SCNC: 3.6 MMOL/L (ref 3.5–5.1)
PTH-INTACT SERPL-MCNC: 70.6 PG/ML (ref 9–77)
SODIUM SERPL-SCNC: 140 MMOL/L (ref 136–145)

## 2022-10-12 PROCEDURE — 80069 RENAL FUNCTION PANEL: CPT | Performed by: INTERNAL MEDICINE

## 2022-10-12 PROCEDURE — G0008 FLU VACCINE - QUADRIVALENT - ADJUVANTED: ICD-10-PCS | Mod: S$GLB,,, | Performed by: FAMILY MEDICINE

## 2022-10-12 PROCEDURE — 83970 ASSAY OF PARATHORMONE: CPT | Performed by: INTERNAL MEDICINE

## 2022-10-12 PROCEDURE — 85018 HEMOGLOBIN: CPT | Performed by: INTERNAL MEDICINE

## 2022-10-12 PROCEDURE — 90694 VACC AIIV4 NO PRSRV 0.5ML IM: CPT | Mod: S$GLB,,, | Performed by: FAMILY MEDICINE

## 2022-10-12 PROCEDURE — 85014 HEMATOCRIT: CPT | Performed by: INTERNAL MEDICINE

## 2022-10-12 PROCEDURE — 36415 COLL VENOUS BLD VENIPUNCTURE: CPT | Mod: PN | Performed by: INTERNAL MEDICINE

## 2022-10-12 PROCEDURE — 90694 FLU VACCINE - QUADRIVALENT - ADJUVANTED: ICD-10-PCS | Mod: S$GLB,,, | Performed by: FAMILY MEDICINE

## 2022-10-12 PROCEDURE — G0008 ADMIN INFLUENZA VIRUS VAC: HCPCS | Mod: S$GLB,,, | Performed by: FAMILY MEDICINE

## 2022-11-02 DIAGNOSIS — N18.30 STAGE 3 CHRONIC KIDNEY DISEASE, UNSPECIFIED WHETHER STAGE 3A OR 3B CKD: Primary | ICD-10-CM

## 2022-11-08 ENCOUNTER — OFFICE VISIT (OUTPATIENT)
Dept: URGENT CARE | Facility: CLINIC | Age: 77
End: 2022-11-08
Payer: MEDICARE

## 2022-11-08 VITALS
TEMPERATURE: 98 F | WEIGHT: 186 LBS | SYSTOLIC BLOOD PRESSURE: 167 MMHG | DIASTOLIC BLOOD PRESSURE: 77 MMHG | RESPIRATION RATE: 18 BRPM | OXYGEN SATURATION: 100 % | HEART RATE: 100 BPM | HEIGHT: 66 IN | BODY MASS INDEX: 29.89 KG/M2

## 2022-11-08 DIAGNOSIS — M54.42 ACUTE BILATERAL LOW BACK PAIN WITH BILATERAL SCIATICA: Primary | ICD-10-CM

## 2022-11-08 DIAGNOSIS — M54.41 ACUTE BILATERAL LOW BACK PAIN WITH BILATERAL SCIATICA: Primary | ICD-10-CM

## 2022-11-08 LAB
BILIRUB UR QL STRIP: NEGATIVE
GLUCOSE UR QL STRIP: NEGATIVE
KETONES UR QL STRIP: NEGATIVE
LEUKOCYTE ESTERASE UR QL STRIP: NEGATIVE
PH, POC UA: 5.5
POC BLOOD, URINE: NEGATIVE
POC NITRATES, URINE: NEGATIVE
PROT UR QL STRIP: NEGATIVE
SP GR UR STRIP: 1.02 (ref 1–1.03)
UROBILINOGEN UR STRIP-ACNC: NORMAL (ref 0.1–1.1)

## 2022-11-08 PROCEDURE — 3078F DIAST BP <80 MM HG: CPT | Mod: CPTII,S$GLB,, | Performed by: NURSE PRACTITIONER

## 2022-11-08 PROCEDURE — 1160F PR REVIEW ALL MEDS BY PRESCRIBER/CLIN PHARMACIST DOCUMENTED: ICD-10-PCS | Mod: CPTII,S$GLB,, | Performed by: NURSE PRACTITIONER

## 2022-11-08 PROCEDURE — 81003 POCT URINALYSIS, DIPSTICK, AUTOMATED, W/O SCOPE: ICD-10-PCS | Mod: QW,S$GLB,, | Performed by: NURSE PRACTITIONER

## 2022-11-08 PROCEDURE — 99213 OFFICE O/P EST LOW 20 MIN: CPT | Mod: S$GLB,,, | Performed by: NURSE PRACTITIONER

## 2022-11-08 PROCEDURE — 1125F AMNT PAIN NOTED PAIN PRSNT: CPT | Mod: CPTII,S$GLB,, | Performed by: NURSE PRACTITIONER

## 2022-11-08 PROCEDURE — 1157F PR ADVANCE CARE PLAN OR EQUIV PRESENT IN MEDICAL RECORD: ICD-10-PCS | Mod: CPTII,S$GLB,, | Performed by: NURSE PRACTITIONER

## 2022-11-08 PROCEDURE — 3077F SYST BP >= 140 MM HG: CPT | Mod: CPTII,S$GLB,, | Performed by: NURSE PRACTITIONER

## 2022-11-08 PROCEDURE — 81003 URINALYSIS AUTO W/O SCOPE: CPT | Mod: QW,S$GLB,, | Performed by: NURSE PRACTITIONER

## 2022-11-08 PROCEDURE — 1159F PR MEDICATION LIST DOCUMENTED IN MEDICAL RECORD: ICD-10-PCS | Mod: CPTII,S$GLB,, | Performed by: NURSE PRACTITIONER

## 2022-11-08 PROCEDURE — 3078F PR MOST RECENT DIASTOLIC BLOOD PRESSURE < 80 MM HG: ICD-10-PCS | Mod: CPTII,S$GLB,, | Performed by: NURSE PRACTITIONER

## 2022-11-08 PROCEDURE — 1157F ADVNC CARE PLAN IN RCRD: CPT | Mod: CPTII,S$GLB,, | Performed by: NURSE PRACTITIONER

## 2022-11-08 PROCEDURE — 1160F RVW MEDS BY RX/DR IN RCRD: CPT | Mod: CPTII,S$GLB,, | Performed by: NURSE PRACTITIONER

## 2022-11-08 PROCEDURE — 1159F MED LIST DOCD IN RCRD: CPT | Mod: CPTII,S$GLB,, | Performed by: NURSE PRACTITIONER

## 2022-11-08 PROCEDURE — 1125F PR PAIN SEVERITY QUANTIFIED, PAIN PRESENT: ICD-10-PCS | Mod: CPTII,S$GLB,, | Performed by: NURSE PRACTITIONER

## 2022-11-08 PROCEDURE — 99213 PR OFFICE/OUTPT VISIT, EST, LEVL III, 20-29 MIN: ICD-10-PCS | Mod: S$GLB,,, | Performed by: NURSE PRACTITIONER

## 2022-11-08 PROCEDURE — 3077F PR MOST RECENT SYSTOLIC BLOOD PRESSURE >= 140 MM HG: ICD-10-PCS | Mod: CPTII,S$GLB,, | Performed by: NURSE PRACTITIONER

## 2022-11-08 RX ORDER — PREDNISONE 20 MG/1
20 TABLET ORAL 2 TIMES DAILY
Qty: 6 TABLET | Refills: 0 | Status: SHIPPED | OUTPATIENT
Start: 2022-11-08 | End: 2022-11-11

## 2022-11-08 NOTE — PATIENT INSTRUCTIONS
Use heat/ice to area for 20 minutes 3-4 times a day for comfort.  No heavy lifting.  Take tylenol as needed for pain.  Apply analgesic rubs or patches such as biofreeze or Tiger Balm.  Go to ER if you have fever of 103 or higher, bowel/bladder incontinence, numbness, tingling, or weakness to lower extremities, or if your symptoms worsen in any way.    Follow up with ortho/PCP within 2 weeks if no improvement.

## 2022-11-08 NOTE — PROGRESS NOTES
"Subjective:       Patient ID: Carmen Wang is a 77 y.o. female.    Vitals:  height is 5' 6" (1.676 m) and weight is 84.4 kg (186 lb). Her tympanic temperature is 97.6 °F (36.4 °C). Her blood pressure is 167/77 (abnormal) and her pulse is 100. Her respiration is 18 and oxygen saturation is 100%.     Chief Complaint: Back Pain    Pt c/o lower back pain radiating into buttocks and down bilateral legs starting Thursday/Friday. Pt states pain got worse on Sunday.  Hx of sciatic nerve pain.     Back Pain  This is a new problem. The current episode started in the past 7 days. The problem occurs constantly. The problem has been gradually worsening since onset. The pain is present in the lumbar spine and sacro-iliac. The quality of the pain is described as shooting and stabbing (pounding, stinging). The pain radiates to the right thigh, left thigh, left knee, right knee, left foot and right foot. The pain is at a severity of 6/10. The pain is moderate. The symptoms are aggravated by standing, sitting and lying down (walking). Associated symptoms include pelvic pain. Pertinent negatives include no abdominal pain, bladder incontinence, bowel incontinence, chest pain, dysuria, fever, headaches, leg pain, numbness, paresis, paresthesias, perianal numbness, tingling, weakness or weight loss. She has tried heat (two tylenol, epsom salt) for the symptoms. The treatment provided no relief.     Constitution: Negative for fever.   Cardiovascular:  Negative for chest pain.   Gastrointestinal:  Negative for abdominal pain and bowel incontinence.   Genitourinary:  Positive for pelvic pain. Negative for dysuria and bladder incontinence.   Musculoskeletal:  Positive for back pain.   Neurological:  Negative for headaches and numbness.     Objective:      Physical Exam   Constitutional: She is oriented to person, place, and time. She appears well-developed. She is cooperative. No distress.   HENT:   Head: Normocephalic and atraumatic. "   Nose: Nose normal.   Mouth/Throat: Oropharynx is clear and moist and mucous membranes are normal.   Eyes: Conjunctivae and lids are normal.   Neck: Trachea normal and phonation normal. Neck supple.   Cardiovascular: Normal rate, regular rhythm, normal heart sounds and normal pulses.   Pulmonary/Chest: Effort normal and breath sounds normal. She has no decreased breath sounds. She has no wheezes.   Abdominal: Normal appearance.   Musculoskeletal:         General: No deformity.        Back:    Neurological: She is alert and oriented to person, place, and time. She has normal strength and normal reflexes. No sensory deficit.   Skin: Skin is warm, dry, intact and not diaphoretic.   Psychiatric: Her speech is normal and behavior is normal. Judgment and thought content normal.   Nursing note and vitals reviewed.      Assessment:       1. Acute bilateral low back pain with bilateral sciatica            Plan:         Acute bilateral low back pain with bilateral sciatica  -     POCT Urinalysis, Dipstick, Automated, W/O Scope  -     predniSONE (DELTASONE) 20 MG tablet; Take 1 tablet (20 mg total) by mouth 2 (two) times daily. for 3 days  Dispense: 6 tablet; Refill: 0               Results for orders placed or performed in visit on 11/08/22   POCT Urinalysis, Dipstick, Automated, W/O Scope   Result Value Ref Range    POC Blood, Urine Negative Negative    POC Bilirubin, Urine Negative Negative    POC Urobilinogen, Urine normal 0.1 - 1.1    POC Ketones, Urine Negative Negative    POC Protein, Urine Negative Negative    POC Nitrates, Urine Negative Negative    POC Glucose, Urine Negative Negative    pH, UA 5.5     POC Specific Gravity, Urine 1.020 1.003 - 1.029    POC Leukocytes, Urine Negative Negative     Lab result reviewed and discussed with patient.      MDM: Chart reviewed. Pt has CKD.    Use heat/ice to area for 20 minutes 3-4 times a day for comfort.  No heavy lifting.  Take tylenol as needed for pain.  Apply analgesic  rubs or patches such as biofreeze or Tiger Balm.  Go to ER if you have fever of 103 or higher, bowel/bladder incontinence, numbness, tingling, or weakness to lower extremities, or if your symptoms worsen in any way.    Follow up with ortho/PCP within 2 weeks if no improvement.

## 2022-11-11 ENCOUNTER — PATIENT MESSAGE (OUTPATIENT)
Dept: INTERNAL MEDICINE | Facility: CLINIC | Age: 77
End: 2022-11-11
Payer: MEDICARE

## 2022-11-13 ENCOUNTER — TELEPHONE (OUTPATIENT)
Dept: INTERNAL MEDICINE | Facility: CLINIC | Age: 77
End: 2022-11-13
Payer: MEDICARE

## 2022-11-14 ENCOUNTER — OFFICE VISIT (OUTPATIENT)
Dept: INTERNAL MEDICINE | Facility: CLINIC | Age: 77
End: 2022-11-14
Payer: MEDICARE

## 2022-11-14 VITALS
BODY MASS INDEX: 29.94 KG/M2 | WEIGHT: 186.31 LBS | HEART RATE: 99 BPM | DIASTOLIC BLOOD PRESSURE: 60 MMHG | SYSTOLIC BLOOD PRESSURE: 114 MMHG | OXYGEN SATURATION: 97 % | HEIGHT: 66 IN

## 2022-11-14 DIAGNOSIS — M47.27 LUMBOSACRAL SPONDYLOSIS WITH RADICULOPATHY: Primary | ICD-10-CM

## 2022-11-14 PROCEDURE — 1125F AMNT PAIN NOTED PAIN PRSNT: CPT | Mod: CPTII,S$GLB,, | Performed by: INTERNAL MEDICINE

## 2022-11-14 PROCEDURE — 1125F PR PAIN SEVERITY QUANTIFIED, PAIN PRESENT: ICD-10-PCS | Mod: CPTII,S$GLB,, | Performed by: INTERNAL MEDICINE

## 2022-11-14 PROCEDURE — 3074F PR MOST RECENT SYSTOLIC BLOOD PRESSURE < 130 MM HG: ICD-10-PCS | Mod: CPTII,S$GLB,, | Performed by: INTERNAL MEDICINE

## 2022-11-14 PROCEDURE — 3074F SYST BP LT 130 MM HG: CPT | Mod: CPTII,S$GLB,, | Performed by: INTERNAL MEDICINE

## 2022-11-14 PROCEDURE — 99999 PR PBB SHADOW E&M-EST. PATIENT-LVL III: ICD-10-PCS | Mod: PBBFAC,,, | Performed by: INTERNAL MEDICINE

## 2022-11-14 PROCEDURE — 1157F ADVNC CARE PLAN IN RCRD: CPT | Mod: CPTII,S$GLB,, | Performed by: INTERNAL MEDICINE

## 2022-11-14 PROCEDURE — 99214 OFFICE O/P EST MOD 30 MIN: CPT | Mod: S$GLB,,, | Performed by: INTERNAL MEDICINE

## 2022-11-14 PROCEDURE — 1101F PT FALLS ASSESS-DOCD LE1/YR: CPT | Mod: CPTII,S$GLB,, | Performed by: INTERNAL MEDICINE

## 2022-11-14 PROCEDURE — 99214 PR OFFICE/OUTPT VISIT, EST, LEVL IV, 30-39 MIN: ICD-10-PCS | Mod: S$GLB,,, | Performed by: INTERNAL MEDICINE

## 2022-11-14 PROCEDURE — 1160F RVW MEDS BY RX/DR IN RCRD: CPT | Mod: CPTII,S$GLB,, | Performed by: INTERNAL MEDICINE

## 2022-11-14 PROCEDURE — 1157F PR ADVANCE CARE PLAN OR EQUIV PRESENT IN MEDICAL RECORD: ICD-10-PCS | Mod: CPTII,S$GLB,, | Performed by: INTERNAL MEDICINE

## 2022-11-14 PROCEDURE — 1160F PR REVIEW ALL MEDS BY PRESCRIBER/CLIN PHARMACIST DOCUMENTED: ICD-10-PCS | Mod: CPTII,S$GLB,, | Performed by: INTERNAL MEDICINE

## 2022-11-14 PROCEDURE — 3288F PR FALLS RISK ASSESSMENT DOCUMENTED: ICD-10-PCS | Mod: CPTII,S$GLB,, | Performed by: INTERNAL MEDICINE

## 2022-11-14 PROCEDURE — 1159F MED LIST DOCD IN RCRD: CPT | Mod: CPTII,S$GLB,, | Performed by: INTERNAL MEDICINE

## 2022-11-14 PROCEDURE — 1159F PR MEDICATION LIST DOCUMENTED IN MEDICAL RECORD: ICD-10-PCS | Mod: CPTII,S$GLB,, | Performed by: INTERNAL MEDICINE

## 2022-11-14 PROCEDURE — 3288F FALL RISK ASSESSMENT DOCD: CPT | Mod: CPTII,S$GLB,, | Performed by: INTERNAL MEDICINE

## 2022-11-14 PROCEDURE — 3078F PR MOST RECENT DIASTOLIC BLOOD PRESSURE < 80 MM HG: ICD-10-PCS | Mod: CPTII,S$GLB,, | Performed by: INTERNAL MEDICINE

## 2022-11-14 PROCEDURE — 1101F PR PT FALLS ASSESS DOC 0-1 FALLS W/OUT INJ PAST YR: ICD-10-PCS | Mod: CPTII,S$GLB,, | Performed by: INTERNAL MEDICINE

## 2022-11-14 PROCEDURE — 99999 PR PBB SHADOW E&M-EST. PATIENT-LVL III: CPT | Mod: PBBFAC,,, | Performed by: INTERNAL MEDICINE

## 2022-11-14 PROCEDURE — 3078F DIAST BP <80 MM HG: CPT | Mod: CPTII,S$GLB,, | Performed by: INTERNAL MEDICINE

## 2022-11-14 RX ORDER — PREDNISONE 20 MG/1
20 TABLET ORAL DAILY
COMMUNITY
End: 2022-11-14

## 2022-11-14 RX ORDER — METHOCARBAMOL 500 MG/1
500 TABLET, FILM COATED ORAL 3 TIMES DAILY
Qty: 9 TABLET | Refills: 0 | Status: SHIPPED | OUTPATIENT
Start: 2022-11-14 | End: 2022-11-17

## 2022-11-14 NOTE — PROGRESS NOTES
MEDICAL HISTORY:  Hypertension.  Irritable bowel syndrome, with with diarrhea  Chronic kidney disease stage III, with secondary hyperparathyroid  Tachycardia  Iron deficiency anemia  Multinodular thyroid gland, on ultrasound  Cholecystectomy.  Hysterectomy.  Bladder suspension.  Lumbar degenerative disk disease.  Gluteal muscular tear.  Right foot surgery.  Resection of lipoma from the groin.  Bout of postcholecystectomy diarrhea.  Prior history of shingles.  Hearing impairment.     SOCIAL HISTORY:  Tobacco and alcohol use - none.  Exercises by doing a treadmill and elliptical.              MEDICATIONS:  Benazepril 20 mg   Vitamin D 50,000 units once a week  Dyazide once daily  Lasix 20 mg      77-year-old female     Last week she started feeling pain in her lower buttocks that extended down the back of thigh region.  She went urgent care on November 10th which she is received a 3 day course of prednisone 40 mg for 3 days which she got complete relief.  After was is been reoccurring this morning actually she is had no pain.  Yesterday it was difficult with walking and bending over.  Previous x-ray 2018 revealed degenerative changes L3-4 and L4-5.    There is also time she had in her groin pain.  Previous x-ray the hips was unrevealing    Examination   Weight 186 lb   Pulse 96   Blood pressure 120/72   2+ knee and ankle jerk reflexes  Negative straight leg raise   When she attempts abduct both legs at the hip joint she might have anterior thigh pain      Impression   lumbar spondylosis with radiculopathy  Pelvic strain     Plan for 3 days methocarbamol 500 mg 3 times a day  Arrange for lumbar spine film with flexion-extension in disposition file afterwards

## 2022-11-14 NOTE — TELEPHONE ENCOUNTER
----- Message from Shayla Gomez sent at 11/14/2022  2:55 PM CST -----  Contact: [phone]  Patient is returning a phone call.  Who left a message for the patient: Marie  Does patient know what this is regarding:  yes   Would you like a call back, or a response through your [facility] portal?:   call back   Comments:

## 2022-11-15 ENCOUNTER — HOSPITAL ENCOUNTER (OUTPATIENT)
Dept: RADIOLOGY | Facility: HOSPITAL | Age: 77
Discharge: HOME | End: 2022-11-15
Attending: INTERNAL MEDICINE
Payer: MEDICARE

## 2022-11-15 DIAGNOSIS — M47.27 LUMBOSACRAL SPONDYLOSIS WITH RADICULOPATHY: ICD-10-CM

## 2022-11-15 PROCEDURE — 72110 X-RAY EXAM L-2 SPINE 4/>VWS: CPT | Mod: TC,PN

## 2022-11-17 ENCOUNTER — PATIENT MESSAGE (OUTPATIENT)
Dept: INTERNAL MEDICINE | Facility: CLINIC | Age: 77
End: 2022-11-17
Payer: MEDICARE

## 2022-11-17 ENCOUNTER — TELEPHONE (OUTPATIENT)
Dept: SPORTS MEDICINE | Facility: CLINIC | Age: 77
End: 2022-11-17
Payer: MEDICARE

## 2022-11-17 DIAGNOSIS — M25.551 BILATERAL HIP PAIN: Primary | ICD-10-CM

## 2022-11-17 DIAGNOSIS — M48.07 SPINAL STENOSIS, LUMBOSACRAL REGION: Primary | ICD-10-CM

## 2022-11-17 DIAGNOSIS — M25.552 BILATERAL HIP PAIN: Primary | ICD-10-CM

## 2022-11-17 NOTE — TELEPHONE ENCOUNTER
Spoke with pt about appt with Dr. Gonzalez. Pt explained that she has been having radiating pain that she believes is from her sciatica. I explained that ortho providers do not see back pain issues and that she will need to call to schedule with [facility]. Because her appt is tomorrow, I offered to keep the appt for initial evaluation but informed pt she may need further treatment from [facility] clinic. Pt verbalized understanding and wished to keep the appt. Informed pt to arrive 30 min prior for hip xrays. Pt verbalized understanding of appt date, time, and location.

## 2022-11-18 ENCOUNTER — HOSPITAL ENCOUNTER (OUTPATIENT)
Dept: RADIOLOGY | Facility: HOSPITAL | Age: 77
Discharge: HOME | End: 2022-11-18
Attending: STUDENT IN AN ORGANIZED HEALTH CARE EDUCATION/TRAINING PROGRAM
Payer: MEDICARE

## 2022-11-18 ENCOUNTER — OFFICE VISIT (OUTPATIENT)
Dept: ORTHOPEDICS | Facility: CLINIC | Age: 77
End: 2022-11-18
Payer: MEDICARE

## 2022-11-18 VITALS — WEIGHT: 186.31 LBS | HEIGHT: 66 IN | BODY MASS INDEX: 29.94 KG/M2

## 2022-11-18 DIAGNOSIS — M51.36 LUMBAR DEGENERATIVE DISC DISEASE: ICD-10-CM

## 2022-11-18 DIAGNOSIS — M25.552 BILATERAL HIP PAIN: ICD-10-CM

## 2022-11-18 DIAGNOSIS — M54.42 ACUTE BILATERAL LOW BACK PAIN WITH BILATERAL SCIATICA: Primary | ICD-10-CM

## 2022-11-18 DIAGNOSIS — M53.3 SACROILIAC JOINT DYSFUNCTION OF BOTH SIDES: ICD-10-CM

## 2022-11-18 DIAGNOSIS — M25.551 BILATERAL HIP PAIN: ICD-10-CM

## 2022-11-18 DIAGNOSIS — M54.41 ACUTE BILATERAL LOW BACK PAIN WITH BILATERAL SCIATICA: Primary | ICD-10-CM

## 2022-11-18 DIAGNOSIS — M47.816 LUMBAR SPONDYLOSIS: ICD-10-CM

## 2022-11-18 DIAGNOSIS — N18.31 STAGE 3A CHRONIC KIDNEY DISEASE: ICD-10-CM

## 2022-11-18 DIAGNOSIS — G57.03 BILATERAL PIRIFORMIS SYNDROME: ICD-10-CM

## 2022-11-18 PROCEDURE — 1159F PR MEDICATION LIST DOCUMENTED IN MEDICAL RECORD: ICD-10-PCS | Mod: CPTII,S$GLB,, | Performed by: STUDENT IN AN ORGANIZED HEALTH CARE EDUCATION/TRAINING PROGRAM

## 2022-11-18 PROCEDURE — 1160F PR REVIEW ALL MEDS BY PRESCRIBER/CLIN PHARMACIST DOCUMENTED: ICD-10-PCS | Mod: CPTII,S$GLB,, | Performed by: STUDENT IN AN ORGANIZED HEALTH CARE EDUCATION/TRAINING PROGRAM

## 2022-11-18 PROCEDURE — 99204 OFFICE O/P NEW MOD 45 MIN: CPT | Mod: S$GLB,,, | Performed by: STUDENT IN AN ORGANIZED HEALTH CARE EDUCATION/TRAINING PROGRAM

## 2022-11-18 PROCEDURE — 99999 PR PBB SHADOW E&M-EST. PATIENT-LVL III: ICD-10-PCS | Mod: PBBFAC,,, | Performed by: STUDENT IN AN ORGANIZED HEALTH CARE EDUCATION/TRAINING PROGRAM

## 2022-11-18 PROCEDURE — 73521 XR HIPS BILATERAL 2 VIEW INCL AP PELVIS: ICD-10-PCS | Mod: 26,,, | Performed by: STUDENT IN AN ORGANIZED HEALTH CARE EDUCATION/TRAINING PROGRAM

## 2022-11-18 PROCEDURE — 3288F PR FALLS RISK ASSESSMENT DOCUMENTED: ICD-10-PCS | Mod: CPTII,S$GLB,, | Performed by: STUDENT IN AN ORGANIZED HEALTH CARE EDUCATION/TRAINING PROGRAM

## 2022-11-18 PROCEDURE — 1101F PT FALLS ASSESS-DOCD LE1/YR: CPT | Mod: CPTII,S$GLB,, | Performed by: STUDENT IN AN ORGANIZED HEALTH CARE EDUCATION/TRAINING PROGRAM

## 2022-11-18 PROCEDURE — 73521 X-RAY EXAM HIPS BI 2 VIEWS: CPT | Mod: 26,,, | Performed by: STUDENT IN AN ORGANIZED HEALTH CARE EDUCATION/TRAINING PROGRAM

## 2022-11-18 PROCEDURE — 1159F MED LIST DOCD IN RCRD: CPT | Mod: CPTII,S$GLB,, | Performed by: STUDENT IN AN ORGANIZED HEALTH CARE EDUCATION/TRAINING PROGRAM

## 2022-11-18 PROCEDURE — 1125F AMNT PAIN NOTED PAIN PRSNT: CPT | Mod: CPTII,S$GLB,, | Performed by: STUDENT IN AN ORGANIZED HEALTH CARE EDUCATION/TRAINING PROGRAM

## 2022-11-18 PROCEDURE — 99204 PR OFFICE/OUTPT VISIT, NEW, LEVL IV, 45-59 MIN: ICD-10-PCS | Mod: S$GLB,,, | Performed by: STUDENT IN AN ORGANIZED HEALTH CARE EDUCATION/TRAINING PROGRAM

## 2022-11-18 PROCEDURE — 1125F PR PAIN SEVERITY QUANTIFIED, PAIN PRESENT: ICD-10-PCS | Mod: CPTII,S$GLB,, | Performed by: STUDENT IN AN ORGANIZED HEALTH CARE EDUCATION/TRAINING PROGRAM

## 2022-11-18 PROCEDURE — 1160F RVW MEDS BY RX/DR IN RCRD: CPT | Mod: CPTII,S$GLB,, | Performed by: STUDENT IN AN ORGANIZED HEALTH CARE EDUCATION/TRAINING PROGRAM

## 2022-11-18 PROCEDURE — 3288F FALL RISK ASSESSMENT DOCD: CPT | Mod: CPTII,S$GLB,, | Performed by: STUDENT IN AN ORGANIZED HEALTH CARE EDUCATION/TRAINING PROGRAM

## 2022-11-18 PROCEDURE — 99999 PR PBB SHADOW E&M-EST. PATIENT-LVL III: CPT | Mod: PBBFAC,,, | Performed by: STUDENT IN AN ORGANIZED HEALTH CARE EDUCATION/TRAINING PROGRAM

## 2022-11-18 PROCEDURE — 73521 X-RAY EXAM HIPS BI 2 VIEWS: CPT | Mod: TC,FY,PO

## 2022-11-18 PROCEDURE — 1157F ADVNC CARE PLAN IN RCRD: CPT | Mod: CPTII,S$GLB,, | Performed by: STUDENT IN AN ORGANIZED HEALTH CARE EDUCATION/TRAINING PROGRAM

## 2022-11-18 PROCEDURE — 1101F PR PT FALLS ASSESS DOC 0-1 FALLS W/OUT INJ PAST YR: ICD-10-PCS | Mod: CPTII,S$GLB,, | Performed by: STUDENT IN AN ORGANIZED HEALTH CARE EDUCATION/TRAINING PROGRAM

## 2022-11-18 PROCEDURE — 1157F PR ADVANCE CARE PLAN OR EQUIV PRESENT IN MEDICAL RECORD: ICD-10-PCS | Mod: CPTII,S$GLB,, | Performed by: STUDENT IN AN ORGANIZED HEALTH CARE EDUCATION/TRAINING PROGRAM

## 2022-11-18 RX ORDER — METHOCARBAMOL 750 MG/1
750 TABLET, FILM COATED ORAL 4 TIMES DAILY
Qty: 90 TABLET | Refills: 0 | Status: SHIPPED | OUTPATIENT
Start: 2022-11-18 | End: 2022-12-02 | Stop reason: SDUPTHER

## 2022-11-18 RX ORDER — PREDNISONE 20 MG/1
40 TABLET ORAL DAILY
Qty: 10 TABLET | Refills: 0 | Status: SHIPPED | OUTPATIENT
Start: 2022-11-18 | End: 2023-01-11

## 2022-11-18 NOTE — PROGRESS NOTES
Patient ID: Carmen Wang  YOB: 1945  MRN: [id number]    Chief Complaint: Pain of the Right Hip and Pain of the Left Hip      Occupation: Retired      History of Present Illness: Carmen Wang is a right-hand dominant 77 y.o. female who presents today with intermittent bilateral hip pain that has been present for 2 weeks with no known cause. Describes the pain as a throbbing sensation that radiates to her calves. Went to urgent care on 11/8/22 where she received Robaxin to take 3 times a day which helped for a short period of time. Denies taking anything else by mouth to help with pain relief, but does apply a heating pad. Rating pain 8/10 at today's office visit.       The patient is active in none.        Past Medical History:   Past Medical History:   Diagnosis Date    Cataract     Chronic diarrhea     Hypertension     IBS (irritable bowel syndrome)      Past Surgical History:   Procedure Laterality Date    APPENDECTOMY      with the hysterectomy    BUNIONECTOMY      CHOLECYSTECTOMY      COLONOSCOPY N/A 10/5/2015    Procedure: COLONOSCOPY;  Surgeon: Teja Olivarez MD;  Location: [facility] (4TH FLR);  Service: Endoscopy;  Laterality: N/A;    HYSTERECTOMY      RUPERT secondary to pelvic pain, sacrocolpoplexy    OPEN REDUCTION AND INTERNAL FIXATION (ORIF) OF FRACTURE OF DISTAL RADIUS Right 9/19/2019    Procedure: ORIF, FRACTURE, RADIUS, DISTAL - right accumed;  Surgeon: Deejay Bragg MD;  [facility];  Service: Orthopedics;  Laterality: Right;     Family History   Problem Relation Age of Onset    Hypertension Mother     Arthritis Mother     Thyroid disease Mother     Glaucoma Mother     Arthritis Father     Hypertension Father     Thyroid disease Sister     Breast cancer Sister 76    Thyroid disease Sister     Thyroid disease Sister     No Known Problems Sister     No Known Problems Sister     No Known Problems Sister     Thyroid disease Brother     Diabetes Brother      Cancer Brother         prostate    Cancer Brother         prostate    No Known Problems Brother     No Known Problems Brother     No Known Problems Brother     No Known Problems Brother     Lupus Daughter     Arthritis Son     Hypertension Son     Hypertension Son     Glaucoma Maternal Aunt     Glaucoma Maternal Aunt     No Known Problems Maternal Uncle     No Known Problems Paternal Aunt     No Known Problems Paternal Uncle     Stroke Maternal Grandmother     No Known Problems Maternal Grandfather     No Known Problems Paternal Grandmother     No Known Problems Paternal Grandfather     Colon cancer Neg Hx     Ovarian cancer Neg Hx     Amblyopia Neg Hx     Blindness Neg Hx     Cataracts Neg Hx     Macular degeneration Neg Hx     Retinal detachment Neg Hx     Strabismus Neg Hx     Esophageal cancer Neg Hx      Social History     Socioeconomic History    Marital status:    Tobacco Use    Smoking status: Never    Smokeless tobacco: Never   Substance and Sexual Activity    Alcohol use: No    Drug use: No    Sexual activity: Not Currently     Partners: Male     Birth control/protection: Surgical     Social Determinants of Health     Financial Resource Strain: Low Risk     Difficulty of Paying Living Expenses: Not hard at all   Food Insecurity: No Food Insecurity    Worried About Running Out of Food in the Last Year: Never true    Ran Out of Food in the Last Year: Never true   Transportation Needs: No Transportation Needs    Lack of Transportation (Medical): No    Lack of Transportation (Non-Medical): No   Physical Activity: Sufficiently Active    Days of Exercise per Week: 3 days    Minutes of Exercise per Session: 90 min   Stress: No Stress Concern Present    Feeling of Stress : Not at all   Social Connections: Socially Integrated    Frequency of Communication with Friends and Family: More than three times a week    Frequency of Social Gatherings with Friends and Family: Three times a week    Attends Zoroastrian  Services: More than 4 times per year    Active Member of Clubs or Organizations: Yes    Attends Club or Organization Meetings: More than 4 times per year    Marital Status:    Housing Stability: Low Risk     Unable to Pay for Housing in the Last Year: No    Number of Places Lived in the Last Year: 1    Unstable Housing in the Last Year: No     Medication List with Changes/Refills   New Medications    METHOCARBAMOL (ROBAXIN) 750 MG TAB    Take 1 tablet (750 mg total) by mouth 4 (four) times daily. for 23 days    PREDNISONE (DELTASONE) 20 MG TABLET    Take 2 tablets (40 mg total) by mouth once daily.   Current Medications    BENAZEPRIL (LOTENSIN) 20 MG TABLET    TAKE 1 TABLET BY MOUTH ONCE DAILY    CHOLECALCIFEROL, VITAMIN D3, (D3-2000) 2,000 UNIT CAP    Take 1 capsule (2,000 Units total) by mouth every other day.    FUROSEMIDE (LASIX) 20 MG TABLET    Take 1 tablet (20 mg total) by mouth once daily.    TRIAMTERENE-HYDROCHLOROTHIAZIDE 37.5-25 MG (DYAZIDE) 37.5-25 MG PER CAPSULE    TAKE 1 CAPSULE BY MOUTH IN  THE MORNING     Review of patient's allergies indicates:   Allergen Reactions    Codeine Hallucinations     Other reaction(s): Hallucinations    Penicillins Hives and Rash       Physical Exam:   Body mass index is 30.07 kg/m².    Physical Exam  Vitals reviewed.   Constitutional:       General: She is not in acute distress.     Appearance: Normal appearance.   HENT:      Head: Normocephalic and atraumatic.   Eyes:      Extraocular Movements: Extraocular movements intact.      Conjunctiva/sclera: Conjunctivae normal.   Pulmonary:      Effort: Pulmonary effort is normal. No respiratory distress.   Skin:     General: Skin is warm and dry.   Neurological:      General: No focal deficit present.      Mental Status: She is alert and oriented to person, place, and time.   Psychiatric:         Mood and Affect: Mood normal.         Detailed MSK exam:   General    Vitals reviewed.  Constitutional: She is oriented to  person, place, and time. No distress.   HENT:   Head: Normocephalic and atraumatic.   Eyes: Conjunctivae are normal.   Pulmonary/Chest: Effort normal. No respiratory distress.   Abdominal: Normal appearance.   Neurological: She is alert and oriented to person, place, and time.   Psychiatric: Mood normal.     General Musculoskeletal Exam   Gait: antalgic     Right Ankle/Foot Exam     Tests   Heel Walk: able to perform  Tiptoe Walk: able to perform  Single Heel Rise: able to perform  Double Heel Rise: unable to perform double heel rise    Left Ankle/Foot Exam     Tests   Heel Walk: able to perform  Tiptoe Walk: able to perform  Single Heel Rise: able to perform  Double Heel Rise: able to perform      Right Hip Exam     Tenderness   The patient tender to palpation of the SI joint.    Range of Motion   The patient has normal right hip ROM.    Tests   Pain w/ forced internal rotation (MARI): absent  Pain w/ forced external rotation (FADIR): absent    Other   Sensation: normal  Left Hip Exam     Tenderness   The patient tender to palpation of the SI joint.    Range of Motion   The patient has normal left hip ROM.    Tests   Pain w/ forced internal rotation (MARI): absent  Pain w/ forced external rotation (FADIR): absent    Other   Sensation: normal      Back (L-Spine & T-Spine) / Neck (C-Spine) Exam     Tenderness Posterior midline palpation reveals tenderness of the Lower L-Spine and Sacrum. Right paramedian tenderness of the Lower L-Spine and Sacrum. Left paramedian tenderness of the Lower L-Spine and Sacrum.     Back (L-Spine & T-Spine) Range of Motion   Extension:  20 abnormal   Flexion:  normal   Lateral bend right:  abnormal   Lateral bend left:  abnormal   Rotation right:  abnormal   Rotation left:  abnormal     Spinal Sensation   Right Side Sensation  S-Spine Level: normal  Left Side Sensation  L-Spine Level: normal  S-Spine Level: normal    Back (L-Spine & T-Spine) Tests   Right Side Tests  Straight leg raise:           Supine SLR: > 70 degrees  Popliteal Compression: negative  Femoral Stretch: negative  Squat Test: able to perform  Left Side Tests  Straight leg raise:         Supine SLR:  > 70 degrees  Popliteal Compression: negative  Femoral Stretch: negative  Squat: able to perform    Comments:  Bilateral straight leg raise and slump tests produced posterior thigh sharp/pulling pain, rather than true radiculopathy/paresthesia type pain      Muscle Strength   Right Lower Extremity   Hip Abduction: 5/5   Hip Flexion: 5/5   Hip Extensors: 5/5  Quadriceps:  5/5   Hamstrin/5   Anterior tibial:  5/5   Gastrocsoleus:  5/5   EHL:  5/5  Left Lower Extremity   Hip Abduction: 5/5   Hip Flexion: 5/5   Hip Extensors: 5/5  Quadriceps:  5/5   Hamstrin/5   Anterior tibial:  5/5   Gastrocsoleus:  5/5   EHL:  5/5       Imaging:  X-Ray Hips Bilateral 2 View Inc AP Pelvis  Narrative: EXAMINATION:  Five radiographic views of the HIP.    CLINICAL HISTORY:  Pain in right hip    TECHNIQUE:  5 radiographic views of the HIP    COMPARISON:  Pelvis radiograph 2018.    FINDINGS:  AP view of the pelvis with AP and lateral views of the bilateral hips demonstrate normal alignment.  There is no fracture.  There is mild osteoarthritis of the bilateral femoroacetabular joints.  There is no soft tissue swelling.  There are calcified phleboliths in the pelvis.  Impression: Mild osteoarthritis of the bilateral hips.    Electronically signed by: John Larson MD  Date:    2022  Time:    13:57      Relevant imaging results were reviewed and interpreted by me and per my read:     XR bilateral hip: Chronic degenerative changes of both hips, with mild joint space narrowing & osteophytic changes. No fractures or other acute abnormalities.    XR lumbar spine:  Normal alignment on the AP and lateral views.  There is multilevel facet arthropathy and disc space narrowing throughout the lumbar spine.  There is grade 1 spondylolisthesis of L3/4.  No  pars defects.  No fractures or other acute abnormalities.    This was discussed with the patient and / or family today.       Patient Instructions   Assessment:  Carmen Wang is a 77 y.o. female with a chief complaint of Pain of the Right Hip and Pain of the Left Hip      Encounter Diagnoses   Name Primary?    Acute bilateral low back pain with bilateral sciatica Yes    Sacroiliac joint dysfunction of both sides     Bilateral piriformis syndrome     Lumbar spondylosis     Lumbar degenerative disc disease     Stage 3a chronic kidney disease         Plan:  Acute low back pain w sciatica  XR L-spine & b/l hips reviewed with chronic degenerative changes, no acute fractures  History & clinical exam suggested lumbar spondylosis, SI joint pain, & myofascial gluteal/piriformis pain  Recommend for conservative mgmt  She has CKD, therefore NSAIDs contraindicated  Will give short course of Prednisone 40 mg daily x 5 days  Robaxin 750 mg QID PRN  OTC Tylenol 2 tablets  Epsom salt bath soaks  If we can get her pain under better control, then would likely benefit from & be able to meaningfully participate in PT  No indication for MRI at this time    Follow-up:  2 weeks or sooner if there are any problems between now and then.    Thank you for choosing [facility] and Dr. Jay Jay Gonzalez for your orthopedic & sports medicine care. It is our goal to provide you with exceptional care that will help keep you healthy, active, and get you back in the game.    Please do not hesitate to reach out to us via email, phone, or MyChart with any questions, concerns, or feedback.    If you are experiencing pain/discomfort ,or have questions after 5pm and would like to be connected to the [facility] on-call team, please call this number and specify which Sports Medicine provider is treating you: [phone]         A copy of today's visit note has been sent to the referring  provider.       Jay Jay Gonzalez MD  Primary Care Sports Medicine        Disclaimer: This note was prepared using a voice recognition system and is likely to have sound alike errors within the text.

## 2022-11-18 NOTE — PATIENT INSTRUCTIONS
Assessment:  Carmen Wang is a 77 y.o. female with a chief complaint of Pain of the Right Hip and Pain of the Left Hip      Encounter Diagnoses   Name Primary?    Acute bilateral low back pain with bilateral sciatica Yes    Sacroiliac joint dysfunction of both sides     Bilateral piriformis syndrome     Lumbar spondylosis     Lumbar degenerative disc disease     Stage 3a chronic kidney disease         Plan:  Acute low back pain w sciatica  XR L-spine & b/l hips reviewed with chronic degenerative changes, no acute fractures  History & clinical exam suggested lumbar spondylosis, SI joint pain, & myofascial gluteal/piriformis pain  Recommend for conservative mgmt  She has CKD, therefore NSAIDs contraindicated  Will give short course of Prednisone 40 mg daily x 5 days  Robaxin 750 mg QID PRN  OTC Tylenol 2 tablets  Epsom salt bath soaks  If we can get her pain under better control, then would likely benefit from & be able to meaningfully participate in PT  No indication for MRI at this time    Follow-up:  2 weeks or sooner if there are any problems between now and then.    Thank you for choosing [facility] and Dr. Jay Jay Gonzalez for your orthopedic & sports medicine care. It is our goal to provide you with exceptional care that will help keep you healthy, active, and get you back in the game.    Please do not hesitate to reach out to us via email, phone, or MyChart with any questions, concerns, or feedback.    If you are experiencing pain/discomfort ,or have questions after 5pm and would like to be connected to the [facility] on-call team, please call this number and specify which Sports Medicine provider is treating you: [phone]

## 2022-11-28 ENCOUNTER — LAB VISIT (OUTPATIENT)
Dept: LAB | Facility: HOSPITAL | Age: 77
End: 2022-11-28
Attending: INTERNAL MEDICINE
Payer: MEDICARE

## 2022-11-28 DIAGNOSIS — N18.30 STAGE 3 CHRONIC KIDNEY DISEASE, UNSPECIFIED WHETHER STAGE 3A OR 3B CKD: ICD-10-CM

## 2022-11-28 LAB
ALBUMIN SERPL BCP-MCNC: 3.3 G/DL (ref 3.5–5.2)
ANION GAP SERPL CALC-SCNC: 11 MMOL/L (ref 8–16)
BUN SERPL-MCNC: 15 MG/DL (ref 8–23)
CALCIUM SERPL-MCNC: 10.4 MG/DL (ref 8.7–10.5)
CHLORIDE SERPL-SCNC: 101 MMOL/L (ref 95–110)
CO2 SERPL-SCNC: 28 MMOL/L (ref 23–29)
CREAT SERPL-MCNC: 1.1 MG/DL (ref 0.5–1.4)
EST. GFR  (NO RACE VARIABLE): 52.1 ML/MIN/1.73 M^2
GLUCOSE SERPL-MCNC: 84 MG/DL (ref 70–110)
HCT VFR BLD AUTO: 38.4 % (ref 37–48.5)
HGB BLD-MCNC: 11.6 G/DL (ref 12–16)
PHOSPHATE SERPL-MCNC: 2.7 MG/DL (ref 2.7–4.5)
POTASSIUM SERPL-SCNC: 3.6 MMOL/L (ref 3.5–5.1)
PTH-INTACT SERPL-MCNC: 82.5 PG/ML (ref 9–77)
SODIUM SERPL-SCNC: 140 MMOL/L (ref 136–145)

## 2022-11-28 PROCEDURE — 36415 COLL VENOUS BLD VENIPUNCTURE: CPT | Mod: PN | Performed by: INTERNAL MEDICINE

## 2022-11-28 PROCEDURE — 85018 HEMOGLOBIN: CPT | Performed by: INTERNAL MEDICINE

## 2022-11-28 PROCEDURE — 83970 ASSAY OF PARATHORMONE: CPT | Performed by: INTERNAL MEDICINE

## 2022-11-28 PROCEDURE — 80069 RENAL FUNCTION PANEL: CPT | Performed by: INTERNAL MEDICINE

## 2022-11-28 PROCEDURE — 85014 HEMATOCRIT: CPT | Performed by: INTERNAL MEDICINE

## 2022-11-29 ENCOUNTER — OFFICE VISIT (OUTPATIENT)
Dept: NEPHROLOGY | Facility: CLINIC | Age: 77
End: 2022-11-29
Payer: MEDICARE

## 2022-11-29 DIAGNOSIS — I10 HYPERTENSION, UNSPECIFIED TYPE: ICD-10-CM

## 2022-11-29 DIAGNOSIS — N18.30 STAGE 3 CHRONIC KIDNEY DISEASE, UNSPECIFIED WHETHER STAGE 3A OR 3B CKD: Primary | ICD-10-CM

## 2022-11-29 PROCEDURE — 99214 OFFICE O/P EST MOD 30 MIN: CPT | Mod: 95,,, | Performed by: INTERNAL MEDICINE

## 2022-11-29 PROCEDURE — 99214 PR OFFICE/OUTPT VISIT, EST, LEVL IV, 30-39 MIN: ICD-10-PCS | Mod: 95,,, | Performed by: INTERNAL MEDICINE

## 2022-11-29 PROCEDURE — 1157F PR ADVANCE CARE PLAN OR EQUIV PRESENT IN MEDICAL RECORD: ICD-10-PCS | Mod: CPTII,95,, | Performed by: INTERNAL MEDICINE

## 2022-11-29 PROCEDURE — 1157F ADVNC CARE PLAN IN RCRD: CPT | Mod: CPTII,95,, | Performed by: INTERNAL MEDICINE

## 2022-11-29 NOTE — PROGRESS NOTES
HISTORY OF PRESENT ILLNESS:  This is a 77-year-old -American female with   longstanding history of hypertension, irritable bowel syndrome and CKD stage   III, who is coming in for f/u.  The patient states her blood pressures   are fairly well controlled in the 120's-130's/70's.   No recent hospitalizations.  Denies NSAID's.    PAST MEDICAL HISTORY:  Significant for hypertension for five years, CKD stage   III, IBS.    REVIEW OF SYSTEMS:  She denies any frequency, urgency, hematuria, dysuria,   nausea, vomiting, chest pain or shortness of breath, diahrrea, N/V. Good energy.  Weight stable and good apetite. Sciatic pain.    PHYSICAL EXAMINATION:none  GENERAL:  Well-nourished, well-developed individual, in no acute distress.  EXTREMITIES:  Trace edema in the ankles  PSYCHIATRIC:  Alert and oriented x3.  The patient has good judgment and insight.    ASSESSMENT AND PLAN:  This is a 77-year-old -American female with   longstanding history of hypertension and chronic kidney disease stage III, who   is coming in for f/u.  1.  Chronic kidney disease stage III with an MDRD GFR of 51 mL per minute.  Her   baseline creatinine appears to be around 1.1 to 1.3 and occasionally lower and   higher with the most recent creatinine of 1.1.    Hydrate. Her CKD is likely secondary to   longstanding hypertension and age-related nephron loss.    I advised her to stay away from NSAIDs and to hydrate well.  She understands the importance of hydration.  On  benazepril 20 mg for nephro-protection.  She is also on triamterene and hydrochlorothiazide.  We will monitor closely and take her off triamterene if needed if potassium becomes an issue. Aldosterone , renin levels stable. Potassium on the lower side and occ alkalosis.   2.  Hypertension.  Blood pressures are stable.   She will monitor blood pressures, currently stable, she   will notify us if they are high.  3.  Renal osteodystrophy.   intact PTH stable .  Ca borderline  high/phos stable.  Her calciums are borderline high and is not taking calcium supplements.  On HCTZ-will con't to monitor. SPEP stable.  4.  Anemia.  Hemoglobin is stable.  5.  She also had a  ultrasound, which was stable with a simple cyst.    RTC in 6 months.    The patient location is:home  The chief complaint leading to consultation is: ckd    Visit type: audiovisual    Face to Face time with patient: 15 minutes of total time spent on the encounter, which includes face to face time and non-face to face time preparing to see the patient (eg, review of tests), Obtaining and/or reviewing separately obtained history, Documenting clinical information in the electronic or other health record, Independently interpreting results (not separately reported) and communicating results to the patient/family/caregiver, or Care coordination (not separately reported).         Each patient to whom he or she provides medical services by telemedicine is:  (1) informed of the relationship between the physician and patient and the respective role of any other health care provider with respect to management of the patient; and (2) notified that he or she may decline to receive medical services by telemedicine and may withdraw from such care at any time.

## 2022-12-02 ENCOUNTER — OFFICE VISIT (OUTPATIENT)
Dept: ORTHOPEDICS | Facility: CLINIC | Age: 77
End: 2022-12-02
Payer: MEDICARE

## 2022-12-02 DIAGNOSIS — N18.31 STAGE 3A CHRONIC KIDNEY DISEASE: ICD-10-CM

## 2022-12-02 DIAGNOSIS — M53.3 SACROILIAC JOINT DYSFUNCTION OF BOTH SIDES: ICD-10-CM

## 2022-12-02 DIAGNOSIS — M54.41 ACUTE BILATERAL LOW BACK PAIN WITH BILATERAL SCIATICA: Primary | ICD-10-CM

## 2022-12-02 DIAGNOSIS — M54.42 ACUTE BILATERAL LOW BACK PAIN WITH BILATERAL SCIATICA: Primary | ICD-10-CM

## 2022-12-02 DIAGNOSIS — G57.03 BILATERAL PIRIFORMIS SYNDROME: ICD-10-CM

## 2022-12-02 DIAGNOSIS — M47.816 LUMBAR SPONDYLOSIS: ICD-10-CM

## 2022-12-02 PROCEDURE — 1125F PR PAIN SEVERITY QUANTIFIED, PAIN PRESENT: ICD-10-PCS | Mod: CPTII,S$GLB,, | Performed by: STUDENT IN AN ORGANIZED HEALTH CARE EDUCATION/TRAINING PROGRAM

## 2022-12-02 PROCEDURE — 1157F PR ADVANCE CARE PLAN OR EQUIV PRESENT IN MEDICAL RECORD: ICD-10-PCS | Mod: CPTII,S$GLB,, | Performed by: STUDENT IN AN ORGANIZED HEALTH CARE EDUCATION/TRAINING PROGRAM

## 2022-12-02 PROCEDURE — 99999 PR PBB SHADOW E&M-EST. PATIENT-LVL III: CPT | Mod: PBBFAC,,, | Performed by: STUDENT IN AN ORGANIZED HEALTH CARE EDUCATION/TRAINING PROGRAM

## 2022-12-02 PROCEDURE — 1157F ADVNC CARE PLAN IN RCRD: CPT | Mod: CPTII,S$GLB,, | Performed by: STUDENT IN AN ORGANIZED HEALTH CARE EDUCATION/TRAINING PROGRAM

## 2022-12-02 PROCEDURE — 99999 PR PBB SHADOW E&M-EST. PATIENT-LVL III: ICD-10-PCS | Mod: PBBFAC,,, | Performed by: STUDENT IN AN ORGANIZED HEALTH CARE EDUCATION/TRAINING PROGRAM

## 2022-12-02 PROCEDURE — 1159F PR MEDICATION LIST DOCUMENTED IN MEDICAL RECORD: ICD-10-PCS | Mod: CPTII,S$GLB,, | Performed by: STUDENT IN AN ORGANIZED HEALTH CARE EDUCATION/TRAINING PROGRAM

## 2022-12-02 PROCEDURE — 3288F PR FALLS RISK ASSESSMENT DOCUMENTED: ICD-10-PCS | Mod: CPTII,S$GLB,, | Performed by: STUDENT IN AN ORGANIZED HEALTH CARE EDUCATION/TRAINING PROGRAM

## 2022-12-02 PROCEDURE — 1159F MED LIST DOCD IN RCRD: CPT | Mod: CPTII,S$GLB,, | Performed by: STUDENT IN AN ORGANIZED HEALTH CARE EDUCATION/TRAINING PROGRAM

## 2022-12-02 PROCEDURE — 99213 OFFICE O/P EST LOW 20 MIN: CPT | Mod: S$GLB,,, | Performed by: STUDENT IN AN ORGANIZED HEALTH CARE EDUCATION/TRAINING PROGRAM

## 2022-12-02 PROCEDURE — 1125F AMNT PAIN NOTED PAIN PRSNT: CPT | Mod: CPTII,S$GLB,, | Performed by: STUDENT IN AN ORGANIZED HEALTH CARE EDUCATION/TRAINING PROGRAM

## 2022-12-02 PROCEDURE — 1101F PT FALLS ASSESS-DOCD LE1/YR: CPT | Mod: CPTII,S$GLB,, | Performed by: STUDENT IN AN ORGANIZED HEALTH CARE EDUCATION/TRAINING PROGRAM

## 2022-12-02 PROCEDURE — 99213 PR OFFICE/OUTPT VISIT, EST, LEVL III, 20-29 MIN: ICD-10-PCS | Mod: S$GLB,,, | Performed by: STUDENT IN AN ORGANIZED HEALTH CARE EDUCATION/TRAINING PROGRAM

## 2022-12-02 PROCEDURE — 3288F FALL RISK ASSESSMENT DOCD: CPT | Mod: CPTII,S$GLB,, | Performed by: STUDENT IN AN ORGANIZED HEALTH CARE EDUCATION/TRAINING PROGRAM

## 2022-12-02 PROCEDURE — 1101F PR PT FALLS ASSESS DOC 0-1 FALLS W/OUT INJ PAST YR: ICD-10-PCS | Mod: CPTII,S$GLB,, | Performed by: STUDENT IN AN ORGANIZED HEALTH CARE EDUCATION/TRAINING PROGRAM

## 2022-12-02 RX ORDER — METHOCARBAMOL 750 MG/1
750 TABLET, FILM COATED ORAL 4 TIMES DAILY
Qty: 90 TABLET | Refills: 0 | Status: SHIPPED | OUTPATIENT
Start: 2022-12-02 | End: 2022-12-25

## 2022-12-02 NOTE — PROGRESS NOTES
Patient ID: Carmen Wang  YOB: 1945  MRN: [id number]    Chief Complaint: Follow-up (bilateral hip pain)    Occupation: Retired    History of Present Illness: Carmen Wang is a 77 y.o. female who presents today for follow up of intermittent bilateral hip pain.    The pain began in early November 2022 without any specific cause. Describes the pain as a throbbing sensation that radiates to her calves. Went to urgent care on 11/8/22 where she received Robaxin to take 3 times a day which helped for a short period of time.     Initially evaluated in our office on 11/18/22.  Diagnosed with low back pain and sciatica with lumbar spondylosis, SI joint pain, and myofascial gluteal/piriformis pain. Treated with prednisone, Robaxin, Tylenol, epsom salt bath soaks.  NSAID's contraindicated due to CKD. Deferred PT due to severity of pain.    She reports that the Robaxin and Biofreeze seem to have helped her pain decrease somewhat.  She is able to move better than her previous visit.    Past Medical History:   Past Medical History:   Diagnosis Date    Cataract     Chronic diarrhea     Hypertension     IBS (irritable bowel syndrome)      Past Surgical History:   Procedure Laterality Date    APPENDECTOMY      with the hysterectomy    BUNIONECTOMY      CHOLECYSTECTOMY      COLONOSCOPY N/A 10/5/2015    Procedure: COLONOSCOPY;  Surgeon: Teja Olivarez MD;  Location: [facility] (4TH FLR);  Service: Endoscopy;  Laterality: N/A;    HYSTERECTOMY      RUPERT secondary to pelvic pain, sacrocolpoplexy    OPEN REDUCTION AND INTERNAL FIXATION (ORIF) OF FRACTURE OF DISTAL RADIUS Right 9/19/2019    Procedure: ORIF, FRACTURE, RADIUS, DISTAL - right accumed;  Surgeon: Deejay Bragg MD;  Location: [facility] OR [address] [place] [address];  Service: Orthopedics;  Laterality: Right;     Family History   Problem Relation Age of Onset    Hypertension Mother     Arthritis Mother     Thyroid disease Mother     Glaucoma Mother     Arthritis  Father     Hypertension Father     Thyroid disease Sister     Breast cancer Sister 76    Thyroid disease Sister     Thyroid disease Sister     No Known Problems Sister     No Known Problems Sister     No Known Problems Sister     Thyroid disease Brother     Diabetes Brother     Cancer Brother         prostate    Cancer Brother         prostate    No Known Problems Brother     No Known Problems Brother     No Known Problems Brother     No Known Problems Brother     Lupus Daughter     Arthritis Son     Hypertension Son     Hypertension Son     Glaucoma Maternal Aunt     Glaucoma Maternal Aunt     No Known Problems Maternal Uncle     No Known Problems Paternal Aunt     No Known Problems Paternal Uncle     Stroke Maternal Grandmother     No Known Problems Maternal Grandfather     No Known Problems Paternal Grandmother     No Known Problems Paternal Grandfather     Colon cancer Neg Hx     Ovarian cancer Neg Hx     Amblyopia Neg Hx     Blindness Neg Hx     Cataracts Neg Hx     Macular degeneration Neg Hx     Retinal detachment Neg Hx     Strabismus Neg Hx     Esophageal cancer Neg Hx      Social History     Socioeconomic History    Marital status:    Tobacco Use    Smoking status: Never    Smokeless tobacco: Never   Substance and Sexual Activity    Alcohol use: No    Drug use: No    Sexual activity: Not Currently     Partners: Male     Birth control/protection: Surgical     Social Determinants of Health     Financial Resource Strain: Low Risk     Difficulty of Paying Living Expenses: Not hard at all   Food Insecurity: No Food Insecurity    Worried About Running Out of Food in the Last Year: Never true    Ran Out of Food in the Last Year: Never true   Transportation Needs: No Transportation Needs    Lack of Transportation (Medical): No    Lack of Transportation (Non-Medical): No   Physical Activity: Sufficiently Active    Days of Exercise per Week: 3 days    Minutes of Exercise per Session: 90 min   Stress: No  Stress Concern Present    Feeling of Stress : Not at all   Social Connections: Socially Integrated    Frequency of Communication with Friends and Family: More than three times a week    Frequency of Social Gatherings with Friends and Family: Three times a week    Attends Restorationism Services: More than 4 times per year    Active Member of Clubs or Organizations: Yes    Attends Club or Organization Meetings: More than 4 times per year    Marital Status:    Housing Stability: Low Risk     Unable to Pay for Housing in the Last Year: No    Number of Places Lived in the Last Year: 1    Unstable Housing in the Last Year: No     Medication List with Changes/Refills   Current Medications    BENAZEPRIL (LOTENSIN) 20 MG TABLET    TAKE 1 TABLET BY MOUTH ONCE DAILY    CHOLECALCIFEROL, VITAMIN D3, (D3-2000) 2,000 UNIT CAP    Take 1 capsule (2,000 Units total) by mouth every other day.    FUROSEMIDE (LASIX) 20 MG TABLET    Take 1 tablet (20 mg total) by mouth once daily.    PREDNISONE (DELTASONE) 20 MG TABLET    Take 2 tablets (40 mg total) by mouth once daily.    TRIAMTERENE-HYDROCHLOROTHIAZIDE 37.5-25 MG (DYAZIDE) 37.5-25 MG PER CAPSULE    TAKE 1 CAPSULE BY MOUTH IN  THE MORNING   Changed and/or Refilled Medications    Modified Medication Previous Medication    METHOCARBAMOL (ROBAXIN) 750 MG TAB methocarbamoL (ROBAXIN) 750 MG Tab       Take 1 tablet (750 mg total) by mouth 4 (four) times daily. for 23 days    Take 1 tablet (750 mg total) by mouth 4 (four) times daily. for 23 days     Review of patient's allergies indicates:   Allergen Reactions    Codeine Hallucinations     Other reaction(s): Hallucinations    Penicillins Hives and Rash       Physical Exam:   There is no height or weight on file to calculate BMI.    Physical Exam  Vitals reviewed.   Constitutional:       General: She is not in acute distress.     Appearance: Normal appearance.   HENT:      Head: Normocephalic and atraumatic.   Eyes:      Extraocular  Movements: Extraocular movements intact.      Conjunctiva/sclera: Conjunctivae normal.   Pulmonary:      Effort: Pulmonary effort is normal. No respiratory distress.   Skin:     General: Skin is warm and dry.   Neurological:      General: No focal deficit present.      Mental Status: She is alert and oriented to person, place, and time.   Psychiatric:         Mood and Affect: Mood normal.     Detailed MSK exam:     Right Hip:  Inspection: No effusion, erythema, or ecchymosis   Palpation tenderness: SI joint  Range of motion: Normal ROM equal bilaterally  Strength:  5/5 Flexion    5/5 Abduction    5/5 Adduction  Impingement Tests: Negative MARI     Negative FADIR  Other Tests:  Negative Log Roll  Negative Circumduction  Positive Piriformis   N/V Exam:  Tibial:    Normal sensory (plantar foot)  Normal motor (FHL)    Sup Peroneal:   Normal sensory (dorsal foot)  Normal motor (Peroneals)            Deep Peroneal:   Normal sensory (1st web space)  Normal motor (EHL)    Sural:   Normal sensory (lateral foot)   Saphenous:   Normal sensory (medial lower leg)   Normal pedal pulses, warm and well perfused with capillary refill < 2 sec       Left Hip:  Inspection: No effusion, erythema, or ecchymosis   Palpation tenderness: SI joint  Range of motion: Normal ROM equal bilaterally  Strength:  5/5 Flexion    5/5 Abduction    5/5 Adduction  Impingement Tests: Negative MARI     Negative FADIR  Other Tests:  Negative Log Roll  Negative Circumduction  Positive Piriformis   N/V Exam:  Tibial:    Normal sensory (plantar foot)  Normal motor (FHL)    Sup Peroneal:   Normal sensory (dorsal foot)  Normal motor (Peroneals)            Deep Peroneal:   Normal sensory (1st web space)  Normal motor (EHL)    Sural:   Normal sensory (lateral foot)   Saphenous:   Normal sensory (medial lower leg)   Normal pedal pulses, warm and well perfused with capillary refill < 2 sec      Back (L-Spine & T-Spine) / Neck (C-Spine) Exam   No effusion,  erythema, or ecchymosis. Tenderness to palpation L spine and sacrum.  ROM limited globally.    Intact EHL, FHL, gastrocsoleus, and tibialis anterior. Sensation intact to light touch in superficial peroneal, deep peroneal, tibial, sural, and saphenous nerve distributions. Foot warm and well perfused with capillary refill of less than 2 seconds and palpable pedal pulses.     Imaging:  No new imaging today.      Patient Instructions   Assessment:  Carmen Wang is a 77 y.o. female with a chief complaint of Follow-up (bilateral hip pain)    Encounter Diagnoses   Name Primary?    Acute bilateral low back pain with bilateral sciatica Yes    Sacroiliac joint dysfunction of both sides     Bilateral piriformis syndrome     Lumbar spondylosis     Stage 3a chronic kidney disease       Plan:  Ms. Wang's pain has improved moderately with medication management and relative rest.   The next step in her treatment algorithm is physical therapy.  Robaxin refilled today, 750 mg QID PRN  OTC Tylenol to augment  Avoid NSAIDs given CKD  Order PT at [facility] - 2-3x per week for 6-8 weeks    Follow-up: 6 weeks or sooner if there are any problems between now and then.    Thank you for choosing [facility] and Dr. Jay Jay Gonzalez for your orthopedic & sports medicine care. It is our goal to provide you with exceptional care that will help keep you healthy, active, and get you back in the game.    Please do not hesitate to reach out to us via email, phone, or MyChart with any questions, concerns, or feedback.    If you are experiencing pain/discomfort ,or have questions after 5pm and would like to be connected to the [facility] on-call team, please call this number and specify which Sports Medicine provider is treating you: [phone]          A copy of today's visit note has been sent to the referring provider.       Jay Jay Gonzalez MD  Primary Care Sports  Medicine        Disclaimer: This note was prepared using a voice recognition system and is likely to have sound alike errors within the text.

## 2022-12-29 NOTE — TELEPHONE ENCOUNTER
No new care gaps identified.  [facility] Embedded Care Gaps. Reference number: [id number]. 12/29/2022   3:37:26 AM CST

## 2022-12-29 NOTE — TELEPHONE ENCOUNTER
Refill Routing Note   Medication(s) are not appropriate for processing by [facility] for the following reason(s):      - Drug-Drug Interaction (Duplicate Therapy: furosemide, triamterene-hydrochlorothiazide 37.5-25 mg)    [facility] action(s):  Defer Medication-related problems identified: Drug-drug interaction        Medication reconciliation completed: No     Appointments  past 12m or future 3m with PCP    Date Provider   Last Visit   11/14/2022 Deniz Redman MD   Next Visit   Visit date not found Deniz Redman MD   ED visits in past 90 days: 0        Note composed:4:10 PM 12/29/2022

## 2022-12-30 RX ORDER — FUROSEMIDE 20 MG/1
20 TABLET ORAL DAILY
Qty: 90 TABLET | Refills: 3 | Status: SHIPPED | OUTPATIENT
Start: 2022-12-30 | End: 2023-09-14 | Stop reason: SDUPTHER

## 2023-01-07 ENCOUNTER — PATIENT MESSAGE (OUTPATIENT)
Dept: INTERNAL MEDICINE | Facility: CLINIC | Age: 78
End: 2023-01-07
Payer: MEDICARE

## 2023-01-07 DIAGNOSIS — Z00.00 ANNUAL PHYSICAL EXAM: Primary | ICD-10-CM

## 2023-01-07 DIAGNOSIS — E04.2 MULTINODULAR THYROID: ICD-10-CM

## 2023-01-07 DIAGNOSIS — R60.0 BILATERAL LEG EDEMA: ICD-10-CM

## 2023-01-07 DIAGNOSIS — I10 ESSENTIAL HYPERTENSION: ICD-10-CM

## 2023-01-07 DIAGNOSIS — N18.31 STAGE 3A CHRONIC KIDNEY DISEASE: ICD-10-CM

## 2023-01-11 ENCOUNTER — OFFICE VISIT (OUTPATIENT)
Dept: INTERNAL MEDICINE | Facility: CLINIC | Age: 78
End: 2023-01-11
Payer: MEDICARE

## 2023-01-11 ENCOUNTER — LAB VISIT (OUTPATIENT)
Dept: LAB | Facility: HOSPITAL | Age: 78
End: 2023-01-11
Attending: INTERNAL MEDICINE
Payer: MEDICARE

## 2023-01-11 VITALS
BODY MASS INDEX: 30.05 KG/M2 | SYSTOLIC BLOOD PRESSURE: 122 MMHG | HEART RATE: 76 BPM | DIASTOLIC BLOOD PRESSURE: 74 MMHG | HEIGHT: 66 IN | OXYGEN SATURATION: 99 % | WEIGHT: 187 LBS

## 2023-01-11 DIAGNOSIS — E04.2 MULTINODULAR THYROID: ICD-10-CM

## 2023-01-11 DIAGNOSIS — I10 ESSENTIAL HYPERTENSION: ICD-10-CM

## 2023-01-11 DIAGNOSIS — M47.27 LUMBOSACRAL SPONDYLOSIS WITH RADICULOPATHY: ICD-10-CM

## 2023-01-11 DIAGNOSIS — N25.81 SECONDARY HYPERPARATHYROIDISM OF RENAL ORIGIN: ICD-10-CM

## 2023-01-11 DIAGNOSIS — I70.0 AORTIC ATHEROSCLEROSIS: ICD-10-CM

## 2023-01-11 DIAGNOSIS — Z00.00 ANNUAL PHYSICAL EXAM: ICD-10-CM

## 2023-01-11 DIAGNOSIS — R60.0 BILATERAL LEG EDEMA: ICD-10-CM

## 2023-01-11 DIAGNOSIS — Z00.00 ANNUAL PHYSICAL EXAM: Primary | ICD-10-CM

## 2023-01-11 DIAGNOSIS — N18.31 STAGE 3A CHRONIC KIDNEY DISEASE: ICD-10-CM

## 2023-01-11 LAB
ALBUMIN SERPL BCP-MCNC: 3.5 G/DL (ref 3.5–5.2)
ALP SERPL-CCNC: 56 U/L (ref 55–135)
ALT SERPL W/O P-5'-P-CCNC: 14 U/L (ref 10–44)
ANION GAP SERPL CALC-SCNC: 10 MMOL/L (ref 8–16)
AST SERPL-CCNC: 19 U/L (ref 10–40)
BASOPHILS # BLD AUTO: 0.04 K/UL (ref 0–0.2)
BASOPHILS NFR BLD: 0.5 % (ref 0–1.9)
BILIRUB SERPL-MCNC: 0.7 MG/DL (ref 0.1–1)
BUN SERPL-MCNC: 18 MG/DL (ref 8–23)
CALCIUM SERPL-MCNC: 10.5 MG/DL (ref 8.7–10.5)
CHLORIDE SERPL-SCNC: 102 MMOL/L (ref 95–110)
CHOLEST SERPL-MCNC: 195 MG/DL (ref 120–199)
CHOLEST/HDLC SERPL: 4 {RATIO} (ref 2–5)
CO2 SERPL-SCNC: 28 MMOL/L (ref 23–29)
CREAT SERPL-MCNC: 1.2 MG/DL (ref 0.5–1.4)
DIFFERENTIAL METHOD: ABNORMAL
EOSINOPHIL # BLD AUTO: 0.3 K/UL (ref 0–0.5)
EOSINOPHIL NFR BLD: 4.4 % (ref 0–8)
ERYTHROCYTE [DISTWIDTH] IN BLOOD BY AUTOMATED COUNT: 19.5 % (ref 11.5–14.5)
EST. GFR  (NO RACE VARIABLE): 46.9 ML/MIN/1.73 M^2
GLUCOSE SERPL-MCNC: 83 MG/DL (ref 70–110)
HCT VFR BLD AUTO: 38.4 % (ref 37–48.5)
HDLC SERPL-MCNC: 49 MG/DL (ref 40–75)
HDLC SERPL: 25.1 % (ref 20–50)
HGB BLD-MCNC: 11.6 G/DL (ref 12–16)
IMM GRANULOCYTES # BLD AUTO: 0.02 K/UL (ref 0–0.04)
IMM GRANULOCYTES NFR BLD AUTO: 0.3 % (ref 0–0.5)
LDLC SERPL CALC-MCNC: 130.4 MG/DL (ref 63–159)
LYMPHOCYTES # BLD AUTO: 3 K/UL (ref 1–4.8)
LYMPHOCYTES NFR BLD: 40.5 % (ref 18–48)
MCH RBC QN AUTO: 23.9 PG (ref 27–31)
MCHC RBC AUTO-ENTMCNC: 30.2 G/DL (ref 32–36)
MCV RBC AUTO: 79 FL (ref 82–98)
MONOCYTES # BLD AUTO: 0.9 K/UL (ref 0.3–1)
MONOCYTES NFR BLD: 11.4 % (ref 4–15)
NEUTROPHILS # BLD AUTO: 3.2 K/UL (ref 1.8–7.7)
NEUTROPHILS NFR BLD: 42.9 % (ref 38–73)
NONHDLC SERPL-MCNC: 146 MG/DL
NRBC BLD-RTO: 0 /100 WBC
PLATELET # BLD AUTO: 268 K/UL (ref 150–450)
PMV BLD AUTO: 12 FL (ref 9.2–12.9)
POTASSIUM SERPL-SCNC: 3.7 MMOL/L (ref 3.5–5.1)
PROT SERPL-MCNC: 7.1 G/DL (ref 6–8.4)
RBC # BLD AUTO: 4.85 M/UL (ref 4–5.4)
SODIUM SERPL-SCNC: 140 MMOL/L (ref 136–145)
TRIGL SERPL-MCNC: 78 MG/DL (ref 30–150)
TSH SERPL DL<=0.005 MIU/L-ACNC: 1.57 UIU/ML (ref 0.4–4)
WBC # BLD AUTO: 7.45 K/UL (ref 3.9–12.7)

## 2023-01-11 PROCEDURE — 80053 COMPREHEN METABOLIC PANEL: CPT | Performed by: INTERNAL MEDICINE

## 2023-01-11 PROCEDURE — 1159F PR MEDICATION LIST DOCUMENTED IN MEDICAL RECORD: ICD-10-PCS | Mod: CPTII,S$GLB,, | Performed by: INTERNAL MEDICINE

## 2023-01-11 PROCEDURE — 3288F FALL RISK ASSESSMENT DOCD: CPT | Mod: CPTII,S$GLB,, | Performed by: INTERNAL MEDICINE

## 2023-01-11 PROCEDURE — 3074F SYST BP LT 130 MM HG: CPT | Mod: CPTII,S$GLB,, | Performed by: INTERNAL MEDICINE

## 2023-01-11 PROCEDURE — 99999 PR PBB SHADOW E&M-EST. PATIENT-LVL III: ICD-10-PCS | Mod: PBBFAC,,, | Performed by: INTERNAL MEDICINE

## 2023-01-11 PROCEDURE — 1125F AMNT PAIN NOTED PAIN PRSNT: CPT | Mod: CPTII,S$GLB,, | Performed by: INTERNAL MEDICINE

## 2023-01-11 PROCEDURE — 1159F MED LIST DOCD IN RCRD: CPT | Mod: CPTII,S$GLB,, | Performed by: INTERNAL MEDICINE

## 2023-01-11 PROCEDURE — 3078F PR MOST RECENT DIASTOLIC BLOOD PRESSURE < 80 MM HG: ICD-10-PCS | Mod: CPTII,S$GLB,, | Performed by: INTERNAL MEDICINE

## 2023-01-11 PROCEDURE — 1160F RVW MEDS BY RX/DR IN RCRD: CPT | Mod: CPTII,S$GLB,, | Performed by: INTERNAL MEDICINE

## 2023-01-11 PROCEDURE — 99999 PR PBB SHADOW E&M-EST. PATIENT-LVL III: CPT | Mod: PBBFAC,,, | Performed by: INTERNAL MEDICINE

## 2023-01-11 PROCEDURE — 36415 COLL VENOUS BLD VENIPUNCTURE: CPT | Mod: PN | Performed by: INTERNAL MEDICINE

## 2023-01-11 PROCEDURE — 80061 LIPID PANEL: CPT | Performed by: INTERNAL MEDICINE

## 2023-01-11 PROCEDURE — 3078F DIAST BP <80 MM HG: CPT | Mod: CPTII,S$GLB,, | Performed by: INTERNAL MEDICINE

## 2023-01-11 PROCEDURE — 99397 PR PREVENTIVE VISIT,EST,65 & OVER: ICD-10-PCS | Mod: GZ,S$GLB,, | Performed by: INTERNAL MEDICINE

## 2023-01-11 PROCEDURE — 3288F PR FALLS RISK ASSESSMENT DOCUMENTED: ICD-10-PCS | Mod: CPTII,S$GLB,, | Performed by: INTERNAL MEDICINE

## 2023-01-11 PROCEDURE — 99397 PER PM REEVAL EST PAT 65+ YR: CPT | Mod: GZ,S$GLB,, | Performed by: INTERNAL MEDICINE

## 2023-01-11 PROCEDURE — 1101F PT FALLS ASSESS-DOCD LE1/YR: CPT | Mod: CPTII,S$GLB,, | Performed by: INTERNAL MEDICINE

## 2023-01-11 PROCEDURE — 3074F PR MOST RECENT SYSTOLIC BLOOD PRESSURE < 130 MM HG: ICD-10-PCS | Mod: CPTII,S$GLB,, | Performed by: INTERNAL MEDICINE

## 2023-01-11 PROCEDURE — 85025 COMPLETE CBC W/AUTO DIFF WBC: CPT | Performed by: INTERNAL MEDICINE

## 2023-01-11 PROCEDURE — 1157F ADVNC CARE PLAN IN RCRD: CPT | Mod: CPTII,S$GLB,, | Performed by: INTERNAL MEDICINE

## 2023-01-11 PROCEDURE — 1160F PR REVIEW ALL MEDS BY PRESCRIBER/CLIN PHARMACIST DOCUMENTED: ICD-10-PCS | Mod: CPTII,S$GLB,, | Performed by: INTERNAL MEDICINE

## 2023-01-11 PROCEDURE — 1157F PR ADVANCE CARE PLAN OR EQUIV PRESENT IN MEDICAL RECORD: ICD-10-PCS | Mod: CPTII,S$GLB,, | Performed by: INTERNAL MEDICINE

## 2023-01-11 PROCEDURE — 84443 ASSAY THYROID STIM HORMONE: CPT | Performed by: INTERNAL MEDICINE

## 2023-01-11 PROCEDURE — 1125F PR PAIN SEVERITY QUANTIFIED, PAIN PRESENT: ICD-10-PCS | Mod: CPTII,S$GLB,, | Performed by: INTERNAL MEDICINE

## 2023-01-11 PROCEDURE — 1101F PR PT FALLS ASSESS DOC 0-1 FALLS W/OUT INJ PAST YR: ICD-10-PCS | Mod: CPTII,S$GLB,, | Performed by: INTERNAL MEDICINE

## 2023-01-11 NOTE — PROGRESS NOTES
MEDICAL HISTORY:  Hypertension.  Irritable bowel syndrome, with with diarrhea  Chronic kidney disease stage III, with secondary hyperparathyroid  Tachycardia  Iron deficiency anemia  Multinodular thyroid gland, on ultrasound  Cholecystectomy.  Hysterectomy.  Bladder suspension.  Lumbar degenerative disk disease.  Gluteal muscular tear.  Right foot surgery.  Resection of lipoma from the groin.  Bout of postcholecystectomy diarrhea.  Prior history of shingles.  Hearing impairment.  Aorta atherosclerosis on imaging     SOCIAL HISTORY:  Tobacco and alcohol use - none.  Exercises by doing a treadmill and elliptical.FAMILY HISTORY:  Very extensive.  Six sisters and seven brothers, thyroid conditions in three sisters, one brother with diabetes, another brother with prostate cancer, but all 13 siblings are living.     SCREENING:  Colonoscopy in October 2015 revealed diverticulosis.              MEDICATIONS:  Benazepril 20 mg   Vitamin D 50,000 units once a week  Dyazide once daily  Lasix 20 mg      77-year-old female   Presents for routine annual visit   Continues to follow-up with nephrology.    She is also followed up with orthopedics regarding lumbar spondylosis with radiculopathy.  She is been having pain involving the lower back buttocks that extends down the back of the leg.  She is had radiographs performed.  She is been involving physical therapy and this is been helpful.    Review of symptoms  Negative for chest pain, palpitations, shortness of breath, abdominal pain.  Regular bowel function.  No difficulty urinating no incontinence.  Occasional nocturia.  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

## 2023-01-13 ENCOUNTER — OFFICE VISIT (OUTPATIENT)
Dept: ORTHOPEDICS | Facility: CLINIC | Age: 78
End: 2023-01-13
Payer: MEDICARE

## 2023-01-13 ENCOUNTER — PATIENT MESSAGE (OUTPATIENT)
Dept: SPORTS MEDICINE | Facility: CLINIC | Age: 78
End: 2023-01-13
Payer: MEDICARE

## 2023-01-13 DIAGNOSIS — M47.816 LUMBAR SPONDYLOSIS: ICD-10-CM

## 2023-01-13 DIAGNOSIS — M53.3 SACROILIAC JOINT DYSFUNCTION OF BOTH SIDES: Primary | ICD-10-CM

## 2023-01-13 DIAGNOSIS — N18.31 STAGE 3A CHRONIC KIDNEY DISEASE: ICD-10-CM

## 2023-01-13 DIAGNOSIS — G57.03 BILATERAL PIRIFORMIS SYNDROME: ICD-10-CM

## 2023-01-13 PROCEDURE — 1160F PR REVIEW ALL MEDS BY PRESCRIBER/CLIN PHARMACIST DOCUMENTED: ICD-10-PCS | Mod: CPTII,S$GLB,, | Performed by: STUDENT IN AN ORGANIZED HEALTH CARE EDUCATION/TRAINING PROGRAM

## 2023-01-13 PROCEDURE — 1101F PT FALLS ASSESS-DOCD LE1/YR: CPT | Mod: CPTII,S$GLB,, | Performed by: STUDENT IN AN ORGANIZED HEALTH CARE EDUCATION/TRAINING PROGRAM

## 2023-01-13 PROCEDURE — 99999 PR PBB SHADOW E&M-EST. PATIENT-LVL III: CPT | Mod: PBBFAC,,, | Performed by: STUDENT IN AN ORGANIZED HEALTH CARE EDUCATION/TRAINING PROGRAM

## 2023-01-13 PROCEDURE — 3288F PR FALLS RISK ASSESSMENT DOCUMENTED: ICD-10-PCS | Mod: CPTII,S$GLB,, | Performed by: STUDENT IN AN ORGANIZED HEALTH CARE EDUCATION/TRAINING PROGRAM

## 2023-01-13 PROCEDURE — 99999 PR PBB SHADOW E&M-EST. PATIENT-LVL III: ICD-10-PCS | Mod: PBBFAC,,, | Performed by: STUDENT IN AN ORGANIZED HEALTH CARE EDUCATION/TRAINING PROGRAM

## 2023-01-13 PROCEDURE — 1159F MED LIST DOCD IN RCRD: CPT | Mod: CPTII,S$GLB,, | Performed by: STUDENT IN AN ORGANIZED HEALTH CARE EDUCATION/TRAINING PROGRAM

## 2023-01-13 PROCEDURE — 1125F AMNT PAIN NOTED PAIN PRSNT: CPT | Mod: CPTII,S$GLB,, | Performed by: STUDENT IN AN ORGANIZED HEALTH CARE EDUCATION/TRAINING PROGRAM

## 2023-01-13 PROCEDURE — 3288F FALL RISK ASSESSMENT DOCD: CPT | Mod: CPTII,S$GLB,, | Performed by: STUDENT IN AN ORGANIZED HEALTH CARE EDUCATION/TRAINING PROGRAM

## 2023-01-13 PROCEDURE — 1160F RVW MEDS BY RX/DR IN RCRD: CPT | Mod: CPTII,S$GLB,, | Performed by: STUDENT IN AN ORGANIZED HEALTH CARE EDUCATION/TRAINING PROGRAM

## 2023-01-13 PROCEDURE — 1157F PR ADVANCE CARE PLAN OR EQUIV PRESENT IN MEDICAL RECORD: ICD-10-PCS | Mod: CPTII,S$GLB,, | Performed by: STUDENT IN AN ORGANIZED HEALTH CARE EDUCATION/TRAINING PROGRAM

## 2023-01-13 PROCEDURE — 1101F PR PT FALLS ASSESS DOC 0-1 FALLS W/OUT INJ PAST YR: ICD-10-PCS | Mod: CPTII,S$GLB,, | Performed by: STUDENT IN AN ORGANIZED HEALTH CARE EDUCATION/TRAINING PROGRAM

## 2023-01-13 PROCEDURE — 99213 PR OFFICE/OUTPT VISIT, EST, LEVL III, 20-29 MIN: ICD-10-PCS | Mod: S$GLB,,, | Performed by: STUDENT IN AN ORGANIZED HEALTH CARE EDUCATION/TRAINING PROGRAM

## 2023-01-13 PROCEDURE — 1159F PR MEDICATION LIST DOCUMENTED IN MEDICAL RECORD: ICD-10-PCS | Mod: CPTII,S$GLB,, | Performed by: STUDENT IN AN ORGANIZED HEALTH CARE EDUCATION/TRAINING PROGRAM

## 2023-01-13 PROCEDURE — 1125F PR PAIN SEVERITY QUANTIFIED, PAIN PRESENT: ICD-10-PCS | Mod: CPTII,S$GLB,, | Performed by: STUDENT IN AN ORGANIZED HEALTH CARE EDUCATION/TRAINING PROGRAM

## 2023-01-13 PROCEDURE — 99213 OFFICE O/P EST LOW 20 MIN: CPT | Mod: S$GLB,,, | Performed by: STUDENT IN AN ORGANIZED HEALTH CARE EDUCATION/TRAINING PROGRAM

## 2023-01-13 PROCEDURE — 1157F ADVNC CARE PLAN IN RCRD: CPT | Mod: CPTII,S$GLB,, | Performed by: STUDENT IN AN ORGANIZED HEALTH CARE EDUCATION/TRAINING PROGRAM

## 2023-01-13 NOTE — PATIENT INSTRUCTIONS
Assessment:  Carmen Wang is a 77 y.o. female with a chief complaint of Follow-up (Bilateral hip pain s/p PT)      Encounter Diagnoses   Name Primary?    Sacroiliac joint dysfunction of both sides Yes    Bilateral piriformis syndrome     Lumbar spondylosis     Stage 3a chronic kidney disease         Plan:  Patient has had significant improvement in pain and functionality with PT the last couple of months, however she is still having pain and functional limitations that are bothersome to her  Still with signs, primarily at this time, of bilateral SI joint dysfunction  Recommend continued PT with focus on lumbar and pelvic strengthening and stabilization  Avoid NSAIDs given CKD 3A  Discussed the possibility of fluoroscopic guided SI joint injections if pain continues, but patient would like to defer at this time  New order for PT at  [facility]    Follow-up: 3 months or sooner if there are any problems between now and then.    Thank you for choosing [facility] and Dr. Jay Jay Gonzalez for your orthopedic & sports medicine care. It is our goal to provide you with exceptional care that will help keep you healthy, active, and get you back in the game.    Please do not hesitate to reach out to us via email, phone, or MyChart with any questions, concerns, or feedback.    If you are experiencing pain/discomfort ,or have questions after 5pm and would like to be connected to the [facility] on-call team, please call this number and specify which Sports Medicine provider is treating you: [phone]

## 2023-01-13 NOTE — PROGRESS NOTES
Patient ID: Carmen Wang  YOB: 1945  MRN: [id number]    Chief Complaint: Follow-up (Bilateral hip pain s/p PT)      Occupation: Retired    History of Present Illness: Carmen Wang is a 77 y.o. female who presents today with 4/10 bilateral hip pain at her follow up visit.    The pain began in early November 2022 without any specific cause. Describes the pain as a throbbing sensation that radiates to her calves. Went to urgent care on 11/8/22 where she received Robaxin to take 3 times a day which helped for a short period of time.      Initially evaluated in our office on 11/18/22.  Diagnosed with low back pain and sciatica with lumbar spondylosis, SI joint pain, and myofascial gluteal/piriformis pain. Treated with prednisone, Robaxin, Tylenol, epsom salt bath soaks.  NSAID's contraindicated due to CKD. Deferred PT due to severity of pain.     She reports that the Robaxin and Biofreeze seem to have helped her pain decrease somewhat.  She is able to move better than her previous visit.    Today, she reports further improvement in pain. She still describes intermittent throbbing when carrying objects and with prolonged standing. She takes tylenol as needed for relief. She is currently in physical therapy at [facility].     The patient is active in none.    ***    Past Medical History:   Past Medical History:   Diagnosis Date    Cataract     Chronic diarrhea     Hypertension     IBS (irritable bowel syndrome)      Past Surgical History:   Procedure Laterality Date    APPENDECTOMY      with the hysterectomy    BUNIONECTOMY      CHOLECYSTECTOMY      COLONOSCOPY N/A 10/5/2015    Procedure: COLONOSCOPY;  Surgeon: Teja Olivarez MD;  Location: [address];  Service: Endoscopy;  Laterality: N/A;    HYSTERECTOMY      RUPERT secondary to pelvic pain, sacrocolpoplexy    OPEN REDUCTION AND INTERNAL FIXATION (ORIF) OF FRACTURE OF DISTAL RADIUS Right 9/19/2019    Procedure: ORIF, FRACTURE,  RADIUS, DISTAL - right accumed;  Surgeon: Deejay Bragg MD;  Location: [facility] OR [address] [place] [address];  Service: Orthopedics;  Laterality: Right;     Family History   Problem Relation Age of Onset    Hypertension Mother     Arthritis Mother     Thyroid disease Mother     Glaucoma Mother     Arthritis Father     Hypertension Father     Thyroid disease Sister     Breast cancer Sister 76    Thyroid disease Sister     Thyroid disease Sister     No Known Problems Sister     No Known Problems Sister     No Known Problems Sister     Thyroid disease Brother     Diabetes Brother     Cancer Brother         prostate    Cancer Brother         prostate    No Known Problems Brother     No Known Problems Brother     No Known Problems Brother     No Known Problems Brother     Lupus Daughter     Arthritis Son     Hypertension Son     Hypertension Son     Glaucoma Maternal Aunt     Glaucoma Maternal Aunt     No Known Problems Maternal Uncle     No Known Problems Paternal Aunt     No Known Problems Paternal Uncle     Stroke Maternal Grandmother     No Known Problems Maternal Grandfather     No Known Problems Paternal Grandmother     No Known Problems Paternal Grandfather     Colon cancer Neg Hx     Ovarian cancer Neg Hx     Amblyopia Neg Hx     Blindness Neg Hx     Cataracts Neg Hx     Macular degeneration Neg Hx     Retinal detachment Neg Hx     Strabismus Neg Hx     Esophageal cancer Neg Hx      Social History     Socioeconomic History    Marital status:    Tobacco Use    Smoking status: Never    Smokeless tobacco: Never   Substance and Sexual Activity    Alcohol use: No    Drug use: No    Sexual activity: Not Currently     Partners: Male     Birth control/protection: Surgical     Social Determinants of Health     Financial Resource Strain: Low Risk     Difficulty of Paying Living Expenses: Not hard at all   Food Insecurity: No Food Insecurity    Worried About Running Out of Food in the Last Year: Never true    Ran Out of Food in the Last  Year: Never true   Transportation Needs: No Transportation Needs    Lack of Transportation (Medical): No    Lack of Transportation (Non-Medical): No   Physical Activity: Sufficiently Active    Days of Exercise per Week: 3 days    Minutes of Exercise per Session: 90 min   Stress: No Stress Concern Present    Feeling of Stress : Not at all   Social Connections: Socially Integrated    Frequency of Communication with Friends and Family: More than three times a week    Frequency of Social Gatherings with Friends and Family: Three times a week    Attends Hoahaoism Services: More than 4 times per year    Active Member of Clubs or Organizations: Yes    Attends Club or Organization Meetings: More than 4 times per year    Marital Status:    Housing Stability: Low Risk     Unable to Pay for Housing in the Last Year: No    Number of Places Lived in the Last Year: 1    Unstable Housing in the Last Year: No     Medication List with Changes/Refills   Current Medications    BENAZEPRIL (LOTENSIN) 20 MG TABLET    TAKE 1 TABLET BY MOUTH ONCE DAILY    CHOLECALCIFEROL, VITAMIN D3, (D3-2000) 2,000 UNIT CAP    Take 1 capsule (2,000 Units total) by mouth every other day.    FUROSEMIDE (LASIX) 20 MG TABLET    Take 1 tablet (20 mg total) by mouth once daily.    TRIAMTERENE-HYDROCHLOROTHIAZIDE 37.5-25 MG (DYAZIDE) 37.5-25 MG PER CAPSULE    TAKE 1 CAPSULE BY MOUTH IN  THE MORNING     Review of patient's allergies indicates:   Allergen Reactions    Codeine Hallucinations     Other reaction(s): Hallucinations    Penicillins Hives and Rash       Physical Exam:   There is no height or weight on file to calculate BMI.    Physical Exam      Detailed MSK exam:   Ortho/SPM Exam     ***    Imaging:  X-Ray Hips Bilateral 2 View Inc AP Pelvis  Narrative: EXAMINATION:  Five radiographic views of the HIP.    CLINICAL HISTORY:  Pain in right hip    TECHNIQUE:  5 radiographic views of the HIP    COMPARISON:  Pelvis radiograph  03/12/2018.    FINDINGS:  AP view of the pelvis with AP and lateral views of the bilateral hips demonstrate normal alignment.  There is no fracture.  There is mild osteoarthritis of the bilateral femoroacetabular joints.  There is no soft tissue swelling.  There are calcified phleboliths in the pelvis.  Impression: Mild osteoarthritis of the bilateral hips.    Electronically signed by: John Larson MD  Date:    11/18/2022  Time:    13:57    ***    Relevant imaging results were reviewed and interpreted by me and per my read ***.      This was discussed with the patient and / or family today.       There are no Patient Instructions on file for this visit.      Jay Jay Gonzalez MD  Primary Care Sports Medicine      Disclaimer: This note was prepared using a voice recognition system and is likely to have sound alike errors within the text.     I spent a total of *** minutes on the day of the visit.This includes face to face time and non-face to face time preparing to see the patient (eg, review of tests), obtaining and/or reviewing separately obtained history, documenting clinical information in the electronic or other health record, independently interpreting results and communicating results to the patient/family/caregiver, or care coordinator.

## 2023-01-13 NOTE — PROGRESS NOTES
Patient ID: Carmen Wang  YOB: 1945  MRN: [id number]    Chief Complaint: Follow-up (Bilateral hip pain s/p PT)      Occupation: Retired    History of Present Illness: Carmen Wang is a 77 y.o. female who presents today with 4/10 bilateral hip pain at her follow up visit.    The pain began in early November 2022 without any specific cause. Describes the pain as a throbbing sensation that radiates to her calves. Went to urgent care on 11/8/22 where she received Robaxin to take 3 times a day which helped for a short period of time.      Initially evaluated in our office on 11/18/22.  Diagnosed with low back pain and sciatica with lumbar spondylosis, SI joint pain, and myofascial gluteal/piriformis pain. Treated with prednisone, Robaxin, Tylenol, epsom salt bath soaks.  NSAID's contraindicated due to CKD. Deferred PT due to severity of pain.     She reports that the Robaxin and Biofreeze seem to have helped her pain decrease somewhat.  She is able to move better than her previous visit.    Today, she reports further improvement in pain. She still describes intermittent throbbing when carrying objects and with prolonged standing. She takes tylenol as needed for relief. She is currently in physical therapy at [facility]. Patient reports that her pain is greatest in the morning upon rising and that she feels it mainly in the gluteal region.  She states that she will do her exercises from physical therapy before getting out of bed and this helps decrease her symptoms.  She states that the pain during the day will generate in the lower gluteal area and tailbone region and that sitting will help alleviate this pain.      The patient is active in none.      Past Medical History:   Past Medical History:   Diagnosis Date    Cataract     Chronic diarrhea     Hypertension     IBS (irritable bowel syndrome)      Past Surgical History:   Procedure Laterality Date    APPENDECTOMY      with the hysterectomy     BUNIONECTOMY      CHOLECYSTECTOMY      COLONOSCOPY N/A 10/5/2015    Procedure: COLONOSCOPY;  Surgeon: Teja Olivarez MD;  Location: [facility] ENDO (4TH FLR);  Service: Endoscopy;  Laterality: N/A;    HYSTERECTOMY      RUPERT secondary to pelvic pain, sacrocolpoplexy    OPEN REDUCTION AND INTERNAL FIXATION (ORIF) OF FRACTURE OF DISTAL RADIUS Right 9/19/2019    Procedure: ORIF, FRACTURE, RADIUS, DISTAL - right accumed;  Surgeon: Deejay Bragg MD;  Location: [facility] OR 2ND FLR;  Service: Orthopedics;  Laterality: Right;     Family History   Problem Relation Age of Onset    Hypertension Mother     Arthritis Mother     Thyroid disease Mother     Glaucoma Mother     Arthritis Father     Hypertension Father     Thyroid disease Sister     Breast cancer Sister 76    Thyroid disease Sister     Thyroid disease Sister     No Known Problems Sister     No Known Problems Sister     No Known Problems Sister     Thyroid disease Brother     Diabetes Brother     Cancer Brother         prostate    Cancer Brother         prostate    No Known Problems Brother     No Known Problems Brother     No Known Problems Brother     No Known Problems Brother     Lupus Daughter     Arthritis Son     Hypertension Son     Hypertension Son     Glaucoma Maternal Aunt     Glaucoma Maternal Aunt     No Known Problems Maternal Uncle     No Known Problems Paternal Aunt     No Known Problems Paternal Uncle     Stroke Maternal Grandmother     No Known Problems Maternal Grandfather     No Known Problems Paternal Grandmother     No Known Problems Paternal Grandfather     Colon cancer Neg Hx     Ovarian cancer Neg Hx     Amblyopia Neg Hx     Blindness Neg Hx     Cataracts Neg Hx     Macular degeneration Neg Hx     Retinal detachment Neg Hx     Strabismus Neg Hx     Esophageal cancer Neg Hx      Social History     Socioeconomic History    Marital status:    Tobacco Use    Smoking status: Never    Smokeless tobacco: Never   Substance and Sexual Activity     Alcohol use: No    Drug use: No    Sexual activity: Not Currently     Partners: Male     Birth control/protection: Surgical     Social Determinants of Health     Financial Resource Strain: Low Risk     Difficulty of Paying Living Expenses: Not hard at all   Food Insecurity: No Food Insecurity    Worried About Running Out of Food in the Last Year: Never true    Ran Out of Food in the Last Year: Never true   Transportation Needs: No Transportation Needs    Lack of Transportation (Medical): No    Lack of Transportation (Non-Medical): No   Physical Activity: Sufficiently Active    Days of Exercise per Week: 3 days    Minutes of Exercise per Session: 90 min   Stress: No Stress Concern Present    Feeling of Stress : Not at all   Social Connections: Socially Integrated    Frequency of Communication with Friends and Family: More than three times a week    Frequency of Social Gatherings with Friends and Family: Three times a week    Attends [facility] Services: More than 4 times per year    Active Member of Clubs or Organizations: Yes    Attends Club or Organization Meetings: More than 4 times per year    Marital Status:    Housing Stability: Low Risk     Unable to Pay for Housing in the Last Year: No    Number of Places Lived in the Last Year: 1    Unstable Housing in the Last Year: No     Medication List with Changes/Refills   Current Medications    BENAZEPRIL (LOTENSIN) 20 MG TABLET    TAKE 1 TABLET BY MOUTH ONCE DAILY    CHOLECALCIFEROL, VITAMIN D3, (D3-2000) 2,000 UNIT CAP    Take 1 capsule (2,000 Units total) by mouth every other day.    FUROSEMIDE (LASIX) 20 MG TABLET    Take 1 tablet (20 mg total) by mouth once daily.    TRIAMTERENE-HYDROCHLOROTHIAZIDE 37.5-25 MG (DYAZIDE) 37.5-25 MG PER CAPSULE    TAKE 1 CAPSULE BY MOUTH IN  THE MORNING     Review of patient's allergies indicates:   Allergen Reactions    Codeine Hallucinations     Other reaction(s): Hallucinations    Penicillins Hives and Rash       Physical  Exam:   There is no height or weight on file to calculate BMI.    Physical Exam  Vitals reviewed.   Constitutional:       General: She is not in acute distress.     Appearance: Normal appearance.   HENT:      Head: Normocephalic and atraumatic.   Eyes:      Extraocular Movements: Extraocular movements intact.      Conjunctiva/sclera: Conjunctivae normal.   Pulmonary:      Effort: Pulmonary effort is normal. No respiratory distress.   Skin:     General: Skin is warm and dry.   Neurological:      General: No focal deficit present.      Mental Status: She is alert and oriented to person, place, and time.   Psychiatric:         Mood and Affect: Mood normal.         Detailed MSK exam:   Right Hip:    Inspection: Normal    Palpation tenderness:  SI joint, gluteal/piriformis region    Range of motion: Normal deg Flexion       45 deg IR       35 deg ER    Strength:  5/5 Flexion    5/5 Abduction    5/5 Adduction    Impingement Tests: Negative MARI      Negative FADIR    Other Tests:  Negative Log Roll  Negative Circumduction  Negative Piriformis     Negative Jace's             N/V Exam:  Tibial:    Normal sensory (plantar foot)  Normal motor (FHL)    Sup Peroneal:   Normal sensory (dorsal foot)  Normal motor (Peroneals)            Deep Peroneal:   Normal sensory (1st web space)  Normal motor (EHL)    Sural:   Normal sensory (lateral foot)   Saphenous:   Normal sensory (medial lower leg)   Normal pedal pulses, warm and well perfused with capillary refill < 2 sec      Left Hip:    Inspection: Normal    Palpation tenderness:  SI joint, gluteal/piriformis region    Range of motion: Normal deg Flexion       65 deg IR       35 deg ER    Strength:  5/5 Flexion    5/5 Abduction    5/5 Adduction    Impingement Tests:  Positive MARI     Negative FADIR    Other Tests:  Negative Log Roll  Negative Circumduction  Negative Piriformis     Negative Jace's          N/V Exam:  Tibial:    Normal sensory (plantar foot)  Normal motor  (FHL)    Sup Peroneal:   Normal sensory (dorsal foot)  Normal motor (Peroneals)            Deep Peroneal:   Normal sensory (1st web space)  Normal motor (EHL)    Sural:   Normal sensory (lateral foot)   Saphenous:   Normal sensory (medial lower leg)   Normal pedal pulses, warm and well perfused with capillary refill < 2 sec     Imaging:  X-Ray Hips Bilateral 2 View Inc AP Pelvis  Narrative: EXAMINATION:  Five radiographic views of the HIP.    CLINICAL HISTORY:  Pain in right hip    TECHNIQUE:  5 radiographic views of the HIP    COMPARISON:  Pelvis radiograph 03/12/2018.    FINDINGS:  AP view of the pelvis with AP and lateral views of the bilateral hips demonstrate normal alignment.  There is no fracture.  There is mild osteoarthritis of the bilateral femoroacetabular joints.  There is no soft tissue swelling.  There are calcified phleboliths in the pelvis.  Impression: Mild osteoarthritis of the bilateral hips.    Electronically signed by: John Larson MD  Date:    11/18/2022  Time:    13:57        This was discussed with the patient and / or family today.       Patient Instructions   Assessment:  Carmen Wang is a 77 y.o. female with a chief complaint of Follow-up (Bilateral hip pain s/p PT)      Encounter Diagnoses   Name Primary?    Sacroiliac joint dysfunction of both sides Yes    Bilateral piriformis syndrome     Lumbar spondylosis     Stage 3a chronic kidney disease         Plan:  Patient has had significant improvement in pain and functionality with PT the last couple of months, however she is still having pain and functional limitations that are bothersome to her  Still with signs, primarily at this time, of bilateral SI joint dysfunction  Recommend continued PT with focus on lumbar and pelvic strengthening and stabilization  Avoid NSAIDs given CKD 3A  Discussed the possibility of fluoroscopic guided SI joint injections if pain continues, but patient would like to defer at this time  [facility] order for PT at  [facility]    Follow-up: 3 months or sooner if there are any problems between now and then.    Thank you for choosing [facility] and Dr. Jya Jay Gonzalez for your orthopedic & sports medicine care. It is our goal to provide you with exceptional care that will help keep you healthy, active, and get you back in the game.    Please do not hesitate to reach out to us via email, phone, or MyChart with any questions, concerns, or feedback.    If you are experiencing pain/discomfort ,or have questions after 5pm and would like to be connected to the [facility] on-call team, please call this number and specify which Sports Medicine provider is treating you: [phone]         Jay Jay Gonzalez MD  Primary Care Sports Medicine      Disclaimer: This note was prepared using a voice recognition system and is likely to have sound alike errors within the text.

## 2023-01-15 ENCOUNTER — PATIENT MESSAGE (OUTPATIENT)
Dept: INTERNAL MEDICINE | Facility: CLINIC | Age: 78
End: 2023-01-15
Payer: MEDICARE

## 2023-01-15 DIAGNOSIS — Z12.31 ENCOUNTER FOR SCREENING MAMMOGRAM FOR BREAST CANCER: Primary | ICD-10-CM

## 2023-01-16 ENCOUNTER — PATIENT MESSAGE (OUTPATIENT)
Dept: INTERNAL MEDICINE | Facility: CLINIC | Age: 78
End: 2023-01-16
Payer: MEDICARE

## 2023-01-16 ENCOUNTER — DOCUMENTATION ONLY (OUTPATIENT)
Dept: INTERNAL MEDICINE | Facility: CLINIC | Age: 78
End: 2023-01-16
Payer: MEDICARE

## 2023-01-16 DIAGNOSIS — N18.31 STAGE 3A CHRONIC KIDNEY DISEASE: ICD-10-CM

## 2023-01-16 DIAGNOSIS — I10 ESSENTIAL HYPERTENSION: Primary | ICD-10-CM

## 2023-01-16 NOTE — PROGRESS NOTES
Internal medicine note    Test results reviewed with the patient.  Chronic kidney disease stage IIIA stable    Discussion regarding lipid status.  Given history of hypertension and chronic kidney disease, recommendation to low-dose a low intensity statin was discussed.  At present she would like to see what she can do the proper diet.  Return appointment in 6 months

## 2023-01-18 ENCOUNTER — HOSPITAL ENCOUNTER (OUTPATIENT)
Dept: RADIOLOGY | Facility: HOSPITAL | Age: 78
Discharge: HOME | End: 2023-01-18
Attending: INTERNAL MEDICINE
Payer: MEDICARE

## 2023-01-18 DIAGNOSIS — E04.2 MULTINODULAR THYROID: ICD-10-CM

## 2023-01-18 PROCEDURE — 76536 US SOFT TISSUE HEAD NECK THYROID: ICD-10-PCS | Mod: 26,,, | Performed by: STUDENT IN AN ORGANIZED HEALTH CARE EDUCATION/TRAINING PROGRAM

## 2023-01-18 PROCEDURE — 76536 US EXAM OF HEAD AND NECK: CPT | Mod: TC,PN

## 2023-01-18 PROCEDURE — 76536 US EXAM OF HEAD AND NECK: CPT | Mod: 26,,, | Performed by: STUDENT IN AN ORGANIZED HEALTH CARE EDUCATION/TRAINING PROGRAM

## 2023-01-23 ENCOUNTER — HOSPITAL ENCOUNTER (OUTPATIENT)
Dept: RADIOLOGY | Facility: HOSPITAL | Age: 78
Discharge: HOME | End: 2023-01-23
Attending: INTERNAL MEDICINE
Payer: MEDICARE

## 2023-01-23 DIAGNOSIS — Z12.31 ENCOUNTER FOR SCREENING MAMMOGRAM FOR BREAST CANCER: ICD-10-CM

## 2023-01-23 PROCEDURE — 77067 MAMMO DIGITAL SCREENING BILAT WITH TOMO: ICD-10-PCS | Mod: 26,,, | Performed by: RADIOLOGY

## 2023-01-23 PROCEDURE — 77067 SCR MAMMO BI INCL CAD: CPT | Mod: 26,,, | Performed by: RADIOLOGY

## 2023-01-23 PROCEDURE — 77067 SCR MAMMO BI INCL CAD: CPT | Mod: TC,PN

## 2023-01-23 PROCEDURE — 77063 MAMMO DIGITAL SCREENING BILAT WITH TOMO: ICD-10-PCS | Mod: 26,,, | Performed by: RADIOLOGY

## 2023-01-23 PROCEDURE — 77063 BREAST TOMOSYNTHESIS BI: CPT | Mod: 26,,, | Performed by: RADIOLOGY

## 2023-01-29 ENCOUNTER — PATIENT MESSAGE (OUTPATIENT)
Dept: INTERNAL MEDICINE | Facility: CLINIC | Age: 78
End: 2023-01-29
Payer: MEDICARE

## 2023-01-30 DIAGNOSIS — E04.1 THYROID NODULE: Primary | ICD-10-CM

## 2023-01-31 ENCOUNTER — TELEPHONE (OUTPATIENT)
Dept: INTERNAL MEDICINE | Facility: CLINIC | Age: 78
End: 2023-01-31
Payer: MEDICARE

## 2023-01-31 NOTE — TELEPHONE ENCOUNTER
Yes that is fine    Also she lives in his [place] I am not certain if there is any body in  [facility] or with the [place] [facility] [place]

## 2023-02-06 ENCOUNTER — PATIENT MESSAGE (OUTPATIENT)
Dept: INTERNAL MEDICINE | Facility: CLINIC | Age: 78
End: 2023-02-06
Payer: MEDICARE

## 2023-03-10 ENCOUNTER — PES CALL (OUTPATIENT)
Dept: ADMINISTRATIVE | Facility: CLINIC | Age: 78
End: 2023-03-10
Payer: MEDICARE

## 2023-04-12 ENCOUNTER — OFFICE VISIT (OUTPATIENT)
Dept: ENDOCRINOLOGY | Facility: CLINIC | Age: 78
End: 2023-04-12
Payer: MEDICARE

## 2023-04-12 VITALS
HEIGHT: 66 IN | HEART RATE: 86 BPM | WEIGHT: 184.19 LBS | BODY MASS INDEX: 29.6 KG/M2 | OXYGEN SATURATION: 96 % | SYSTOLIC BLOOD PRESSURE: 120 MMHG | DIASTOLIC BLOOD PRESSURE: 60 MMHG

## 2023-04-12 DIAGNOSIS — E04.2 MULTINODULAR THYROID: Primary | ICD-10-CM

## 2023-04-12 DIAGNOSIS — E83.52 HYPERCALCEMIA: ICD-10-CM

## 2023-04-12 DIAGNOSIS — E04.1 THYROID NODULE: ICD-10-CM

## 2023-04-12 DIAGNOSIS — E21.3 HYPERPARATHYROIDISM: ICD-10-CM

## 2023-04-12 PROCEDURE — 3074F SYST BP LT 130 MM HG: CPT | Mod: CPTII,S$GLB,, | Performed by: GENERAL ACUTE CARE HOSPITAL

## 2023-04-12 PROCEDURE — 99999 PR PBB SHADOW E&M-EST. PATIENT-LVL IV: ICD-10-PCS | Mod: PBBFAC,,, | Performed by: GENERAL ACUTE CARE HOSPITAL

## 2023-04-12 PROCEDURE — 1160F RVW MEDS BY RX/DR IN RCRD: CPT | Mod: CPTII,S$GLB,, | Performed by: GENERAL ACUTE CARE HOSPITAL

## 2023-04-12 PROCEDURE — 1101F PT FALLS ASSESS-DOCD LE1/YR: CPT | Mod: CPTII,S$GLB,, | Performed by: GENERAL ACUTE CARE HOSPITAL

## 2023-04-12 PROCEDURE — 1157F ADVNC CARE PLAN IN RCRD: CPT | Mod: CPTII,S$GLB,, | Performed by: GENERAL ACUTE CARE HOSPITAL

## 2023-04-12 PROCEDURE — 1101F PR PT FALLS ASSESS DOC 0-1 FALLS W/OUT INJ PAST YR: ICD-10-PCS | Mod: CPTII,S$GLB,, | Performed by: GENERAL ACUTE CARE HOSPITAL

## 2023-04-12 PROCEDURE — 99999 PR PBB SHADOW E&M-EST. PATIENT-LVL IV: CPT | Mod: PBBFAC,,, | Performed by: GENERAL ACUTE CARE HOSPITAL

## 2023-04-12 PROCEDURE — 1126F PR PAIN SEVERITY QUANTIFIED, NO PAIN PRESENT: ICD-10-PCS | Mod: CPTII,S$GLB,, | Performed by: GENERAL ACUTE CARE HOSPITAL

## 2023-04-12 PROCEDURE — 3288F FALL RISK ASSESSMENT DOCD: CPT | Mod: CPTII,S$GLB,, | Performed by: GENERAL ACUTE CARE HOSPITAL

## 2023-04-12 PROCEDURE — 1159F PR MEDICATION LIST DOCUMENTED IN MEDICAL RECORD: ICD-10-PCS | Mod: CPTII,S$GLB,, | Performed by: GENERAL ACUTE CARE HOSPITAL

## 2023-04-12 PROCEDURE — 1126F AMNT PAIN NOTED NONE PRSNT: CPT | Mod: CPTII,S$GLB,, | Performed by: GENERAL ACUTE CARE HOSPITAL

## 2023-04-12 PROCEDURE — 1159F MED LIST DOCD IN RCRD: CPT | Mod: CPTII,S$GLB,, | Performed by: GENERAL ACUTE CARE HOSPITAL

## 2023-04-12 PROCEDURE — 3074F PR MOST RECENT SYSTOLIC BLOOD PRESSURE < 130 MM HG: ICD-10-PCS | Mod: CPTII,S$GLB,, | Performed by: GENERAL ACUTE CARE HOSPITAL

## 2023-04-12 PROCEDURE — 3078F DIAST BP <80 MM HG: CPT | Mod: CPTII,S$GLB,, | Performed by: GENERAL ACUTE CARE HOSPITAL

## 2023-04-12 PROCEDURE — 3288F PR FALLS RISK ASSESSMENT DOCUMENTED: ICD-10-PCS | Mod: CPTII,S$GLB,, | Performed by: GENERAL ACUTE CARE HOSPITAL

## 2023-04-12 PROCEDURE — 99204 OFFICE O/P NEW MOD 45 MIN: CPT | Mod: S$GLB,,, | Performed by: GENERAL ACUTE CARE HOSPITAL

## 2023-04-12 PROCEDURE — 99204 PR OFFICE/OUTPT VISIT, NEW, LEVL IV, 45-59 MIN: ICD-10-PCS | Mod: S$GLB,,, | Performed by: GENERAL ACUTE CARE HOSPITAL

## 2023-04-12 PROCEDURE — 1160F PR REVIEW ALL MEDS BY PRESCRIBER/CLIN PHARMACIST DOCUMENTED: ICD-10-PCS | Mod: CPTII,S$GLB,, | Performed by: GENERAL ACUTE CARE HOSPITAL

## 2023-04-12 PROCEDURE — 3078F PR MOST RECENT DIASTOLIC BLOOD PRESSURE < 80 MM HG: ICD-10-PCS | Mod: CPTII,S$GLB,, | Performed by: GENERAL ACUTE CARE HOSPITAL

## 2023-04-12 PROCEDURE — 1157F PR ADVANCE CARE PLAN OR EQUIV PRESENT IN MEDICAL RECORD: ICD-10-PCS | Mod: CPTII,S$GLB,, | Performed by: GENERAL ACUTE CARE HOSPITAL

## 2023-04-12 NOTE — Clinical Note
Good morning Dr. Redman,   I saw Ms. 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 For confirmation we will need to stop HCTZ and re evaluate PTH and calcium levels after holding HCTZ for 3 months.  Since you have been managing her BP and she mentioned leg edema, I wanted to reach out before I held any medications. I was considering stopping HCTZ and increase Lasix to 40mg daily if needed for edema and BP.      Please let me know your thoughts and if any other recommendations.   Have a nice day.   Max

## 2023-04-12 NOTE — PATIENT INSTRUCTIONS
Your thyroid nodules are small and with no suspicious features which is good news.  We will repeat your ultrasound in 6-12 months.       Due to calcium and parathyroid hormone levels being elevated but taking Hydrochlorothiazide which could case elevation of calcium and parathyroid hormone levels.  I will recommend the following.    I will reach out to Dr. Redman to stop the Hydrochlorothiazide and adjust blood pressure medications.      Once you have been off the hydrochlorothiazide for 3 months we will send a 24hr urine calcium collection and the day you return the urine we will do blood work.     Hydration will water is important to avoid calcium levels from going up.    We will do a bone density scan.     We will plan for follow up in 3 months to discuss results.     I will contact you after I talk to Dr. Redman.     Have a nice day.     Dr. Pinedo

## 2023-04-12 NOTE — PROGRESS NOTES
Subjective:      Patient ID: Carmen Wang is a 77 y.o.    Chief Complaint:  Thyroid nodule, Hypercalcemia ; Initial visit     Patient Active Problem List   Diagnosis    Essential hypertension    Nuclear sclerosis - Both Eyes    Cortical senile cataract - Both Eyes    CKD (chronic kidney disease), stage III    Secondary hyperparathyroidism of renal origin    Obesity (BMI 30.0-34.9)    Aortic atherosclerosis    Multinodular thyroid        History of Present Illness  76 YO Female w/ medical history as above that presents to the endocrine clinic for initial evaluation of Thyroid nodules and hypercalcemia.        With regards to thyroid nodules:   -TSH  WNL     -Thyroid US 1/2023:   FINDINGS:  The thyroid is normal in size.  Right lobe of the thyroid measures 5.1 x 1.8 x 1.5 cm.  Left lobe of the thyroid measures 5.3 x 1.5 x 1.5 cm.  Isthmus measures up to 2.1 mm in thickness.     There is a 1.0 x 0.8 x 0.7 cm mixed cystic and solid nodule in the mid right lobe, unchanged.  There is a 0.5 x 0.5 x 0.6 cm mixed cystic and solid nodule nearby also in the right mid lobe.  There is a 1.0 x 0.9 x 0.7 cm isoechoic nodule in the lower right lobe, also unchanged.  There is a 0.4 x 0.3 x 0.4 cm cystic nodule in the left mid lobe.  There is a 1.1 x 0.7 x 1.0 cm cystic nodule with mild peripheral thickening and small internal septation in the left mid to lower lobe, previously measuring 0.6 x 0.4 x 0.6 cm.     Impression:     Slightly increased size of the cystic nodule in the left mid to lower lobe.  Remaining bilateral nodules are unchanged.  No new indication for FNA per ACR TI-RADS guidelines..     -FNA?  DENIES PRIOR   -Compressive symptoms?  DENIES     -History of radiation? No   -Family history of thyroid CA?   NO       With regards to hyperparathyroidism:           PTH MEDIATED HYPERCALCEMIA IN THE SETTING OF HCTZ AND CKD3.  Suspicious for PHPT but unable to confirm while patient is on HCTZ       -Vitamin D is WNL      ASYMPTOMATIC       -Family history of hypercalcemia? DENIES     -Calcium supplements?   Taking a MV     -Lithium?   NO     -GFR< 60  ckd 3   -Kidney stones?  DENIES     -DXA?  WNL IN 2020   -History of Fragility Fractures? DENIES               ROS:   As above    Objective:     LMP 01/01/1982 (Approximate)     There is no height or weight on file to calculate BMI.      Physical Exam  Vitals reviewed.   Constitutional:       General: She is not in acute distress.     Appearance: Normal appearance. She is well-developed. She is not ill-appearing.   HENT:      Nose: Nose normal. No rhinorrhea.      Mouth/Throat:      Mouth: Mucous membranes are moist.   Eyes:      Extraocular Movements: Extraocular movements intact.      Pupils: Pupils are equal, round, and reactive to light.      Comments: No proptosis, No lid lag, No conjunctival erythema    Neck:      Thyroid: No thyromegaly.      Trachea: No tracheal deviation.      Comments: No thyromegaly or Thyroid nodules palpated on exam   Cardiovascular:      Rate and Rhythm: Normal rate and regular rhythm.      Pulses: Normal pulses.   Pulmonary:      Effort: Pulmonary effort is normal.      Breath sounds: Normal breath sounds.   Abdominal:      Palpations: Abdomen is soft. There is no mass.      Tenderness: There is no abdominal tenderness.      Hernia: No hernia is present.      Comments: No scar tissue, No Violaceous striae    Musculoskeletal:         General: No swelling.      Cervical back: Neck supple. No tenderness.      Right lower leg: No edema.      Left lower leg: No edema.   Skin:     General: Skin is warm.      Findings: No rash.   Neurological:      General: No focal deficit present.      Mental Status: She is alert and oriented to person, place, and time.   Psychiatric:         Mood and Affect: Mood normal.         Judgment: Judgment normal.              Lab Review:   No results found for: HGBA1C  Lab Results   Component Value Date    CHOL 195 01/11/2023     HDL 49 01/11/2023    LDLCALC 130.4 01/11/2023    TRIG 78 01/11/2023    CHOLHDL 25.1 01/11/2023     Lab Results   Component Value Date     01/11/2023    K 3.7 01/11/2023     01/11/2023    CO2 28 01/11/2023    GLU 83 01/11/2023    BUN 18 01/11/2023    CREATININE 1.2 01/11/2023    CALCIUM 10.5 01/11/2023    PROT 7.1 01/11/2023    ALBUMIN 3.5 01/11/2023    BILITOT 0.7 01/11/2023    ALKPHOS 56 01/11/2023    AST 19 01/11/2023    ALT 14 01/11/2023    ANIONGAP 10 01/11/2023    ESTGFRAFRICA 50.7 (A) 05/16/2022    EGFRNONAA 44.0 (A) 05/16/2022    TSH 1.568 01/11/2023     Vit D, 25-Hydroxy   Date Value Ref Range Status   06/07/2021 44 30 - 96 ng/mL Final     Comment:     Vitamin D deficiency.........<10 ng/mL                              Vitamin D insufficiency......10-29 ng/mL       Vitamin D sufficiency........> or equal to 30 ng/mL  Vitamin D toxicity............>100 ng/mL         Assessment and Plan     Multinodular thyroid         Small bilateral thyroid cystic nodules     None of the nodules meet criteria for FNA     Repeat thyroid US in 1yr           Hyperparathyroidism  Hypercalcemia     PTH MEDIATED HYPERCALCEMIA IN THE SETTING OF HCTZ AND CKD3.  Suspicious for PHPT but unable to confirm while patient is on HCTZ       Will stop HCTZ and repeat PTH and calcium levels in 3 months after holding HCTZ     If patient is having PHPT she meets surgical criteria due to hypercalcemia and CKD     -     DXA Bone Density Axial Skeleton 1 or more sites; Future; Expected date: 04/12/2023  -     Calcium, Timed Urine; Future  -     Creatinine Clearance, Timed; Future  -     PTH, Intact; Future; Expected date: 04/26/2023  -     Renal Function Panel; Future; Expected date: 04/12/2023  -     Vitamin D; Future; Expected date: 04/12/2023

## 2023-04-18 ENCOUNTER — APPOINTMENT (OUTPATIENT)
Dept: RADIOLOGY | Facility: HOSPITAL | Age: 78
End: 2023-04-18
Attending: GENERAL ACUTE CARE HOSPITAL
Payer: MEDICARE

## 2023-04-18 DIAGNOSIS — E21.3 HYPERPARATHYROIDISM: ICD-10-CM

## 2023-04-18 DIAGNOSIS — E83.52 HYPERCALCEMIA: ICD-10-CM

## 2023-04-18 PROCEDURE — 77080 DXA BONE DENSITY AXIAL SKELETON 1 OR MORE SITES: ICD-10-PCS | Mod: 26,,, | Performed by: RADIOLOGY

## 2023-04-18 PROCEDURE — 77080 DXA BONE DENSITY AXIAL: CPT | Mod: 26,,, | Performed by: RADIOLOGY

## 2023-04-18 PROCEDURE — 77080 DXA BONE DENSITY AXIAL: CPT | Mod: TC

## 2023-04-19 ENCOUNTER — PATIENT MESSAGE (OUTPATIENT)
Dept: ENDOCRINOLOGY | Facility: CLINIC | Age: 78
End: 2023-04-19
Payer: MEDICARE

## 2023-04-20 ENCOUNTER — TELEPHONE (OUTPATIENT)
Dept: ENDOCRINOLOGY | Facility: CLINIC | Age: 78
End: 2023-04-20
Payer: MEDICARE

## 2023-04-20 NOTE — TELEPHONE ENCOUNTER
----- Message from Mojgan Griffith MA sent at 4/19/2023  6:06 PM CDT -----    ----- Message -----  From: Steven Nix MD  Sent: 4/19/2023   1:23 PM CDT  To: [facility] Staff    Please inform patient her bone density is normal.  NO osteoporosis.     Thank you

## 2023-04-26 ENCOUNTER — PATIENT MESSAGE (OUTPATIENT)
Dept: ENDOCRINOLOGY | Facility: CLINIC | Age: 78
End: 2023-04-26
Payer: MEDICARE

## 2023-05-08 ENCOUNTER — PES CALL (OUTPATIENT)
Dept: ADMINISTRATIVE | Facility: CLINIC | Age: 78
End: 2023-05-08
Payer: MEDICARE

## 2023-05-08 DIAGNOSIS — N18.30 STAGE 3 CHRONIC KIDNEY DISEASE, UNSPECIFIED WHETHER STAGE 3A OR 3B CKD: Primary | ICD-10-CM

## 2023-05-16 ENCOUNTER — OFFICE VISIT (OUTPATIENT)
Dept: OTOLARYNGOLOGY | Facility: CLINIC | Age: 78
End: 2023-05-16
Payer: MEDICARE

## 2023-05-16 VITALS — WEIGHT: 191.81 LBS | TEMPERATURE: 98 F | BODY MASS INDEX: 30.82 KG/M2 | HEIGHT: 66 IN

## 2023-05-16 DIAGNOSIS — H61.21 IMPACTED CERUMEN OF RIGHT EAR: Primary | ICD-10-CM

## 2023-05-16 PROCEDURE — 99999 PR PBB SHADOW E&M-EST. PATIENT-LVL III: ICD-10-PCS | Mod: PBBFAC,,, | Performed by: OTOLARYNGOLOGY

## 2023-05-16 PROCEDURE — 99999 PR PBB SHADOW E&M-EST. PATIENT-LVL III: CPT | Mod: PBBFAC,,, | Performed by: OTOLARYNGOLOGY

## 2023-05-16 PROCEDURE — 69210 REMOVE IMPACTED EAR WAX UNI: CPT | Mod: ,,, | Performed by: OTOLARYNGOLOGY

## 2023-05-16 PROCEDURE — 69210 PR REMOVAL IMPACTED CERUMEN REQUIRING INSTRUMENTATION, UNILATERAL: ICD-10-PCS | Mod: ,,, | Performed by: OTOLARYNGOLOGY

## 2023-05-16 PROCEDURE — 99499 NO LOS: ICD-10-PCS | Mod: ,,, | Performed by: OTOLARYNGOLOGY

## 2023-05-16 PROCEDURE — 99499 UNLISTED E&M SERVICE: CPT | Mod: ,,, | Performed by: OTOLARYNGOLOGY

## 2023-05-16 NOTE — PROGRESS NOTES
REFERRING PROVIDER  Aaareferral Self  No address on file  Subjective:   Patient: Carmen Wang [id number], :1945   Visit date:2023 2:22 PM    Chief Complaint:  right ear fullness (Pt states has been going on x1 month. States feels as if the hearing aide is hitting something when places in her ear)        HPI:     Carmen Wang is a 78 y.o. female whom I am asked to see for evaluation of otalgia or hearing loss in the right ear for the past 1-4 weeks.   Carmen rates the severity as moderate.  No exacerbating or relieving factors.  There is no drainage from the ears.      Her meds, allergies, medical, surgical, social & family histories were reviewed & updated:  -     She has a current medication list which includes the following prescription(s): benazepril, cholecalciferol (vitamin d3), furosemide, and triamterene-hydrochlorothiazide 37.5-25 mg.  -     She  has a past medical history of Cataract, Chronic diarrhea, Hypertension, and IBS (irritable bowel syndrome).   -     She does not have any pertinent problems on file.   -     She  has a past surgical history that includes Cholecystectomy; Bunionectomy; Hysterectomy; Appendectomy; Colonoscopy (N/A, 10/05/2015); and Open reduction and internal fixation (ORIF) of fracture of distal radius (Right, 2019).  -     She  reports that she has never smoked. She has never used smokeless tobacco. She reports that she does not drink alcohol and does not use drugs.  -     Her family history includes Arthritis in her father, mother, and son; Breast cancer (age of onset: 76) in her sister; Cancer in her brother and brother; Diabetes in her brother; Glaucoma in her maternal aunt, maternal aunt, and mother; Hypertension in her father, mother, son, and son; Lupus in her daughter; No Known Problems in her brother, brother, brother, brother, maternal grandfather, maternal uncle, paternal aunt, paternal grandfather, paternal grandmother, paternal uncle, sister,  "sister, and sister; Stroke in her maternal grandmother; Thyroid disease in her brother, mother, sister, sister, and sister.  -     She is allergic to codeine and penicillins.      Review of Systems:  -     Allergic/Immunologic: is allergic to codeine and penicillins..  -     Constitutional: Current temp: 97.9 °F (36.6 °C) (Temporal)        Objective:     Physical Exam:  Vitals:  Temp 97.9 °F (36.6 °C) (Temporal)   Ht 5' 5.5" (1.664 m)   Wt 87 kg (191 lb 12.8 oz)   LMP 01/01/1982 (Approximate)   BMI 31.43 kg/m²   Communication:  Able to communicate, no hoarseness.  Head & Face:  Normocephalic, atraumatic, no sinus tenderness.  Eyes:  Extraocular motions intact.  Ears:  Otoscopy of external auditory canals reveals impaction of right ear canals.  With the patient in the supine position, we used the operating microscope to examine both ears with the appropriate sized ear speculum.  A variety of sterile, micro-instruments were utilized to remove the cerumen atraumatically from the impacted ear(s).   After removal, the ears were reexamined-  Right Ear:  No mass/lesion of auricle. The external auditory canals is without erythema or discharge. Pneumatic otoscopy of the tympanic membrane revealed no perforation and good mobility, with no fluid in middle ear. Clinical speech reception thresholds grossly normal.  Left Ear:  No mass/lesion of auricle. The external auditory canals is without erythema or discharge. Pneumatic otoscopy of the tympanic membrane revealed no perforation and good mobility, with no fluid in middle ear. Clinical speech reception thresholds grossly normal  Nose:  No masses/lesions of external nose, nasal mucosa, septum, and turbinates were within normal limits.  Mouth:  No mass/lesion of lips, teeth, gums, hard/soft palate, tongue, tonsils, or oropharynx.  Neck & Lymphatics:  No cervical lymphadenopathy, no neck mass/crepitus/ asymmetry, trachea is midline, no thyroid " enlargement/tenderness/mass.  Neuro/Psych: Alert with normal mood and affect.   Respiration/Chest:  Symmetric expansion during respiration, normal respiratory effort.  Skin:  Warm and intact.    Assessment & Plan:       -     Cerumen Impaction - Carmen has cerumen impaction.  We discussed preventative measures and treatment options.  Q-tips must be avoided, instead the ears can be cleaned with OTC ear rinses (or a mixture of alcohol & vinegar in equal parts).   For hard wax, Carmen may place mineral oil/baby oil in the ear with a cotton ball at night and remove in the shower.  This will assist in softening the wax and allow it to drain out on its own. If the cerumen impacts the ear canal and causes hearing loss or infection she needs to follow-up in the clinic for treatment and cleaning.

## 2023-05-17 ENCOUNTER — LAB VISIT (OUTPATIENT)
Dept: LAB | Facility: HOSPITAL | Age: 78
End: 2023-05-17
Attending: INTERNAL MEDICINE
Payer: MEDICARE

## 2023-05-17 DIAGNOSIS — N18.30 STAGE 3 CHRONIC KIDNEY DISEASE, UNSPECIFIED WHETHER STAGE 3A OR 3B CKD: ICD-10-CM

## 2023-05-17 LAB
ALBUMIN SERPL BCP-MCNC: 3.4 G/DL (ref 3.5–5.2)
ANION GAP SERPL CALC-SCNC: 9 MMOL/L (ref 8–16)
BUN SERPL-MCNC: 15 MG/DL (ref 8–23)
CALCIUM SERPL-MCNC: 9.9 MG/DL (ref 8.7–10.5)
CHLORIDE SERPL-SCNC: 105 MMOL/L (ref 95–110)
CO2 SERPL-SCNC: 26 MMOL/L (ref 23–29)
CREAT SERPL-MCNC: 1.1 MG/DL (ref 0.5–1.4)
EST. GFR  (NO RACE VARIABLE): 51.8 ML/MIN/1.73 M^2
GLUCOSE SERPL-MCNC: 83 MG/DL (ref 70–110)
HCT VFR BLD AUTO: 32.7 % (ref 37–48.5)
HGB BLD-MCNC: 10 G/DL (ref 12–16)
PHOSPHATE SERPL-MCNC: 3.1 MG/DL (ref 2.7–4.5)
POTASSIUM SERPL-SCNC: 4.1 MMOL/L (ref 3.5–5.1)
PTH-INTACT SERPL-MCNC: 95.6 PG/ML (ref 9–77)
SODIUM SERPL-SCNC: 140 MMOL/L (ref 136–145)

## 2023-05-17 PROCEDURE — 85014 HEMATOCRIT: CPT | Performed by: INTERNAL MEDICINE

## 2023-05-17 PROCEDURE — 36415 COLL VENOUS BLD VENIPUNCTURE: CPT | Mod: PN | Performed by: INTERNAL MEDICINE

## 2023-05-17 PROCEDURE — 83970 ASSAY OF PARATHORMONE: CPT | Performed by: INTERNAL MEDICINE

## 2023-05-17 PROCEDURE — 80069 RENAL FUNCTION PANEL: CPT | Performed by: INTERNAL MEDICINE

## 2023-05-17 PROCEDURE — 85018 HEMOGLOBIN: CPT | Performed by: INTERNAL MEDICINE

## 2023-05-18 ENCOUNTER — PATIENT MESSAGE (OUTPATIENT)
Dept: NEPHROLOGY | Facility: CLINIC | Age: 78
End: 2023-05-18

## 2023-05-18 ENCOUNTER — OFFICE VISIT (OUTPATIENT)
Dept: NEPHROLOGY | Facility: CLINIC | Age: 78
End: 2023-05-18
Payer: MEDICARE

## 2023-05-18 DIAGNOSIS — N18.31 STAGE 3A CHRONIC KIDNEY DISEASE: Primary | ICD-10-CM

## 2023-05-18 DIAGNOSIS — I10 HYPERTENSION, UNSPECIFIED TYPE: ICD-10-CM

## 2023-05-18 PROCEDURE — 99214 PR OFFICE/OUTPT VISIT, EST, LEVL IV, 30-39 MIN: ICD-10-PCS | Mod: 95,,, | Performed by: INTERNAL MEDICINE

## 2023-05-18 PROCEDURE — 1157F ADVNC CARE PLAN IN RCRD: CPT | Mod: CPTII,95,, | Performed by: INTERNAL MEDICINE

## 2023-05-18 PROCEDURE — 1157F PR ADVANCE CARE PLAN OR EQUIV PRESENT IN MEDICAL RECORD: ICD-10-PCS | Mod: CPTII,95,, | Performed by: INTERNAL MEDICINE

## 2023-05-18 PROCEDURE — 99214 OFFICE O/P EST MOD 30 MIN: CPT | Mod: 95,,, | Performed by: INTERNAL MEDICINE

## 2023-05-18 NOTE — PROGRESS NOTES
HISTORY OF PRESENT ILLNESS:  This is a 78-year-old -American female with   longstanding history of hypertension, irritable bowel syndrome and CKD stage   III, who is coming in for f/u.  The patient states her blood pressures   are fairly well controlled in the 120's-130's/70's.   No recent hospitalizations.  Denies NSAID's. Has recently had slightly more edema after endo d/shahnaz her HCTZ but increased lasix.    PAST MEDICAL HISTORY:  Significant for hypertension for five years, CKD stage   III, IBS.    REVIEW OF SYSTEMS:  She denies any frequency, urgency, hematuria, dysuria,   nausea, vomiting, chest pain or shortness of breath, diahrrea, N/V. Good energy.  Weight stable and good apetite. Sciatic pain.    PHYSICAL EXAMINATION:none  GENERAL:  Well-nourished, well-developed individual, in no acute distress.  EXTREMITIES:  Trace edema in the ankles  PSYCHIATRIC:  Alert and oriented x3.  The patient has good judgment and insight.    ASSESSMENT AND PLAN:  This is a 78-year-old -American female with   longstanding history of hypertension and chronic kidney disease stage III, who   is coming in for f/u.  1.  Chronic kidney disease stage III with an MDRD GFR of 51 mL per minute.  Her   baseline creatinine appears to be around 1.1 to 1.3 and occasionally lower and   higher with the most recent creatinine of 1.1.    Hydrate. Her CKD is likely secondary to   longstanding hypertension and age-related nephron loss.    I advised her to stay away from NSAIDs and to hydrate well.  She understands the importance of hydration.  On  benazepril 20 mg for nephro-protection.   triamterene and hydrochlorothiazide was held by abi for hypercalcemia and will be reassessed-?primary hyperparathyroidism.   Aldosterone , renin levels stable. Potassium on the lower side and occ alkalosis.   2.  Hypertension.  Blood pressures are stable.   She will monitor blood pressures, currently stable, she   will notify us if they are  high.  3.  Renal osteodystrophy.   intact PTH stable .  Ca borderline high/phos stable.  Her calciums are borderline high and is not taking calcium supplements.  Triamterene and hydrochlorothiazide was held by abi for hypercalcemia and will be reassessed-?primary hyperparathyroidism,  SPEP stable.  4.  Anemia.  Hemoglobin is stable.  She also had a  ultrasound, which was stable with a simple cyst.    RTC in 6 months.    The patient location is:home  The chief complaint leading to consultation is: ckd    Visit type: audiovisual    Face to Face time with patient: 15 minutes of total time spent on the encounter, which includes face to face time and non-face to face time preparing to see the patient (eg, review of tests), Obtaining and/or reviewing separately obtained history, Documenting clinical information in the electronic or other health record, Independently interpreting results (not separately reported) and communicating results to the patient/family/caregiver, or Care coordination (not separately reported).         Each patient to whom he or she provides medical services by telemedicine is:  (1) informed of the relationship between the physician and patient and the respective role of any other health care provider with respect to management of the patient; and (2) notified that he or she may decline to receive medical services by telemedicine and may withdraw from such care at any time.

## 2023-06-20 ENCOUNTER — OFFICE VISIT (OUTPATIENT)
Dept: OPHTHALMOLOGY | Facility: CLINIC | Age: 78
End: 2023-06-20
Payer: MEDICARE

## 2023-06-20 DIAGNOSIS — H43.393 VITREOUS SYNERESIS OF BOTH EYES: Primary | ICD-10-CM

## 2023-06-20 DIAGNOSIS — H52.4 ASTIGMATISM OF BOTH EYES WITH PRESBYOPIA: ICD-10-CM

## 2023-06-20 DIAGNOSIS — H43.812 POSTERIOR VITREOUS DETACHMENT OF LEFT EYE: ICD-10-CM

## 2023-06-20 DIAGNOSIS — H52.203 ASTIGMATISM OF BOTH EYES WITH PRESBYOPIA: ICD-10-CM

## 2023-06-20 DIAGNOSIS — H04.123 DRY EYE SYNDROME, BILATERAL: ICD-10-CM

## 2023-06-20 DIAGNOSIS — H25.13 NUCLEAR SCLEROTIC CATARACT OF BOTH EYES: ICD-10-CM

## 2023-06-20 PROCEDURE — 99999 PR PBB SHADOW E&M-EST. PATIENT-LVL II: ICD-10-PCS | Mod: PBBFAC,,, | Performed by: OPTOMETRIST

## 2023-06-20 PROCEDURE — 92014 PR EYE EXAM, EST PATIENT,COMPREHESV: ICD-10-PCS | Mod: S$GLB,,, | Performed by: OPTOMETRIST

## 2023-06-20 PROCEDURE — 92014 COMPRE OPH EXAM EST PT 1/>: CPT | Mod: S$GLB,,, | Performed by: OPTOMETRIST

## 2023-06-20 PROCEDURE — 92015 PR REFRACTION: ICD-10-PCS | Mod: S$GLB,,, | Performed by: OPTOMETRIST

## 2023-06-20 PROCEDURE — 1159F PR MEDICATION LIST DOCUMENTED IN MEDICAL RECORD: ICD-10-PCS | Mod: CPTII,S$GLB,, | Performed by: OPTOMETRIST

## 2023-06-20 PROCEDURE — 99999 PR PBB SHADOW E&M-EST. PATIENT-LVL II: CPT | Mod: PBBFAC,,, | Performed by: OPTOMETRIST

## 2023-06-20 PROCEDURE — 92015 DETERMINE REFRACTIVE STATE: CPT | Mod: S$GLB,,, | Performed by: OPTOMETRIST

## 2023-06-20 PROCEDURE — 1159F MED LIST DOCD IN RCRD: CPT | Mod: CPTII,S$GLB,, | Performed by: OPTOMETRIST

## 2023-06-20 PROCEDURE — 1157F ADVNC CARE PLAN IN RCRD: CPT | Mod: CPTII,S$GLB,, | Performed by: OPTOMETRIST

## 2023-06-20 PROCEDURE — 1157F PR ADVANCE CARE PLAN OR EQUIV PRESENT IN MEDICAL RECORD: ICD-10-PCS | Mod: CPTII,S$GLB,, | Performed by: OPTOMETRIST

## 2023-06-20 NOTE — PROGRESS NOTES
HPI    Patient here today for yearly eye exam  Vision changes since last eye exam?: Yes trouble focusing  Blurred vision   Wearing PAL glasses full-time     Any eye pain today: No    Other ocular symptoms: No    Interested in contact lens fitting today? No    1. NSC OU  2. PVD OU      Last edited by Angelika Smith, PCT on 6/20/2023  9:52 AM.            Assessment /Plan     For exam results, see Encounter Report.      Posterior vitreous detachment of left eye  The nature of posterior vitreous detachment was discussed with the patient.  Signs and symptoms of retinal detachment were discussed with patient.   No holes or tears were noted upon careful retinal examination after dilation today.   Return to clinic as soon as possible (same day) if you notice any new floaters, flashes of light, curtain/veil over your vision from any direction, or any change in vision.    Nuclear sclerotic cataract of both eyes  Cataracts are not visually significant and not affecting activities of daily living. Annual observation is recommended at this time. Patient to call or RTC with any significant change in vision prior to next visit.    Dry eye syndrome, bilateral  Discussed warm compresses twice daily and lid hygiene, with preservative free artificial tears instillation four times daily. Patient to return to clinic if no improvement in symptoms with current treatment.     Astigmatism of both eyes with presbyopia  Eyeglass Final Rx       Eyeglass Final Rx         Sphere Cylinder Axis Add    Right +0.75 +1.25 175 +2.50    Left -0.50 +1.50 177 +2.50      Type: PAL    Expiration Date: 6/20/2024                      RTC 1 yr for dilated eye exam or sooner if any changes to vision.   Discussed above and answered questions.

## 2023-06-29 ENCOUNTER — PES CALL (OUTPATIENT)
Dept: ADMINISTRATIVE | Facility: CLINIC | Age: 78
End: 2023-06-29
Payer: MEDICARE

## 2023-07-13 ENCOUNTER — LAB VISIT (OUTPATIENT)
Dept: LAB | Facility: HOSPITAL | Age: 78
End: 2023-07-13
Attending: INTERNAL MEDICINE
Payer: MEDICARE

## 2023-07-13 DIAGNOSIS — E21.3 HYPERPARATHYROIDISM: ICD-10-CM

## 2023-07-13 DIAGNOSIS — E83.52 HYPERCALCEMIA: ICD-10-CM

## 2023-07-13 DIAGNOSIS — I10 ESSENTIAL HYPERTENSION: ICD-10-CM

## 2023-07-13 DIAGNOSIS — E04.2 MULTINODULAR THYROID: ICD-10-CM

## 2023-07-13 DIAGNOSIS — N18.31 STAGE 3A CHRONIC KIDNEY DISEASE: ICD-10-CM

## 2023-07-13 LAB
25(OH)D3+25(OH)D2 SERPL-MCNC: 57 NG/ML (ref 30–96)
ALBUMIN SERPL BCP-MCNC: 3.6 G/DL (ref 3.5–5.2)
ANION GAP SERPL CALC-SCNC: 9 MMOL/L (ref 8–16)
ANION GAP SERPL CALC-SCNC: 9 MMOL/L (ref 8–16)
BASOPHILS # BLD AUTO: 0.03 K/UL (ref 0–0.2)
BASOPHILS NFR BLD: 0.5 % (ref 0–1.9)
BUN SERPL-MCNC: 15 MG/DL (ref 8–23)
BUN SERPL-MCNC: 15 MG/DL (ref 8–23)
CALCIUM SERPL-MCNC: 10.7 MG/DL (ref 8.7–10.5)
CALCIUM SERPL-MCNC: 10.7 MG/DL (ref 8.7–10.5)
CHLORIDE SERPL-SCNC: 102 MMOL/L (ref 95–110)
CHLORIDE SERPL-SCNC: 102 MMOL/L (ref 95–110)
CHOLEST SERPL-MCNC: 188 MG/DL (ref 120–199)
CHOLEST/HDLC SERPL: 3.4 {RATIO} (ref 2–5)
CO2 SERPL-SCNC: 27 MMOL/L (ref 23–29)
CO2 SERPL-SCNC: 27 MMOL/L (ref 23–29)
CREAT SERPL-MCNC: 1.2 MG/DL (ref 0.5–1.4)
CREAT SERPL-MCNC: 1.2 MG/DL (ref 0.5–1.4)
DIFFERENTIAL METHOD: ABNORMAL
EOSINOPHIL # BLD AUTO: 0.3 K/UL (ref 0–0.5)
EOSINOPHIL NFR BLD: 5 % (ref 0–8)
ERYTHROCYTE [DISTWIDTH] IN BLOOD BY AUTOMATED COUNT: 16.7 % (ref 11.5–14.5)
EST. GFR  (NO RACE VARIABLE): 46.6 ML/MIN/1.73 M^2
EST. GFR  (NO RACE VARIABLE): 46.6 ML/MIN/1.73 M^2
GLUCOSE SERPL-MCNC: 83 MG/DL (ref 70–110)
GLUCOSE SERPL-MCNC: 83 MG/DL (ref 70–110)
HCT VFR BLD AUTO: 38.6 % (ref 37–48.5)
HDLC SERPL-MCNC: 56 MG/DL (ref 40–75)
HDLC SERPL: 29.8 % (ref 20–50)
HGB BLD-MCNC: 11.6 G/DL (ref 12–16)
IMM GRANULOCYTES # BLD AUTO: 0.01 K/UL (ref 0–0.04)
IMM GRANULOCYTES NFR BLD AUTO: 0.2 % (ref 0–0.5)
LDLC SERPL CALC-MCNC: 117.8 MG/DL (ref 63–159)
LYMPHOCYTES # BLD AUTO: 2.7 K/UL (ref 1–4.8)
LYMPHOCYTES NFR BLD: 42.1 % (ref 18–48)
MCH RBC QN AUTO: 22 PG (ref 27–31)
MCHC RBC AUTO-ENTMCNC: 30.1 G/DL (ref 32–36)
MCV RBC AUTO: 73 FL (ref 82–98)
MONOCYTES # BLD AUTO: 0.7 K/UL (ref 0.3–1)
MONOCYTES NFR BLD: 11.2 % (ref 4–15)
NEUTROPHILS # BLD AUTO: 2.6 K/UL (ref 1.8–7.7)
NEUTROPHILS NFR BLD: 41 % (ref 38–73)
NONHDLC SERPL-MCNC: 132 MG/DL
NRBC BLD-RTO: 0 /100 WBC
PHOSPHATE SERPL-MCNC: 3 MG/DL (ref 2.7–4.5)
PLATELET # BLD AUTO: 316 K/UL (ref 150–450)
PMV BLD AUTO: 12.4 FL (ref 9.2–12.9)
POTASSIUM SERPL-SCNC: 3.9 MMOL/L (ref 3.5–5.1)
POTASSIUM SERPL-SCNC: 3.9 MMOL/L (ref 3.5–5.1)
PTH-INTACT SERPL-MCNC: 84.4 PG/ML (ref 9–77)
RBC # BLD AUTO: 5.28 M/UL (ref 4–5.4)
SODIUM SERPL-SCNC: 138 MMOL/L (ref 136–145)
SODIUM SERPL-SCNC: 138 MMOL/L (ref 136–145)
TRIGL SERPL-MCNC: 71 MG/DL (ref 30–150)
TSH SERPL DL<=0.005 MIU/L-ACNC: 2.09 UIU/ML (ref 0.4–4)
WBC # BLD AUTO: 6.42 K/UL (ref 3.9–12.7)

## 2023-07-13 PROCEDURE — 36415 COLL VENOUS BLD VENIPUNCTURE: CPT | Mod: PN | Performed by: GENERAL ACUTE CARE HOSPITAL

## 2023-07-13 PROCEDURE — 82306 VITAMIN D 25 HYDROXY: CPT | Performed by: GENERAL ACUTE CARE HOSPITAL

## 2023-07-13 PROCEDURE — 83970 ASSAY OF PARATHORMONE: CPT | Performed by: GENERAL ACUTE CARE HOSPITAL

## 2023-07-13 PROCEDURE — 85025 COMPLETE CBC W/AUTO DIFF WBC: CPT | Performed by: INTERNAL MEDICINE

## 2023-07-13 PROCEDURE — 80061 LIPID PANEL: CPT | Performed by: INTERNAL MEDICINE

## 2023-07-13 PROCEDURE — 80069 RENAL FUNCTION PANEL: CPT | Performed by: GENERAL ACUTE CARE HOSPITAL

## 2023-07-13 PROCEDURE — 84443 ASSAY THYROID STIM HORMONE: CPT | Performed by: GENERAL ACUTE CARE HOSPITAL

## 2023-07-14 ENCOUNTER — PATIENT MESSAGE (OUTPATIENT)
Dept: INTERNAL MEDICINE | Facility: CLINIC | Age: 78
End: 2023-07-14
Payer: MEDICARE

## 2023-07-15 NOTE — PROGRESS NOTES
MEDICAL HISTORY:  Hypertension.  Irritable bowel syndrome, with with diarrhea  Chronic kidney disease stage III, with secondary hyperparathyroid  Tachycardia  Iron deficiency anemia  Multinodular thyroid gland, on ultrasound  Cholecystectomy.  Hysterectomy.  Bladder suspension.  Lumbar degenerative disk disease.  Gluteal muscular tear.  Right foot surgery.  Resection of lipoma from the groin.  Bout of postcholecystectomy diarrhea.  Prior history of shingles.  Hearing impairment.  Aorta atherosclerosis on imaging     SOCIAL HISTORY:  Tobacco and alcohol use - none.  Exercises by doing a treadmill and elliptical.            MEDICATIONS:  Benazepril 20 mg   Vitamin-D 2000 units every other day  Lasix 20 mg, 1 tablet daily Sunday Monday Wednesday Friday and 2 tablets daily Tuesday Thursday Saturday   Component      Latest Ref Rng & Units 7/13/2023           8:39 AM   WBC      3.90 - 12.70 K/uL 6.42   RBC      4.00 - 5.40 M/uL 5.28   Hemoglobin      12.0 - 16.0 g/dL 11.6 (L)   Hematocrit      37.0 - 48.5 % 38.6   MCV      82 - 98 fL 73 (L)   MCH      27.0 - 31.0 pg 22.0 (L)   MCHC      32.0 - 36.0 g/dL 30.1 (L)   RDW      11.5 - 14.5 % 16.7 (H)   Platelets      150 - 450 K/uL 316   MPV      9.2 - 12.9 fL 12.4   Immature Granulocytes      0.0 - 0.5 % 0.2   Gran # (ANC)      1.8 - 7.7 K/uL 2.6   Immature Grans (Abs)      0.00 - 0.04 K/uL 0.01   Lymph #      1.0 - 4.8 K/uL 2.7   Mono #      0.3 - 1.0 K/uL 0.7   Eos #      0.0 - 0.5 K/uL 0.3   Baso #      0.00 - 0.20 K/uL 0.03   nRBC      0 /100 WBC 0   Gran %      38.0 - 73.0 % 41.0   Lymph %      18.0 - 48.0 % 42.1   Mono %      4.0 - 15.0 % 11.2   Eosinophil %      0.0 - 8.0 % 5.0   Basophil %      0.0 - 1.9 % 0.5   Differential Method       Automated   Glucose      70 - 110 mg/dL 83   Sodium      136 - 145 mmol/L 138   Potassium      3.5 - 5.1 mmol/L 3.9   Chloride      95 - 110 mmol/L 102   CO2      23 - 29 mmol/L 27   BUN      8 - 23 mg/dL 15   Calcium      8.7 - 10.5  mg/dL 10.7 (H)   Creatinine      0.5 - 1.4 mg/dL 1.2   Albumin      3.5 - 5.2 g/dL    Phosphorus      2.7 - 4.5 mg/dL    eGFR      >60 mL/min/1.73 m:2 46.6 (A)   Anion Gap      8 - 16 mmol/L 9   Cholesterol      120 - 199 mg/dL 188   Triglycerides      30 - 150 mg/dL 71   HDL      40 - 75 mg/dL 56   LDL Cholesterol External      63.0 - 159.0 mg/dL 117.8   HDL/Cholesterol Ratio      20.0 - 50.0 % 29.8   Total Cholesterol/HDL Ratio      2.0 - 5.0 3.4   Non-HDL Cholesterol      mg/dL 132   TSH      0.400 - 4.000 uIU/mL 2.090   PTH      9.0 - 77.0 pg/mL 84.4 (H)   Vit D, 25-Hydroxy      30 - 96 ng/mL 57          [age] female   Presents for routine follow-up visit    In the interim she is followed up with Endocrinology regarding thyroid nodules which been stable and right now and observation.  It was also noted that she has a degree of elevated calcium and PTH.  In April it was recommended that Dyazide be put on hold and continue with Lasix.  She is taking Lasix 20 mg once a day but after developing edema, Lasix was increased to 2 tablets on Tuesday Thursday Saturday     She reports no chest pain, palpitations, shortness breath, abdominal pain.  Has regular bowel function.  She will have a bowel movement after eating breakfast.  No difficulty urinating.  Urine flows fine no indigestion heartburn    She is mentions that if she is watching TV for length of time in the afternoon she can fall asleep but she does not feel somnolent sedated throughout the day she feels get a restful night's sleep    It is noted on labs that the MCV is down to 73    Examination   Weight 186   BMI 30.53   Pulse 80   Blood pressure 120/72  Neck no palpable masses do not palpate any nodules  Chest clear breath sounds  Heart regular rate rhythm no murmurs gallops  Abdominal exam nontender soft no hepatosplenomegaly abdominal masses  Pulses 2+ carotid pulses no bruits, 2+ pedal pulses  Trace pedal pretibial edema.  Patient feels this  looks better than before    Impression  Hypertension  Chronic kidney disease stage IIIA  Hyperparathyroid probably secondary but being evaluated for primary  Microcytic anemia    Plan   Iron ferritin reticulocyte count  Has follow-up visit with endocrinology in August Soon she will be scheduled to do a 24 hour urine for calcium and creatinine

## 2023-07-17 ENCOUNTER — HOSPITAL ENCOUNTER (OUTPATIENT)
Facility: HOSPITAL | Age: 78
Discharge: HOME | End: 2023-07-19
Attending: EMERGENCY MEDICINE | Admitting: EMERGENCY MEDICINE
Payer: MEDICARE

## 2023-07-17 ENCOUNTER — OFFICE VISIT (OUTPATIENT)
Dept: INTERNAL MEDICINE | Facility: CLINIC | Age: 78
End: 2023-07-17
Payer: MEDICARE

## 2023-07-17 VITALS
HEART RATE: 81 BPM | HEIGHT: 66 IN | SYSTOLIC BLOOD PRESSURE: 124 MMHG | BODY MASS INDEX: 29.94 KG/M2 | OXYGEN SATURATION: 96 % | WEIGHT: 186.31 LBS | DIASTOLIC BLOOD PRESSURE: 70 MMHG

## 2023-07-17 DIAGNOSIS — R42 LIGHTHEADEDNESS: ICD-10-CM

## 2023-07-17 DIAGNOSIS — E04.2 MULTINODULAR THYROID: ICD-10-CM

## 2023-07-17 DIAGNOSIS — R60.0 BILATERAL LEG EDEMA: ICD-10-CM

## 2023-07-17 DIAGNOSIS — N25.81 SECONDARY HYPERPARATHYROIDISM OF RENAL ORIGIN: ICD-10-CM

## 2023-07-17 DIAGNOSIS — R07.9 CHEST PAIN: ICD-10-CM

## 2023-07-17 DIAGNOSIS — R40.20 LOC (LOSS OF CONSCIOUSNESS): Primary | ICD-10-CM

## 2023-07-17 DIAGNOSIS — N18.31 STAGE 3A CHRONIC KIDNEY DISEASE: ICD-10-CM

## 2023-07-17 DIAGNOSIS — R55 SYNCOPE: ICD-10-CM

## 2023-07-17 DIAGNOSIS — D50.9 MICROCYTIC ANEMIA: ICD-10-CM

## 2023-07-17 DIAGNOSIS — R55 SYNCOPE, UNSPECIFIED SYNCOPE TYPE: ICD-10-CM

## 2023-07-17 DIAGNOSIS — I10 ESSENTIAL HYPERTENSION: Primary | ICD-10-CM

## 2023-07-17 LAB
ALBUMIN SERPL BCP-MCNC: 3.8 G/DL (ref 3.5–5.2)
ALP SERPL-CCNC: 66 U/L (ref 55–135)
ALT SERPL W/O P-5'-P-CCNC: 16 U/L (ref 10–44)
ANION GAP SERPL CALC-SCNC: 16 MMOL/L (ref 8–16)
AST SERPL-CCNC: 21 U/L (ref 10–40)
BASOPHILS # BLD AUTO: 0.04 K/UL (ref 0–0.2)
BASOPHILS NFR BLD: 0.4 % (ref 0–1.9)
BILIRUB SERPL-MCNC: 0.3 MG/DL (ref 0.1–1)
BNP SERPL-MCNC: 41 PG/ML (ref 0–99)
BUN SERPL-MCNC: 22 MG/DL (ref 8–23)
CALCIUM SERPL-MCNC: 10.8 MG/DL (ref 8.7–10.5)
CHLORIDE SERPL-SCNC: 105 MMOL/L (ref 95–110)
CO2 SERPL-SCNC: 25 MMOL/L (ref 23–29)
CREAT SERPL-MCNC: 1.2 MG/DL (ref 0.5–1.4)
DIFFERENTIAL METHOD: ABNORMAL
EOSINOPHIL # BLD AUTO: 0 K/UL (ref 0–0.5)
EOSINOPHIL NFR BLD: 0.2 % (ref 0–8)
ERYTHROCYTE [DISTWIDTH] IN BLOOD BY AUTOMATED COUNT: 17.2 % (ref 11.5–14.5)
EST. GFR  (NO RACE VARIABLE): 46.6 ML/MIN/1.73 M^2
GLUCOSE SERPL-MCNC: 118 MG/DL (ref 70–110)
HCT VFR BLD AUTO: 40.6 % (ref 37–48.5)
HCV AB SERPL QL IA: NORMAL
HGB BLD-MCNC: 12.1 G/DL (ref 12–16)
HIV 1+2 AB+HIV1 P24 AG SERPL QL IA: NORMAL
IMM GRANULOCYTES # BLD AUTO: 0.05 K/UL (ref 0–0.04)
IMM GRANULOCYTES NFR BLD AUTO: 0.5 % (ref 0–0.5)
LIPASE SERPL-CCNC: 14 U/L (ref 4–60)
LYMPHOCYTES # BLD AUTO: 1.6 K/UL (ref 1–4.8)
LYMPHOCYTES NFR BLD: 14.9 % (ref 18–48)
MCH RBC QN AUTO: 22.2 PG (ref 27–31)
MCHC RBC AUTO-ENTMCNC: 29.8 G/DL (ref 32–36)
MCV RBC AUTO: 75 FL (ref 82–98)
MONOCYTES # BLD AUTO: 0.9 K/UL (ref 0.3–1)
MONOCYTES NFR BLD: 8.1 % (ref 4–15)
NEUTROPHILS # BLD AUTO: 8.1 K/UL (ref 1.8–7.7)
NEUTROPHILS NFR BLD: 75.9 % (ref 38–73)
NRBC BLD-RTO: 0 /100 WBC
PLATELET # BLD AUTO: 321 K/UL (ref 150–450)
PMV BLD AUTO: 11.7 FL (ref 9.2–12.9)
POTASSIUM SERPL-SCNC: 4.7 MMOL/L (ref 3.5–5.1)
PROT SERPL-MCNC: 8 G/DL (ref 6–8.4)
RBC # BLD AUTO: 5.45 M/UL (ref 4–5.4)
SODIUM SERPL-SCNC: 146 MMOL/L (ref 136–145)
TROPONIN I SERPL DL<=0.01 NG/ML-MCNC: 0.01 NG/ML (ref 0–0.03)
WBC # BLD AUTO: 10.72 K/UL (ref 3.9–12.7)

## 2023-07-17 PROCEDURE — 99214 OFFICE O/P EST MOD 30 MIN: CPT | Mod: S$GLB,,, | Performed by: INTERNAL MEDICINE

## 2023-07-17 PROCEDURE — 86803 HEPATITIS C AB TEST: CPT | Performed by: PHYSICIAN ASSISTANT

## 2023-07-17 PROCEDURE — 1160F PR REVIEW ALL MEDS BY PRESCRIBER/CLIN PHARMACIST DOCUMENTED: ICD-10-PCS | Mod: CPTII,S$GLB,, | Performed by: INTERNAL MEDICINE

## 2023-07-17 PROCEDURE — 93010 EKG 12-LEAD: ICD-10-PCS | Mod: ,,, | Performed by: INTERNAL MEDICINE

## 2023-07-17 PROCEDURE — 3078F DIAST BP <80 MM HG: CPT | Mod: CPTII,S$GLB,, | Performed by: INTERNAL MEDICINE

## 2023-07-17 PROCEDURE — 1160F RVW MEDS BY RX/DR IN RCRD: CPT | Mod: CPTII,S$GLB,, | Performed by: INTERNAL MEDICINE

## 2023-07-17 PROCEDURE — 94761 N-INVAS EAR/PLS OXIMETRY MLT: CPT

## 2023-07-17 PROCEDURE — 99214 PR OFFICE/OUTPT VISIT, EST, LEVL IV, 30-39 MIN: ICD-10-PCS | Mod: S$GLB,,, | Performed by: INTERNAL MEDICINE

## 2023-07-17 PROCEDURE — 85025 COMPLETE CBC W/AUTO DIFF WBC: CPT | Performed by: EMERGENCY MEDICINE

## 2023-07-17 PROCEDURE — 25500020 PHARM REV CODE 255: Performed by: EMERGENCY MEDICINE

## 2023-07-17 PROCEDURE — 1157F PR ADVANCE CARE PLAN OR EQUIV PRESENT IN MEDICAL RECORD: ICD-10-PCS | Mod: CPTII,S$GLB,, | Performed by: INTERNAL MEDICINE

## 2023-07-17 PROCEDURE — 3288F FALL RISK ASSESSMENT DOCD: CPT | Mod: CPTII,S$GLB,, | Performed by: INTERNAL MEDICINE

## 2023-07-17 PROCEDURE — 1157F ADVNC CARE PLAN IN RCRD: CPT | Mod: CPTII,S$GLB,, | Performed by: INTERNAL MEDICINE

## 2023-07-17 PROCEDURE — 3078F PR MOST RECENT DIASTOLIC BLOOD PRESSURE < 80 MM HG: ICD-10-PCS | Mod: CPTII,S$GLB,, | Performed by: INTERNAL MEDICINE

## 2023-07-17 PROCEDURE — 1101F PR PT FALLS ASSESS DOC 0-1 FALLS W/OUT INJ PAST YR: ICD-10-PCS | Mod: CPTII,S$GLB,, | Performed by: INTERNAL MEDICINE

## 2023-07-17 PROCEDURE — 1126F AMNT PAIN NOTED NONE PRSNT: CPT | Mod: CPTII,S$GLB,, | Performed by: INTERNAL MEDICINE

## 2023-07-17 PROCEDURE — 3074F PR MOST RECENT SYSTOLIC BLOOD PRESSURE < 130 MM HG: ICD-10-PCS | Mod: CPTII,S$GLB,, | Performed by: INTERNAL MEDICINE

## 2023-07-17 PROCEDURE — 1126F PR PAIN SEVERITY QUANTIFIED, NO PAIN PRESENT: ICD-10-PCS | Mod: CPTII,S$GLB,, | Performed by: INTERNAL MEDICINE

## 2023-07-17 PROCEDURE — 80053 COMPREHEN METABOLIC PANEL: CPT | Performed by: EMERGENCY MEDICINE

## 2023-07-17 PROCEDURE — 84484 ASSAY OF TROPONIN QUANT: CPT | Performed by: EMERGENCY MEDICINE

## 2023-07-17 PROCEDURE — 99285 EMERGENCY DEPT VISIT HI MDM: CPT | Mod: 25

## 2023-07-17 PROCEDURE — G0378 HOSPITAL OBSERVATION PER HR: HCPCS

## 2023-07-17 PROCEDURE — 1101F PT FALLS ASSESS-DOCD LE1/YR: CPT | Mod: CPTII,S$GLB,, | Performed by: INTERNAL MEDICINE

## 2023-07-17 PROCEDURE — 93005 ELECTROCARDIOGRAM TRACING: CPT

## 2023-07-17 PROCEDURE — 3288F PR FALLS RISK ASSESSMENT DOCUMENTED: ICD-10-PCS | Mod: CPTII,S$GLB,, | Performed by: INTERNAL MEDICINE

## 2023-07-17 PROCEDURE — 3074F SYST BP LT 130 MM HG: CPT | Mod: CPTII,S$GLB,, | Performed by: INTERNAL MEDICINE

## 2023-07-17 PROCEDURE — 99999 PR PBB SHADOW E&M-EST. PATIENT-LVL III: ICD-10-PCS | Mod: PBBFAC,,, | Performed by: INTERNAL MEDICINE

## 2023-07-17 PROCEDURE — 83880 ASSAY OF NATRIURETIC PEPTIDE: CPT | Performed by: EMERGENCY MEDICINE

## 2023-07-17 PROCEDURE — 93010 ELECTROCARDIOGRAM REPORT: CPT | Mod: ,,, | Performed by: INTERNAL MEDICINE

## 2023-07-17 PROCEDURE — 1159F PR MEDICATION LIST DOCUMENTED IN MEDICAL RECORD: ICD-10-PCS | Mod: CPTII,S$GLB,, | Performed by: INTERNAL MEDICINE

## 2023-07-17 PROCEDURE — 99999 PR PBB SHADOW E&M-EST. PATIENT-LVL III: CPT | Mod: PBBFAC,,, | Performed by: INTERNAL MEDICINE

## 2023-07-17 PROCEDURE — 1159F MED LIST DOCD IN RCRD: CPT | Mod: CPTII,S$GLB,, | Performed by: INTERNAL MEDICINE

## 2023-07-17 PROCEDURE — 83690 ASSAY OF LIPASE: CPT | Performed by: EMERGENCY MEDICINE

## 2023-07-17 PROCEDURE — 87389 HIV-1 AG W/HIV-1&-2 AB AG IA: CPT | Performed by: PHYSICIAN ASSISTANT

## 2023-07-17 RX ORDER — TALC
6 POWDER (GRAM) TOPICAL NIGHTLY PRN
Status: DISCONTINUED | OUTPATIENT
Start: 2023-07-17 | End: 2023-07-19 | Stop reason: HOSPADM

## 2023-07-17 RX ORDER — FUROSEMIDE 20 MG/1
20 TABLET ORAL
Status: DISCONTINUED | OUTPATIENT
Start: 2023-07-19 | End: 2023-07-19 | Stop reason: HOSPADM

## 2023-07-17 RX ORDER — PROMETHAZINE HYDROCHLORIDE 25 MG/1
25 TABLET ORAL EVERY 6 HOURS PRN
Status: DISCONTINUED | OUTPATIENT
Start: 2023-07-17 | End: 2023-07-19 | Stop reason: HOSPADM

## 2023-07-17 RX ORDER — ACETAMINOPHEN 325 MG/1
650 TABLET ORAL EVERY 8 HOURS PRN
Status: DISCONTINUED | OUTPATIENT
Start: 2023-07-17 | End: 2023-07-19 | Stop reason: HOSPADM

## 2023-07-17 RX ORDER — ONDANSETRON 4 MG/1
4 TABLET, ORALLY DISINTEGRATING ORAL EVERY 8 HOURS PRN
Status: DISCONTINUED | OUTPATIENT
Start: 2023-07-17 | End: 2023-07-19 | Stop reason: HOSPADM

## 2023-07-17 RX ORDER — SODIUM CHLORIDE 0.9 % (FLUSH) 0.9 %
10 SYRINGE (ML) INJECTION
Status: DISCONTINUED | OUTPATIENT
Start: 2023-07-17 | End: 2023-07-19 | Stop reason: HOSPADM

## 2023-07-17 RX ORDER — FUROSEMIDE 40 MG/1
40 TABLET ORAL
Status: DISCONTINUED | OUTPATIENT
Start: 2023-07-18 | End: 2023-07-19 | Stop reason: HOSPADM

## 2023-07-17 RX ORDER — LISINOPRIL 20 MG/1
20 TABLET ORAL DAILY
Status: DISCONTINUED | OUTPATIENT
Start: 2023-07-18 | End: 2023-07-19 | Stop reason: HOSPADM

## 2023-07-17 RX ADMIN — IOHEXOL 100 ML: 350 INJECTION, SOLUTION INTRAVENOUS at 10:07

## 2023-07-17 NOTE — Clinical Note
Diagnosis: LOC (loss of consciousness) [569174]   Future Attending Provider: GILBERTO VALLEJO [[id number]]   Is the patient being sent to ED Observation?: Yes   Admitting Provider:: GILBERTO VALLEJO [[id number]]   Special Needs:: No Special Needs [1]

## 2023-07-17 NOTE — ED PROVIDER NOTES
CC: Loss of Consciousness (And vomited one hour ago, feeling very weak)      History provided by:   Patient     HPI: Carmen Wang is a 78 y.o. year old female PMH of HTN, CKD, aortic atherosclerosis, multinodular thyroid who presents to the ED status post loss of consciousness. Patient reports she was feeling well this morning, had right-sided 5/10 headache that resolved by approximally 11:00 a.m. in the morning, she also saw her primary care physician for a regular visit after which she had lunch and around 3:00 in the afternoon she was sitting down at home when she started feeling lightheaded and she lost consciousness.  Her son was with her, he mentions that he she slid out of the chair, he helped her and she did not fall, she had mild shaking of her body however no rhythmic tonic-clonic motion, no tongue biting, no urine or stool incontinence, no confusion after the episode.  When she recovered she had an episode of vomiting, she reports also mild right flank pain, unsure if it was secondary to the fall afterwards,if somehow she hit the right side.    She denies any chest pain or shortness of breath, she also denies palpitations.  She reports history of IBS and in the past she had an episode that started with abdominal cramping and then she thinks she lost consciousness, she woke up after she vomited however she does not remember vomiting    History of hypertension, compliant with her medication      PHYSICAL EXAM:  Vitals:    07/17/23 1601   BP:    Pulse:    Resp:    Temp: 98.7 °F (37.1 °C)     VS: triage VS reviewed    general: comfortable, in no acute distress, pleasant, well nourished  HENT: neck symmetric, trachea midline  Eyes: PERRL,no conjunctival injection and symmetrical eyelids  CV: RRR, no  murmurs, no rubs, no gallops, no LE edema  Resp: comfortable breathing, speaks in full sentences, CTAB, no wheezing, no crackles, no ronchi  ABD:  soft, ND, no masses, + normal BS, mild tenderness palpation  over the right flank just above the iliac crest  Renal: No CVAT  Neuro: AAO x 3, 5/5 strength x 4 extremities, sensation intact, face symmetric, speech normal  MSK: NC/AT, extremities w/out deformity   Skin: warm, dry      MDM:  Carmen Wang is a 78 y.o. year old female who presents to the ED for an episode of loss of consciousness, likely syncope, that started with lightheadedness and on recovery she vomited    Had mild headache in the morning however resolved by noon, did not have any headache just before or after the episode of syncope   New right flank pain, mild in nature, on physical exam patient has no neurologic deficit, normal symmetrical pulses in the lower extremities, low suspicion for AAA/dissection    Will check trop x 2, no chest pain/sob, vitals in the ED wnl, unlikely PE.  Unlikely orthostasis as the patient was sitting down when this happened    DDX includes but not limited to: arrhythmia, acs, unlikely PE, ICH, low suspicion for AAA    Labs ordered and reviewed:   Cbc normal white count hemoglobin and platelets  CMP with slightly elevated CMP at 146, also borderline elevated calcium at 10.8  Normal lipase   Normal troponin  Normal BNP  Medication given in the ED: n/a    CXR (ordered and reviewed): no acute  Imagings independently visualized: yes    EKG (independantly reviewed):  Ventricular rate 70, normal sinus rhythm, no acute ischemic changes, mild sinus arrhythmia      Patient not orthostatic  On reassessment, she still reports right flank pain, her son told her that it is unlikely to be from the fall as he was careful and she did not hit anything. Will plan for CT a/p.    Patient agreeable to stay over the night in the hospital    Plan for [facility] admit, rpt trop, CT a/p with contrast, cardiac monitor overnight, echo in AM, continue home meds    IMPRESSION:  1.) syncope  2.) right flank pain    Dispo: Obs [facility]    Critical Care Time: N/A          Past Medical History:   Diagnosis Date     Cataract     Chronic diarrhea     Hypertension     IBS (irritable bowel syndrome)      Past Surgical History:   Procedure Laterality Date    APPENDECTOMY      with the hysterectomy    BUNIONECTOMY      CHOLECYSTECTOMY      COLONOSCOPY N/A 10/05/2015    Procedure: COLONOSCOPY;  Surgeon: Teja Olivarez MD;  Location: [facility] (4TH FLR);  Service: Endoscopy;  Laterality: N/A;    HYSTERECTOMY      RUPERT secondary to pelvic pain, sacrocolpoplexy    OPEN REDUCTION AND INTERNAL FIXATION (ORIF) OF FRACTURE OF DISTAL RADIUS Right 09/19/2019    Procedure: ORIF, FRACTURE, RADIUS, DISTAL - right accumed;  Surgeon: Deejay Bragg MD;  Location: [facility] OR 2ND FLR;  Service: Orthopedics;  Laterality: Right;     Family History   Problem Relation Age of Onset    Hypertension Mother     Arthritis Mother     Thyroid disease Mother     Glaucoma Mother     Arthritis Father     Hypertension Father     Thyroid disease Sister     Breast cancer Sister 76    Thyroid disease Sister     Thyroid disease Sister     No Known Problems Sister     No Known Problems Sister     No Known Problems Sister     Thyroid disease Brother     Diabetes Brother     Cancer Brother         prostate    Cancer Brother         prostate    No Known Problems Brother     No Known Problems Brother     No Known Problems Brother     No Known Problems Brother     Lupus Daughter     Arthritis Son     Hypertension Son     Hypertension Son     Glaucoma Maternal Aunt     Glaucoma Maternal Aunt     No Known Problems Maternal Uncle     No Known Problems Paternal Aunt     No Known Problems Paternal Uncle     Stroke Maternal Grandmother     No Known Problems Maternal Grandfather     No Known Problems Paternal Grandmother     No Known Problems Paternal Grandfather     Colon cancer Neg Hx     Ovarian cancer Neg Hx     Amblyopia Neg Hx     Blindness Neg Hx     Cataracts Neg Hx     Macular degeneration Neg Hx     Retinal detachment Neg Hx     Strabismus Neg Hx     Esophageal cancer  Neg Hx      No current facility-administered medications on file prior to encounter.     Current Outpatient Medications on File Prior to Encounter   Medication Sig Dispense Refill    benazepriL (LOTENSIN) 20 MG tablet TAKE 1 TABLET BY MOUTH ONCE DAILY 90 tablet 3    cholecalciferol, vitamin D3, (D3-2000) 2,000 unit Cap Take 1 capsule (2,000 Units total) by mouth every other day.      furosemide (LASIX) 20 MG tablet Take 1 tablet (20 mg total) by mouth once daily. 90 tablet 3    triamterene-hydrochlorothiazide 37.5-25 mg (DYAZIDE) 37.5-25 mg per capsule TAKE 1 CAPSULE BY MOUTH IN  THE MORNING (Patient not taking: Reported on 5/16/2023) 90 capsule 3     Codeine and Penicillins  Social History     Socioeconomic History    Marital status:    Tobacco Use    Smoking status: Never    Smokeless tobacco: Never   Substance and Sexual Activity    Alcohol use: No    Drug use: No    Sexual activity: Not Currently     Partners: Male     Birth control/protection: Surgical     Social Determinants of Health     Financial Resource Strain: Low Risk     Difficulty of Paying Living Expenses: Not hard at all   Food Insecurity: No Food Insecurity    Worried About Running Out of Food in the Last Year: Never true    Ran Out of Food in the Last Year: Never true   Transportation Needs: No Transportation Needs    Lack of Transportation (Medical): No    Lack of Transportation (Non-Medical): No   Physical Activity: Sufficiently Active    Days of Exercise per Week: 4 days    Minutes of Exercise per Session: 50 min   Stress: No Stress Concern Present    Feeling of Stress : Only a little   Social Connections: Socially Integrated    Frequency of Communication with Friends and Family: More than three times a week    Frequency of Social Gatherings with Friends and Family: Three times a week    Attends [employer] Services: More than 4 times per year    Active Member of Clubs or Organizations: Yes    Attends Club or Organization Meetings: 1 to 4  times per year    Marital Status:    Housing Stability: Low Risk     Unable to Pay for Housing in the Last Year: No    Number of Places Lived in the Last Year: 1    Unstable Housing in the Last Year: No                 DATA & INTERVENTIONS:    LABS reviewed:  Labs Reviewed   CBC W/ AUTO DIFFERENTIAL   COMPREHENSIVE METABOLIC PANEL   LIPASE   TROPONIN I   URINALYSIS, REFLEX TO URINE CULTURE       RADIOLOGY reviewed:  Imaging Results    None         MEDICATIONS/FLUIDS:  Medications - No data to display           Josie Kelly MD  07/18/23 [id number]

## 2023-07-18 PROBLEM — E27.9 ADRENAL NODULE: Status: ACTIVE | Noted: 2023-07-18

## 2023-07-18 PROBLEM — R55 SYNCOPE: Status: ACTIVE | Noted: 2023-07-17

## 2023-07-18 PROBLEM — R93.2 ABNORMAL LIVER CT: Status: ACTIVE | Noted: 2023-07-18

## 2023-07-18 PROBLEM — E27.8 ADRENAL NODULE: Status: ACTIVE | Noted: 2023-07-18

## 2023-07-18 LAB
ANION GAP SERPL CALC-SCNC: 9 MMOL/L (ref 8–16)
ASCENDING AORTA: 3.23 CM
AV INDEX (PROSTH): 1
AV MEAN GRADIENT: 4 MMHG
AV PEAK GRADIENT: 7 MMHG
AV VALVE AREA: 2.71 CM2
AV VELOCITY RATIO: 0.97
BASOPHILS # BLD AUTO: 0.03 K/UL (ref 0–0.2)
BASOPHILS NFR BLD: 0.3 % (ref 0–1.9)
BILIRUB UR QL STRIP: NEGATIVE
BSA FOR ECHO PROCEDURE: 1.97 M2
BUN SERPL-MCNC: 18 MG/DL (ref 8–23)
CALCIUM SERPL-MCNC: 10.2 MG/DL (ref 8.7–10.5)
CHLORIDE SERPL-SCNC: 106 MMOL/L (ref 95–110)
CLARITY UR REFRACT.AUTO: CLEAR
CO2 SERPL-SCNC: 27 MMOL/L (ref 23–29)
COLOR UR AUTO: YELLOW
CREAT SERPL-MCNC: 1.1 MG/DL (ref 0.5–1.4)
CV ECHO LV RWT: 0.28 CM
D DIMER PPP IA.FEU-MCNC: 1.1 MG/L FEU
DIFFERENTIAL METHOD: ABNORMAL
DOP CALC AO PEAK VEL: 1.36 M/S
DOP CALC AO VTI: 33.7 CM
DOP CALC LVOT AREA: 2.7 CM2
DOP CALC LVOT DIAMETER: 1.86 CM
DOP CALC LVOT PEAK VEL: 1.32 M/S
DOP CALC LVOT STROKE VOLUME: 91.36 CM3
DOP CALCLVOT PEAK VEL VTI: 33.64 CM
E WAVE DECELERATION TIME: 183.88 MSEC
E/A RATIO: 0.85
E/E' RATIO: 11 M/S
ECHO LV POSTERIOR WALL: 0.6 CM (ref 0.6–1.1)
EJECTION FRACTION: 65 %
EOSINOPHIL # BLD AUTO: 0.1 K/UL (ref 0–0.5)
EOSINOPHIL NFR BLD: 0.6 % (ref 0–8)
ERYTHROCYTE [DISTWIDTH] IN BLOOD BY AUTOMATED COUNT: 16.5 % (ref 11.5–14.5)
EST. GFR  (NO RACE VARIABLE): 51.8 ML/MIN/1.73 M^2
FRACTIONAL SHORTENING: 42 % (ref 28–44)
GLUCOSE SERPL-MCNC: 102 MG/DL (ref 70–110)
GLUCOSE UR QL STRIP: NEGATIVE
HCT VFR BLD AUTO: 37.4 % (ref 37–48.5)
HGB BLD-MCNC: 11.5 G/DL (ref 12–16)
HGB UR QL STRIP: NEGATIVE
IMM GRANULOCYTES # BLD AUTO: 0.02 K/UL (ref 0–0.04)
IMM GRANULOCYTES NFR BLD AUTO: 0.2 % (ref 0–0.5)
INTERVENTRICULAR SEPTUM: 0.96 CM (ref 0.6–1.1)
IVRT: 108.47 MSEC
KETONES UR QL STRIP: ABNORMAL
LA MAJOR: 5.74 CM
LA MINOR: 5.39 CM
LA WIDTH: 3.38 CM
LEFT ATRIUM SIZE: 3.12 CM
LEFT ATRIUM VOLUME INDEX MOD: 23 ML/M2
LEFT ATRIUM VOLUME INDEX: 26 ML/M2
LEFT ATRIUM VOLUME MOD: 44.16 CM3
LEFT ATRIUM VOLUME: 49.83 CM3
LEFT INTERNAL DIMENSION IN SYSTOLE: 2.45 CM (ref 2.1–4)
LEFT VENTRICLE DIASTOLIC VOLUME INDEX: 41.71 ML/M2
LEFT VENTRICLE DIASTOLIC VOLUME: 80.09 ML
LEFT VENTRICLE MASS INDEX: 52 G/M2
LEFT VENTRICLE SYSTOLIC VOLUME INDEX: 11.1 ML/M2
LEFT VENTRICLE SYSTOLIC VOLUME: 21.28 ML
LEFT VENTRICULAR INTERNAL DIMENSION IN DIASTOLE: 4.23 CM (ref 3.5–6)
LEFT VENTRICULAR MASS: 99.12 G
LEUKOCYTE ESTERASE UR QL STRIP: NEGATIVE
LV LATERAL E/E' RATIO: 11 M/S
LV SEPTAL E/E' RATIO: 11 M/S
LYMPHOCYTES # BLD AUTO: 2.2 K/UL (ref 1–4.8)
LYMPHOCYTES NFR BLD: 20.7 % (ref 18–48)
MCH RBC QN AUTO: 22 PG (ref 27–31)
MCHC RBC AUTO-ENTMCNC: 30.7 G/DL (ref 32–36)
MCV RBC AUTO: 72 FL (ref 82–98)
MONOCYTES # BLD AUTO: 1.1 K/UL (ref 0.3–1)
MONOCYTES NFR BLD: 10.1 % (ref 4–15)
MV PEAK A VEL: 1.03 M/S
MV PEAK E VEL: 0.88 M/S
MV STENOSIS PRESSURE HALF TIME: 53.32 MS
MV VALVE AREA P 1/2 METHOD: 4.13 CM2
NEUTROPHILS # BLD AUTO: 7.3 K/UL (ref 1.8–7.7)
NEUTROPHILS NFR BLD: 68.1 % (ref 38–73)
NITRITE UR QL STRIP: NEGATIVE
NRBC BLD-RTO: 0 /100 WBC
PH UR STRIP: 6 [PH] (ref 5–8)
PISA TR MAX VEL: 2.95 M/S
PLATELET # BLD AUTO: 296 K/UL (ref 150–450)
PMV BLD AUTO: 10.4 FL (ref 9.2–12.9)
POTASSIUM SERPL-SCNC: 3.8 MMOL/L (ref 3.5–5.1)
PROT UR QL STRIP: ABNORMAL
RA MAJOR: 5.08 CM
RA PRESSURE: 3 MMHG
RA WIDTH: 3.09 CM
RBC # BLD AUTO: 5.23 M/UL (ref 4–5.4)
RIGHT VENTRICULAR END-DIASTOLIC DIMENSION: 2.79 CM
SINUS: 2.88 CM
SODIUM SERPL-SCNC: 142 MMOL/L (ref 136–145)
SP GR UR STRIP: 1.01 (ref 1–1.03)
STJ: 2.54 CM
TDI LATERAL: 0.08 M/S
TDI SEPTAL: 0.08 M/S
TDI: 0.08 M/S
TR MAX PG: 35 MMHG
TRICUSPID ANNULAR PLANE SYSTOLIC EXCURSION: 1.8 CM
TROPONIN I SERPL DL<=0.01 NG/ML-MCNC: 0.01 NG/ML (ref 0–0.03)
TV REST PULMONARY ARTERY PRESSURE: 38 MMHG
URN SPEC COLLECT METH UR: ABNORMAL
WBC # BLD AUTO: 10.76 K/UL (ref 3.9–12.7)

## 2023-07-18 PROCEDURE — G0378 HOSPITAL OBSERVATION PER HR: HCPCS

## 2023-07-18 PROCEDURE — 80048 BASIC METABOLIC PNL TOTAL CA: CPT | Performed by: PHYSICIAN ASSISTANT

## 2023-07-18 PROCEDURE — 99204 OFFICE O/P NEW MOD 45 MIN: CPT | Mod: GC,,, | Performed by: INTERNAL MEDICINE

## 2023-07-18 PROCEDURE — 25500020 PHARM REV CODE 255: Performed by: NURSE PRACTITIONER

## 2023-07-18 PROCEDURE — 63600175 PHARM REV CODE 636 W HCPCS

## 2023-07-18 PROCEDURE — 85025 COMPLETE CBC W/AUTO DIFF WBC: CPT | Performed by: PHYSICIAN ASSISTANT

## 2023-07-18 PROCEDURE — 25000003 PHARM REV CODE 250: Performed by: PHYSICIAN ASSISTANT

## 2023-07-18 PROCEDURE — 81003 URINALYSIS AUTO W/O SCOPE: CPT | Performed by: PHYSICIAN ASSISTANT

## 2023-07-18 PROCEDURE — 99204 PR OFFICE/OUTPT VISIT, NEW, LEVL IV, 45-59 MIN: ICD-10-PCS | Mod: GC,,, | Performed by: INTERNAL MEDICINE

## 2023-07-18 PROCEDURE — 84484 ASSAY OF TROPONIN QUANT: CPT | Performed by: PHYSICIAN ASSISTANT

## 2023-07-18 PROCEDURE — 85379 FIBRIN DEGRADATION QUANT: CPT | Performed by: NURSE PRACTITIONER

## 2023-07-18 PROCEDURE — 96372 THER/PROPH/DIAG INJ SC/IM: CPT

## 2023-07-18 PROCEDURE — 99223 1ST HOSP IP/OBS HIGH 75: CPT | Mod: ,,,

## 2023-07-18 PROCEDURE — 94761 N-INVAS EAR/PLS OXIMETRY MLT: CPT

## 2023-07-18 PROCEDURE — 99223 PR INITIAL HOSPITAL CARE,LEVL III: ICD-10-PCS | Mod: ,,,

## 2023-07-18 RX ORDER — SIMETHICONE 80 MG
1 TABLET,CHEWABLE ORAL 4 TIMES DAILY PRN
Status: DISCONTINUED | OUTPATIENT
Start: 2023-07-18 | End: 2023-07-19 | Stop reason: HOSPADM

## 2023-07-18 RX ORDER — MAG HYDROX/ALUMINUM HYD/SIMETH 200-200-20
30 SUSPENSION, ORAL (FINAL DOSE FORM) ORAL 4 TIMES DAILY PRN
Status: DISCONTINUED | OUTPATIENT
Start: 2023-07-18 | End: 2023-07-19 | Stop reason: HOSPADM

## 2023-07-18 RX ORDER — ACETAMINOPHEN 500 MG
1000 TABLET ORAL EVERY 8 HOURS PRN
Status: DISCONTINUED | OUTPATIENT
Start: 2023-07-18 | End: 2023-07-19 | Stop reason: HOSPADM

## 2023-07-18 RX ORDER — POLYETHYLENE GLYCOL 3350 17 G/17G
17 POWDER, FOR SOLUTION ORAL 2 TIMES DAILY PRN
Status: DISCONTINUED | OUTPATIENT
Start: 2023-07-18 | End: 2023-07-19 | Stop reason: HOSPADM

## 2023-07-18 RX ORDER — NALOXONE HCL 0.4 MG/ML
0.02 VIAL (ML) INJECTION
Status: DISCONTINUED | OUTPATIENT
Start: 2023-07-18 | End: 2023-07-19 | Stop reason: HOSPADM

## 2023-07-18 RX ORDER — SODIUM CHLORIDE 0.9 % (FLUSH) 0.9 %
5 SYRINGE (ML) INJECTION
Status: DISCONTINUED | OUTPATIENT
Start: 2023-07-18 | End: 2023-07-19 | Stop reason: HOSPADM

## 2023-07-18 RX ORDER — ENOXAPARIN SODIUM 100 MG/ML
40 INJECTION SUBCUTANEOUS EVERY 24 HOURS
Status: DISCONTINUED | OUTPATIENT
Start: 2023-07-18 | End: 2023-07-19 | Stop reason: HOSPADM

## 2023-07-18 RX ORDER — BISACODYL 10 MG
10 SUPPOSITORY, RECTAL RECTAL DAILY PRN
Status: DISCONTINUED | OUTPATIENT
Start: 2023-07-18 | End: 2023-07-19 | Stop reason: HOSPADM

## 2023-07-18 RX ADMIN — IOHEXOL 75 ML: 350 INJECTION, SOLUTION INTRAVENOUS at 11:07

## 2023-07-18 RX ADMIN — LISINOPRIL 20 MG: 20 TABLET ORAL at 08:07

## 2023-07-18 RX ADMIN — FUROSEMIDE 40 MG: 40 TABLET ORAL at 05:07

## 2023-07-18 RX ADMIN — ENOXAPARIN SODIUM 40 MG: 40 INJECTION SUBCUTANEOUS at 05:07

## 2023-07-18 NOTE — H&P
[facility] - Emergency Dept  [facility] Medicine  History & Physical    Patient Name: Carmen Wang  MRN: [id number]  Patient Class: OP- Observation  Admission Date: 7/17/2023  Attending Physician: Alexys Renee MD   Primary Care Provider: Deniz Redman MD         Patient information was obtained from patient and ER records.     Subjective:     Principal Problem:Syncope    Chief Complaint:   Chief Complaint   Patient presents with    Loss of Consciousness     And vomited one hour ago, feeling very weak        HPI: Carmen Wang is a 79 yo F with PMHx of HTN, CKD, aortic atherosclerosis, multinodular thyroid who presented to ED for syncopal episode. She was in her usual state of health yesterday besides a mild R sided HA that resolved without intervention. Yesterday after lunch around 3 pm, she was sitting down at home when she started feeling lightheaded and had a syncopal episode. Her son witnessed the event and stated that she slid out of her chair with mild shaking of her body. Denies rhythmic tonic-clonic motion, tongue biting, urine or stool incontinence, or confusion after the episode. When she recovered she had an episode of vomiting. No history of previous syncope. She had mild right flank pain/R hip pain when she presented to the ED, which has since resolved. Of note, her son has a hx of provoked PE after surgery. Denies fever, chills, chest pain, palpitations, SOB, cough, abdominal pain, diarrhea, dysuria, and LE swelling.    In ED: Afebrile. Orthostatic negative. HDS. Satting well on RA. No leukocytosis. Hgb 12.1. CMP grossly unremarkable. BNP 41. Trop 0.007>>0.006. EKG without ischemic changes. Echo with EF 65%, indeterminate diastolic fx, mod TR, mild MR, normal RV function, normal CVP. UA noninfectious. CXR without acute process. CT head negative. CT A/P without acute process. Evaluated by cardiology, who recommended d dimer and consider 30 day event monitor. D dimer positive (negative when age  adjusted). CTA pending. Placed in observation with hospital medicine.       Past Medical History:   Diagnosis Date    Cataract     Chronic diarrhea     Hypertension     IBS (irritable bowel syndrome)        Past Surgical History:   Procedure Laterality Date    APPENDECTOMY      with the hysterectomy    BUNIONECTOMY      CHOLECYSTECTOMY      COLONOSCOPY N/A 10/05/2015    Procedure: COLONOSCOPY;  Surgeon: Teja Olivarez MD;  Location: [facility] (4TH FLR);  Service: Endoscopy;  Laterality: N/A;    HYSTERECTOMY      RUPERT secondary to pelvic pain, sacrocolpoplexy    OPEN REDUCTION AND INTERNAL FIXATION (ORIF) OF FRACTURE OF DISTAL RADIUS Right 09/19/2019    Procedure: ORIF, FRACTURE, RADIUS, DISTAL - right accumed;  Surgeon: Deejay Bragg MD;  Location: [facility] OR 2ND FLR;  Service: Orthopedics;  Laterality: Right;       Review of patient's allergies indicates:   Allergen Reactions    Codeine Hallucinations     Other reaction(s): Hallucinations    Penicillins Hives and Rash       No current facility-administered medications on file prior to encounter.     Current Outpatient Medications on File Prior to Encounter   Medication Sig    benazepriL (LOTENSIN) 20 MG tablet TAKE 1 TABLET BY MOUTH ONCE DAILY    cholecalciferol, vitamin D3, (D3-2000) 2,000 unit Cap Take 1 capsule (2,000 Units total) by mouth every other day.    furosemide (LASIX) 20 MG tablet Take 1 tablet (20 mg total) by mouth once daily.    triamterene-hydrochlorothiazide 37.5-25 mg (DYAZIDE) 37.5-25 mg per capsule TAKE 1 CAPSULE BY MOUTH IN  THE MORNING (Patient not taking: Reported on 5/16/2023)     Family History       Problem Relation (Age of Onset)    Arthritis Mother, Father, Son    Breast cancer Sister (76)    Cancer Brother, Brother    Diabetes Brother    Glaucoma Mother, Maternal Aunt, Maternal Aunt    Hypertension Mother, Father, Son, Son    Lupus Daughter    No Known Problems Sister, Sister, Sister, Brother, Brother, Brother,  Brother, Maternal Uncle, Paternal Aunt, Paternal Uncle, Maternal Grandfather, Paternal Grandmother, Paternal Grandfather    Stroke Maternal Grandmother    Thyroid disease Mother, Sister, Sister, Sister, Brother          Tobacco Use    Smoking status: Never    Smokeless tobacco: Never   Substance and Sexual Activity    Alcohol use: No    Drug use: No    Sexual activity: Not Currently     Partners: Male     Birth control/protection: Surgical     Review of Systems   Constitutional:  Negative for activity change, chills and fever.   HENT:  Negative for trouble swallowing.    Eyes:  Negative for photophobia and visual disturbance.   Respiratory:  Negative for cough, chest tightness and shortness of breath.    Cardiovascular:  Negative for chest pain, palpitations and leg swelling.   Gastrointestinal:  Negative for abdominal pain, constipation, diarrhea, nausea and vomiting.   Genitourinary:  Negative for dysuria, frequency and hematuria.   Musculoskeletal:  Negative for back pain, gait problem, myalgias and neck pain.   Skin:  Negative for rash and wound.   Neurological:  Positive for syncope and headaches. Negative for dizziness, speech difficulty and light-headedness.   Psychiatric/Behavioral:  Negative for agitation and confusion. The patient is not nervous/anxious.    Objective:     Vital Signs (Most Recent):  Temp: 99 °F (37.2 °C) (07/18/23 1106)  Pulse: 65 (07/18/23 1106)  Resp: 16 (07/18/23 1106)  BP: 130/63 (07/18/23 1106)  SpO2: 98 % (07/18/23 1106) Vital Signs (24h Range):  Temp:  [98.3 °F (36.8 °C)-99 °F (37.2 °C)] 99 °F (37.2 °C)  Pulse:  [63-78] 65  Resp:  [16-19] 16  SpO2:  [98 %-100 %] 98 %  BP: (130-179)/(63-85) 130/63     Weight: 84.4 kg (186 lb)  Body mass index is 30.95 kg/m².     Physical Exam  Vitals and nursing note reviewed.   Constitutional:       General: She is not in acute distress.     Appearance: She is well-developed.   HENT:      Head: Normocephalic and atraumatic.      Mouth/Throat:       Pharynx: No oropharyngeal exudate.   Eyes:      Conjunctiva/sclera: Conjunctivae normal.      Pupils: Pupils are equal, round, and reactive to light.   Cardiovascular:      Rate and Rhythm: Normal rate and regular rhythm.      Heart sounds: Normal heart sounds.   Pulmonary:      Effort: Pulmonary effort is normal. No respiratory distress.      Breath sounds: Normal breath sounds. No wheezing.   Abdominal:      General: Bowel sounds are normal. There is no distension.      Palpations: Abdomen is soft.      Tenderness: There is no abdominal tenderness.   Musculoskeletal:         General: No tenderness. Normal range of motion.      Cervical back: Normal range of motion and neck supple.      Right lower leg: No edema.      Left lower leg: No edema.   Lymphadenopathy:      Cervical: No cervical adenopathy.   Skin:     General: Skin is warm and dry.      Capillary Refill: Capillary refill takes less than 2 seconds.      Findings: No rash.   Neurological:      Mental Status: She is alert and oriented to person, place, and time.      Cranial Nerves: No cranial nerve deficit.      Sensory: No sensory deficit.      Coordination: Coordination normal.   Psychiatric:         Behavior: Behavior normal.         Thought Content: Thought content normal.         Judgment: Judgment normal.            CRANIAL NERVES     CN III, IV, VI   Pupils are equal, round, and reactive to light.     Significant Labs: All pertinent labs within the past 24 hours have been reviewed.  CBC:   Recent Labs   Lab 07/17/23 1810 07/18/23  0409   WBC 10.72 10.76   HGB 12.1 11.5*   HCT 40.6 37.4    296     CMP:   Recent Labs   Lab 07/17/23 1810 07/18/23  0409   * 142   K 4.7 3.8    106   CO2 25 27   * 102   BUN 22 18   CREATININE 1.2 1.1   CALCIUM 10.8* 10.2   PROT 8.0  --    ALBUMIN 3.8  --    BILITOT 0.3  --    ALKPHOS 66  --    AST 21  --    ALT 16  --    ANIONGAP 16 9     Cardiac Markers:   Recent Labs   Lab 07/17/23 1810    BNP 41     Troponin:   Recent Labs   Lab 07/17/23  1810 07/18/23  0117   TROPONINI 0.007 0.006     Urine Studies:   Recent Labs   Lab 07/18/23  0243   COLORU Yellow   APPEARANCEUA Clear   PHUR 6.0   SPECGRAV 1.010   PROTEINUA Trace*   GLUCUA Negative   KETONESU Trace*   BILIRUBINUA Negative   OCCULTUA Negative   NITRITE Negative   LEUKOCYTESUR Negative       Significant Imaging: I have reviewed all pertinent imaging results/findings within the past 24 hours.  Imaging Results              CTA Chest Non-Coronary (PE Studies) (No Result on File)                      CT Head Without Contrast (Final result)  Result time 07/18/23 09:46:45      Final result by Hernan Parrish DO (07/18/23 09:46:45)                   Impression:      Unremarkable noncontrast CT head as detailed above specifically without evidence for acute intracranial hemorrhage.  Clinical correlation and further evaluation as warranted.      Electronically signed by: Hernan Parrish DO  Date:    07/18/2023  Time:    09:46               Narrative:    EXAMINATION:  CT HEAD WITHOUT CONTRAST    CLINICAL HISTORY:  Syncope, simple, normal neuro exam;    TECHNIQUE:  Multiple sequential 5 mm axial images of the head without contrast.  Coronal and sagittal reformatted imaging from the axial acquisition.    COMPARISON:  None    FINDINGS:  Slight distortion by motion artifacts.  Allowing for artifacts there is no evidence for acute intracranial hemorrhage or sulcal effacement.  The ventricles are normal in size without hydrocephalus.  There is no midline shift or mass effect.  Visualized paranasal sinuses and mastoid air cells are clear.                                       CT Abdomen Pelvis With Contrast (Final result)  Result time 07/18/23 00:05:45      Final result by Maximino Devlin MD (07/18/23 00:05:45)                   Impression:      Left adrenal nodule, indeterminate.  Further evaluation with nonemergent outpatient adrenal MRI or CT can be obtained  as clinically indicated.    Multiple probable hepatic cysts, too small to characterize.  Further evaluation can be obtained with nonemergent hepatic ultrasound as clinically indicated.    Colonic diverticulosis without diverticulitis.    Additional findings as above.    Electronically signed by resident: Clarisse Cash  Date:    07/17/2023  Time:    22:32    Electronically signed by: Maximino Devlin MD  Date:    07/18/2023  Time:    00:05               Narrative:    EXAMINATION:  CT ABDOMEN PELVIS WITH CONTRAST    CLINICAL HISTORY:  Abdominal pain, acute, nonlocalized;right side abd pain, syncope;    TECHNIQUE:  Low dose axial images, sagittal and coronal reformations were obtained from the lung bases to the pubic symphysis following the IV administration of 100 mL of Omnipaque 350.  Oral contrast was not administered.    COMPARISON:  Complete retroperitoneal ultrasound 06/12/2017.    FINDINGS:  Heart: Normal size. No effusion.    Lung Bases: Vague ground-glass attenuation in the bilateral lower lung zones, could be seen with pulmonary edema.    Liver: Liver is upper limits of normal in size.  Few scattered hypodensities throughout the liver, largest measures 1.5 cm and likely represent cysts.    Gallbladder: Surgically absent.    Bile Ducts: No dilatation.    Pancreas: No mass. No peripancreatic fat stranding.    Spleen: Normal.    Adrenals: 1.2 x 0.9 cm left adrenal nodule.  Right adrenal is unremarkable.    Kidneys/Ureters: Right renal cyst.  No hydronephrosis.  Bilateral ureters are normal in course and caliber.    Bladder: No wall thickening.    Reproductive organs: Uterus is surgically absent.    GI Tract/Mesentery: Colonic diverticulosis.  No evidence of bowel obstruction or inflammation.  Appendix is unremarkable.    Peritoneal Space: No ascites or free air.    Retroperitoneum: No significant adenopathy.    Abdominal wall: Normal.    Vasculature: No aneurysm.  Mild atherosclerosis the descending aorta and  branches.    Bones: No acute fracture.  Degenerative disc space narrowing with vacuum disc at L3-4..                                       X-Ray Chest AP Portable (Final result)  Result time 07/17/23 20:54:00      Final result by Jj Moreno MD (07/17/23 20:54:00)                   Impression:      No detrimental change or radiographic acute intrathoracic process seen on this single view.      Electronically signed by: Jj Moreno MD  Date:    07/17/2023  Time:    20:54               Narrative:    EXAMINATION:  XR CHEST AP PORTABLE    CLINICAL HISTORY:  Chest Pain;    TECHNIQUE:  Single frontal view of the chest was performed.    COMPARISON:  Chest radiograph 09/16/2019    FINDINGS:  Patient is slightly rotated.Bibasilar minimal platelike scarring versus atelectasis.  The lungs are otherwise symmetrically well expanded without consolidation, pleural effusion or pneumothorax.    The cardiac silhouette is normal in size. Grossly similar calcification with mild tortuosity of the aorta.  Hilar contours are within normal limits.    Bones are intact.  Right upper quadrant surgical clips again noted.  PA and lateral views can be obtained.                                    Assessment/Plan:     * Syncope  - had prodrome of lightheadedness, 1 episode of emesis after syncopal episode  - orthostatic negative  - infectious workup negative  - Evaluated by Cardiology:  -D-Dimer to r/o PE, if positive D-Dimer will need CTA PE since son has h/o syncope which showed PE  -consider 30 day cardiac event monitor upon d/c if no PE found that'd explain syncope (insert cardiac event monitor and secure chat or call [employer] [phone] to arrange monitor, she can get it before d/c or it can be mailed to her home)  -consider neurology outpatient visit with eyes rolling up and confusion afterwards to eval for a seizure episode  -outpatient cardiology f/up  - CTA pending (per radiology, needed to wait till 10 pm to receive since she had  contrast yesterday for CT A/P)  - consider EEG  - tele    Adrenal nodule  - CT with left adrenal nodule, indeterminate.  - Further evaluation with nonemergent outpatient adrenal MRI or CT can be obtained as clinically indicated    Abnormal liver CT  - CT with Multiple probable hepatic cysts, too small to characterize.   - liver enzymes wnl  - Further evaluation can be obtained with nonemergent hepatic ultrasound as clinically indicated.     Microcytic anemia  - Hgb 12.1 on admit  - anemia panel with iron deficiency anemia  - daily cbc    Stage 3a chronic kidney disease  - Cr 1.2 on admit, at baseline   - renally dose meds  - avoid nephrotoxins  - monitor with daily bmp    Essential hypertension  - controlled on admit  - continue lasix  - benazepril not on formulary >> use lisinopril       VTE Risk Mitigation (From admission, onward)         Ordered     enoxaparin injection 40 mg  Daily         07/18/23 1516     IP VTE HIGH RISK PATIENT  Once         07/18/23 1516     Place sequential compression device  Until discontinued         07/17/23 2145                     On 07/18/2023, patient should be placed in hospital observation services under my care in collaboration with Dr. Alexys Renee.      Tabby Ruiz PA-C  Department of Hospital Medicine  [facility] - Emergency Dept

## 2023-07-18 NOTE — ED NOTES
Oral care products provided to pt per request. Denies any further complaints at this time. Care ongoing.

## 2023-07-18 NOTE — ASSESSMENT & PLAN NOTE
- had prodrome of lightheadedness, 1 episode of emesis after syncopal episode  - orthostatic negative  - infectious workup negative  - Evaluated by Cardiology:  -D-Dimer to r/o PE, if positive D-Dimer will need CTA PE since son has h/o syncope which showed PE  -consider 30 day cardiac event monitor upon d/c if no PE found that'd explain syncope (insert cardiac event monitor and secure chat or call [employer] [facility] [phone] to arrange monitor, she can get it before d/c or it can be mailed to her home)  -consider neurology outpatient visit with eyes rolling up and confusion afterwards to eval for a seizure episode  -outpatient cardiology f/up  - CTA pending (per radiology, needed to wait till 10 pm to receive since she had contrast yesterday for CT A/P)  - consider EEG  - tele

## 2023-07-18 NOTE — PLAN OF CARE
07/18/23 1741   Post-Acute Status   Post-Acute Authorization Other   Other Status No Post-Acute Service Needs   Discharge Delays None known at this time   Discharge Plan   Discharge Plan A Home     No post acute services required at this time      Elizabeth Reese CD, MSW, LMSW, RSW   Case Management  [facility]  Email: [email or web address]

## 2023-07-18 NOTE — ASSESSMENT & PLAN NOTE
Patient presented with syncope x 1 min while sitting in chair on 7/17 around 3 pm. Per son who witnessed it, she had LOC for one min. Her eyes also rolled up. She was confused once she woke up before gaining full concouiosness. She denies having any CP or SOB. She has no h/o past syncopal events.     Upon admit, her EKG shows NSR. Tele review showing HR sinus around 75 with no star events. Trop x 2 are -ve. Echo with EF normal and mild to mod TR and mild MR. BNP is 41. On exam, she is A O x 3 on r/a, comfortable with no leg or ankle edema.     Recommendations:  -D-Dimer to r/o PE, if positive D-Dimer will need CTA PE since son has h/o syncope which showed PE  -consider 30 day cardiac event monitor upon d/c if no PE found that'd explain syncope (insert cardiac event monitor and secure chat or call [employer] [facility] [phone] to arrange monitor, she can get it before d/c or it can be mailed to her home)  -consider neurology outpatient visit with eyes rolling up and confusion afterwards to eval for a seizure episode  -outpatient cardiology f/up

## 2023-07-18 NOTE — HPI
Carmen Wang is a 79 yo F with PMHx of HTN, CKD, aortic atherosclerosis, multinodular thyroid who presented to ED for syncopal episode. She was in her usual state of health yesterday besides a mild R sided HA that resolved without intervention. Yesterday after lunch around 3 pm, she was sitting down at home when she started feeling lightheaded and had a syncopal episode. Her son witnessed the event and stated that she slid out of her chair with mild shaking of her body. Denies rhythmic tonic-clonic motion, tongue biting, urine or stool incontinence, or confusion after the episode. When she recovered she had an episode of vomiting. No history of previous syncope. She had mild right flank pain/R hip pain when she presented to the ED, which has since resolved. Of note, her son has a hx of provoked PE after surgery. Denies fever, chills, chest pain, palpitations, SOB, cough, abdominal pain, diarrhea, dysuria, and LE swelling.    In ED: Afebrile. Orthostatic negative. HDS. Satting well on RA. No leukocytosis. Hgb 12.1. CMP grossly unremarkable. BNP 41. Trop 0.007>>0.006. EKG without ischemic changes. Echo with EF 65%, indeterminate diastolic fx, mod TR, mild MR, normal RV function, normal CVP. UA noninfectious. CXR without acute process. CT head negative. CT A/P without acute process. Evaluated by cardiology, who recommended d dimer and consider 30 day event monitor. D dimer positive (negative when age adjusted). CTA pending. Placed in observation with hospital medicine.

## 2023-07-18 NOTE — PROGRESS NOTES
ED Observation Unit  Progress Note      HPI   78 y.o. year old female PMH of HTN, CKD, aortic atherosclerosis, multinodular thyroid who presents to the ED c/o syncopal episode.  She was feeling well this morning had right-sided 5/10 headache that resolved.  She also saw her primary care physician for a regular visit earlier today and afterwards, she had lunch. Around 3:00p,  she was sitting down at home when she started feeling lightheaded and had a syncopal episode.  Her son was with her and he mentions that she slid out of the chair - he helped her during the time she did not fall, mild shaking of her body however no rhythmic tonic-clonic motion, no tongue biting, no urine or stool incontinence, no confusion after the episode.  When she recovered she had an episode of vomiting. She also reports also mild right flank pain/R hip pain, unsure if it was secondary to the fall afterwards if somehow she hit the right side.     She denies any chest pain or shortness of breath. She also denies palpitations.  She reports a history of IBS. In the past she had an episode that started with abdominal cramping and she believes that she lost consciousness during that episode because she woke up after she vomited, however she does not remember vomiting.    The patient reports that she has been having intermittent headaches.  These began about 3 months ago after her hydrochlorothiazide was discontinued.  She denies head trauma.  Denies neck stiffness or fever.  She describes the headaches as beginning across the entire forehead and traveling to the right side of her head and down the back of her head to the neck.  She describes the pain as tension.  She denies aggravating factors.  The headaches resolve spontaneously after a few hours.  She does not take medication for these headaches.  They are not associated visual changes or pain in the temples.  Not associated with weakness or numbness. She reports that she did have 1 of  these headaches yesterday prior to the syncopal episode.  These headaches are new and prior to 3 months ago, she states that she did not get regular headaches.  The headaches typically occur 2 to 3 times a week.     History of hypertension. She reports that she is compliant with her medication.    In the ED - VSS, not orthostatic. Labs largely unremarkable. Symptoms resolved and she is feeling much better now.     Placed in ED observation for troponin trending, cardiac monitoring, echo in the am, CT abdomen/pelvis.      Interval History   Patient is resting in bed on my assessment.  She states that she is not lightheaded or nauseated.  She does report a tension headache that is the same as her headaches have been over the course the last 3 months.  She states that it is mild and she would not like any medication for it.  She is neurologically intact.  She was taken for an echocardiogram this morning, results are pending.    PMHx   Past Medical History:   Diagnosis Date    Cataract     Chronic diarrhea     Hypertension     IBS (irritable bowel syndrome)       Past Surgical History:   Procedure Laterality Date    APPENDECTOMY      with the hysterectomy    BUNIONECTOMY      CHOLECYSTECTOMY      COLONOSCOPY N/A 10/05/2015    Procedure: COLONOSCOPY;  Surgeon: Teja Olivarez MD;  Location: [facility] (4TH FLR);  Service: Endoscopy;  Laterality: N/A;    HYSTERECTOMY      RUPERT secondary to pelvic pain, sacrocolpoplexy    OPEN REDUCTION AND INTERNAL FIXATION (ORIF) OF FRACTURE OF DISTAL RADIUS Right 09/19/2019    Procedure: ORIF, FRACTURE, RADIUS, DISTAL - right accumed;  Surgeon: Deejay Bragg MD;  Location: [facility] OR 2ND FLR;  Service: Orthopedics;  Laterality: Right;        Family Hx   Family History   Problem Relation Age of Onset    Hypertension Mother     Arthritis Mother     Thyroid disease Mother     Glaucoma Mother     Arthritis Father     Hypertension Father     Thyroid disease Sister     Breast cancer Sister  76    Thyroid disease Sister     Thyroid disease Sister     No Known Problems Sister     No Known Problems Sister     No Known Problems Sister     Thyroid disease Brother     Diabetes Brother     Cancer Brother         prostate    Cancer Brother         prostate    No Known Problems Brother     No Known Problems Brother     No Known Problems Brother     No Known Problems Brother     Lupus Daughter     Arthritis Son     Hypertension Son     Hypertension Son     Glaucoma Maternal Aunt     Glaucoma Maternal Aunt     No Known Problems Maternal Uncle     No Known Problems Paternal Aunt     No Known Problems Paternal Uncle     Stroke Maternal Grandmother     No Known Problems Maternal Grandfather     No Known Problems Paternal Grandmother     No Known Problems Paternal Grandfather     Colon cancer Neg Hx     Ovarian cancer Neg Hx     Amblyopia Neg Hx     Blindness Neg Hx     Cataracts Neg Hx     Macular degeneration Neg Hx     Retinal detachment Neg Hx     Strabismus Neg Hx     Esophageal cancer Neg Hx         Social Hx   Social History     Socioeconomic History    Marital status:    Tobacco Use    Smoking status: Never    Smokeless tobacco: Never   Substance and Sexual Activity    Alcohol use: No    Drug use: No    Sexual activity: Not Currently     Partners: Male     Birth control/protection: Surgical     Social Determinants of Health     Financial Resource Strain: Low Risk     Difficulty of Paying Living Expenses: Not hard at all   Food Insecurity: No Food Insecurity    Worried About Running Out of Food in the Last Year: Never true    Ran Out of Food in the Last Year: Never true   Transportation Needs: No Transportation Needs    Lack of Transportation (Medical): No    Lack of Transportation (Non-Medical): No   Physical Activity: Sufficiently Active    Days of Exercise per Week: 4 days    Minutes of Exercise per Session: 50 min   Stress: No Stress Concern Present    Feeling of Stress : Only a little   Social  "Connections: Socially Integrated    Frequency of Communication with Friends and Family: More than three times a week    Frequency of Social Gatherings with Friends and Family: Three times a week    Attends Protestant Services: More than 4 times per year    Active Member of Clubs or Organizations: Yes    Attends Club or Organization Meetings: 1 to 4 times per year    Marital Status:    Housing Stability: Low Risk     Unable to Pay for Housing in the Last Year: No    Number of Places Lived in the Last Year: 1    Unstable Housing in the Last Year: No        Vital Signs   Vitals:    07/18/23 0600 07/18/23 0733 07/18/23 1106 07/18/23 1600   BP: (!) 145/73 (!) 145/66 130/63 (!) 145/63   BP Location:       Patient Position:       Pulse: 63 63 65 62   Resp:  18 16 18   Temp:  98.7 °F (37.1 °C) 99 °F (37.2 °C) 98.5 °F (36.9 °C)   TempSrc:  Oral Oral Oral   SpO2:  98% 98% 99%   Weight: 84.4 kg (186 lb)      Height: 5' 5" (1.651 m)           Review of Systems  Constitutional:  Negative for chills and fever.   Respiratory:  Negative for shortness of breath.    Cardiovascular:  Negative for chest pain and palpitations.   Gastrointestinal:  Positive for vomiting (resolved). Negative for abdominal pain.   Genitourinary:  Negative for dysuria.   Musculoskeletal:  Positive for myalgias (resolved).   Neurological:  Positive for loss of consciousness and headaches (resolved). Negative for speech change and seizures.     Brief Physical Exam/Reassessment   Constitutional:       Appearance: Normal appearance.   HENT:      Head: Normocephalic and atraumatic.   Cardiovascular:      Rate and Rhythm: Normal rate and regular rhythm.      Heart sounds: Normal heart sounds.   Pulmonary:      Effort: Pulmonary effort is normal.      Breath sounds: Normal breath sounds.   Abdominal:      General: Abdomen is flat.      Palpations: Abdomen is soft.      Tenderness: There is no abdominal tenderness.   Skin:     General: Skin is warm and dry. "   Neurological:      Mental Status: She is alert and oriented to person, place, and time. Normal facial symmetry. Normal strength and sensation. Normal speech.  Psychiatric:         Mood and Affect: Mood normal.        Labs/Imaging   Labs Reviewed   CBC W/ AUTO DIFFERENTIAL - Abnormal; Notable for the following components:       Result Value    RBC 5.45 (*)     MCV 75 (*)     MCH 22.2 (*)     MCHC 29.8 (*)     RDW 17.2 (*)     Gran # (ANC) 8.1 (*)     Immature Grans (Abs) 0.05 (*)     Gran % 75.9 (*)     Lymph % 14.9 (*)     All other components within normal limits   COMPREHENSIVE METABOLIC PANEL - Abnormal; Notable for the following components:    Sodium 146 (*)     Glucose 118 (*)     Calcium 10.8 (*)     eGFR 46.6 (*)     All other components within normal limits   URINALYSIS, REFLEX TO URINE CULTURE - Abnormal; Notable for the following components:    Protein, UA Trace (*)     Ketones, UA Trace (*)     All other components within normal limits    Narrative:     Specimen Source->Urine   CBC W/ AUTO DIFFERENTIAL - Abnormal; Notable for the following components:    Hemoglobin 11.5 (*)     MCV 72 (*)     MCH 22.0 (*)     MCHC 30.7 (*)     RDW 16.5 (*)     Mono # 1.1 (*)     All other components within normal limits   BASIC METABOLIC PANEL - Abnormal; Notable for the following components:    eGFR 51.8 (*)     All other components within normal limits   D DIMER, QUANTITATIVE - Abnormal; Notable for the following components:    D-Dimer 1.10 (*)     All other components within normal limits   LIPASE   TROPONIN I   B-TYPE NATRIURETIC PEPTIDE   HIV 1 / 2 ANTIBODY    Narrative:     Release to patient->Immediate   HEPATITIS C ANTIBODY    Narrative:     Release to patient->Immediate   TROPONIN I   POCT TROPONIN      Imaging Results              CTA Chest Non-Coronary (PE Studies) (No Result on File)                      CT Head Without Contrast (Final result)  Result time 07/18/23 09:46:45      Final result by Hernan MARIA  DO Francois (07/18/23 09:46:45)                   Impression:      Unremarkable noncontrast CT head as detailed above specifically without evidence for acute intracranial hemorrhage.  Clinical correlation and further evaluation as warranted.      Electronically signed by: Hernan Parrish DO  Date:    07/18/2023  Time:    09:46               Narrative:    EXAMINATION:  CT HEAD WITHOUT CONTRAST    CLINICAL HISTORY:  Syncope, simple, normal neuro exam;    TECHNIQUE:  Multiple sequential 5 mm axial images of the head without contrast.  Coronal and sagittal reformatted imaging from the axial acquisition.    COMPARISON:  None    FINDINGS:  Slight distortion by motion artifacts.  Allowing for artifacts there is no evidence for acute intracranial hemorrhage or sulcal effacement.  The ventricles are normal in size without hydrocephalus.  There is no midline shift or mass effect.  Visualized paranasal sinuses and mastoid air cells are clear.                                       CT Abdomen Pelvis With Contrast (Final result)  Result time 07/18/23 00:05:45      Final result by Maximino Devlin MD (07/18/23 00:05:45)                   Impression:      Left adrenal nodule, indeterminate.  Further evaluation with nonemergent outpatient adrenal MRI or CT can be obtained as clinically indicated.    Multiple probable hepatic cysts, too small to characterize.  Further evaluation can be obtained with nonemergent hepatic ultrasound as clinically indicated.    Colonic diverticulosis without diverticulitis.    Additional findings as above.    Electronically signed by resident: Clarisse Cash  Date:    07/17/2023  Time:    22:32    Electronically signed by: Maximino Devlin MD  Date:    07/18/2023  Time:    00:05               Narrative:    EXAMINATION:  CT ABDOMEN PELVIS WITH CONTRAST    CLINICAL HISTORY:  Abdominal pain, acute, nonlocalized;right side abd pain, syncope;    TECHNIQUE:  Low dose axial images, sagittal and coronal  reformations were obtained from the lung bases to the pubic symphysis following the IV administration of 100 mL of Omnipaque 350.  Oral contrast was not administered.    COMPARISON:  Complete retroperitoneal ultrasound 06/12/2017.    FINDINGS:  Heart: Normal size. No effusion.    Lung Bases: Vague ground-glass attenuation in the bilateral lower lung zones, could be seen with pulmonary edema.    Liver: Liver is upper limits of normal in size.  Few scattered hypodensities throughout the liver, largest measures 1.5 cm and likely represent cysts.    Gallbladder: Surgically absent.    Bile Ducts: No dilatation.    Pancreas: No mass. No peripancreatic fat stranding.    Spleen: Normal.    Adrenals: 1.2 x 0.9 cm left adrenal nodule.  Right adrenal is unremarkable.    Kidneys/Ureters: Right renal cyst.  No hydronephrosis.  Bilateral ureters are normal in course and caliber.    Bladder: No wall thickening.    Reproductive organs: Uterus is surgically absent.    GI Tract/Mesentery: Colonic diverticulosis.  No evidence of bowel obstruction or inflammation.  Appendix is unremarkable.    Peritoneal Space: No ascites or free air.    Retroperitoneum: No significant adenopathy.    Abdominal wall: Normal.    Vasculature: No aneurysm.  Mild atherosclerosis the descending aorta and branches.    Bones: No acute fracture.  Degenerative disc space narrowing with vacuum disc at L3-4..                                       X-Ray Chest AP Portable (Final result)  Result time 07/17/23 20:54:00      Final result by Jj Moreno MD (07/17/23 20:54:00)                   Impression:      No detrimental change or radiographic acute intrathoracic process seen on this single view.      Electronically signed by: Jj Moreno MD  Date:    07/17/2023  Time:    20:54               Narrative:    EXAMINATION:  XR CHEST AP PORTABLE    CLINICAL HISTORY:  Chest Pain;    TECHNIQUE:  Single frontal view of the chest was performed.    COMPARISON:  Chest  radiograph 09/16/2019    FINDINGS:  Patient is slightly rotated.Bibasilar minimal platelike scarring versus atelectasis.  The lungs are otherwise symmetrically well expanded without consolidation, pleural effusion or pneumothorax.    The cardiac silhouette is normal in size. Grossly similar calcification with mild tortuosity of the aorta.  Hilar contours are within normal limits.    Bones are intact.  Right upper quadrant surgical clips again noted.  PA and lateral views can be obtained.                                       I reviewed all labs, imaging, EKGs pertinent to this visit.    Plan   Syncope  -I have reviewed ED labs including negative serial troponins. Reviewed imaging and EKG's from ED.  -CT of the abdomen/pelvis shows L adrenal nodule, hepatic cysts, diverticulosis without diverticulitis  -Echo results pending  -Continue telemetry   -continue all home medications  -I spoke with cardiology upon receiving echo results to have them review the report/images. They recommend ordering a d-dimer and placing an inpatient cardiac consult. If d-dimer is positive, will order CTA chest. If d-dimer is negative, they would like a 30 day cardiac event monitor ordered.  -Pt will be moved to Hospital Medicine Service for extended obs for 10 pm CTA chest. Received a call from radiology (Dr. Loen) that pt will have exceeded the 24 hourdosing recommendation for IV contrast after yesterdays CT abd/pelv and todays CTA chest. I discussed the former with Dr. Carlson as well as the patient. Pt has made an informed decision to wait until this evening.    Headache  -Discussed patient's headache with Dr. Worley  -Pt is neuro intact   -Will add non contrast CT head- No acute findings on head CT  -PRN Tylenol if needed.      I received report on this patient from my colleague Gladis Torres PA-C via [employer] secure chat.  I discussed this case with ED staff Dr. Worley.

## 2023-07-18 NOTE — ED NOTES
Care assumed from LEVI Irving    APPEARANCE: awake, alert, and appears to be in NAD. Pain score 0/10. Bed in lowest and locked position w/ side rails up x2. Pt remains on cont cardiac monitoring via telemetry box. Call light w/n pt reach and instructed on how to use.   SKIN: warm, dry and intact. No visible breakdown or bruising.  MUSCULOSKELETAL: Patient moving all extremities spontaneously, no obvious swelling or deformities noted. Ambulates independently.  RESPIRATORY: Airway open and patent. Denies shortness of breath. Respirations even, unlabored, equal bilaterally on inspiration and expiration.   CARDIAC: Regular HR. Denies CP, 2+ distal pulses; no peripheral edema  ABDOMEN: S/ND/NT, Denies N/V/D. Pt reports no change in appetite or abnormal BM  : Pt voids spontaneously, denies dysuria, hematuria, urinary frequency, or incontinence   NEUROLOGIC: AAO x 4; speaking and following commands appropriately. Equal strength in all extremities; denies numbness/tingling. Denies dizziness, lightheadedness, or visual changes. +HA

## 2023-07-18 NOTE — PLAN OF CARE
[facility] - Emergency Dept  Initial Discharge Assessment       Primary Care Provider: Deniz Redman MD    Admission Diagnosis: LOC (loss of consciousness) [R40.20]    Admission Date: 7/17/2023  Expected Discharge Date:     Pt stated she is independent with her ADL's and ambulation and does not require assistance or equipment.    Pt to d/c home with no needs when ready    Transition of Care Barriers: (P) None    Payor: [employer]ICARE / Plan: [employer] 65 / Product Type: Medicare Advantage /     Extended Emergency Contact Information  Primary Emergency Contact: IrlandalaineHanson  Address: [address] [place]           [place] [address] [facility]  Home Phone: [phone]  Work Phone: [phone]  Mobile Phone: [phone]  Relation: Spouse  Secondary Emergency Contact: IrlandalaineJitendra  Mobile Phone: [phone]  Relation: Son    Discharge Plan A: (P) Home  Discharge Plan B: (P) Home      [facility] - [address] [address]  Phone: [phone] Fax: [phone]    [id number] - [place] - [address] AT [facility] [address]  [address]  [place] [address]  Phone: [phone] Fax: [phone]    [facility] ([facility] ) - [place], [place] - [address]  [address]  [address]  [facility] [address]  Phone: [phone] Fax: [phone]      Initial Assessment (most recent)       Adult Discharge Assessment - 07/18/23 2209          Discharge Assessment    Assessment Type Discharge Planning Assessment     Confirmed/corrected address, phone number and insurance Yes     Confirmed Demographics Correct on Facesheet     Source of Information patient     Communicated CARLOS with patient/caregiver Yes     Reason For Admission Syncope     People in Home spouse     Facility Arrived From: home     Do you expect to  return to your current living situation? Yes     Do you have help at home or someone to help you manage your care at home? No     Prior to hospitilization cognitive status: Alert/Oriented;No Deficits     Current cognitive status: Alert/Oriented;No Deficits (P)      Home Accessibility wheelchair accessible (P)      Home Layout Able to live on 1st floor (P)      Equipment Currently Used at Home none (P)      Patient currently being followed by outpatient case management? No (P)      Do you currently have service(s) that help you manage your care at home? No (P)      Do you have any problems affording any of your prescribed medications? No (P)      Is the patient taking medications as prescribed? yes (P)      Who is going to help you get home at discharge? family/friends (P)      How do you get to doctors appointments? car, drives self;family or friend will provide (P)      Are you on dialysis? No (P)      Do you take coumadin? No (P)      Discharge Plan A Home (P)      Discharge Plan B Home (P)      DME Needed Upon Discharge  none (P)      Discharge Plan discussed with: Patient (P)      Transition of Care Barriers None (P)         OTHER    Name(s) of People in Home spouse Hanson (P)                    Elizabeth Reese CD, MSW, LMSW, RSW   Case Management  [facility]  Email: [email or web address]

## 2023-07-18 NOTE — CONSULTS
[facility]  Cardiology  Consult Note    Patient Name: Carmen Wang  MRN: [id number]  Admission Date: 7/17/2023  Hospital Length of Stay: 0 days  Code Status: Full Code   Attending Provider: No att. providers found   Consulting Provider: Isaías Villalpando MD  Primary Care Physician: Deniz Redman MD  Principal Problem:LOC (loss of consciousness)    Patient information was obtained from patient and ER records.     Inpatient consult to Cardiology  Consult performed by: Isaías Villalpando MD  Consult ordered by: Alla Gaspar NP        Subjective:     Chief Complaint:  Syncope      HPI:   78 years old pleasant AA female with h/o HTN, CKD 3a, B/L lower ext swelling likely from venous insufficiency. She has no past cardiac history.   She presented to the ER yesterday after a syncope and fall. She was sitting in a chair around 3 pm when she developed LH followed by passing out. Per son who witnessed it, she had LOC for one min. Her eyes also rolled up. She was confused once she woke up before gaining full concouiosness. She denies having any CP or SOB. She has no h/o past syncopal events.   Upon admit, her EKG shows NSR. Tele review showing HR sinus around 75 with no star events. Trop x 2 are -ve. Echo with EF normal and mild to mod TR and mild MR. BNP is 41. On exam, she is A O x 3 on r/a, comfortable with no leg or ankle edema.       Past Medical History:   Diagnosis Date    Cataract     Chronic diarrhea     Hypertension     IBS (irritable bowel syndrome)        Past Surgical History:   Procedure Laterality Date    APPENDECTOMY      with the hysterectomy    BUNIONECTOMY      CHOLECYSTECTOMY      COLONOSCOPY N/A 10/05/2015    Procedure: COLONOSCOPY;  Surgeon: Teja Olivarez MD;  Location: [facility] ([address] [place] [address]);  Service: Endoscopy;  Laterality: N/A;    HYSTERECTOMY      RUPERT secondary to pelvic pain, sacrocolpoplexy    OPEN REDUCTION AND INTERNAL FIXATION (ORIF) OF FRACTURE OF  DISTAL RADIUS Right 09/19/2019    Procedure: ORIF, FRACTURE, RADIUS, DISTAL - right accumed;  Surgeon: Deejay Bragg MD;  Location: [facility] OR [address] [place] [address];  Service: Orthopedics;  Laterality: Right;       Review of patient's allergies indicates:   Allergen Reactions    Codeine Hallucinations     Other reaction(s): Hallucinations    Penicillins Hives and Rash       No current facility-administered medications on file prior to encounter.     Current Outpatient Medications on File Prior to Encounter   Medication Sig    benazepriL (LOTENSIN) 20 MG tablet TAKE 1 TABLET BY MOUTH ONCE DAILY    cholecalciferol, vitamin D3, (D3-2000) 2,000 unit Cap Take 1 capsule (2,000 Units total) by mouth every other day.    furosemide (LASIX) 20 MG tablet Take 1 tablet (20 mg total) by mouth once daily.    triamterene-hydrochlorothiazide 37.5-25 mg (DYAZIDE) 37.5-25 mg per capsule TAKE 1 CAPSULE BY MOUTH IN  THE MORNING (Patient not taking: Reported on 5/16/2023)     Family History       Problem Relation (Age of Onset)    Arthritis Mother, Father, Son    Breast cancer Sister (76)    Cancer Brother, Brother    Diabetes Brother    Glaucoma Mother, Maternal Aunt, Maternal Aunt    Hypertension Mother, Father, Son, Son    Lupus Daughter    No Known Problems Sister, Sister, Sister, Brother, Brother, Brother, Brother, Maternal Uncle, Paternal Aunt, Paternal Uncle, Maternal Grandfather, Paternal Grandmother, Paternal Grandfather    Stroke Maternal Grandmother    Thyroid disease Mother, Sister, Sister, Sister, Brother          Tobacco Use    Smoking status: Never    Smokeless tobacco: Never   Substance and Sexual Activity    Alcohol use: No    Drug use: No    Sexual activity: Not Currently     Partners: Male     Birth control/protection: Surgical     Review of Systems   Constitutional: Negative for fever.   HENT:  Negative for ear pain.    Eyes:  Negative for double vision.   Cardiovascular:  Positive for syncope. Negative for chest pain,  dyspnea on exertion, leg swelling and palpitations.   Respiratory:  Negative for shortness of breath.    Endocrine: Negative for heat intolerance.   Hematologic/Lymphatic: Negative for adenopathy.   Skin:  Negative for dry skin.   Musculoskeletal:  Negative for falls.   Gastrointestinal:  Negative for anorexia.   Genitourinary:  Negative for flank pain.   Neurological:  Negative for disturbances in coordination.   Psychiatric/Behavioral:  Negative for depression.    Objective:     Vital Signs (Most Recent):  Temp: 99 °F (37.2 °C) (07/18/23 1106)  Pulse: 65 (07/18/23 1106)  Resp: 16 (07/18/23 1106)  BP: 130/63 (07/18/23 1106)  SpO2: 98 % (07/18/23 1106) Vital Signs (24h Range):  Temp:  [98.3 °F (36.8 °C)-99 °F (37.2 °C)] 99 °F (37.2 °C)  Pulse:  [63-79] 65  Resp:  [16-19] 16  SpO2:  [98 %-100 %] 98 %  BP: (123-179)/(58-85) 130/63     Weight: 84.4 kg (186 lb)  Body mass index is 30.95 kg/m².    SpO2: 98 %       No intake or output data in the 24 hours ending 07/18/23 1205    Lines/Drains/Airways       Peripheral Intravenous Line  Duration                  Peripheral IV - Single Lumen 07/17/23 1814 20 G;1 in Left Antecubital <1 day                     Physical Exam  HENT:      Head: Normocephalic.      Nose: Nose normal.   Eyes:      Pupils: Pupils are equal, round, and reactive to light.   Cardiovascular:      Rate and Rhythm: Normal rate and regular rhythm.   Pulmonary:      Effort: Pulmonary effort is normal.   Abdominal:      General: Abdomen is flat.   Musculoskeletal:         General: Normal range of motion.   Skin:     General: Skin is warm.   Neurological:      General: No focal deficit present.      Mental Status: She is alert.   Psychiatric:         Mood and Affect: Mood normal.      Significant Labs: Blood Culture: No results for input(s): LABBLOO in the last 48 hours., BMP:   Recent Labs   Lab 07/17/23  1810 07/18/23  0409   * 102   * 142   K 4.7 3.8    106   CO2 25 27   BUN 22 18    CREATININE 1.2 1.1   CALCIUM 10.8* 10.2   , CMP   Recent Labs   Lab 07/17/23  1810 07/18/23  0409   * 142   K 4.7 3.8    106   CO2 25 27   * 102   BUN 22 18   CREATININE 1.2 1.1   CALCIUM 10.8* 10.2   PROT 8.0  --    ALBUMIN 3.8  --    BILITOT 0.3  --    ALKPHOS 66  --    AST 21  --    ALT 16  --    ANIONGAP 16 9   , CBC   Recent Labs   Lab 07/17/23 1810 07/18/23  0409   WBC 10.72 10.76   HGB 12.1 11.5*   HCT 40.6 37.4    296   , INR No results for input(s): INR, PROTIME in the last 48 hours., Lipid Panel No results for input(s): CHOL, HDL, LDLCALC, TRIG, CHOLHDL in the last 48 hours., and Troponin   Recent Labs   Lab 07/17/23 1810 07/18/23  0117   TROPONINI 0.007 0.006       Significant Imaging: Echocardiogram: 2D echo with color flow doppler: No results found for this or any previous visit. and Transthoracic echo (TTE) complete (Cupid Only):   Results for orders placed or performed during the hospital encounter of 07/17/23   Echo   Result Value Ref Range    Ascending aorta 3.23 cm    STJ 2.54 cm    AV mean gradient 4 mmHg    Ao peak nathan 1.36 m/s    Ao VTI 33.70 cm    IVRT 108.47 msec    IVS 0.96 0.6 - 1.1 cm    LA size 3.12 cm    Left Atrium Major Axis 5.74 cm    Left Atrium Minor Axis 5.39 cm    LVIDd 4.23 3.5 - 6.0 cm    LVIDs 2.45 2.1 - 4.0 cm    LVOT diameter 1.86 cm    LVOT peak VTI 33.64 cm    Posterior Wall 0.60 0.6 - 1.1 cm    MV Peak A Nathan 1.03 m/s    E wave deceleration time 183.88 msec    MV Peak E Nathan 0.88 m/s    RA Major Axis 5.08 cm    RA Width 3.09 cm    RVDD 2.79 cm    Sinus 2.88 cm    TAPSE 1.80 cm    TR Max Nathan 2.95 m/s    LA WIDTH 3.38 cm    MV stenosis pressure 1/2 time 53.32 ms    LV Diastolic Volume 80.09 mL    LV Systolic Volume 21.28 mL    LVOT peak nathan 1.32 m/s    TDI LATERAL 0.08 m/s    TDI SEPTAL 0.08 m/s    LA volume (mod) 44.16 cm3    LV LATERAL E/E' RATIO 11.00 m/s    LV SEPTAL E/E' RATIO 11.00 m/s    FS 42 %    LA volume 49.83 cm3    LV mass 99.12 g     Left Ventricle Relative Wall Thickness 0.28 cm    AV valve area 2.71 cm2    AV Velocity Ratio 0.97     AV index (prosthetic) 1.00     MV valve area p 1/2 method 4.13 cm2    E/A ratio 0.85     Mean e' 0.08 m/s    LVOT area 2.7 cm2    LVOT stroke volume 91.36 cm3    AV peak gradient 7 mmHg    E/E' ratio 11.00 m/s    LV Systolic Volume Index 11.1 mL/m2    LV Diastolic Volume Index 41.71 mL/m2    LA Volume Index 26.0 mL/m2    LV Mass Index 52 g/m2    Triscuspid Valve Regurgitation Peak Gradient 35 mmHg    LA Volume Index (Mod) 23.0 mL/m2    BSA 1.97 m2    Right Atrial Pressure (from IVC) 3 mmHg    EF 65 %    TV rest pulmonary artery pressure 38 mmHg    Narrative    · The left ventricle is normal in size with normal systolic function.  · The estimated ejection fraction is 65%.  · Indeterminate left ventricular diastolic function.  · Normal right ventricular size with normal right ventricular systolic   function.  · Mild right atrial enlargement.  · Mild mitral regurgitation.  · Mild to moderate tricuspid regurgitation.  · Normal central venous pressure (3 mmHg).  · The estimated PA systolic pressure is 38 mmHg.  · Trivial posterior pericardial effusion. Just base.        Assessment and Plan:     * LOC (loss of consciousness)  Patient presented with syncope x 1 min while sitting in chair on 7/17 around 3 pm. Per son who witnessed it, she had LOC for one min. Her eyes also rolled up. She was confused once she woke up before gaining full concouiosness. She denies having any CP or SOB. She has no h/o past syncopal events.     Upon admit, her EKG shows NSR. Tele review showing HR sinus around 75 with no star events. Trop x 2 are -ve. Echo with EF normal and mild to mod TR and mild MR. BNP is 41. On exam, she is A O x 3 on r/a, comfortable with no leg or ankle edema.     Recommendations:  -D-Dimer to r/o PE, if positive D-Dimer will need CTA PE since son has h/o syncope which showed PE  -consider 30 day cardiac event monitor  upon d/c if no PE found that'd explain syncope (insert cardiac event monitor and secure chat or call Suzanne Ayon to arrange monitor, she can get it before d/c or it can be mailed to her home)  -consider neurology outpatient visit with eyes rolling up and confusion afterwards to eval for a seizure episode  -outpatient cardiology f/up        Thank you for your consult. I will sign off. Please contact us if you have any additional questions.    Isaías Villalpando MD  Cardiology   [facility]

## 2023-07-18 NOTE — ASSESSMENT & PLAN NOTE
- CT with left adrenal nodule, indeterminate.  - Further evaluation with nonemergent outpatient adrenal MRI or CT can be obtained as clinically indicated

## 2023-07-18 NOTE — SUBJECTIVE & OBJECTIVE
Past Medical History:   Diagnosis Date    Cataract     Chronic diarrhea     Hypertension     IBS (irritable bowel syndrome)        Past Surgical History:   Procedure Laterality Date    APPENDECTOMY      with the hysterectomy    BUNIONECTOMY      CHOLECYSTECTOMY      COLONOSCOPY N/A 10/05/2015    Procedure: COLONOSCOPY;  Surgeon: Teja Olivarez MD;  Location: [facility] (4TH FLR);  Service: Endoscopy;  Laterality: N/A;    HYSTERECTOMY      RUPERT secondary to pelvic pain, sacrocolpoplexy    OPEN REDUCTION AND INTERNAL FIXATION (ORIF) OF FRACTURE OF DISTAL RADIUS Right 09/19/2019    Procedure: ORIF, FRACTURE, RADIUS, DISTAL - right accumed;  Surgeon: Deejay Bragg MD;  Location: [facility] OR 2ND FLR;  Service: Orthopedics;  Laterality: Right;       Review of patient's allergies indicates:   Allergen Reactions    Codeine Hallucinations     Other reaction(s): Hallucinations    Penicillins Hives and Rash       No current facility-administered medications on file prior to encounter.     Current Outpatient Medications on File Prior to Encounter   Medication Sig    benazepriL (LOTENSIN) 20 MG tablet TAKE 1 TABLET BY MOUTH ONCE DAILY    cholecalciferol, vitamin D3, (D3-2000) 2,000 unit Cap Take 1 capsule (2,000 Units total) by mouth every other day.    furosemide (LASIX) 20 MG tablet Take 1 tablet (20 mg total) by mouth once daily.    triamterene-hydrochlorothiazide 37.5-25 mg (DYAZIDE) 37.5-25 mg per capsule TAKE 1 CAPSULE BY MOUTH IN  THE MORNING (Patient not taking: Reported on 5/16/2023)     Family History       Problem Relation (Age of Onset)    Arthritis Mother, Father, Son    Breast cancer Sister (76)    Cancer Brother, Brother    Diabetes Brother    Glaucoma Mother, Maternal Aunt, Maternal Aunt    Hypertension Mother, Father, Son, Son    Lupus Daughter    No Known Problems Sister, Sister, Sister, Brother, Brother, Brother, Brother, Maternal Uncle, Paternal Aunt, Paternal Uncle, Maternal Grandfather, Paternal  Grandmother, Paternal Grandfather    Stroke Maternal Grandmother    Thyroid disease Mother, Sister, Sister, Sister, Brother          Tobacco Use    Smoking status: Never    Smokeless tobacco: Never   Substance and Sexual Activity    Alcohol use: No    Drug use: No    Sexual activity: Not Currently     Partners: Male     Birth control/protection: Surgical     Review of Systems   Constitutional:  Negative for activity change, chills and fever.   HENT:  Negative for trouble swallowing.    Eyes:  Negative for photophobia and visual disturbance.   Respiratory:  Negative for cough, chest tightness and shortness of breath.    Cardiovascular:  Negative for chest pain, palpitations and leg swelling.   Gastrointestinal:  Negative for abdominal pain, constipation, diarrhea, nausea and vomiting.   Genitourinary:  Negative for dysuria, frequency and hematuria.   Musculoskeletal:  Negative for back pain, gait problem, myalgias and neck pain.   Skin:  Negative for rash and wound.   Neurological:  Positive for syncope and headaches. Negative for dizziness, speech difficulty and light-headedness.   Psychiatric/Behavioral:  Negative for agitation and confusion. The patient is not nervous/anxious.    Objective:     Vital Signs (Most Recent):  Temp: 99 °F (37.2 °C) (07/18/23 1106)  Pulse: 65 (07/18/23 1106)  Resp: 16 (07/18/23 1106)  BP: 130/63 (07/18/23 1106)  SpO2: 98 % (07/18/23 1106) Vital Signs (24h Range):  Temp:  [98.3 °F (36.8 °C)-99 °F (37.2 °C)] 99 °F (37.2 °C)  Pulse:  [63-78] 65  Resp:  [16-19] 16  SpO2:  [98 %-100 %] 98 %  BP: (130-179)/(63-85) 130/63     Weight: 84.4 kg (186 lb)  Body mass index is 30.95 kg/m².     Physical Exam  Vitals and nursing note reviewed.   Constitutional:       General: She is not in acute distress.     Appearance: She is well-developed.   HENT:      Head: Normocephalic and atraumatic.      Mouth/Throat:      Pharynx: No oropharyngeal exudate.   Eyes:      Conjunctiva/sclera: Conjunctivae normal.       Pupils: Pupils are equal, round, and reactive to light.   Cardiovascular:      Rate and Rhythm: Normal rate and regular rhythm.      Heart sounds: Normal heart sounds.   Pulmonary:      Effort: Pulmonary effort is normal. No respiratory distress.      Breath sounds: Normal breath sounds. No wheezing.   Abdominal:      General: Bowel sounds are normal. There is no distension.      Palpations: Abdomen is soft.      Tenderness: There is no abdominal tenderness.   Musculoskeletal:         General: No tenderness. Normal range of motion.      Cervical back: Normal range of motion and neck supple.      Right lower leg: No edema.      Left lower leg: No edema.   Lymphadenopathy:      Cervical: No cervical adenopathy.   Skin:     General: Skin is warm and dry.      Capillary Refill: Capillary refill takes less than 2 seconds.      Findings: No rash.   Neurological:      Mental Status: She is alert and oriented to person, place, and time.      Cranial Nerves: No cranial nerve deficit.      Sensory: No sensory deficit.      Coordination: Coordination normal.   Psychiatric:         Behavior: Behavior normal.         Thought Content: Thought content normal.         Judgment: Judgment normal.            CRANIAL NERVES     CN III, IV, VI   Pupils are equal, round, and reactive to light.     Significant Labs: All pertinent labs within the past 24 hours have been reviewed.  CBC:   Recent Labs   Lab 07/17/23 1810 07/18/23  0409   WBC 10.72 10.76   HGB 12.1 11.5*   HCT 40.6 37.4    296     CMP:   Recent Labs   Lab 07/17/23 1810 07/18/23  0409   * 142   K 4.7 3.8    106   CO2 25 27   * 102   BUN 22 18   CREATININE 1.2 1.1   CALCIUM 10.8* 10.2   PROT 8.0  --    ALBUMIN 3.8  --    BILITOT 0.3  --    ALKPHOS 66  --    AST 21  --    ALT 16  --    ANIONGAP 16 9     Cardiac Markers:   Recent Labs   Lab 07/17/23 1810   BNP 41     Troponin:   Recent Labs   Lab 07/17/23 1810 07/18/23  0117   TROPONINI 0.007  0.006     Urine Studies:   Recent Labs   Lab 07/18/23  0243   COLORU Yellow   APPEARANCEUA Clear   PHUR 6.0   SPECGRAV 1.010   PROTEINUA Trace*   GLUCUA Negative   KETONESU Trace*   BILIRUBINUA Negative   OCCULTUA Negative   NITRITE Negative   LEUKOCYTESUR Negative       Significant Imaging: I have reviewed all pertinent imaging results/findings within the past 24 hours.  Imaging Results              CTA Chest Non-Coronary (PE Studies) (No Result on File)                      CT Head Without Contrast (Final result)  Result time 07/18/23 09:46:45      Final result by Hernan Parrish DO (07/18/23 09:46:45)                   Impression:      Unremarkable noncontrast CT head as detailed above specifically without evidence for acute intracranial hemorrhage.  Clinical correlation and further evaluation as warranted.      Electronically signed by: Hernan Parrish DO  Date:    07/18/2023  Time:    09:46               Narrative:    EXAMINATION:  CT HEAD WITHOUT CONTRAST    CLINICAL HISTORY:  Syncope, simple, normal neuro exam;    TECHNIQUE:  Multiple sequential 5 mm axial images of the head without contrast.  Coronal and sagittal reformatted imaging from the axial acquisition.    COMPARISON:  None    FINDINGS:  Slight distortion by motion artifacts.  Allowing for artifacts there is no evidence for acute intracranial hemorrhage or sulcal effacement.  The ventricles are normal in size without hydrocephalus.  There is no midline shift or mass effect.  Visualized paranasal sinuses and mastoid air cells are clear.                                       CT Abdomen Pelvis With Contrast (Final result)  Result time 07/18/23 00:05:45      Final result by Maximino Devlin MD (07/18/23 00:05:45)                   Impression:      Left adrenal nodule, indeterminate.  Further evaluation with nonemergent outpatient adrenal MRI or CT can be obtained as clinically indicated.    Multiple probable hepatic cysts, too small to characterize.   Further evaluation can be obtained with nonemergent hepatic ultrasound as clinically indicated.    Colonic diverticulosis without diverticulitis.    Additional findings as above.    Electronically signed by resident: Clarisse Cash  Date:    07/17/2023  Time:    22:32    Electronically signed by: Maximino Devlin MD  Date:    07/18/2023  Time:    00:05               Narrative:    EXAMINATION:  CT ABDOMEN PELVIS WITH CONTRAST    CLINICAL HISTORY:  Abdominal pain, acute, nonlocalized;right side abd pain, syncope;    TECHNIQUE:  Low dose axial images, sagittal and coronal reformations were obtained from the lung bases to the pubic symphysis following the IV administration of 100 mL of Omnipaque 350.  Oral contrast was not administered.    COMPARISON:  Complete retroperitoneal ultrasound 06/12/2017.    FINDINGS:  Heart: Normal size. No effusion.    Lung Bases: Vague ground-glass attenuation in the bilateral lower lung zones, could be seen with pulmonary edema.    Liver: Liver is upper limits of normal in size.  Few scattered hypodensities throughout the liver, largest measures 1.5 cm and likely represent cysts.    Gallbladder: Surgically absent.    Bile Ducts: No dilatation.    Pancreas: No mass. No peripancreatic fat stranding.    Spleen: Normal.    Adrenals: 1.2 x 0.9 cm left adrenal nodule.  Right adrenal is unremarkable.    Kidneys/Ureters: Right renal cyst.  No hydronephrosis.  Bilateral ureters are normal in course and caliber.    Bladder: No wall thickening.    Reproductive organs: Uterus is surgically absent.    GI Tract/Mesentery: Colonic diverticulosis.  No evidence of bowel obstruction or inflammation.  Appendix is unremarkable.    Peritoneal Space: No ascites or free air.    Retroperitoneum: No significant adenopathy.    Abdominal wall: Normal.    Vasculature: No aneurysm.  Mild atherosclerosis the descending aorta and branches.    Bones: No acute fracture.  Degenerative disc space narrowing with vacuum disc  at L3-4..                                       X-Ray Chest AP Portable (Final result)  Result time 07/17/23 20:54:00      Final result by Jj Moreno MD (07/17/23 20:54:00)                   Impression:      No detrimental change or radiographic acute intrathoracic process seen on this single view.      Electronically signed by: Jj Moreno MD  Date:    07/17/2023  Time:    20:54               Narrative:    EXAMINATION:  XR CHEST AP PORTABLE    CLINICAL HISTORY:  Chest Pain;    TECHNIQUE:  Single frontal view of the chest was performed.    COMPARISON:  Chest radiograph 09/16/2019    FINDINGS:  Patient is slightly rotated.Bibasilar minimal platelike scarring versus atelectasis.  The lungs are otherwise symmetrically well expanded without consolidation, pleural effusion or pneumothorax.    The cardiac silhouette is normal in size. Grossly similar calcification with mild tortuosity of the aorta.  Hilar contours are within normal limits.    Bones are intact.  Right upper quadrant surgical clips again noted.  PA and lateral views can be obtained.

## 2023-07-18 NOTE — ASSESSMENT & PLAN NOTE
- CT with Multiple probable hepatic cysts, too small to characterize.   - liver enzymes wnl  - Further evaluation can be obtained with nonemergent hepatic ultrasound as clinically indicated.

## 2023-07-18 NOTE — SUBJECTIVE & OBJECTIVE
Past Medical History:   Diagnosis Date    Cataract     Chronic diarrhea     Hypertension     IBS (irritable bowel syndrome)        Past Surgical History:   Procedure Laterality Date    APPENDECTOMY      with the hysterectomy    BUNIONECTOMY      CHOLECYSTECTOMY      COLONOSCOPY N/A 10/05/2015    Procedure: COLONOSCOPY;  Surgeon: Teja Olivarez MD;  Location: [facility] (4TH FLR);  Service: Endoscopy;  Laterality: N/A;    HYSTERECTOMY      RUPERT secondary to pelvic pain, sacrocolpoplexy    OPEN REDUCTION AND INTERNAL FIXATION (ORIF) OF FRACTURE OF DISTAL RADIUS Right 09/19/2019    Procedure: ORIF, FRACTURE, RADIUS, DISTAL - right accumed;  Surgeon: Deejay Bragg MD;  Location: [facility] OR 2ND FLR;  Service: Orthopedics;  Laterality: Right;       Review of patient's allergies indicates:   Allergen Reactions    Codeine Hallucinations     Other reaction(s): Hallucinations    Penicillins Hives and Rash       No current facility-administered medications on file prior to encounter.     Current Outpatient Medications on File Prior to Encounter   Medication Sig    benazepriL (LOTENSIN) 20 MG tablet TAKE 1 TABLET BY MOUTH ONCE DAILY    cholecalciferol, vitamin D3, (D3-2000) 2,000 unit Cap Take 1 capsule (2,000 Units total) by mouth every other day.    furosemide (LASIX) 20 MG tablet Take 1 tablet (20 mg total) by mouth once daily.    triamterene-hydrochlorothiazide 37.5-25 mg (DYAZIDE) 37.5-25 mg per capsule TAKE 1 CAPSULE BY MOUTH IN  THE MORNING (Patient not taking: Reported on 5/16/2023)     Family History       Problem Relation (Age of Onset)    Arthritis Mother, Father, Son    Breast cancer Sister (76)    Cancer Brother, Brother    Diabetes Brother    Glaucoma Mother, Maternal Aunt, Maternal Aunt    Hypertension Mother, Father, Son, Son    Lupus Daughter    No Known Problems Sister, Sister, Sister, Brother, Brother, Brother, Brother, Maternal Uncle, Paternal Aunt, Paternal Uncle, Maternal Grandfather, Paternal  Grandmother, Paternal Grandfather    Stroke Maternal Grandmother    Thyroid disease Mother, Sister, Sister, Sister, Brother          Tobacco Use    Smoking status: Never    Smokeless tobacco: Never   Substance and Sexual Activity    Alcohol use: No    Drug use: No    Sexual activity: Not Currently     Partners: Male     Birth control/protection: Surgical     Review of Systems   Constitutional: Negative for fever.   HENT:  Negative for ear pain.    Eyes:  Negative for double vision.   Cardiovascular:  Positive for syncope. Negative for chest pain, dyspnea on exertion, leg swelling and palpitations.   Respiratory:  Negative for shortness of breath.    Endocrine: Negative for heat intolerance.   Hematologic/Lymphatic: Negative for adenopathy.   Skin:  Negative for dry skin.   Musculoskeletal:  Negative for falls.   Gastrointestinal:  Negative for anorexia.   Genitourinary:  Negative for flank pain.   Neurological:  Negative for disturbances in coordination.   Psychiatric/Behavioral:  Negative for depression.    Objective:     Vital Signs (Most Recent):  Temp: 99 °F (37.2 °C) (07/18/23 1106)  Pulse: 65 (07/18/23 1106)  Resp: 16 (07/18/23 1106)  BP: 130/63 (07/18/23 1106)  SpO2: 98 % (07/18/23 1106) Vital Signs (24h Range):  Temp:  [98.3 °F (36.8 °C)-99 °F (37.2 °C)] 99 °F (37.2 °C)  Pulse:  [63-79] 65  Resp:  [16-19] 16  SpO2:  [98 %-100 %] 98 %  BP: (123-179)/(58-85) 130/63     Weight: 84.4 kg (186 lb)  Body mass index is 30.95 kg/m².    SpO2: 98 %       No intake or output data in the 24 hours ending 07/18/23 1205    Lines/Drains/Airways       Peripheral Intravenous Line  Duration                  Peripheral IV - Single Lumen 07/17/23 1814 20 G;1 in Left Antecubital <1 day                     Physical Exam  HENT:      Head: Normocephalic.      Nose: Nose normal.   Eyes:      Pupils: Pupils are equal, round, and reactive to light.   Cardiovascular:      Rate and Rhythm: Normal rate and regular rhythm.   Pulmonary:       Effort: Pulmonary effort is normal.   Abdominal:      General: Abdomen is flat.   Musculoskeletal:         General: Normal range of motion.   Skin:     General: Skin is warm.   Neurological:      General: No focal deficit present.      Mental Status: She is alert.   Psychiatric:         Mood and Affect: Mood normal.      Significant Labs: Blood Culture: No results for input(s): LABBLOO in the last 48 hours., BMP:   Recent Labs   Lab 07/17/23 1810 07/18/23  0409   * 102   * 142   K 4.7 3.8    106   CO2 25 27   BUN 22 18   CREATININE 1.2 1.1   CALCIUM 10.8* 10.2   , CMP   Recent Labs   Lab 07/17/23 1810 07/18/23  0409   * 142   K 4.7 3.8    106   CO2 25 27   * 102   BUN 22 18   CREATININE 1.2 1.1   CALCIUM 10.8* 10.2   PROT 8.0  --    ALBUMIN 3.8  --    BILITOT 0.3  --    ALKPHOS 66  --    AST 21  --    ALT 16  --    ANIONGAP 16 9   , CBC   Recent Labs   Lab 07/17/23 1810 07/18/23  0409   WBC 10.72 10.76   HGB 12.1 11.5*   HCT 40.6 37.4    296   , INR No results for input(s): INR, PROTIME in the last 48 hours., Lipid Panel No results for input(s): CHOL, HDL, LDLCALC, TRIG, CHOLHDL in the last 48 hours., and Troponin   Recent Labs   Lab 07/17/23 1810 07/18/23  0117   TROPONINI 0.007 0.006       Significant Imaging: Echocardiogram: 2D echo with color flow doppler: No results found for this or any previous visit. and Transthoracic echo (TTE) complete (Cupid Only):   Results for orders placed or performed during the hospital encounter of 07/17/23   Echo   Result Value Ref Range    Ascending aorta 3.23 cm    STJ 2.54 cm    AV mean gradient 4 mmHg    Ao peak nathan 1.36 m/s    Ao VTI 33.70 cm    IVRT 108.47 msec    IVS 0.96 0.6 - 1.1 cm    LA size 3.12 cm    Left Atrium Major Axis 5.74 cm    Left Atrium Minor Axis 5.39 cm    LVIDd 4.23 3.5 - 6.0 cm    LVIDs 2.45 2.1 - 4.0 cm    LVOT diameter 1.86 cm    LVOT peak VTI 33.64 cm    Posterior Wall 0.60 0.6 - 1.1 cm    MV Peak A Nathan  1.03 m/s    E wave deceleration time 183.88 msec    MV Peak E Nathan 0.88 m/s    RA Major Axis 5.08 cm    RA Width 3.09 cm    RVDD 2.79 cm    Sinus 2.88 cm    TAPSE 1.80 cm    TR Max Nathan 2.95 m/s    LA WIDTH 3.38 cm    MV stenosis pressure 1/2 time 53.32 ms    LV Diastolic Volume 80.09 mL    LV Systolic Volume 21.28 mL    LVOT peak nathan 1.32 m/s    TDI LATERAL 0.08 m/s    TDI SEPTAL 0.08 m/s    LA volume (mod) 44.16 cm3    LV LATERAL E/E' RATIO 11.00 m/s    LV SEPTAL E/E' RATIO 11.00 m/s    FS 42 %    LA volume 49.83 cm3    LV mass 99.12 g    Left Ventricle Relative Wall Thickness 0.28 cm    AV valve area 2.71 cm2    AV Velocity Ratio 0.97     AV index (prosthetic) 1.00     MV valve area p 1/2 method 4.13 cm2    E/A ratio 0.85     Mean e' 0.08 m/s    LVOT area 2.7 cm2    LVOT stroke volume 91.36 cm3    AV peak gradient 7 mmHg    E/E' ratio 11.00 m/s    LV Systolic Volume Index 11.1 mL/m2    LV Diastolic Volume Index 41.71 mL/m2    LA Volume Index 26.0 mL/m2    LV Mass Index 52 g/m2    Triscuspid Valve Regurgitation Peak Gradient 35 mmHg    LA Volume Index (Mod) 23.0 mL/m2    BSA 1.97 m2    Right Atrial Pressure (from IVC) 3 mmHg    EF 65 %    TV rest pulmonary artery pressure 38 mmHg    Narrative    · The left ventricle is normal in size with normal systolic function.  · The estimated ejection fraction is 65%.  · Indeterminate left ventricular diastolic function.  · Normal right ventricular size with normal right ventricular systolic   function.  · Mild right atrial enlargement.  · Mild mitral regurgitation.  · Mild to moderate tricuspid regurgitation.  · Normal central venous pressure (3 mmHg).  · The estimated PA systolic pressure is 38 mmHg.  · Trivial posterior pericardial effusion. Just base.

## 2023-07-18 NOTE — HPI
78 years old pleasant AA female with h/o HTN, CKD 3a, B/L lower ext swelling likely from venous insufficiency. She has no past cardiac history.   She presented to the ER yesterday after a syncope and fall. She was sitting in a chair around 3 pm when she developed LH followed by passing out. Per son who witnessed it, she had LOC for one min. Her eyes also rolled up. She was confused once she woke up before gaining full concouiosness. She denies having any CP or SOB. She has no h/o past syncopal events.   Upon admit, her EKG shows NSR. Tele review showing HR sinus around 75 with no star events. Trop x 2 are -ve. Echo with EF normal and mild to mod TR and mild MR. BNP is 41. On exam, she is A O x 3 on r/a, comfortable with no leg or ankle edema.

## 2023-07-18 NOTE — ASSESSMENT & PLAN NOTE
- Cr 1.2 on admit, at baseline   - renally dose meds  - avoid nephrotoxins  - monitor with daily bmp

## 2023-07-18 NOTE — H&P
ED Observation Unit  History and Physical      I assumed care of this patient from the Main ED at onset of observation time, 20:50 on 07/17/2023.       History of Present Illness:     78 y.o. year old female PMH of HTN, CKD, aortic atherosclerosis, multinodular thyroid who presents to the ED c/o syncopal episode.  She was feeling well this morning had right-sided 5/10 headache that resolved.  She also saw her primary care physician for a regular visit earlier today and afterwards, she had lunch. Around 3:00p,  she was sitting down at home when she started feeling lightheaded and had a syncopal episode.  Her son was with her and he mentions that he she slid out of the chair - he helped her during the time she did not fall, mild shaking of her body however no rhythmic tonic-clonic motion, no tongue biting, no urine or stool incontinence, no confusion after the episode.  When she recovered she had an episode of vomiting, she reports also mild right flank pain/R hip pain, unsure if it was secondary to the fall afterwards if somehow she hit the right side.     She denies any chest pain or shortness of breath she also denies palpitations.  She reports history of IBS and in the past she had an episode that started with abdominal cramping and then she thinks she lost consciousness she woke up after she vomited however she does not remember vomiting     History of hypertension, compliant with her medication    I reviewed the ED Provider Note dated 7/1723 prior to my evaluation of this patient.  I reviewed all labs and imaging performed in the Main ED, prior to patient being placed in Observation. Patient was placed in the ED Observation Unit for LOC (loss of consciousness).    In the ED - VSS, not orthostatic. Labs largely unremarkable. Symptoms resolved and she is feeling much better now.    Placed in ED observation for troponin trending, cardiac monitoring, echo in the am, CT abdomen/pelvis.     PMHx   Past Medical  History:   Diagnosis Date    Cataract     Chronic diarrhea     Hypertension     IBS (irritable bowel syndrome)       Past Surgical History:   Procedure Laterality Date    APPENDECTOMY      with the hysterectomy    BUNIONECTOMY      CHOLECYSTECTOMY      COLONOSCOPY N/A 10/05/2015    Procedure: COLONOSCOPY;  Surgeon: Teja Olivarez MD;  Location: [facility] ENDO (4TH FLR);  Service: Endoscopy;  Laterality: N/A;    HYSTERECTOMY      RUPERT secondary to pelvic pain, sacrocolpoplexy    OPEN REDUCTION AND INTERNAL FIXATION (ORIF) OF FRACTURE OF DISTAL RADIUS Right 09/19/2019    Procedure: ORIF, FRACTURE, RADIUS, DISTAL - right accumed;  Surgeon: Deejay Bragg MD;  Location: [facility] OR 2ND FLR;  Service: Orthopedics;  Laterality: Right;        Family Hx   Family History   Problem Relation Age of Onset    Hypertension Mother     Arthritis Mother     Thyroid disease Mother     Glaucoma Mother     Arthritis Father     Hypertension Father     Thyroid disease Sister     Breast cancer Sister 76    Thyroid disease Sister     Thyroid disease Sister     No Known Problems Sister     No Known Problems Sister     No Known Problems Sister     Thyroid disease Brother     Diabetes Brother     Cancer Brother         prostate    Cancer Brother         prostate    No Known Problems Brother     No Known Problems Brother     No Known Problems Brother     No Known Problems Brother     Lupus Daughter     Arthritis Son     Hypertension Son     Hypertension Son     Glaucoma Maternal Aunt     Glaucoma Maternal Aunt     No Known Problems Maternal Uncle     No Known Problems Paternal Aunt     No Known Problems Paternal Uncle     Stroke Maternal Grandmother     No Known Problems Maternal Grandfather     No Known Problems Paternal Grandmother     No Known Problems Paternal Grandfather     Colon cancer Neg Hx     Ovarian cancer Neg Hx     Amblyopia Neg Hx     Blindness Neg Hx     Cataracts Neg Hx     Macular degeneration Neg Hx     Retinal detachment Neg Hx      Strabismus Neg Hx     Esophageal cancer Neg Hx         Social Hx   Social History     Socioeconomic History    Marital status:    Tobacco Use    Smoking status: Never    Smokeless tobacco: Never   Substance and Sexual Activity    Alcohol use: No    Drug use: No    Sexual activity: Not Currently     Partners: Male     Birth control/protection: Surgical     Social Determinants of Health     Financial Resource Strain: Low Risk     Difficulty of Paying Living Expenses: Not hard at all   Food Insecurity: No Food Insecurity    Worried About Running Out of Food in the Last Year: Never true    Ran Out of Food in the Last Year: Never true   Transportation Needs: No Transportation Needs    Lack of Transportation (Medical): No    Lack of Transportation (Non-Medical): No   Physical Activity: Sufficiently Active    Days of Exercise per Week: 4 days    Minutes of Exercise per Session: 50 min   Stress: No Stress Concern Present    Feeling of Stress : Only a little   Social Connections: Socially Integrated    Frequency of Communication with Friends and Family: More than three times a week    Frequency of Social Gatherings with Friends and Family: Three times a week    Attends Zoroastrian Services: More than 4 times per year    Active Member of Clubs or Organizations: Yes    Attends Club or Organization Meetings: 1 to 4 times per year    Marital Status:    Housing Stability: Low Risk     Unable to Pay for Housing in the Last Year: No    Number of Places Lived in the Last Year: 1    Unstable Housing in the Last Year: No        Vital Signs   Vitals:    07/17/23 1820 07/17/23 1821 07/17/23 1822 07/17/23 2111   BP: (S) (!) 156/85 (S) (!) 160/77 (S) (!) 156/75 (!) 179/83   BP Location: Right arm Right arm Right arm Right arm   Patient Position: (S) Lying (S) Sitting (S) Standing Sitting   Pulse: (S) 70 (S) 76 (S) 78 65   Resp:    17   Temp:    98.7 °F (37.1 °C)   TempSrc:    Oral   SpO2:    100%   Weight:             Review of Systems  Review of Systems   Constitutional:  Negative for chills and fever.   Respiratory:  Negative for shortness of breath.    Cardiovascular:  Negative for chest pain and palpitations.   Gastrointestinal:  Positive for vomiting. Negative for abdominal pain.   Genitourinary:  Negative for dysuria.   Musculoskeletal:  Positive for myalgias.   Neurological:  Positive for loss of consciousness and headaches. Negative for speech change and seizures.     Physical Exam  Physical Exam  Vitals and nursing note reviewed.   Constitutional:       Appearance: Normal appearance.   HENT:      Head: Normocephalic and atraumatic.   Cardiovascular:      Rate and Rhythm: Normal rate and regular rhythm.      Heart sounds: Normal heart sounds.   Pulmonary:      Effort: Pulmonary effort is normal.      Breath sounds: Normal breath sounds.   Abdominal:      General: Abdomen is flat.      Palpations: Abdomen is soft.      Tenderness: There is no abdominal tenderness.   Skin:     General: Skin is warm and dry.   Neurological:      Mental Status: She is alert and oriented to person, place, and time.   Psychiatric:         Mood and Affect: Mood normal.       Medications:   Scheduled Meds:   [START ON 7/19/2023] furosemide  20 mg Oral Every Mon, Wed, Fri, Sun    [START ON 7/18/2023] furosemide  40 mg Oral Every Tues, Thurs, Sat    [START ON 7/18/2023] lisinopriL  20 mg Oral Daily     Continuous Infusions:  PRN Meds:.acetaminophen, melatonin, ondansetron, promethazine, sodium chloride 0.9%      Assessment/Plan:  Syncope  -had prodrome of lightheadedness, 1 episode of emesis after syncopal episode  -troponin trending, initial troponin normal at 0.007  -Echo in am  -cardiac monitoring to r/o arrhythmia  -CT abdomen/pelvis to r/o dissection - no abdominal pain or chest pain, complaining now of some mild R hip pain. CT shows L adrenal nodule, hepatic cysts, diverticulosis without diverticulitis.   -continue all home  medications    Case was discussed with the ED provider, Dr Kelly

## 2023-07-18 NOTE — ED NOTES
Care assumed. Pt resting comfortably and independently repositioned in stretcher with bed locked in lowest position for safety. NAD and patient states she feels a little better. Respirations even and unlabored and visible chest rise noted. Patient offered bathroom assistance and denies need at this time. Pt instructed to call if assistance is needed.. No needs at this time. Will continue to monitor. Call light within reach. Family at bedside.

## 2023-07-19 ENCOUNTER — CLINICAL SUPPORT (OUTPATIENT)
Dept: CARDIOLOGY | Facility: HOSPITAL | Age: 78
End: 2023-07-19
Attending: EMERGENCY MEDICINE
Payer: MEDICARE

## 2023-07-19 VITALS
WEIGHT: 186 LBS | HEART RATE: 58 BPM | RESPIRATION RATE: 20 BRPM | DIASTOLIC BLOOD PRESSURE: 74 MMHG | TEMPERATURE: 98 F | BODY MASS INDEX: 30.99 KG/M2 | SYSTOLIC BLOOD PRESSURE: 155 MMHG | HEIGHT: 65 IN | OXYGEN SATURATION: 98 %

## 2023-07-19 DIAGNOSIS — R55 SYNCOPE, UNSPECIFIED SYNCOPE TYPE: ICD-10-CM

## 2023-07-19 LAB
ANION GAP SERPL CALC-SCNC: 10 MMOL/L (ref 8–16)
BUN SERPL-MCNC: 20 MG/DL (ref 8–23)
CALCIUM SERPL-MCNC: 9.9 MG/DL (ref 8.7–10.5)
CHLORIDE SERPL-SCNC: 105 MMOL/L (ref 95–110)
CO2 SERPL-SCNC: 28 MMOL/L (ref 23–29)
CREAT SERPL-MCNC: 1.3 MG/DL (ref 0.5–1.4)
ERYTHROCYTE [DISTWIDTH] IN BLOOD BY AUTOMATED COUNT: 16.7 % (ref 11.5–14.5)
EST. GFR  (NO RACE VARIABLE): 42.4 ML/MIN/1.73 M^2
GLUCOSE SERPL-MCNC: 97 MG/DL (ref 70–110)
HCT VFR BLD AUTO: 36.1 % (ref 37–48.5)
HGB BLD-MCNC: 11.2 G/DL (ref 12–16)
MAGNESIUM SERPL-MCNC: 1.9 MG/DL (ref 1.6–2.6)
MCH RBC QN AUTO: 22.4 PG (ref 27–31)
MCHC RBC AUTO-ENTMCNC: 31 G/DL (ref 32–36)
MCV RBC AUTO: 72 FL (ref 82–98)
PLATELET # BLD AUTO: 300 K/UL (ref 150–450)
PMV BLD AUTO: 11.1 FL (ref 9.2–12.9)
POTASSIUM SERPL-SCNC: 4.2 MMOL/L (ref 3.5–5.1)
RBC # BLD AUTO: 5.01 M/UL (ref 4–5.4)
SODIUM SERPL-SCNC: 143 MMOL/L (ref 136–145)
TSH SERPL DL<=0.005 MIU/L-ACNC: 0.92 UIU/ML (ref 0.4–4)
WBC # BLD AUTO: 7.89 K/UL (ref 3.9–12.7)

## 2023-07-19 PROCEDURE — 36415 COLL VENOUS BLD VENIPUNCTURE: CPT

## 2023-07-19 PROCEDURE — 25000003 PHARM REV CODE 250: Performed by: PHYSICIAN ASSISTANT

## 2023-07-19 PROCEDURE — 93272 ECG/REVIEW INTERPRET ONLY: CPT | Mod: ,,, | Performed by: STUDENT IN AN ORGANIZED HEALTH CARE EDUCATION/TRAINING PROGRAM

## 2023-07-19 PROCEDURE — 93272 CARDIAC EVENT MONITOR (CUPID ONLY): ICD-10-PCS | Mod: ,,, | Performed by: STUDENT IN AN ORGANIZED HEALTH CARE EDUCATION/TRAINING PROGRAM

## 2023-07-19 PROCEDURE — 93270 REMOTE 30 DAY ECG REV/REPORT: CPT

## 2023-07-19 PROCEDURE — 80048 BASIC METABOLIC PNL TOTAL CA: CPT

## 2023-07-19 PROCEDURE — 99239 HOSP IP/OBS DSCHRG MGMT >30: CPT | Mod: ,,,

## 2023-07-19 PROCEDURE — 93271 ECG/MONITORING AND ANALYSIS: CPT

## 2023-07-19 PROCEDURE — 84443 ASSAY THYROID STIM HORMONE: CPT

## 2023-07-19 PROCEDURE — 99239 PR HOSPITAL DISCHARGE DAY,>30 MIN: ICD-10-PCS | Mod: ,,,

## 2023-07-19 PROCEDURE — G0378 HOSPITAL OBSERVATION PER HR: HCPCS

## 2023-07-19 PROCEDURE — 83735 ASSAY OF MAGNESIUM: CPT

## 2023-07-19 PROCEDURE — 85027 COMPLETE CBC AUTOMATED: CPT

## 2023-07-19 RX ADMIN — LISINOPRIL 20 MG: 20 TABLET ORAL at 08:07

## 2023-07-19 NOTE — PLAN OF CARE
Pt admitted and care plan initiated.Telemetry maintained,NSR.neuro intact.scheduled for CT of Chest tonight.had to wait 24hrs since the last contrast administered.CT Tech reports he will be sending for pt soon.vss.safety precautions implemented.bed in low position.rails up x3.call bell in reach.bed alarm activated for pt safety.continue plan of care.

## 2023-07-19 NOTE — NURSING TRANSFER
Nursing Transfer Note      Pt arrived from the er via stretcher accompanied by transporter.arrived on Telemetry,NSR.aaox4.resp even and non labored.ella breath sounds clear.neuro intact.vss.scheduled for CT of Chest tonight after 10pm.safety precautions implemented.bed in low position.rails up x3.call bell in reach.bed alarm activated for pt safety.will monitor.instructed to call for assistance to br before getting oob.verbalizes understanding.

## 2023-07-19 NOTE — PLAN OF CARE
[facility] - Observation 11H  Discharge Final Note    Primary Care Provider: Deniz Redman MD    Expected Discharge Date: 7/19/2023    Future Appointments   Date Time Provider Department Center   7/28/2023 10:30 AM Deniz Redman MD [facility] PRICARE [place]   8/3/2023  1:30 PM Eliu Coyle MD [facility] CARDIO [facility]   8/11/2023 10:30 AM Skinny Barrett MD [facility] ENDO [place]     Pt discharged home with no services.  John Rivera, RN,BSN        Final Discharge Note (most recent)       Final Note - 07/19/23 1141          Final Note    Assessment Type Final Discharge Note     Anticipated Discharge Disposition Home or Self Care     Hospital Resources/Appts/Education Provided Provided patient/caregiver with written discharge plan information;Appointments scheduled and added to AVS;Appointments scheduled by Navigator/Coordinator        Post-Acute Status    Other Status No Post-Acute Service Needs     Discharge Delays None known at this time                     Important Message from Medicare             Contact [facility]       Neurology    Neurology   [phone]       Next Steps: Follow up    Instructions: A message has been sent to the Neurology clinic, the clinic nurse will contact you to schedule a hospital follow up visit. However, if you do not hear from them within 24 to 48 hours of discharge please call to schedule the appointment.

## 2023-07-19 NOTE — PLAN OF CARE
Problem: Adult Inpatient Plan of Care  Goal: Plan of Care Review  Outcome: Met  Goal: Patient-Specific Goal (Individualized)  Outcome: Met  Goal: Absence of Hospital-Acquired Illness or Injury  Outcome: Met  Goal: Optimal Comfort and Wellbeing  Outcome: Met  Goal: Readiness for Transition of Care  Outcome: Met     Problem: Infection  Goal: Absence of Infection Signs and Symptoms  Outcome: Met     Problem: Syncope  Goal: Absence of Syncopal Symptoms  Outcome: Met     Problem: Fall Injury Risk  Goal: Absence of Fall and Fall-Related Injury  Outcome: Met

## 2023-07-19 NOTE — PLAN OF CARE
CHW scheduled the Cardiology hospital follow up for August 3 @ 1:30pm, patient added to the wait list for a sooner appointment    CHW unable to schedule the (Neurology) hospital follow up. A message was sent to the clinic requesting an appointment on the patients behalf. I added this information to the AVS as a reminder for the patient.    CHW scheduled the PCP hospital follow up for July 28 @ 10:00am

## 2023-07-19 NOTE — NURSING
Pt is discharged in stable condition,able to make needs known,understood discharge instructions,tele removed.

## 2023-07-21 ENCOUNTER — PATIENT OUTREACH (OUTPATIENT)
Dept: EMERGENCY MEDICINE | Facility: HOSPITAL | Age: 78
End: 2023-07-21
Payer: MEDICARE

## 2023-07-21 NOTE — PROGRESS NOTES
Mimi Donohue  ED Navigator  Emergency Department    [place] [facility] [place]  Role: Community Health Worker    Date: 07/21/2023  Patient Name: Carmen Wang  MRN: [id number]  PCP: Deniz Redman MD    Assessment:     Carmen Wang is a 78 y.o. female who has presented to ED for dizziness. Patient has visited the ED 0 times in the past 3 months. Patient did contact PCP.     ED Navigator Initial Assessment    ED Navigator Enrollment Documentation  Consent to Services  Does patient consent to completing the assessment?: Yes  Contact  Method of Initial Contact: Phone  Transportation  Does the patient have issues with Transportation?: No  Does the patient have transportation to and from healthcare appointments?: Yes  Insurance Coverage  Do you have coverage/adequate coverage?: Yes  Type/kind of coverage: Coverage:  [employer]ices 65  Is patient able to afford co-pays/deductibles?: Yes  Is patient able to afford HME or supplies?: Yes  Does patient have an established [facility] PCP?: Yes  Able to access?: Yes  Does the patient have a lack of adequate coverage?: Yes  Specialist Appointment  Did the patient come to the ED to see a specialist?: No  Does the patient have a pending specialist referral?: Yes  Does the patient have a specialist appointment made?: Yes  Is the patient able to access a timely specialist appointment?: Yes  PCP Follow Up Appointment  Has the patient had an appointment with a primary care provider in the past year?: Yes  Approximate date: 7/17/21  Provider: Deniz Redman MD  Does the patient have a follow up appontment with a PCP?: Yes  Upcoming appointment date: 7/28/23  Provider: Deniz Redman MD  When was the last time you saw your PCP?: 7/17/21  Medications  Is patient able to afford medication?: Yes  Is patient unable to get medication due to lack of transportation?: No  Psychological  Does the patient have psycho-social concerns?:  No  Food  Does the patient have concerns about food?: No  Communication/Education  Does the patient have limited English proficiency/English not primary language?: No  Does patient have low literacy and/or low health literacy?: No  Does patient have concerns with care?: No  Does patient have dissatisfaction with care?: No  Other Financial Concerns  Does the patient have immediate financial distress?: No  Does the patient have general financial concerns?: No  Other Social Barriers/Concerns  Does the patient have any additional barriers or concerns?: None  Primary Barrier  Barriers identified: Patient identified no barriers to care  Root Cause of ED Utilization: Chronic Conditions  Next steps: Provided Education, Scheduled Appointment/Referral  Scheduled Appointment Date: 7/25/23  Appointment Reminder Date: 7/24/23  Was education/educational materials provided surrounding PCP services/creating a medical home?: Yes Was education verbal or written?: Verbal     Was education/educational materials provided surrounding low cost, healthy foods?: Yes Was education verbal or written?: Verbal     Was education/educational materials provided surrounding other items? If so, use comment to explain.: Yes Was education verbal or written?: Verbal   Plan: Provided information for [facility] 24/7 Nurse triage line, [phone] or [phone]-[facility] ([phone])  Expected Date of Follow Up 1: 7/24/23  Additional Documentation: Patient requested assistance scheduling follow-up appointment following ED to Admission visit. Patient sees Dr Deniz Redman and requested a virtual for follow-up. ED Navigator sent a message to PCP staff for assistance scheduling. Patient also requested Cardiology appointment closer to her home. Appointment scheduled with Dr Kandy Diaz [facility], Cardiology [address] [place] [address] [phone].  Mimi Donohue            Social History     Socioeconomic  History    Marital status:    Tobacco Use    Smoking status: Never    Smokeless tobacco: Never   Substance and Sexual Activity    Alcohol use: No    Drug use: No    Sexual activity: Not Currently     Partners: Male     Birth control/protection: Surgical     Social Determinants of Health     Financial Resource Strain: Low Risk     Difficulty of Paying Living Expenses: Not hard at all   Food Insecurity: Unknown    Worried About Running Out of Food in the Last Year: Patient refused    Ran Out of Food in the Last Year: Patient refused   Transportation Needs: No Transportation Needs    Lack of Transportation (Medical): No    Lack of Transportation (Non-Medical): No   Physical Activity: Sufficiently Active    Days of Exercise per Week: 4 days    Minutes of Exercise per Session: 50 min   Stress: No Stress Concern Present    Feeling of Stress : Not at all   Social Connections: Socially Integrated    Frequency of Communication with Friends and Family: More than three times a week    Frequency of Social Gatherings with Friends and Family: Three times a week    Attends [employer] Services: More than 4 times per year    Active Member of Clubs or Organizations: Yes    Attends Club or Organization Meetings: 1 to 4 times per year    Marital Status:    Housing Stability: Low Risk     Unable to Pay for Housing in the Last Year: No    Number of Places Lived in the Last Year: 1    Unstable Housing in the Last Year: No       Plan:   Patient requested assistance scheduling follow-up appointment following ED to Admission visit. Patient sees Dr Deniz Redman and requested a virtual for follow-up. ED Navigator sent a message to PCP staff for assistance scheduling. Patient also requested Cardiology appointment closer to her home. Appointment scheduled with Dr Kandy Diaz [facility], Cardiology [address] [place] [address] [phone].      Appointment made with: Deniz Redman,  MD

## 2023-07-22 ENCOUNTER — PATIENT MESSAGE (OUTPATIENT)
Dept: INTERNAL MEDICINE | Facility: CLINIC | Age: 78
End: 2023-07-22
Payer: MEDICARE

## 2023-07-24 ENCOUNTER — PATIENT OUTREACH (OUTPATIENT)
Dept: EMERGENCY MEDICINE | Facility: HOSPITAL | Age: 78
End: 2023-07-24
Payer: MEDICARE

## 2023-07-24 ENCOUNTER — LAB VISIT (OUTPATIENT)
Dept: LAB | Facility: HOSPITAL | Age: 78
End: 2023-07-24
Attending: INTERNAL MEDICINE
Payer: MEDICARE

## 2023-07-24 DIAGNOSIS — E83.52 HYPERCALCEMIA: ICD-10-CM

## 2023-07-24 DIAGNOSIS — E21.3 HYPERPARATHYROIDISM: ICD-10-CM

## 2023-07-24 LAB
CALCIUM 24H UR-MRATE: 8 MG/HR (ref 4–12)
CALCIUM UR-MCNC: 8.3 MG/DL (ref 0–15)
CALCIUM URINE (MG/SPEC): 191 MG/SPEC
CREAT CL/1.73 SQ M 12H UR+SERPL-ARVRAT: 81 ML/MIN (ref 70–110)
CREAT SERPL-MCNC: 1.1 MG/DL (ref 0.5–1.4)
CREAT UR-MCNC: 56 MG/DL (ref 15–325)
CREATININE, URINE (MG/SPEC): 1288 MG/SPEC
URINE COLLECTION DURATION: 24 HR
URINE COLLECTION DURATION: 24 HR
URINE VOLUME: 2300 ML
URINE VOLUME: 2300 ML

## 2023-07-24 PROCEDURE — 82340 ASSAY OF CALCIUM IN URINE: CPT | Performed by: GENERAL ACUTE CARE HOSPITAL

## 2023-07-24 PROCEDURE — 82575 CREATININE CLEARANCE TEST: CPT | Performed by: GENERAL ACUTE CARE HOSPITAL

## 2023-07-24 NOTE — DISCHARGE SUMMARY
[facility] - Observation [address] [place] [address] Medicine  Discharge Summary      Patient Name: Carmen Wang  MRN: [id number]  FERNANDA: [id number]  Patient Class: OP- Observation  Admission Date: 7/17/2023  Hospital Length of Stay: 0 days  Discharge Date and Time: 7/19/2023 11:00 AM  Attending Physician: No att. providers found   Discharging Provider: Tabby Ruiz PA-C  Primary Care Provider: Deniz Redman MD  [facility] [place]: [facility] [place] [place] Tabby Ruiz PA-C  [place] Care Team[place] [facility] [place]    HPI:   Carmen Wang is a 79 yo F with PMHx of HTN, CKD, aortic atherosclerosis, multinodular thyroid who presented to ED for syncopal episode. She was in her usual state of health yesterday besides a mild R sided HA that resolved without intervention. Yesterday after lunch around 3 pm, she was sitting down at home when she started feeling lightheaded and had a syncopal episode. Her son witnessed the event and stated that she slid out of her chair with mild shaking of her body. Denies rhythmic tonic-clonic motion, tongue biting, urine or stool incontinence, or confusion after the episode. When she recovered she had an episode of vomiting. No history of previous syncope. She had mild right flank pain/R hip pain when she presented to the ED, which has since resolved. Of note, her son has a hx of provoked PE after surgery. Denies fever, chills, chest pain, palpitations, SOB, cough, abdominal pain, diarrhea, dysuria, and LE swelling.    In ED: Afebrile. Orthostatic negative. HDS. Satting well on RA. No leukocytosis. Hgb 12.1. CMP grossly unremarkable. BNP 41. Trop 0.007>>0.006. EKG without ischemic changes. Echo with EF 65%, indeterminate diastolic fx, mod TR, mild MR, normal RV function, normal CVP. UA noninfectious. CXR without acute process. CT head negative. CT A/P without acute process. Evaluated by cardiology, who recommended d dimer and consider 30 day event monitor. D dimer positive (negative when age adjusted).  CTA pending. Placed in observation with hospital medicine.       * No surgery found *      Hospital Course:   Carmen Wang is 79yo F placed in observation for further evaluation of a syncopal episode. Infectious workup negative. CT head and A/P without acute process. Echo with EF 65%, indeterminate diastolic fx, mod TR, mild MR, normal RV function, normal CVP. CTA negative for PE. No further episodes of presyncope or syncope. Referrals placed for cardiology f/u and neurology f/u for recurrent headaches. Patient discharged home with 30 day event monitor.        Goals of Care Treatment Preferences:  Code Status: Full Code    Living Will: Yes              Consults:   Consults (From admission, onward)        Status Ordering Provider     Inpatient consult to Cardiology  Once        Provider:  (Not yet assigned)    Completed BRENDON GARCES          No new Assessment & Plan notes have been filed under this hospital service since the last note was generated.  Service: Hospital Medicine    Final Active Diagnoses:    Diagnosis Date Noted POA    PRINCIPAL PROBLEM:  Syncope [R55] 07/17/2023 Yes    Abnormal liver CT [R93.2] 07/18/2023 Yes    Adrenal nodule [E27.8] 07/18/2023 Yes    Microcytic anemia [D50.9] 07/17/2023 Yes    Secondary hyperparathyroidism of renal origin [N25.81] 09/26/2022 Yes    Stage 3a chronic kidney disease [N18.31] 12/28/2015 Yes    Essential hypertension [I10]  Yes      Problems Resolved During this Admission:       Discharged Condition: good    Disposition: Home or Self Care    Follow Up:   Follow-up Information     Neurology Follow up.    Why: A message has been sent to the Neurology clinic, the clinic nurse will contact you to schedule a hospital follow up visit. However, if you do not hear from them within 24 to 48 hours of discharge please call to schedule the appointment.  Contact information:  Neurology   [phone]                     Patient Instructions:      Ambulatory  referral/consult to Neurology   Standing Status: Future   Referral Priority: Routine Referral Type: Consultation   Referral Reason: Specialty Services Required   Requested Specialty: Neurology   Number of Visits Requested: 1     Ambulatory referral/consult to Cardiology   Standing Status: Future   Referral Priority: Routine Referral Type: Consultation   Referral Reason: Specialty Services Required   Requested Specialty: Cardiology   Number of Visits Requested: 1     Diet Adult Regular     Notify your health care provider if you experience any of the following:  persistent dizziness, light-headedness, or visual disturbances     Notify your health care provider if you experience any of the following:  increased confusion or weakness     Notify your health care provider if you experience any of the following:  severe persistent headache     Cardiac event monitor   Standing Status: Future Number of Occurrences: 1 Standing Exp. Date: 07/19/24   Order Comments: 30 day     Order Specific Question Answer Comments   Cardiac Event Monitor Auto Trigger    Release to patient Immediate      Activity as tolerated       Pending Diagnostic Studies:     None         Medications:  Reconciled Home Medications:      Medication List      CONTINUE taking these medications    benazepriL 20 MG tablet  Commonly known as: LOTENSIN  TAKE 1 TABLET BY MOUTH ONCE DAILY     cholecalciferol (vitamin D3) 50 mcg (2,000 unit) Cap capsule  Commonly known as: D3-2000  Take 1 capsule (2,000 Units total) by mouth every other day.     furosemide 20 MG tablet  Commonly known as: LASIX  Take 1 tablet (20 mg total) by mouth once daily.        STOP taking these medications    triamterene-hydrochlorothiazide 37.5-25 mg 37.5-25 mg per capsule  Commonly known as: DYAZIDE            Indwelling Lines/Drains at time of discharge:   Lines/Drains/Airways     None                 Time spent on the discharge of patient: 35 minutes         Tabby Ruiz  RISA  Department of Hospital Medicine  [facility] - Observation 11H

## 2023-07-24 NOTE — HOSPITAL COURSE
Carmen Wang is 79yo F placed in observation for further evaluation of a syncopal episode. Infectious workup negative. CT head and A/P without acute process. Echo with EF 65%, indeterminate diastolic fx, mod TR, mild MR, normal RV function, normal CVP. CTA negative for PE. No further episodes of presyncope or syncope. Referrals placed for cardiology f/u and neurology f/u for recurrent headaches. Patient discharged home with 30 day event monitor.

## 2023-07-25 ENCOUNTER — OFFICE VISIT (OUTPATIENT)
Dept: CARDIOLOGY | Facility: CLINIC | Age: 78
End: 2023-07-25
Payer: MEDICARE

## 2023-07-25 VITALS
DIASTOLIC BLOOD PRESSURE: 84 MMHG | HEIGHT: 65 IN | HEART RATE: 74 BPM | WEIGHT: 187.63 LBS | SYSTOLIC BLOOD PRESSURE: 185 MMHG | OXYGEN SATURATION: 97 % | BODY MASS INDEX: 31.26 KG/M2

## 2023-07-25 DIAGNOSIS — R55 SYNCOPE, UNSPECIFIED SYNCOPE TYPE: ICD-10-CM

## 2023-07-25 DIAGNOSIS — N25.81 SECONDARY HYPERPARATHYROIDISM OF RENAL ORIGIN: ICD-10-CM

## 2023-07-25 DIAGNOSIS — E66.9 OBESITY (BMI 30.0-34.9): ICD-10-CM

## 2023-07-25 DIAGNOSIS — I10 ESSENTIAL HYPERTENSION: ICD-10-CM

## 2023-07-25 DIAGNOSIS — N18.31 STAGE 3A CHRONIC KIDNEY DISEASE: Primary | ICD-10-CM

## 2023-07-25 PROBLEM — I70.0 AORTIC ATHEROSCLEROSIS: Status: RESOLVED | Noted: 2023-01-11 | Resolved: 2023-07-25

## 2023-07-25 PROCEDURE — 3077F PR MOST RECENT SYSTOLIC BLOOD PRESSURE >= 140 MM HG: ICD-10-PCS | Mod: CPTII,S$GLB,, | Performed by: STUDENT IN AN ORGANIZED HEALTH CARE EDUCATION/TRAINING PROGRAM

## 2023-07-25 PROCEDURE — 1101F PR PT FALLS ASSESS DOC 0-1 FALLS W/OUT INJ PAST YR: ICD-10-PCS | Mod: CPTII,S$GLB,, | Performed by: STUDENT IN AN ORGANIZED HEALTH CARE EDUCATION/TRAINING PROGRAM

## 2023-07-25 PROCEDURE — 99204 OFFICE O/P NEW MOD 45 MIN: CPT | Mod: S$GLB,,, | Performed by: STUDENT IN AN ORGANIZED HEALTH CARE EDUCATION/TRAINING PROGRAM

## 2023-07-25 PROCEDURE — 3288F PR FALLS RISK ASSESSMENT DOCUMENTED: ICD-10-PCS | Mod: CPTII,S$GLB,, | Performed by: STUDENT IN AN ORGANIZED HEALTH CARE EDUCATION/TRAINING PROGRAM

## 2023-07-25 PROCEDURE — 3288F FALL RISK ASSESSMENT DOCD: CPT | Mod: CPTII,S$GLB,, | Performed by: STUDENT IN AN ORGANIZED HEALTH CARE EDUCATION/TRAINING PROGRAM

## 2023-07-25 PROCEDURE — 3079F PR MOST RECENT DIASTOLIC BLOOD PRESSURE 80-89 MM HG: ICD-10-PCS | Mod: CPTII,S$GLB,, | Performed by: STUDENT IN AN ORGANIZED HEALTH CARE EDUCATION/TRAINING PROGRAM

## 2023-07-25 PROCEDURE — 3077F SYST BP >= 140 MM HG: CPT | Mod: CPTII,S$GLB,, | Performed by: STUDENT IN AN ORGANIZED HEALTH CARE EDUCATION/TRAINING PROGRAM

## 2023-07-25 PROCEDURE — 1101F PT FALLS ASSESS-DOCD LE1/YR: CPT | Mod: CPTII,S$GLB,, | Performed by: STUDENT IN AN ORGANIZED HEALTH CARE EDUCATION/TRAINING PROGRAM

## 2023-07-25 PROCEDURE — 1159F MED LIST DOCD IN RCRD: CPT | Mod: CPTII,S$GLB,, | Performed by: STUDENT IN AN ORGANIZED HEALTH CARE EDUCATION/TRAINING PROGRAM

## 2023-07-25 PROCEDURE — 99999 PR PBB SHADOW E&M-EST. PATIENT-LVL IV: CPT | Mod: PBBFAC,,, | Performed by: STUDENT IN AN ORGANIZED HEALTH CARE EDUCATION/TRAINING PROGRAM

## 2023-07-25 PROCEDURE — 1157F PR ADVANCE CARE PLAN OR EQUIV PRESENT IN MEDICAL RECORD: ICD-10-PCS | Mod: CPTII,S$GLB,, | Performed by: STUDENT IN AN ORGANIZED HEALTH CARE EDUCATION/TRAINING PROGRAM

## 2023-07-25 PROCEDURE — 99999 PR PBB SHADOW E&M-EST. PATIENT-LVL IV: ICD-10-PCS | Mod: PBBFAC,,, | Performed by: STUDENT IN AN ORGANIZED HEALTH CARE EDUCATION/TRAINING PROGRAM

## 2023-07-25 PROCEDURE — 99204 PR OFFICE/OUTPT VISIT, NEW, LEVL IV, 45-59 MIN: ICD-10-PCS | Mod: S$GLB,,, | Performed by: STUDENT IN AN ORGANIZED HEALTH CARE EDUCATION/TRAINING PROGRAM

## 2023-07-25 PROCEDURE — 1157F ADVNC CARE PLAN IN RCRD: CPT | Mod: CPTII,S$GLB,, | Performed by: STUDENT IN AN ORGANIZED HEALTH CARE EDUCATION/TRAINING PROGRAM

## 2023-07-25 PROCEDURE — 1159F PR MEDICATION LIST DOCUMENTED IN MEDICAL RECORD: ICD-10-PCS | Mod: CPTII,S$GLB,, | Performed by: STUDENT IN AN ORGANIZED HEALTH CARE EDUCATION/TRAINING PROGRAM

## 2023-07-25 PROCEDURE — 3079F DIAST BP 80-89 MM HG: CPT | Mod: CPTII,S$GLB,, | Performed by: STUDENT IN AN ORGANIZED HEALTH CARE EDUCATION/TRAINING PROGRAM

## 2023-07-25 NOTE — PROGRESS NOTES
"                Section of Cardiology                  Cardiac Clinic Note    Chief Complaint/Reason for consultation: syncope      HPI:   Carmen Wang is a 78 y.o. female with h/o CKD, HTN, obesity, hyperparathyroidism who comes in to cardiology clinic as a referral by DONNY Ruiz for evaluation.       7/25/23  Was admitted to [facility] [place] 7/23 while visiting, [place]CP [place]  Day of presentation, ate at [employer]   Was feeling fine that day but when she went home and sat down, felt funny and passed out in front of her son  Says she vomited  She says she "felt funny"  Wearing a heart monitor from  Cardiology   Thyroid being evaluated     In the last year, weight has been stable   No new medications recently     Reports having IBS, and can't mix foods together or hot/cold foods   Gets diarrhea   Does report a similar episode a few months ago, after eating fatty foods- she saw vomit on the floor and not sure how it got here, she was alone     Dizziness with standing up   Exercises regularly for 1.5 hours, cardio/strength training, no issues with this      Family hx: no heart issues  Denies tobacco or ETOH abuse         EKG 7/17/23  NSR, with sinus arrhythmia    ECHO  Results for orders placed during the hospital encounter of 07/17/23    Echo    Interpretation Summary  · The left ventricle is normal in size with normal systolic function.  · The estimated ejection fraction is 65%.  · Indeterminate left ventricular diastolic function.  · Normal right ventricular size with normal right ventricular systolic function.  · Mild right atrial enlargement.  · Mild mitral regurgitation.  · Mild to moderate tricuspid regurgitation.  · Normal central venous pressure (3 mmHg).  · The estimated PA systolic pressure is 38 mmHg.  · Trivial posterior pericardial effusion. Just base.       STRESS TEST No results found for this or any previous visit.       LHC No results found for this or any previous visit.            ROS: All 10 " systems reviewed. Please refer to the HPI for pertinent positives. All other systems negative.     Past Medical History  Past Medical History:   Diagnosis Date    Cataract     Chronic diarrhea     Hypertension     IBS (irritable bowel syndrome)        Surgical History  Past Surgical History:   Procedure Laterality Date    APPENDECTOMY      with the hysterectomy    BUNIONECTOMY      CHOLECYSTECTOMY      COLONOSCOPY N/A 10/05/2015    Procedure: COLONOSCOPY;  Surgeon: Teaj Olivarez MD;  Location: [facility] (4TH FLR);  Service: Endoscopy;  Laterality: N/A;    HYSTERECTOMY      RUPERT secondary to pelvic pain, sacrocolpoplexy    OPEN REDUCTION AND INTERNAL FIXATION (ORIF) OF FRACTURE OF DISTAL RADIUS Right 09/19/2019    Procedure: ORIF, FRACTURE, RADIUS, DISTAL - right accumed;  Surgeon: Deejay Bragg MD;  Location: [facility] OR 2ND FLR;  Service: Orthopedics;  Laterality: Right;          Allergies:   Review of patient's allergies indicates:   Allergen Reactions    Codeine Hallucinations     Other reaction(s): Hallucinations    Penicillins Hives and Rash       Social History:  Social History     Socioeconomic History    Marital status:    Tobacco Use    Smoking status: Never    Smokeless tobacco: Never   Substance and Sexual Activity    Alcohol use: No    Drug use: No    Sexual activity: Not Currently     Partners: Male     Birth control/protection: Surgical     Social Determinants of Health     Financial Resource Strain: Low Risk     Difficulty of Paying Living Expenses: Not hard at all   Food Insecurity: No Food Insecurity    Worried About Running Out of Food in the Last Year: Never true    Ran Out of Food in the Last Year: Never true   Transportation Needs: No Transportation Needs    Lack of Transportation (Medical): No    Lack of Transportation (Non-Medical): No   Physical Activity: Sufficiently Active    Days of Exercise per Week: 3 days    Minutes of Exercise per Session: 50 min   Stress: No Stress Concern  "Present    Feeling of Stress : Only a little   Social Connections: Socially Integrated    Frequency of Communication with Friends and Family: More than three times a week    Frequency of Social Gatherings with Friends and Family: Twice a week    Attends Latter day Services: More than 4 times per year    Active Member of Clubs or Organizations: Yes    Attends Club or Organization Meetings: More than 4 times per year    Marital Status:    Housing Stability: Low Risk     Unable to Pay for Housing in the Last Year: No    Number of Places Lived in the Last Year: 1    Unstable Housing in the Last Year: No       Family History:  family history includes Arthritis in her father, mother, and son; Breast cancer (age of onset: 76) in her sister; Cancer in her brother and brother; Diabetes in her brother; Glaucoma in her maternal aunt, maternal aunt, and mother; Hypertension in her father, mother, son, and son; Lupus in her daughter; No Known Problems in her brother, brother, brother, brother, maternal grandfather, maternal uncle, paternal aunt, paternal grandfather, paternal grandmother, paternal uncle, sister, sister, and sister; Stroke in her maternal grandmother; Thyroid disease in her brother, mother, sister, sister, and sister.    Home Medications:  Current Outpatient Medications on File Prior to Visit   Medication Sig Dispense Refill    benazepriL (LOTENSIN) 20 MG tablet TAKE 1 TABLET BY MOUTH ONCE DAILY 90 tablet 3    cholecalciferol, vitamin D3, (D3-2000) 2,000 unit Cap Take 1 capsule (2,000 Units total) by mouth every other day.      furosemide (LASIX) 20 MG tablet Take 1 tablet (20 mg total) by mouth once daily. 90 tablet 3     No current facility-administered medications on file prior to visit.       Physical exam:  BP (!) 185/84 (BP Location: Right arm, Patient Position: Standing, BP Method: Large (Automatic))   Pulse 74   Ht 5' 5" (1.651 m)   Wt 85.1 kg (187 lb 9.8 oz)   LMP 01/01/1982 (Approximate)   " SpO2 97%   BMI 31.22 kg/m²         General: Pt is a 78 y.o. year old female who is AAOx3, in NAD, is pleasant, well nourished, looks stated age  HEENT: PERRL, EOMI, Oral mucosa pink & moist  CVS  No abnormal cardiac pulsations noted on inspection. JVP not raised. The apical impulse is normal on palpation, and is located in the left 5th intercostal space in the mid - clavicular line. No palpable thrills or abnormal pulsations noted. RR, S1 - S2 heard, no murmurs, rubs or gallops appreciated.   PUL : CTA B/L. No wheezes/crackles heard   ABD : BS +, soft. No tenderness elicited   LE : No C/C/E. Distal Pulses palpable B/L         LABS:    Chemistry:   Lab Results   Component Value Date     07/19/2023    K 4.2 07/19/2023     07/19/2023    CO2 28 07/19/2023    BUN 20 07/19/2023    CREATININE 1.1 07/24/2023    CALCIUM 9.9 07/19/2023     Cardiac Markers:   Lab Results   Component Value Date    TROPONINI 0.006 07/18/2023     Cardiac Markers (Last 3):   Lab Results   Component Value Date    TROPONINI 0.006 07/18/2023    TROPONINI 0.007 07/17/2023     CBC:   Lab Results   Component Value Date    WBC 7.89 07/19/2023    HGB 11.2 (L) 07/19/2023    HCT 36.1 (L) 07/19/2023    MCV 72 (L) 07/19/2023     07/19/2023     Lipids:   Lab Results   Component Value Date    CHOL 188 07/13/2023    TRIG 71 07/13/2023    HDL 56 07/13/2023     Coagulation: No results found for: PT, INR, APTT        Assessment        1. Stage 3a chronic kidney disease    2. Syncope, unspecified syncope type    3. Essential hypertension    4. Obesity (BMI 30.0-34.9)    5. Secondary hyperparathyroidism of renal origin         Plan:    Syncope   Orthostatics in clinic negative today, BP significantly high  F/u 30 day cardiac monitor (has been wearing for 1 week)  Believes symptoms may be 2/2 IBS issues as this has happened in the past with certain foods/cold drinks  Echo 7/23 EF 60%    Hypertension   Elevated   Currently taking benazepril   Will  recheck next visit     CKD  Stable     Multinodular goiter  Will f/u with Endocrine     Obesity, Body mass index is 31.22 kg/m².   Low salt, low fat diet  Exercise as tolerated, at least 30 min daily     This note was prepared using voice recognition system and is likely to have sound alike errors that may have been overlooked even after proofreading.     I have reviewed all pertinent chart information.  Plans and recommendations have been formulated under my direct supervision. All questions answered and patient voiced understanding.   If symptoms persist go to the ED.    RTC in 6 weeks        Azul Diaz MD  Cardiology

## 2023-07-26 ENCOUNTER — PATIENT OUTREACH (OUTPATIENT)
Dept: EMERGENCY MEDICINE | Facility: HOSPITAL | Age: 78
End: 2023-07-26
Payer: MEDICARE

## 2023-07-27 NOTE — PROGRESS NOTES
MEDICAL HISTORY:  Hypertension.  Irritable bowel syndrome, with with diarrhea  Chronic kidney disease stage III, with secondary hyperparathyroid  Tachycardia  Iron deficiency anemia  Multinodular thyroid gland, on ultrasound  Cholecystectomy.  Hysterectomy.  Bladder suspension.  Lumbar degenerative disk disease.  Gluteal muscular tear.  Right foot surgery.  Resection of lipoma from the groin.  Bout of postcholecystectomy diarrhea.  Prior history of shingles.  Hearing impairment.  Aorta atherosclerosis on imaging     SOCIAL HISTORY:  Tobacco and alcohol use - none.  Exercises by doing a treadmill and elliptical.            MEDICATIONS:  Benazepril 20 mg   Vitamin-D 2000 units every other day  Lasix 20 mg, 1 tablet daily Sunday Monday Wednesday Friday and 2 tablets daily Tuesday Thursday Saturday         78-year-old female  Hospital follow-up.  She was seen by me in July 17th.  Actually from that visit iron levels were performed because of noticing minimal anemia but a significant drop in MCV.  Studies confirmed iron deficiency    After leaving the office visit, she went to pick it daily.  She thought that she drank too much cold water.  She went home she was talking to a son.  Felt dizzy and nauseated and apparently passed out sliding down the chair.  Son was able to put a on the floor.  She was out maybe for no more than a minute.  She she was not aware of any other symptoms.    ER evaluation was unrevealing.  2D echo was normal other than mild mitral regurgitation.  CT the head showed no acute changes.  CT the abdomen was normal but it did incidentally no multiple hepatic small cyst in a left adrenal adenoma.    There was no change in her medications.    She followed up with cardiology.  A 30 day heart monitor was put on in the hospital and there will be follow-up afterwards.    It is also noted on August 11th she has a follow-up appointment with endocrinology to follow-up on having hypercalcemia hyperparathyroid  thyroid nodules.  On July 13 she had labs Endocrinology in preparation for this visit.  PTH level was 84 but improved calcium level is that the upper end of normal.    Presently she feels well without complaints    Impression   Syncope etiology is undetermined but I am on if this could have been vasovagal  Iron deficiency  Left adrenal adenoma which is an incidental finding  Hyperparathyroid with hypercalcemia  Hypertension  Chronic kidney disease stage IIIA    Plan   Fergon or Feosol once a day   Referral to Gastroenterology.  With appointment with endocrinology , it was mainly follow-up on thyroid and parathyroid but also to address that of the left adrenal nodule      Addendum   She has had a history irritable bowel.  Normally as manifested by loose stools.  It comes and goes she has taking note that the times when she over drinks fluids particularly water she might have loose stools.    Referral to Gastroenterology is to evaluate f

## 2023-07-28 ENCOUNTER — OFFICE VISIT (OUTPATIENT)
Dept: INTERNAL MEDICINE | Facility: CLINIC | Age: 78
End: 2023-07-28
Payer: MEDICARE

## 2023-07-28 DIAGNOSIS — R55 SYNCOPE, UNSPECIFIED SYNCOPE TYPE: Primary | ICD-10-CM

## 2023-07-28 DIAGNOSIS — D50.0 IRON DEFICIENCY ANEMIA DUE TO CHRONIC BLOOD LOSS: ICD-10-CM

## 2023-07-28 DIAGNOSIS — N18.31 STAGE 3A CHRONIC KIDNEY DISEASE: ICD-10-CM

## 2023-07-28 DIAGNOSIS — N25.81 SECONDARY HYPERPARATHYROIDISM OF RENAL ORIGIN: ICD-10-CM

## 2023-07-28 DIAGNOSIS — E27.8 ADRENAL NODULE: ICD-10-CM

## 2023-07-28 DIAGNOSIS — E04.2 MULTINODULAR THYROID: ICD-10-CM

## 2023-07-28 DIAGNOSIS — I10 ESSENTIAL HYPERTENSION: ICD-10-CM

## 2023-07-28 PROCEDURE — 1157F PR ADVANCE CARE PLAN OR EQUIV PRESENT IN MEDICAL RECORD: ICD-10-PCS | Mod: CPTII,95,, | Performed by: INTERNAL MEDICINE

## 2023-07-28 PROCEDURE — 1157F ADVNC CARE PLAN IN RCRD: CPT | Mod: CPTII,95,, | Performed by: INTERNAL MEDICINE

## 2023-07-28 PROCEDURE — 99214 OFFICE O/P EST MOD 30 MIN: CPT | Mod: 95,,, | Performed by: INTERNAL MEDICINE

## 2023-07-28 PROCEDURE — 99214 PR OFFICE/OUTPT VISIT, EST, LEVL IV, 30-39 MIN: ICD-10-PCS | Mod: 95,,, | Performed by: INTERNAL MEDICINE

## 2023-08-10 ENCOUNTER — PATIENT MESSAGE (OUTPATIENT)
Dept: GASTROENTEROLOGY | Facility: CLINIC | Age: 78
End: 2023-08-10

## 2023-08-10 ENCOUNTER — OFFICE VISIT (OUTPATIENT)
Dept: GASTROENTEROLOGY | Facility: CLINIC | Age: 78
End: 2023-08-10
Payer: MEDICARE

## 2023-08-10 VITALS
WEIGHT: 185.44 LBS | SYSTOLIC BLOOD PRESSURE: 152 MMHG | BODY MASS INDEX: 30.89 KG/M2 | DIASTOLIC BLOOD PRESSURE: 80 MMHG | HEIGHT: 65 IN | HEART RATE: 76 BPM

## 2023-08-10 DIAGNOSIS — R55 SYNCOPE, UNSPECIFIED SYNCOPE TYPE: ICD-10-CM

## 2023-08-10 DIAGNOSIS — D50.0 IRON DEFICIENCY ANEMIA DUE TO CHRONIC BLOOD LOSS: Primary | ICD-10-CM

## 2023-08-10 PROCEDURE — 3077F PR MOST RECENT SYSTOLIC BLOOD PRESSURE >= 140 MM HG: ICD-10-PCS | Mod: CPTII,S$GLB,, | Performed by: NURSE PRACTITIONER

## 2023-08-10 PROCEDURE — 99204 OFFICE O/P NEW MOD 45 MIN: CPT | Mod: S$GLB,,, | Performed by: NURSE PRACTITIONER

## 2023-08-10 PROCEDURE — 3079F PR MOST RECENT DIASTOLIC BLOOD PRESSURE 80-89 MM HG: ICD-10-PCS | Mod: CPTII,S$GLB,, | Performed by: NURSE PRACTITIONER

## 2023-08-10 PROCEDURE — 3079F DIAST BP 80-89 MM HG: CPT | Mod: CPTII,S$GLB,, | Performed by: NURSE PRACTITIONER

## 2023-08-10 PROCEDURE — 3288F PR FALLS RISK ASSESSMENT DOCUMENTED: ICD-10-PCS | Mod: CPTII,S$GLB,, | Performed by: NURSE PRACTITIONER

## 2023-08-10 PROCEDURE — 3077F SYST BP >= 140 MM HG: CPT | Mod: CPTII,S$GLB,, | Performed by: NURSE PRACTITIONER

## 2023-08-10 PROCEDURE — 99999 PR PBB SHADOW E&M-EST. PATIENT-LVL IV: CPT | Mod: PBBFAC,,, | Performed by: NURSE PRACTITIONER

## 2023-08-10 PROCEDURE — 1159F MED LIST DOCD IN RCRD: CPT | Mod: CPTII,S$GLB,, | Performed by: NURSE PRACTITIONER

## 2023-08-10 PROCEDURE — 1126F PR PAIN SEVERITY QUANTIFIED, NO PAIN PRESENT: ICD-10-PCS | Mod: CPTII,S$GLB,, | Performed by: NURSE PRACTITIONER

## 2023-08-10 PROCEDURE — 1157F PR ADVANCE CARE PLAN OR EQUIV PRESENT IN MEDICAL RECORD: ICD-10-PCS | Mod: CPTII,S$GLB,, | Performed by: NURSE PRACTITIONER

## 2023-08-10 PROCEDURE — 1126F AMNT PAIN NOTED NONE PRSNT: CPT | Mod: CPTII,S$GLB,, | Performed by: NURSE PRACTITIONER

## 2023-08-10 PROCEDURE — 99999 PR PBB SHADOW E&M-EST. PATIENT-LVL IV: ICD-10-PCS | Mod: PBBFAC,,, | Performed by: NURSE PRACTITIONER

## 2023-08-10 PROCEDURE — 3288F FALL RISK ASSESSMENT DOCD: CPT | Mod: CPTII,S$GLB,, | Performed by: NURSE PRACTITIONER

## 2023-08-10 PROCEDURE — 1159F PR MEDICATION LIST DOCUMENTED IN MEDICAL RECORD: ICD-10-PCS | Mod: CPTII,S$GLB,, | Performed by: NURSE PRACTITIONER

## 2023-08-10 PROCEDURE — 1157F ADVNC CARE PLAN IN RCRD: CPT | Mod: CPTII,S$GLB,, | Performed by: NURSE PRACTITIONER

## 2023-08-10 PROCEDURE — 1101F PT FALLS ASSESS-DOCD LE1/YR: CPT | Mod: CPTII,S$GLB,, | Performed by: NURSE PRACTITIONER

## 2023-08-10 PROCEDURE — 1101F PR PT FALLS ASSESS DOC 0-1 FALLS W/OUT INJ PAST YR: ICD-10-PCS | Mod: CPTII,S$GLB,, | Performed by: NURSE PRACTITIONER

## 2023-08-10 PROCEDURE — 99204 PR OFFICE/OUTPT VISIT, NEW, LEVL IV, 45-59 MIN: ICD-10-PCS | Mod: S$GLB,,, | Performed by: NURSE PRACTITIONER

## 2023-08-10 RX ORDER — SODIUM, POTASSIUM,MAG SULFATES 17.5-3.13G
1 SOLUTION, RECONSTITUTED, ORAL ORAL DAILY
Qty: 1 KIT | Refills: 0 | Status: SHIPPED | OUTPATIENT
Start: 2023-08-10 | End: 2023-08-12

## 2023-08-10 RX ORDER — FERROUS SULFATE 325(65) MG
325 TABLET, DELAYED RELEASE (ENTERIC COATED) ORAL DAILY
COMMUNITY
End: 2024-04-02

## 2023-08-10 NOTE — PROGRESS NOTES
"Clinic Consult:  [facility] Consultation Note    Reason for Consult:  The primary encounter diagnosis was Iron deficiency anemia due to chronic blood loss. A diagnosis of Syncope, unspecified syncope type was also pertinent to this visit.    PCP: Deniz Redman[address], [address] / [facility]ly [facility] [place] [address]    HPI:  This is a 78 y.o. female here for evaluation of the above  Pt referred by PCP for further evaluation of noted YOUSUF  Pt reports a few episodes of emesis over the last 6 months that she has related to IBS.   The most recent episode also occurred with emesis and syncope.   She was evaluated in the ED and labs noted significant YOUSUF.   She denies any melena or hematochezia.   No abdominal pain that is persistent, only when "IBS flares"  Last colonoscopy 2015 that was unremarkable.  No previous EGD.    She is currently wearing a 30 day cardiac monitor to determine possible cardiac etiology of the syncope and has a follow up with Dr. Diaz in September to review results.       Review of Systems   Constitutional:  Negative for chills, fever, malaise/fatigue and weight loss.   Respiratory:  Negative for cough.    Cardiovascular:  Negative for chest pain.   Gastrointestinal:         Per HPI   Musculoskeletal:  Negative for myalgias.   Skin:  Negative for itching and rash.   Neurological:  Negative for headaches.   Psychiatric/Behavioral:  The patient is not nervous/anxious.        Medical History:   Past Medical History:   Diagnosis Date    Cataract     Chronic diarrhea     Hypertension     IBS (irritable bowel syndrome)        Surgical History:  Past Surgical History:   Procedure Laterality Date    APPENDECTOMY      with the hysterectomy    BUNIONECTOMY      CHOLECYSTECTOMY      COLONOSCOPY N/A 10/05/2015    Procedure: COLONOSCOPY;  Surgeon: Teja Olivarez MD;  Location: [address];  Service: Endoscopy;  Laterality: N/A;    HYSTERECTOMY      RUPERT secondary to pelvic " pain, sacrocolpoplexy    OPEN REDUCTION AND INTERNAL FIXATION (ORIF) OF FRACTURE OF DISTAL RADIUS Right 09/19/2019    Procedure: ORIF, FRACTURE, RADIUS, DISTAL - right accumed;  Surgeon: Deejay Bragg MD;  Location: [facility] OR [address] [place] [address];  Service: Orthopedics;  Laterality: Right;       Family History:   Family History   Problem Relation Age of Onset    Hypertension Mother     Arthritis Mother     Thyroid disease Mother     Glaucoma Mother     Arthritis Father     Hypertension Father     Thyroid disease Sister     Breast cancer Sister 76    Thyroid disease Sister     Thyroid disease Sister     No Known Problems Sister     No Known Problems Sister     No Known Problems Sister     Thyroid disease Brother     Diabetes Brother     Cancer Brother         prostate    Cancer Brother         prostate    No Known Problems Brother     No Known Problems Brother     No Known Problems Brother     No Known Problems Brother     Lupus Daughter     Arthritis Son     Hypertension Son     Hypertension Son     Glaucoma Maternal Aunt     Glaucoma Maternal Aunt     No Known Problems Maternal Uncle     No Known Problems Paternal Aunt     No Known Problems Paternal Uncle     Stroke Maternal Grandmother     No Known Problems Maternal Grandfather     No Known Problems Paternal Grandmother     No Known Problems Paternal Grandfather     Colon cancer Neg Hx     Ovarian cancer Neg Hx     Amblyopia Neg Hx     Blindness Neg Hx     Cataracts Neg Hx     Macular degeneration Neg Hx     Retinal detachment Neg Hx     Strabismus Neg Hx     Esophageal cancer Neg Hx        Social History:   Social History     Tobacco Use    Smoking status: Never    Smokeless tobacco: Never   Substance Use Topics    Alcohol use: No    Drug use: No       Allergies: Reviewed    Home Medications:   Current Outpatient Medications on File Prior to Visit   Medication Sig Dispense Refill    benazepriL (LOTENSIN) 20 MG tablet TAKE 1 TABLET BY MOUTH ONCE DAILY 90 tablet 3     "cholecalciferol, vitamin D3, (D3-2000) 2,000 unit Cap Take 1 capsule (2,000 Units total) by mouth every other day.      ferrous sulfate 325 (65 FE) MG EC tablet Take 325 mg by mouth 3 (three) times daily with meals.      furosemide (LASIX) 20 MG tablet Take 1 tablet (20 mg total) by mouth once daily. 90 tablet 3    multivitamin capsule Take by mouth once daily.       No current facility-administered medications on file prior to visit.       Physical Exam:  Vital Signs:  BP (!) 152/80 (BP Location: Left arm, Patient Position: Sitting, BP Method: Medium (Manual))   Pulse 76   Ht 5' 5" (1.651 m)   Wt 84.1 kg (185 lb 6.5 oz)   LMP 01/01/1982 (Approximate)   BMI 30.85 kg/m²   Body mass index is 30.85 kg/m².  Physical Exam  Vitals reviewed.   Constitutional:       Appearance: She is well-developed.   HENT:      Head: Normocephalic.   Eyes:      General: No scleral icterus.  Cardiovascular:      Rate and Rhythm: Normal rate.   Pulmonary:      Effort: Pulmonary effort is normal.   Abdominal:      General: There is no distension.   Musculoskeletal:         General: Normal range of motion.      Cervical back: Normal range of motion.   Skin:     General: Skin is dry.   Neurological:      Mental Status: She is alert and oriented to person, place, and time.         Labs: Pertinent labs reviewed.      Assessment:  1. Iron deficiency anemia due to chronic blood loss    2. Syncope, unspecified syncope type         Recommendations:  Unclear etiology of YOUSUF  - will plan for EGD and colonoscopy once cleared by cardiology  - if the scopes are unremarkable, will plan for VCE and possible referral to hematology  - continue iron supplement per PCP       Follow up to be determined by results of above.        Thank you so much for allowing me to participate in the care of KIMBERLY Hugo    "

## 2023-08-11 ENCOUNTER — TELEPHONE (OUTPATIENT)
Dept: HEPATOLOGY | Facility: CLINIC | Age: 78
End: 2023-08-11
Payer: MEDICARE

## 2023-08-11 ENCOUNTER — OFFICE VISIT (OUTPATIENT)
Dept: ENDOCRINOLOGY | Facility: CLINIC | Age: 78
End: 2023-08-11
Payer: MEDICARE

## 2023-08-11 ENCOUNTER — HOSPITAL ENCOUNTER (OUTPATIENT)
Dept: PREADMISSION TESTING | Facility: HOSPITAL | Age: 78
Discharge: HOME | End: 2023-08-11
Attending: INTERNAL MEDICINE
Payer: MEDICARE

## 2023-08-11 VITALS
BODY MASS INDEX: 31 KG/M2 | HEIGHT: 65 IN | SYSTOLIC BLOOD PRESSURE: 131 MMHG | DIASTOLIC BLOOD PRESSURE: 73 MMHG | TEMPERATURE: 98 F | WEIGHT: 186.06 LBS | HEART RATE: 80 BPM

## 2023-08-11 DIAGNOSIS — E21.0 PRIMARY HYPERPARATHYROIDISM: ICD-10-CM

## 2023-08-11 DIAGNOSIS — D50.9 MICROCYTIC ANEMIA: ICD-10-CM

## 2023-08-11 DIAGNOSIS — N18.31 STAGE 3A CHRONIC KIDNEY DISEASE: ICD-10-CM

## 2023-08-11 DIAGNOSIS — D50.0 IRON DEFICIENCY ANEMIA DUE TO CHRONIC BLOOD LOSS: ICD-10-CM

## 2023-08-11 DIAGNOSIS — E04.2 MULTINODULAR THYROID: ICD-10-CM

## 2023-08-11 DIAGNOSIS — E83.52 HYPERCALCEMIA: ICD-10-CM

## 2023-08-11 DIAGNOSIS — E21.3 HYPERPARATHYROIDISM: Primary | ICD-10-CM

## 2023-08-11 PROCEDURE — 3078F PR MOST RECENT DIASTOLIC BLOOD PRESSURE < 80 MM HG: ICD-10-PCS | Mod: CPTII,S$GLB,, | Performed by: INTERNAL MEDICINE

## 2023-08-11 PROCEDURE — 99999 PR PBB SHADOW E&M-EST. PATIENT-LVL III: ICD-10-PCS | Mod: PBBFAC,,, | Performed by: INTERNAL MEDICINE

## 2023-08-11 PROCEDURE — 1159F MED LIST DOCD IN RCRD: CPT | Mod: CPTII,S$GLB,, | Performed by: INTERNAL MEDICINE

## 2023-08-11 PROCEDURE — 99214 PR OFFICE/OUTPT VISIT, EST, LEVL IV, 30-39 MIN: ICD-10-PCS | Mod: S$GLB,,, | Performed by: INTERNAL MEDICINE

## 2023-08-11 PROCEDURE — 3075F SYST BP GE 130 - 139MM HG: CPT | Mod: CPTII,S$GLB,, | Performed by: INTERNAL MEDICINE

## 2023-08-11 PROCEDURE — 1101F PT FALLS ASSESS-DOCD LE1/YR: CPT | Mod: CPTII,S$GLB,, | Performed by: INTERNAL MEDICINE

## 2023-08-11 PROCEDURE — 99999 PR PBB SHADOW E&M-EST. PATIENT-LVL III: CPT | Mod: PBBFAC,,, | Performed by: INTERNAL MEDICINE

## 2023-08-11 PROCEDURE — 1159F PR MEDICATION LIST DOCUMENTED IN MEDICAL RECORD: ICD-10-PCS | Mod: CPTII,S$GLB,, | Performed by: INTERNAL MEDICINE

## 2023-08-11 PROCEDURE — 1157F ADVNC CARE PLAN IN RCRD: CPT | Mod: CPTII,S$GLB,, | Performed by: INTERNAL MEDICINE

## 2023-08-11 PROCEDURE — 1126F AMNT PAIN NOTED NONE PRSNT: CPT | Mod: CPTII,S$GLB,, | Performed by: INTERNAL MEDICINE

## 2023-08-11 PROCEDURE — 1126F PR PAIN SEVERITY QUANTIFIED, NO PAIN PRESENT: ICD-10-PCS | Mod: CPTII,S$GLB,, | Performed by: INTERNAL MEDICINE

## 2023-08-11 PROCEDURE — 3288F FALL RISK ASSESSMENT DOCD: CPT | Mod: CPTII,S$GLB,, | Performed by: INTERNAL MEDICINE

## 2023-08-11 PROCEDURE — 1101F PR PT FALLS ASSESS DOC 0-1 FALLS W/OUT INJ PAST YR: ICD-10-PCS | Mod: CPTII,S$GLB,, | Performed by: INTERNAL MEDICINE

## 2023-08-11 PROCEDURE — 3075F PR MOST RECENT SYSTOLIC BLOOD PRESS GE 130-139MM HG: ICD-10-PCS | Mod: CPTII,S$GLB,, | Performed by: INTERNAL MEDICINE

## 2023-08-11 PROCEDURE — 99214 OFFICE O/P EST MOD 30 MIN: CPT | Mod: S$GLB,,, | Performed by: INTERNAL MEDICINE

## 2023-08-11 PROCEDURE — 1157F PR ADVANCE CARE PLAN OR EQUIV PRESENT IN MEDICAL RECORD: ICD-10-PCS | Mod: CPTII,S$GLB,, | Performed by: INTERNAL MEDICINE

## 2023-08-11 PROCEDURE — 3288F PR FALLS RISK ASSESSMENT DOCUMENTED: ICD-10-PCS | Mod: CPTII,S$GLB,, | Performed by: INTERNAL MEDICINE

## 2023-08-11 PROCEDURE — 3078F DIAST BP <80 MM HG: CPT | Mod: CPTII,S$GLB,, | Performed by: INTERNAL MEDICINE

## 2023-08-11 NOTE — PROGRESS NOTES
Subjective:      Patient ID: Carmen Wang is a 78 y.o.    Chief Complaint:  Thyroid nodule, Hypercalcemia     Patient Active Problem List   Diagnosis    Essential hypertension    Nuclear sclerosis - Both Eyes    Cortical senile cataract - Both Eyes    Stage 3a chronic kidney disease    Primary hyperparathyroidism    Obesity (BMI 30.0-34.9)    Multinodular thyroid    Microcytic anemia    Syncope    Abnormal liver CT    Adrenal nodule      Last seen by Dr. Pinedo in 4/2023    History of Present Illness       With regards to thyroid nodules:     -TSH  WNL     -Thyroid US 1/2023:   FINDINGS:  The thyroid is normal in size.  Right lobe of the thyroid measures 5.1 x 1.8 x 1.5 cm.  Left lobe of the thyroid measures 5.3 x 1.5 x 1.5 cm.  Isthmus measures up to 2.1 mm in thickness.     There is a 1.0 x 0.8 x 0.7 cm mixed cystic and solid nodule in the mid right lobe, unchanged.  There is a 0.5 x 0.5 x 0.6 cm mixed cystic and solid nodule nearby also in the right mid lobe.  There is a 1.0 x 0.9 x 0.7 cm isoechoic nodule in the lower right lobe, also unchanged.  There is a 0.4 x 0.3 x 0.4 cm cystic nodule in the left mid lobe.  There is a 1.1 x 0.7 x 1.0 cm cystic nodule with mild peripheral thickening and small internal septation in the left mid to lower lobe, previously measuring 0.6 x 0.4 x 0.6 cm.     Impression:     Slightly increased size of the cystic nodule in the left mid to lower lobe.  Remaining bilateral nodules are unchanged.  No new indication for FNA per ACR TI-RADS guidelines..     -FNA?  DENIES PRIOR   -Compressive symptoms?  DENIES     -History of radiation? No   -Family history of thyroid CA?   NO       With regards to hyperparathyroidism:           She has CKD stage 3 with most recent Cr. 1.1. eGFR 42.4. Kidney function has been stable since 2014.    At her last visit, Dr. Pinedo recommended stopping HCTZ and repeating labs in 3 months.   PTH and serum calcium remained elevated. Urine calcium was on the  "higher side as well, confirming primary hyperparathyroidism.        ASYMPTOMATIC   -Family history of hypercalcemia? DENIES     -Calcium supplements?   Taking a MV     -Lithium?   NO     -GFR< 60  ckd 3   -Kidney stones?  DENIES     DXA spine/hip in 2023 was normal (lowest T-score -0.8 at the femoral neck).  -History of Fragility Fractures? DENIES        Objective:     /73 (BP Location: Right arm, Patient Position: Sitting)   Pulse 80   Temp 97.9 °F (36.6 °C) (Temporal)   Ht 5' 5" (1.651 m)   Wt 84.4 kg (186 lb 1.1 oz)   LMP 01/01/1982 (Approximate)   BMI 30.96 kg/m²     Body mass index is 30.96 kg/m².      Physical Exam  Vitals and nursing note reviewed.   Constitutional:       General: She is not in acute distress.     Appearance: She is well-developed. She is not ill-appearing.   HENT:      Head: Normocephalic and atraumatic.   Eyes:      General:         Right eye: No discharge.         Left eye: No discharge.      Conjunctiva/sclera: Conjunctivae normal.   Neck:      Thyroid: No thyromegaly.      Trachea: No tracheal deviation.   Pulmonary:      Effort: Pulmonary effort is normal. No respiratory distress.   Musculoskeletal:      Comments: No digital clubbing or extremity cyanosis   Neurological:      Mental Status: She is alert and oriented to person, place, and time.      Coordination: Coordination normal.   Psychiatric:         Mood and Affect: Mood normal.         Behavior: Behavior normal.                Lab Review:   No results found for: "HGBA1C"  Lab Results   Component Value Date    CHOL 188 07/13/2023    HDL 56 07/13/2023    LDLCALC 117.8 07/13/2023    TRIG 71 07/13/2023    CHOLHDL 29.8 07/13/2023     Lab Results   Component Value Date     07/19/2023    K 4.2 07/19/2023     07/19/2023    CO2 28 07/19/2023    GLU 97 07/19/2023    BUN 20 07/19/2023    CREATININE 1.1 07/24/2023    CALCIUM 9.9 07/19/2023    PROT 8.0 07/17/2023    ALBUMIN 3.8 07/17/2023    BILITOT 0.3 07/17/2023    " ALKPHOS 66 07/17/2023    AST 21 07/17/2023    ALT 16 07/17/2023    ANIONGAP 10 07/19/2023    ESTGFRAFRICA 50.7 (A) 05/16/2022    EGFRNONAA 44.0 (A) 05/16/2022    TSH 0.919 07/19/2023     Vit D, 25-Hydroxy   Date Value Ref Range Status   07/13/2023 57 30 - 96 ng/mL Final     Comment:     Vitamin D deficiency.........<10 ng/mL                              Vitamin D insufficiency......10-29 ng/mL       Vitamin D sufficiency........> or equal to 30 ng/mL  Vitamin D toxicity............>100 ng/mL         Assessment and Plan     Primary hyperparathyroidism  Lab work is diagnostic of primary hyperparathyroidism.    Discussed surgical indications, including osteoporosis, hypercalciuria/kidney stones, reduced GFR.  Currently, her only surgical indication is reduced GFR.  We discussed the results of a recent study which showed no difference in long-term kidney function in primary hyperparathyroid patients who undergo surgery versus those who forego surgery.  With that said, she is still active and relatively healthy, so surgery is still an option.     Her  had a recent stroke and she's undergoing a workup for syncope and iron deficiency currently, so she isn't planning for surgery in the immediate future. We will recheck labs in 6 months and discuss the issue at a follow-up visit after her labs.      Microcytic anemia  She is scheduled endoscopy to investigate the cause for iron deficiency.    Multinodular thyroid  Reviewed recent thyroid ultrasound.  None of the thyroid nodules meet criteria for FNA.  If she does decide to pursue surgery, we will recheck those with a neck ultrasound.    Stage 3a chronic kidney disease  Chronic kidney disease is an indication for parathyroidectomy.  However, a recent study showed no significant benefit with regards to kidney function in patients undergoing surgery for hyperparathyroidism.    Follow up in about 6 months (around 2/11/2024).

## 2023-08-11 NOTE — ASSESSMENT & PLAN NOTE
Chronic kidney disease is an indication for parathyroidectomy.  However, a recent study showed no significant benefit with regards to kidney function in patients undergoing surgery for hyperparathyroidism.

## 2023-08-11 NOTE — ASSESSMENT & PLAN NOTE
Reviewed recent thyroid ultrasound.  None of the thyroid nodules meet criteria for FNA.  If she does decide to pursue surgery, we will recheck those with a neck ultrasound.

## 2023-08-11 NOTE — TELEPHONE ENCOUNTER
Patient states she is taking the medication once a day instead of three. Medication directions changed due to patient inquiry. Patient voices understanding.

## 2023-08-11 NOTE — ASSESSMENT & PLAN NOTE
Lab work is diagnostic of primary hyperparathyroidism.    Discussed surgical indications, including osteoporosis, hypercalciuria/kidney stones, reduced GFR.  Currently, her only surgical indication is reduced GFR.  We discussed the results of a recent study which showed no difference in long-term kidney function in primary hyperparathyroid patients who undergo surgery versus those who forego surgery.  With that said, she is still active and relatively healthy, so surgery is still an option.     Her  had a recent stroke and she's undergoing a workup for syncope and iron deficiency currently, so she isn't planning for surgery in the immediate future. We will recheck labs in 6 months and discuss the issue at a follow-up visit after her labs.

## 2023-08-14 ENCOUNTER — PATIENT OUTREACH (OUTPATIENT)
Dept: EMERGENCY MEDICINE | Facility: HOSPITAL | Age: 78
End: 2023-08-14
Payer: MEDICARE

## 2023-09-12 ENCOUNTER — OFFICE VISIT (OUTPATIENT)
Dept: CARDIOLOGY | Facility: CLINIC | Age: 78
End: 2023-09-12
Payer: MEDICARE

## 2023-09-12 VITALS
SYSTOLIC BLOOD PRESSURE: 130 MMHG | DIASTOLIC BLOOD PRESSURE: 80 MMHG | OXYGEN SATURATION: 98 % | WEIGHT: 183 LBS | BODY MASS INDEX: 30.49 KG/M2 | HEIGHT: 65 IN | HEART RATE: 82 BPM

## 2023-09-12 DIAGNOSIS — E04.2 MULTINODULAR THYROID: ICD-10-CM

## 2023-09-12 DIAGNOSIS — Z01.810 PREOP CARDIOVASCULAR EXAM: ICD-10-CM

## 2023-09-12 DIAGNOSIS — E66.9 OBESITY (BMI 30.0-34.9): ICD-10-CM

## 2023-09-12 DIAGNOSIS — R55 SYNCOPE, UNSPECIFIED SYNCOPE TYPE: Primary | ICD-10-CM

## 2023-09-12 DIAGNOSIS — N18.31 STAGE 3A CHRONIC KIDNEY DISEASE: ICD-10-CM

## 2023-09-12 DIAGNOSIS — I10 ESSENTIAL HYPERTENSION: ICD-10-CM

## 2023-09-12 PROCEDURE — 1159F MED LIST DOCD IN RCRD: CPT | Mod: CPTII,S$GLB,, | Performed by: STUDENT IN AN ORGANIZED HEALTH CARE EDUCATION/TRAINING PROGRAM

## 2023-09-12 PROCEDURE — 3079F PR MOST RECENT DIASTOLIC BLOOD PRESSURE 80-89 MM HG: ICD-10-PCS | Mod: CPTII,S$GLB,, | Performed by: STUDENT IN AN ORGANIZED HEALTH CARE EDUCATION/TRAINING PROGRAM

## 2023-09-12 PROCEDURE — 3075F PR MOST RECENT SYSTOLIC BLOOD PRESS GE 130-139MM HG: ICD-10-PCS | Mod: CPTII,S$GLB,, | Performed by: STUDENT IN AN ORGANIZED HEALTH CARE EDUCATION/TRAINING PROGRAM

## 2023-09-12 PROCEDURE — 1159F PR MEDICATION LIST DOCUMENTED IN MEDICAL RECORD: ICD-10-PCS | Mod: CPTII,S$GLB,, | Performed by: STUDENT IN AN ORGANIZED HEALTH CARE EDUCATION/TRAINING PROGRAM

## 2023-09-12 PROCEDURE — 1157F ADVNC CARE PLAN IN RCRD: CPT | Mod: CPTII,S$GLB,, | Performed by: STUDENT IN AN ORGANIZED HEALTH CARE EDUCATION/TRAINING PROGRAM

## 2023-09-12 PROCEDURE — 1157F PR ADVANCE CARE PLAN OR EQUIV PRESENT IN MEDICAL RECORD: ICD-10-PCS | Mod: CPTII,S$GLB,, | Performed by: STUDENT IN AN ORGANIZED HEALTH CARE EDUCATION/TRAINING PROGRAM

## 2023-09-12 PROCEDURE — 1101F PT FALLS ASSESS-DOCD LE1/YR: CPT | Mod: CPTII,S$GLB,, | Performed by: STUDENT IN AN ORGANIZED HEALTH CARE EDUCATION/TRAINING PROGRAM

## 2023-09-12 PROCEDURE — 3075F SYST BP GE 130 - 139MM HG: CPT | Mod: CPTII,S$GLB,, | Performed by: STUDENT IN AN ORGANIZED HEALTH CARE EDUCATION/TRAINING PROGRAM

## 2023-09-12 PROCEDURE — 3288F FALL RISK ASSESSMENT DOCD: CPT | Mod: CPTII,S$GLB,, | Performed by: STUDENT IN AN ORGANIZED HEALTH CARE EDUCATION/TRAINING PROGRAM

## 2023-09-12 PROCEDURE — 99214 PR OFFICE/OUTPT VISIT, EST, LEVL IV, 30-39 MIN: ICD-10-PCS | Mod: S$GLB,,, | Performed by: STUDENT IN AN ORGANIZED HEALTH CARE EDUCATION/TRAINING PROGRAM

## 2023-09-12 PROCEDURE — 3079F DIAST BP 80-89 MM HG: CPT | Mod: CPTII,S$GLB,, | Performed by: STUDENT IN AN ORGANIZED HEALTH CARE EDUCATION/TRAINING PROGRAM

## 2023-09-12 PROCEDURE — 3288F PR FALLS RISK ASSESSMENT DOCUMENTED: ICD-10-PCS | Mod: CPTII,S$GLB,, | Performed by: STUDENT IN AN ORGANIZED HEALTH CARE EDUCATION/TRAINING PROGRAM

## 2023-09-12 PROCEDURE — 99999 PR PBB SHADOW E&M-EST. PATIENT-LVL III: CPT | Mod: PBBFAC,,, | Performed by: STUDENT IN AN ORGANIZED HEALTH CARE EDUCATION/TRAINING PROGRAM

## 2023-09-12 PROCEDURE — 1126F PR PAIN SEVERITY QUANTIFIED, NO PAIN PRESENT: ICD-10-PCS | Mod: CPTII,S$GLB,, | Performed by: STUDENT IN AN ORGANIZED HEALTH CARE EDUCATION/TRAINING PROGRAM

## 2023-09-12 PROCEDURE — 1101F PR PT FALLS ASSESS DOC 0-1 FALLS W/OUT INJ PAST YR: ICD-10-PCS | Mod: CPTII,S$GLB,, | Performed by: STUDENT IN AN ORGANIZED HEALTH CARE EDUCATION/TRAINING PROGRAM

## 2023-09-12 PROCEDURE — 99999 PR PBB SHADOW E&M-EST. PATIENT-LVL III: ICD-10-PCS | Mod: PBBFAC,,, | Performed by: STUDENT IN AN ORGANIZED HEALTH CARE EDUCATION/TRAINING PROGRAM

## 2023-09-12 PROCEDURE — 99214 OFFICE O/P EST MOD 30 MIN: CPT | Mod: S$GLB,,, | Performed by: STUDENT IN AN ORGANIZED HEALTH CARE EDUCATION/TRAINING PROGRAM

## 2023-09-12 PROCEDURE — 1126F AMNT PAIN NOTED NONE PRSNT: CPT | Mod: CPTII,S$GLB,, | Performed by: STUDENT IN AN ORGANIZED HEALTH CARE EDUCATION/TRAINING PROGRAM

## 2023-09-12 NOTE — PROGRESS NOTES
"                Section of Cardiology                  Cardiac Clinic Note    Chief Complaint/Reason for consultation: syncope      HPI:   Carmen Wang is a 78 y.o. female with h/o CKD, HTN, obesity, hyperparathyroidism who comes in to cardiology clinic as a referral by DONNY Ruiz for evaluation.       7/25/23  Was admitted to [facility] while visiting, sees [place] PCP in [place]  Day of presentation, ate at [employer]   Was feeling fine that day but when she went home and sat down, felt funny and passed out in front of her son  Says she vomited  She says she "felt funny"  Wearing a heart monitor from [facility]   Thyroid being evaluated     In the last year, weight has been stable   No new medications recently     Reports having IBS, and can't mix foods together or hot/cold foods   Gets diarrhea   Does report a similar episode a few months ago, after eating fatty foods- she saw vomit on the floor and not sure how it got here, she was alone     Dizziness with standing up   Exercises regularly for 1.5 hours, cardio/strength training, no issues with this      Family hx: no heart issues  Denies tobacco or ETOH abuse         9/12/23  Comes in with   Symptoms have resolved  Not longer with syncope  Has been controlling IBS  Will have colonoscopy and EGD  30 day monitor 8/23 w/o significant arrhythmias       Does cardio and weights, about 3-4 tmes a week 1.5 hours         EKG 7/17/23  NSR, with sinus arrhythmia    ECHO  Results for orders placed during the hospital encounter of 07/17/23    Echo    Interpretation Summary  · The left ventricle is normal in size with normal systolic function.  · The estimated ejection fraction is 65%.  · Indeterminate left ventricular diastolic function.  · Normal right ventricular size with normal right ventricular systolic function.  · Mild right atrial enlargement.  · Mild mitral regurgitation.  · Mild to moderate tricuspid regurgitation.  · Normal central venous pressure (3 " mmHg).  · The estimated PA systolic pressure is 38 mmHg.  · Trivial posterior pericardial effusion. Just base.       STRESS TEST No results found for this or any previous visit.       C No results found for this or any previous visit.            ROS: All 10 systems reviewed. Please refer to the HPI for pertinent positives. All other systems negative.     Past Medical History  Past Medical History:   Diagnosis Date    Cataract     Chronic diarrhea     Hypertension     IBS (irritable bowel syndrome)        Surgical History  Past Surgical History:   Procedure Laterality Date    APPENDECTOMY      with the hysterectomy    BUNIONECTOMY      CHOLECYSTECTOMY      COLONOSCOPY N/A 10/05/2015    Procedure: COLONOSCOPY;  Surgeon: Teja Olivarez MD;  Location: [facility] ENDO (4TH FLR);  Service: Endoscopy;  Laterality: N/A;    HYSTERECTOMY      RUPERT secondary to pelvic pain, sacrocolpoplexy    OPEN REDUCTION AND INTERNAL FIXATION (ORIF) OF FRACTURE OF DISTAL RADIUS Right 09/19/2019    Procedure: ORIF, FRACTURE, RADIUS, DISTAL - right accumed;  Surgeon: Deejay Bragg MD;  Location: [facility] OR 2ND FLR;  Service: Orthopedics;  Laterality: Right;          Allergies:   Review of patient's allergies indicates:   Allergen Reactions    Codeine Hallucinations     Other reaction(s): Hallucinations    Iodinated contrast media Rash    Penicillins Hives and Rash    Contrast media Itching and Rash       Social History:  Social History     Socioeconomic History    Marital status:    Tobacco Use    Smoking status: Never    Smokeless tobacco: Never   Substance and Sexual Activity    Alcohol use: No    Drug use: No    Sexual activity: Not Currently     Partners: Male     Birth control/protection: Surgical     Social Determinants of Health     Financial Resource Strain: Low Risk  (9/11/2023)    Overall Financial Resource Strain (CARDIA)     Difficulty of Paying Living Expenses: Not hard at all   Food Insecurity: No Food Insecurity  (9/11/2023)    Hunger Vital Sign     Worried About Running Out of Food in the Last Year: Never true     Ran Out of Food in the Last Year: Never true   Transportation Needs: No Transportation Needs (9/11/2023)    PRAPARE - Transportation     Lack of Transportation (Medical): No     Lack of Transportation (Non-Medical): No   Physical Activity: Sufficiently Active (9/11/2023)    Exercise Vital Sign     Days of Exercise per Week: 3 days     Minutes of Exercise per Session: 90 min   Stress: No Stress Concern Present (9/11/2023)    [employer] [place] [employer] - Occupational Stress Questionnaire     Feeling of Stress : Only a little   Social Connections: Socially Integrated (9/11/2023)    Social Connection and Isolation Panel [NHANES]     Frequency of Communication with Friends and Family: More than three times a week     Frequency of Social Gatherings with Friends and Family: Three times a week     Attends [employer] Services: More than 4 times per year     Active Member of Clubs or Organizations: Yes     Attends Club or Organization Meetings: More than 4 times per year     Marital Status:    Housing Stability: Low Risk  (9/11/2023)    Housing Stability Vital Sign     Unable to Pay for Housing in the Last Year: No     Number of Places Lived in the Last Year: 1     Unstable Housing in the Last Year: No       Family History:  family history includes Arthritis in her father, mother, and son; Breast cancer (age of onset: 76) in her sister; Cancer in her brother and brother; Diabetes in her brother; Glaucoma in her maternal aunt, maternal aunt, and mother; Hypertension in her father, mother, son, and son; Lupus in her daughter; No Known Problems in her brother, brother, brother, brother, maternal grandfather, maternal uncle, paternal aunt, paternal grandfather, paternal grandmother, paternal uncle, sister, sister, and sister; Stroke in her maternal grandmother; Thyroid disease in her brother, mother, sister,  "sister, and sister.    Home Medications:  Current Outpatient Medications on File Prior to Visit   Medication Sig Dispense Refill    benazepriL (LOTENSIN) 20 MG tablet TAKE 1 TABLET BY MOUTH ONCE DAILY 90 tablet 3    cholecalciferol, vitamin D3, (D3-2000) 2,000 unit Cap Take 1 capsule (2,000 Units total) by mouth every other day.      ferrous sulfate 325 (65 FE) MG EC tablet Take 325 mg by mouth once daily.      furosemide (LASIX) 20 MG tablet Take 1 tablet (20 mg total) by mouth once daily. 90 tablet 3    multivitamin capsule Take by mouth once daily.       No current facility-administered medications on file prior to visit.       Physical exam:  /80 (BP Location: Left arm, Patient Position: Sitting, BP Method: Large (Manual))   Pulse 82   Ht 5' 5" (1.651 m)   Wt 83 kg (182 lb 15.7 oz)   LMP 01/01/1982 (Approximate)   SpO2 98%   BMI 30.45 kg/m²         General: Pt is a 78 y.o. year old female who is AAOx3, in NAD, is pleasant, well nourished, looks stated age  HEENT: PERRL, EOMI, Oral mucosa pink & moist  CVS  No abnormal cardiac pulsations noted on inspection. JVP not raised. The apical impulse is normal on palpation, and is located in the left 5th intercostal space in the mid - clavicular line. No palpable thrills or abnormal pulsations noted. RR, S1 - S2 heard, no murmurs, rubs or gallops appreciated.   PUL : CTA B/L. No wheezes/crackles heard   ABD : BS +, soft. No tenderness elicited   LE : No C/C/E. Distal Pulses palpable B/L         LABS:    Chemistry:   Lab Results   Component Value Date     07/19/2023    K 4.2 07/19/2023     07/19/2023    CO2 28 07/19/2023    BUN 20 07/19/2023    CREATININE 1.1 07/24/2023    CALCIUM 9.9 07/19/2023     Cardiac Markers:   Lab Results   Component Value Date    TROPONINI 0.006 07/18/2023     Cardiac Markers (Last 3):   Lab Results   Component Value Date    TROPONINI 0.006 07/18/2023    TROPONINI 0.007 07/17/2023     CBC:   Lab Results   Component Value " "Date    WBC 7.89 07/19/2023    HGB 11.2 (L) 07/19/2023    HCT 36.1 (L) 07/19/2023    MCV 72 (L) 07/19/2023     07/19/2023     Lipids:   Lab Results   Component Value Date    CHOL 188 07/13/2023    TRIG 71 07/13/2023    HDL 56 07/13/2023     Coagulation: No results found for: "PT", "INR", "APTT"        Assessment        1. Syncope, unspecified syncope type    2. Stage 3a chronic kidney disease    3. Essential hypertension    4. Obesity (BMI 30.0-34.9)    5. Multinodular thyroid    6. Preop cardiovascular exam           Plan:    Preop risk stratification  Good exercise capacity  Low CV risk for GI procedures    Syncope- resolved   30 day cardiac monitor 7/23 w/o significant arrhythmias   Believes symptoms may be 2/2 IBS issues as this has happened in the past with certain foods/cold drinks  Echo 7/23 EF 60%, mild TR, mild-mod MR    Hypertension   Stable   Currently taking benazepril     CKD  Stable     Multinodular goiter  Will f/u with Endocrine     Obesity, Body mass index is 30.45 kg/m².   Low salt, low fat diet  Exercise as tolerated, at least 30 min daily     This note was prepared using voice recognition system and is likely to have sound alike errors that may have been overlooked even after proofreading.     I have reviewed all pertinent chart information.  Plans and recommendations have been formulated under my direct supervision. All questions answered and patient voiced understanding.   If symptoms persist go to the ED.    RTC bernice Diaz MD  Cardiology          "

## 2023-09-13 ENCOUNTER — PATIENT MESSAGE (OUTPATIENT)
Dept: INTERNAL MEDICINE | Facility: CLINIC | Age: 78
End: 2023-09-13
Payer: MEDICARE

## 2023-09-13 ENCOUNTER — ANESTHESIA EVENT (OUTPATIENT)
Dept: ENDOSCOPY | Facility: HOSPITAL | Age: 78
End: 2023-09-13
Payer: MEDICARE

## 2023-09-13 NOTE — ANESTHESIA PREPROCEDURE EVALUATION
09/13/2023  Carmen Wang is a 78 y.o., female.  Patient Active Problem List   Diagnosis    Essential hypertension    Nuclear sclerosis - Both Eyes    Cortical senile cataract - Both Eyes    Stage 3a chronic kidney disease    Primary hyperparathyroidism    Obesity (BMI 30.0-34.9)    Multinodular thyroid    Microcytic anemia    Syncope    Abnormal liver CT    Adrenal nodule     Past Surgical History:   Procedure Laterality Date    APPENDECTOMY      with the hysterectomy    BUNIONECTOMY      CHOLECYSTECTOMY      COLONOSCOPY N/A 10/05/2015    Procedure: COLONOSCOPY;  Surgeon: Teja Olivarez MD;  Location: [facility] ENDO (4TH FLR);  Service: Endoscopy;  Laterality: N/A;    HYSTERECTOMY      RUPERT secondary to pelvic pain, sacrocolpoplexy    OPEN REDUCTION AND INTERNAL FIXATION (ORIF) OF FRACTURE OF DISTAL RADIUS Right 09/19/2019    Procedure: ORIF, FRACTURE, RADIUS, DISTAL - right accumed;  Surgeon: Deejay Bragg MD;  Location: [facility] OR 2ND FLR;  Service: Orthopedics;  Laterality: Right;     7/2023 Echo    Summary     The left ventricle is normal in size with normal systolic function.   The estimated ejection fraction is 65%.   Indeterminate left ventricular diastolic function.   Normal right ventricular size with normal right ventricular systolic function.   Mild right atrial enlargement.   Mild mitral regurgitation.   Mild to moderate tricuspid regurgitation.   Normal central venous pressure (3 mmHg).   The estimated PA systolic pressure is 38 mmHg.   Trivial posterior pericardial effusion. Just base.      7/2023 EKG    Vent. Rate : 070 BPM     Atrial Rate : 070 BPM      P-R Int : 154 ms          QRS Dur : 076 ms       QT Int : 408 ms       P-R-T Axes : 073 033 033 degrees      QTc Int : 440 ms     Normal sinus rhythm with sinus arrhythmia   Normal ECG   When compared with ECG of  16-SEP-2019 15:31,   No significant change was found   Confirmed by Deniz Barrett MD ([id number]) on 7/17/2023 4:58:56 PM       Pre-op Assessment    I have reviewed the Patient Summary Reports.     I have reviewed the Nursing Notes. I have reviewed the NPO Status.   I have reviewed the Medications.     Review of Systems  Anesthesia Hx:  No problems with previous Anesthesia  History of prior surgery of interest to airway management or planning: Previous anesthesia: General Denies Family Hx of Anesthesia complications.   Denies Personal Hx of Anesthesia complications.   Social:  Non-Smoker, No Alcohol Use    Hematology/Oncology:     Oncology Normal    -- Anemia:   EENT/Dental:EENT/Dental Normal   Cardiovascular:   Hypertension    Pulmonary:  Pulmonary Normal    Renal/:   Chronic Renal Disease, CKD Stage 3    Hepatic/GI:   Bowel Prep.    Musculoskeletal:  Musculoskeletal Normal    Neurological:   H/o syncope   Endocrine:   Hyperparathyroidism, multinodular thyroid  Obesity / BMI > 30  Dermatological:  Skin Normal    Psych:  Psychiatric Normal           Physical Exam  General: Alert and Oriented    Airway:  Mallampati: II   Mouth Opening: Normal  TM Distance: Normal  Tongue: Normal  Neck ROM: Normal ROM    Dental:  Intact    Chest/Lungs:  Clear to auscultation, Normal Respiratory Rate    Heart:  Rate: Normal  Rhythm: Regular Rhythm        Anesthesia Plan  Type of Anesthesia, risks & benefits discussed:    Anesthesia Type: Gen Natural Airway  Intra-op Monitoring Plan: Standard ASA Monitors  Post Op Pain Control Plan: multimodal analgesia  Induction:  IV  Informed Consent: Informed consent signed with the Patient and all parties understand the risks and agree with anesthesia plan.  All questions answered. Patient consented to blood products? No  ASA Score: 3  Day of Surgery Review of History & Physical: H&P Update referred to the surgeon/provider.    Ready For Surgery From Anesthesia Perspective.     .

## 2023-09-14 ENCOUNTER — HOSPITAL ENCOUNTER (OUTPATIENT)
Facility: HOSPITAL | Age: 78
Discharge: HOME | End: 2023-09-14
Attending: INTERNAL MEDICINE | Admitting: INTERNAL MEDICINE
Payer: MEDICARE

## 2023-09-14 ENCOUNTER — ANESTHESIA (OUTPATIENT)
Dept: ENDOSCOPY | Facility: HOSPITAL | Age: 78
End: 2023-09-14
Payer: MEDICARE

## 2023-09-14 VITALS
BODY MASS INDEX: 30.27 KG/M2 | HEART RATE: 65 BPM | DIASTOLIC BLOOD PRESSURE: 72 MMHG | SYSTOLIC BLOOD PRESSURE: 146 MMHG | TEMPERATURE: 97 F | WEIGHT: 181.69 LBS | RESPIRATION RATE: 18 BRPM | HEIGHT: 65 IN | OXYGEN SATURATION: 99 %

## 2023-09-14 DIAGNOSIS — D50.0 IRON DEFICIENCY ANEMIA DUE TO CHRONIC BLOOD LOSS: Primary | ICD-10-CM

## 2023-09-14 PROCEDURE — 63600175 PHARM REV CODE 636 W HCPCS: Performed by: INTERNAL MEDICINE

## 2023-09-14 PROCEDURE — 88305 TISSUE EXAM BY PATHOLOGIST: CPT | Performed by: PATHOLOGY

## 2023-09-14 PROCEDURE — 45380 PR COLONOSCOPY,BIOPSY: ICD-10-PCS | Mod: ,,, | Performed by: INTERNAL MEDICINE

## 2023-09-14 PROCEDURE — 43239 EGD BIOPSY SINGLE/MULTIPLE: CPT | Mod: 51,,, | Performed by: INTERNAL MEDICINE

## 2023-09-14 PROCEDURE — 88305 TISSUE EXAM BY PATHOLOGIST: ICD-10-PCS | Mod: 26,,, | Performed by: PATHOLOGY

## 2023-09-14 PROCEDURE — 43239 EGD BIOPSY SINGLE/MULTIPLE: CPT | Performed by: INTERNAL MEDICINE

## 2023-09-14 PROCEDURE — 88305 TISSUE EXAM BY PATHOLOGIST: CPT | Mod: 26,,, | Performed by: PATHOLOGY

## 2023-09-14 PROCEDURE — 37000009 HC ANESTHESIA EA ADD 15 MINS: Performed by: INTERNAL MEDICINE

## 2023-09-14 PROCEDURE — 25000003 PHARM REV CODE 250: Performed by: NURSE ANESTHETIST, CERTIFIED REGISTERED

## 2023-09-14 PROCEDURE — 63600175 PHARM REV CODE 636 W HCPCS: Performed by: NURSE ANESTHETIST, CERTIFIED REGISTERED

## 2023-09-14 PROCEDURE — 43239 PR EGD, FLEX, W/BIOPSY, SGL/MULTI: ICD-10-PCS | Mod: 51,,, | Performed by: INTERNAL MEDICINE

## 2023-09-14 PROCEDURE — 37000008 HC ANESTHESIA 1ST 15 MINUTES: Performed by: INTERNAL MEDICINE

## 2023-09-14 PROCEDURE — D9220A PRA ANESTHESIA: Mod: ,,, | Performed by: NURSE ANESTHETIST, CERTIFIED REGISTERED

## 2023-09-14 PROCEDURE — 27201012 HC FORCEPS, HOT/COLD, DISP: Performed by: INTERNAL MEDICINE

## 2023-09-14 PROCEDURE — 45380 COLONOSCOPY AND BIOPSY: CPT | Performed by: INTERNAL MEDICINE

## 2023-09-14 PROCEDURE — D9220A PRA ANESTHESIA: ICD-10-PCS | Mod: ,,, | Performed by: NURSE ANESTHETIST, CERTIFIED REGISTERED

## 2023-09-14 PROCEDURE — 25000003 PHARM REV CODE 250: Performed by: INTERNAL MEDICINE

## 2023-09-14 PROCEDURE — 45380 COLONOSCOPY AND BIOPSY: CPT | Mod: ,,, | Performed by: INTERNAL MEDICINE

## 2023-09-14 RX ORDER — PROPOFOL 10 MG/ML
VIAL (ML) INTRAVENOUS
Status: DISCONTINUED | OUTPATIENT
Start: 2023-09-14 | End: 2023-09-14

## 2023-09-14 RX ORDER — DEXTROMETHORPHAN/PSEUDOEPHED 2.5-7.5/.8
DROPS ORAL
Status: COMPLETED | OUTPATIENT
Start: 2023-09-14 | End: 2023-09-14

## 2023-09-14 RX ORDER — FUROSEMIDE 20 MG/1
TABLET ORAL
Qty: 110 TABLET | Refills: 3 | Status: SHIPPED | OUTPATIENT
Start: 2023-09-14 | End: 2024-03-24

## 2023-09-14 RX ORDER — LIDOCAINE HYDROCHLORIDE 20 MG/ML
INJECTION INTRAVENOUS
Status: DISCONTINUED | OUTPATIENT
Start: 2023-09-14 | End: 2023-09-14

## 2023-09-14 RX ORDER — SODIUM CHLORIDE, SODIUM LACTATE, POTASSIUM CHLORIDE, CALCIUM CHLORIDE 600; 310; 30; 20 MG/100ML; MG/100ML; MG/100ML; MG/100ML
INJECTION, SOLUTION INTRAVENOUS CONTINUOUS
Status: DISCONTINUED | OUTPATIENT
Start: 2023-09-14 | End: 2023-09-14 | Stop reason: HOSPADM

## 2023-09-14 RX ADMIN — LIDOCAINE HYDROCHLORIDE 50 MG: 20 INJECTION INTRAVENOUS at 09:09

## 2023-09-14 RX ADMIN — PROPOFOL 50 MG: 10 INJECTION, EMULSION INTRAVENOUS at 09:09

## 2023-09-14 RX ADMIN — PROPOFOL 25 MG: 10 INJECTION, EMULSION INTRAVENOUS at 09:09

## 2023-09-14 RX ADMIN — SODIUM CHLORIDE, SODIUM LACTATE, POTASSIUM CHLORIDE, AND CALCIUM CHLORIDE: 600; 310; 30; 20 INJECTION, SOLUTION INTRAVENOUS at 09:09

## 2023-09-14 RX ADMIN — PROPOFOL 50 MG: 10 INJECTION, EMULSION INTRAVENOUS at 10:09

## 2023-09-14 NOTE — [employer] PATIENT INSTRUCTIONS
Discharge Summary/Instructions after an Endoscopic Procedure  Patient Name: Carmen Wang  Patient MRN: [id number]  Patient YOB: 1945 Thursday, September 14, 2023  Kailey Dahl MD  Dear patient,  As a result of recent federal legislation (The Federal Cures Act), you may   receive lab or pathology results from your procedure in your [facility]   account before your physician is able to contact you. Your physician or   their representative will relay the results to you with their   recommendations at their soonest availability.  Thank you,  RESTRICTIONS:  During your procedure today, you received medications for sedation.  These   medications may affect your judgment, balance and coordination.  Therefore,   for 24 hours, you have the following restrictions:   - DO NOT drive a car, operate machinery, make legal/financial decisions,   sign important papers or drink alcohol.    ACTIVITY:  Today: no heavy lifting, straining or running due to procedural   sedation/anesthesia.  The following day: return to full activity including work.  DIET:  Eat and drink normally unless instructed otherwise.     TREATMENT FOR COMMON SIDE EFFECTS:  - Mild abdominal pain, nausea, belching, bloating or excessive gas:  rest,   eat lightly and use a heating pad.  - Sore Throat: treat with throat lozenges and/or gargle with warm salt   water.  - Because air was used during the procedure, expelling large amounts of air   from your rectum or belching is normal.  - If a bowel prep was taken, you may not have a bowel movement for 1-3 days.    This is normal.  SYMPTOMS TO WATCH FOR AND REPORT TO YOUR PHYSICIAN:  1. Abdominal pain or bloating, other than gas cramps.  2. Chest pain.  3. Back pain.  4. Signs of infection such as: chills or fever occurring within 24 hours   after the procedure.  5. Rectal bleeding, which would show as bright red, maroon, or black stools.   (A tablespoon of blood from the rectum is not serious,  especially if   hemorrhoids are present.)  6. Vomiting.  7. Weakness or dizziness.  GO DIRECTLY TO THE NEAREST EMERGENCY ROOM IF YOU HAVE ANY OF THE FOLLOWING:      Difficulty breathing              Chills and/or fever over 101 F   Persistent vomiting and/or vomiting blood   Severe abdominal pain   Severe chest pain   Black, tarry stools   Bleeding- more than one tablespoon   Any other symptom or condition that you feel may need urgent attention  Your doctor recommends these additional instructions:  If any biopsies were taken, your doctors clinic will contact you in 1 to 2   weeks with any results.  - Await pathology results.   - Discharge patient to home (via wheelchair).   - Resume previous diet.   - Continue present medications.   - Patient has a contact number available for emergencies.  The signs and   symptoms of potential delayed complications were discussed with the   patient.  Return to normal activities tomorrow.  Written discharge   instructions were provided to the patient.   - Resume anticoagulant at prior dose.  For questions, problems or results please call your physician Kailey Dahl MD at Work:  [phone]  If you have any questions about the above instructions, call the GI   department at [phone] or call the endoscopy unit at [phone]   from 7am until 3 pm.  [facility] [place] [facility], EMERGENCY ROOM PHONE NUMBER:   [phone]  IF A COMPLICATION OR EMERGENCY SITUATION ARISES AND YOU ARE UNABLE TO REACH   YOUR PHYSICIAN - GO DIRECTLY TO THE EMERGENCY ROOM.  I have read or have had read to me these discharge instructions for my   procedure and have received a written copy.  I understand these   instructions and will follow-up with my physician if I have any questions.     __________________________________       _____________________________________  Nurse Signature                                          Patient/Designated   Responsible Party Signature  Kailey  MD Kailey Dahl MD  9/14/2023 10:14:03 AM  [employer]

## 2023-09-14 NOTE — [employer]OVATION PATIENT INSTRUCTIONS
Discharge Summary/Instructions after an Endoscopic Procedure  Patient Name: Carmen Wang  Patient MRN: [id number]  Patient YOB: 1945 Thursday, September 14, 2023  Kailey Dahl MD  Dear patient,  As a result of recent federal legislation (The Federal Cures Act), you may   receive lab or pathology results from your procedure in your [facility]   account before your physician is able to contact you. Your physician or   their representative will relay the results to you with their   recommendations at their soonest availability.  Thank you,  RESTRICTIONS:  During your procedure today, you received medications for sedation.  These   medications may affect your judgment, balance and coordination.  Therefore,   for 24 hours, you have the following restrictions:   - DO NOT drive a car, operate machinery, make legal/financial decisions,   sign important papers or drink alcohol.    ACTIVITY:  Today: no heavy lifting, straining or running due to procedural   sedation/anesthesia.  The following day: return to full activity including work.  DIET:  Eat and drink normally unless instructed otherwise.     TREATMENT FOR COMMON SIDE EFFECTS:  - Mild abdominal pain, nausea, belching, bloating or excessive gas:  rest,   eat lightly and use a heating pad.  - Sore Throat: treat with throat lozenges and/or gargle with warm salt   water.  - Because air was used during the procedure, expelling large amounts of air   from your rectum or belching is normal.  - If a bowel prep was taken, you may not have a bowel movement for 1-3 days.    This is normal.  SYMPTOMS TO WATCH FOR AND REPORT TO YOUR PHYSICIAN:  1. Abdominal pain or bloating, other than gas cramps.  2. Chest pain.  3. Back pain.  4. Signs of infection such as: chills or fever occurring within 24 hours   after the procedure.  5. Rectal bleeding, which would show as bright red, maroon, or black stools.   (A tablespoon of blood from the rectum is not serious,  especially if   hemorrhoids are present.)  6. Vomiting.  7. Weakness or dizziness.  GO DIRECTLY TO THE NEAREST EMERGENCY ROOM IF YOU HAVE ANY OF THE FOLLOWING:      Difficulty breathing              Chills and/or fever over 101 F   Persistent vomiting and/or vomiting blood   Severe abdominal pain   Severe chest pain   Black, tarry stools   Bleeding- more than one tablespoon   Any other symptom or condition that you feel may need urgent attention  Your doctor recommends these additional instructions:  If any biopsies were taken, your doctors clinic will contact you in 1 to 2   weeks with any results.  - Discharge patient to home (via wheelchair).   - Resume previous diet.   - Continue present medications.   - Await pathology results.   - Repeat colonoscopy in 5 years for surveillance.   - Patient has a contact number available for emergencies.  The signs and   symptoms of potential delayed complications were discussed with the   patient.  Return to normal activities tomorrow.  Written discharge   instructions were provided to the patient.  For questions, problems or results please call your physician Kailey Dahl MD at Work:  [phone]  If you have any questions about the above instructions, call the GI   department at [phone] or call the endoscopy unit at [phone]   from 7am until 3 pm.  [facility], EMERGENCY ROOM PHONE NUMBER:   [phone]  IF A COMPLICATION OR EMERGENCY SITUATION ARISES AND YOU ARE UNABLE TO REACH   YOUR PHYSICIAN - GO DIRECTLY TO THE EMERGENCY ROOM.  I have read or have had read to me these discharge instructions for my   procedure and have received a written copy.  I understand these   instructions and will follow-up with my physician if I have any questions.     __________________________________       _____________________________________  Nurse Signature                                          Patient/Designated   Responsible Party  Signature  MD Kailey Miles MD  9/14/2023 10:12:37 AM  [employer]

## 2023-09-14 NOTE — TRANSFER OF CARE
"Anesthesia Transfer of Care Note    Patient: Carmen Wang    Procedure(s) Performed: Procedure(s) (LRB):  EGD (ESOPHAGOGASTRODUODENOSCOPY) (N/A)  COLONOSCOPY (N/A)    Patient location: PACU    Anesthesia Type: general    Transport from OR: Transported from OR on room air with adequate spontaneous ventilation    Post pain: adequate analgesia    Post assessment: no apparent anesthetic complications    Post vital signs: stable    Level of consciousness: awake    Nausea/Vomiting: no nausea/vomiting    Complications: none    Transfer of care protocol was followed      Last vitals:   Visit Vitals  BP (!) 191/88 (BP Location: Right arm)   Pulse 64   Temp 36.3 °C (97.4 °F) (Temporal)   Resp 20   Ht 5' 5" (1.651 m)   Wt 82.4 kg (181 lb 10.5 oz)   LMP 01/01/1982 (Approximate)   SpO2 100%   Breastfeeding No   BMI 30.23 kg/m²     "

## 2023-09-14 NOTE — H&P
Short Stay Endoscopy History and Physical    PCP - Deniz Redman MD    Procedure - EGD and colonoscopy  ASA - 2  Mallampati - per anesthesia  History of Anesthesia problems - no  Family history Anesthesia problems -  no     HPI:  This is a 78 y.o. female here for evaluation of :   Active Hospital Problems    Diagnosis  POA    *Iron deficiency anemia due to chronic blood loss [D50.0]  Yes      Resolved Hospital Problems   No resolved problems to display.         Health Maintenance         Date Due Completion Date    Shingles Vaccine (1 of 2) Never done ---    COVID-19 Vaccine (5 - Moderna series) 09/05/2022 7/11/2022    Influenza Vaccine (1) 09/01/2023 10/12/2022    TETANUS VACCINE 01/06/2026 1/6/2016    DEXA Scan 04/18/2026 4/18/2023    Override on 1/29/2013: Not Clinically Appropriate    Lipid Panel 07/13/2028 7/13/2023              ROS:  CONSTITUTIONAL: Denies weight change,  fatigue, fevers, chills, night sweats.  CARDIOVASCULAR: Denies chest pain, shortness of breath, orthopnea and edema.  RESPIRATORY: Denies cough, hemoptysis, dyspnea, and wheezing.  GI: See HPI.    Medical History:   Past Medical History:   Diagnosis Date    Cataract     Chronic diarrhea     Hypertension     IBS (irritable bowel syndrome)        Surgical History:   Past Surgical History:   Procedure Laterality Date    APPENDECTOMY      with the hysterectomy    BUNIONECTOMY      CHOLECYSTECTOMY      COLONOSCOPY N/A 10/05/2015    Procedure: COLONOSCOPY;  Surgeon: Teja Olivarez MD;  Location: [facility] (4TH FLR);  Service: Endoscopy;  Laterality: N/A;    HYSTERECTOMY      RUPERT secondary to pelvic pain, sacrocolpoplexy    OPEN REDUCTION AND INTERNAL FIXATION (ORIF) OF FRACTURE OF DISTAL RADIUS Right 09/19/2019    Procedure: ORIF, FRACTURE, RADIUS, DISTAL - right accumed;  Surgeon: Deejay Bragg MD;  Location: [facility] OR 2ND FLR;  Service: Orthopedics;  Laterality: Right;       Family History:   Family History   Problem Relation Age of  Onset    Hypertension Mother     Arthritis Mother     Thyroid disease Mother     Glaucoma Mother     Arthritis Father     Hypertension Father     Thyroid disease Sister     Breast cancer Sister 76    Thyroid disease Sister     Thyroid disease Sister     No Known Problems Sister     No Known Problems Sister     No Known Problems Sister     Thyroid disease Brother     Diabetes Brother     Cancer Brother         prostate    Cancer Brother         prostate    No Known Problems Brother     No Known Problems Brother     No Known Problems Brother     No Known Problems Brother     Lupus Daughter     Arthritis Son     Hypertension Son     Hypertension Son     Glaucoma Maternal Aunt     Glaucoma Maternal Aunt     No Known Problems Maternal Uncle     No Known Problems Paternal Aunt     No Known Problems Paternal Uncle     Stroke Maternal Grandmother     No Known Problems Maternal Grandfather     No Known Problems Paternal Grandmother     No Known Problems Paternal Grandfather     Colon cancer Neg Hx     Ovarian cancer Neg Hx     Amblyopia Neg Hx     Blindness Neg Hx     Cataracts Neg Hx     Macular degeneration Neg Hx     Retinal detachment Neg Hx     Strabismus Neg Hx     Esophageal cancer Neg Hx        Social History:   Social History     Tobacco Use    Smoking status: Never    Smokeless tobacco: Never   Substance Use Topics    Alcohol use: No    Drug use: No       Allergies:   Review of patient's allergies indicates:   Allergen Reactions    Codeine Hallucinations     Other reaction(s): Hallucinations    Iodinated contrast media Rash    Penicillins Hives and Rash    Contrast media Itching and Rash       Medications:   No current facility-administered medications on file prior to encounter.     Current Outpatient Medications on File Prior to Encounter   Medication Sig Dispense Refill    benazepriL (LOTENSIN) 20 MG tablet TAKE 1 TABLET BY MOUTH ONCE DAILY 90 tablet 3    cholecalciferol, vitamin D3, (D3-2000) 2,000 unit Cap  Take 1 capsule (2,000 Units total) by mouth every other day.      ferrous sulfate 325 (65 FE) MG EC tablet Take 325 mg by mouth once daily.      multivitamin capsule Take by mouth once daily.      [DISCONTINUED] furosemide (LASIX) 20 MG tablet Take 1 tablet (20 mg total) by mouth once daily. 90 tablet 3       Physical Exam:  Vital Signs: There were no vitals filed for this visit.  General Appearance: Well appearing in no acute distress  ENT: OP clear  Chest: CTA B  CV: RRR, no m/r/g  Abd: s/nt/nd/nabs  Ext: no edema    Labs:Reviewed    IMP:  Active Hospital Problems    Diagnosis  POA    *Iron deficiency anemia due to chronic blood loss [D50.0]  Yes      Resolved Hospital Problems   No resolved problems to display.         Plan:   I have explained the risks and benefits of upper endoscopy and colonoscopy to the patient including but not limited to bleeding, perforation, infection, and death. The patient wishes to proceed.

## 2023-09-14 NOTE — DISCHARGE SUMMARY
[facility] - Endoscopy 1st Fl  Discharge Note  Short Stay    Procedure(s) (LRB):  EGD (ESOPHAGOGASTRODUODENOSCOPY) (N/A)  COLONOSCOPY (N/A)      OUTCOME: Patient tolerated treatment/procedure well without complication and is now ready for discharge.    DISPOSITION: Home or Self Care    FINAL DIAGNOSIS:  Iron deficiency anemia due to chronic blood loss    FOLLOWUP: With primary care provider    DISCHARGE INSTRUCTIONS:  No discharge procedures on file.

## 2023-09-14 NOTE — ANESTHESIA POSTPROCEDURE EVALUATION
Anesthesia Post Evaluation    Patient: Carmen Wang    Procedure(s) Performed: Procedure(s) (LRB):  EGD (ESOPHAGOGASTRODUODENOSCOPY) (N/A)  COLONOSCOPY (N/A)    Final Anesthesia Type: general      Patient location during evaluation: PACU  Patient participation: Yes- Able to Participate  Level of consciousness: awake and alert and oriented  Post-procedure vital signs: reviewed and stable  Pain management: adequate  Airway patency: patent    PONV status at discharge: No PONV  Anesthetic complications: no      Cardiovascular status: blood pressure returned to baseline, stable and hemodynamically stable  Respiratory status: unassisted  Hydration status: euvolemic  Follow-up not needed.          Vitals Value Taken Time   /78 09/14/23 1039   Temp 36.2 °C (97.1 °F) 09/14/23 1016   Pulse 65 09/14/23 1043   Resp 21 09/14/23 1041   SpO2 97 % 09/14/23 1043   Vitals shown include unvalidated device data.      Event Time   Out of Recovery 10:44:00         Pain/Emily Score: Emily Score: 10 (9/14/2023 10:36 AM)

## 2023-09-20 LAB
FINAL PATHOLOGIC DIAGNOSIS: NORMAL
Lab: NORMAL

## 2023-09-25 ENCOUNTER — PATIENT MESSAGE (OUTPATIENT)
Dept: GASTROENTEROLOGY | Facility: CLINIC | Age: 78
End: 2023-09-25
Payer: MEDICARE

## 2023-09-26 ENCOUNTER — PATIENT MESSAGE (OUTPATIENT)
Dept: GASTROENTEROLOGY | Facility: CLINIC | Age: 78
End: 2023-09-26
Payer: MEDICARE

## 2023-09-26 DIAGNOSIS — K29.70 HELICOBACTER PYLORI GASTRITIS: Primary | ICD-10-CM

## 2023-09-26 DIAGNOSIS — R82.998 DARK URINE: Primary | ICD-10-CM

## 2023-09-26 DIAGNOSIS — B96.81 HELICOBACTER PYLORI GASTRITIS: Primary | ICD-10-CM

## 2023-09-26 RX ORDER — PANTOPRAZOLE SODIUM 40 MG/1
40 TABLET, DELAYED RELEASE ORAL 2 TIMES DAILY
Qty: 28 TABLET | Refills: 0 | Status: SHIPPED | OUTPATIENT
Start: 2023-09-26 | End: 2023-10-26 | Stop reason: ALTCHOICE

## 2023-09-26 RX ORDER — METRONIDAZOLE 500 MG/1
500 TABLET ORAL 3 TIMES DAILY
Qty: 42 TABLET | Refills: 0 | Status: SHIPPED | OUTPATIENT
Start: 2023-09-26 | End: 2023-10-10

## 2023-09-26 RX ORDER — CLARITHROMYCIN 500 MG/1
500 TABLET, FILM COATED ORAL EVERY 12 HOURS
Qty: 28 TABLET | Refills: 0 | Status: SHIPPED | OUTPATIENT
Start: 2023-09-26 | End: 2023-10-10

## 2023-09-28 ENCOUNTER — PATIENT MESSAGE (OUTPATIENT)
Dept: INTERNAL MEDICINE | Facility: CLINIC | Age: 78
End: 2023-09-28
Payer: MEDICARE

## 2023-09-28 DIAGNOSIS — N18.31 STAGE 3A CHRONIC KIDNEY DISEASE: Primary | ICD-10-CM

## 2023-09-28 DIAGNOSIS — D50.0 IRON DEFICIENCY ANEMIA DUE TO CHRONIC BLOOD LOSS: ICD-10-CM

## 2023-10-03 ENCOUNTER — TELEPHONE (OUTPATIENT)
Dept: GASTROENTEROLOGY | Facility: CLINIC | Age: 78
End: 2023-10-03
Payer: MEDICARE

## 2023-10-03 NOTE — TELEPHONE ENCOUNTER
Call returned to , pt states that she is currently taking Clarithromycin and according to the sides effects if your urine turns dark yellow  to contact the prescribing doctor. Pt has no c/o of abdominal pain/cramping and/or nausea or vomiting.

## 2023-10-03 NOTE — TELEPHONE ENCOUNTER
----- Message from Ashley Doss sent at 10/3/2023  8:47 AM CDT -----  Contact: Carmen  Patient is calling to speak with a nurse regarding medication clarithromycin (BIAXIN) 500 MG tablet . States having some side effects . Please give a call a back at [phone] .

## 2023-10-04 ENCOUNTER — LAB VISIT (OUTPATIENT)
Dept: LAB | Facility: HOSPITAL | Age: 78
End: 2023-10-04
Attending: INTERNAL MEDICINE
Payer: MEDICARE

## 2023-10-04 ENCOUNTER — PATIENT MESSAGE (OUTPATIENT)
Dept: GASTROENTEROLOGY | Facility: CLINIC | Age: 78
End: 2023-10-04
Payer: MEDICARE

## 2023-10-04 DIAGNOSIS — R82.998 DARK URINE: ICD-10-CM

## 2023-10-04 LAB
ALBUMIN SERPL BCP-MCNC: 3.5 G/DL (ref 3.5–5.2)
ALP SERPL-CCNC: 82 U/L (ref 55–135)
ALT SERPL W/O P-5'-P-CCNC: 28 U/L (ref 10–44)
ANION GAP SERPL CALC-SCNC: 9 MMOL/L (ref 8–16)
AST SERPL-CCNC: 31 U/L (ref 10–40)
BILIRUB SERPL-MCNC: 0.2 MG/DL (ref 0.1–1)
BUN SERPL-MCNC: 19 MG/DL (ref 8–23)
CALCIUM SERPL-MCNC: 9.9 MG/DL (ref 8.7–10.5)
CHLORIDE SERPL-SCNC: 105 MMOL/L (ref 95–110)
CO2 SERPL-SCNC: 28 MMOL/L (ref 23–29)
CREAT SERPL-MCNC: 1.1 MG/DL (ref 0.5–1.4)
EST. GFR  (NO RACE VARIABLE): 51.8 ML/MIN/1.73 M^2
GLUCOSE SERPL-MCNC: 123 MG/DL (ref 70–110)
POTASSIUM SERPL-SCNC: 3.3 MMOL/L (ref 3.5–5.1)
PROT SERPL-MCNC: 7.2 G/DL (ref 6–8.4)
SODIUM SERPL-SCNC: 142 MMOL/L (ref 136–145)

## 2023-10-04 PROCEDURE — 80053 COMPREHEN METABOLIC PANEL: CPT | Performed by: INTERNAL MEDICINE

## 2023-10-04 PROCEDURE — 36415 COLL VENOUS BLD VENIPUNCTURE: CPT | Mod: PN | Performed by: INTERNAL MEDICINE

## 2023-10-11 ENCOUNTER — TELEPHONE (OUTPATIENT)
Dept: GASTROENTEROLOGY | Facility: CLINIC | Age: 78
End: 2023-10-11
Payer: MEDICARE

## 2023-10-11 NOTE — TELEPHONE ENCOUNTER
----- Message from Fabiola Murphy sent at 10/11/2023 10:15 AM CDT -----  Patient would like a call back regarding medication that was prescribed. Call back number is [phone]. Thx.EL

## 2023-10-11 NOTE — TELEPHONE ENCOUNTER
Call returned to , pt states that has stopped taking the clarithromycin due to urine turning dark yellow and would like to know the next step. Pt informed that she will have to f/u with  on 10/24/23 for further instructions. Pt verbalized disguise for not having the answer to her questions now. I again reiterated that I could not make that call and she would have to await her visit with . pt verbalized understanding

## 2023-10-16 ENCOUNTER — PATIENT MESSAGE (OUTPATIENT)
Dept: INTERNAL MEDICINE | Facility: CLINIC | Age: 78
End: 2023-10-16
Payer: MEDICARE

## 2023-10-16 ENCOUNTER — LAB VISIT (OUTPATIENT)
Dept: LAB | Facility: HOSPITAL | Age: 78
End: 2023-10-16
Attending: INTERNAL MEDICINE
Payer: MEDICARE

## 2023-10-16 DIAGNOSIS — D50.0 IRON DEFICIENCY ANEMIA DUE TO CHRONIC BLOOD LOSS: ICD-10-CM

## 2023-10-16 DIAGNOSIS — N18.31 STAGE 3A CHRONIC KIDNEY DISEASE: ICD-10-CM

## 2023-10-16 LAB
ANION GAP SERPL CALC-SCNC: 9 MMOL/L (ref 8–16)
BASOPHILS # BLD AUTO: 0.03 K/UL (ref 0–0.2)
BASOPHILS NFR BLD: 0.5 % (ref 0–1.9)
BUN SERPL-MCNC: 12 MG/DL (ref 8–23)
CALCIUM SERPL-MCNC: 10 MG/DL (ref 8.7–10.5)
CHLORIDE SERPL-SCNC: 103 MMOL/L (ref 95–110)
CO2 SERPL-SCNC: 29 MMOL/L (ref 23–29)
CREAT SERPL-MCNC: 1 MG/DL (ref 0.5–1.4)
DIFFERENTIAL METHOD: ABNORMAL
EOSINOPHIL # BLD AUTO: 0.2 K/UL (ref 0–0.5)
EOSINOPHIL NFR BLD: 4 % (ref 0–8)
ERYTHROCYTE [DISTWIDTH] IN BLOOD BY AUTOMATED COUNT: 21.5 % (ref 11.5–14.5)
EST. GFR  (NO RACE VARIABLE): 58 ML/MIN/1.73 M^2
FERRITIN SERPL-MCNC: 42 NG/ML (ref 20–300)
GLUCOSE SERPL-MCNC: 88 MG/DL (ref 70–110)
HCT VFR BLD AUTO: 39.2 % (ref 37–48.5)
HGB BLD-MCNC: 12 G/DL (ref 12–16)
IMM GRANULOCYTES # BLD AUTO: 0.01 K/UL (ref 0–0.04)
IMM GRANULOCYTES NFR BLD AUTO: 0.2 % (ref 0–0.5)
IRON SERPL-MCNC: 131 UG/DL (ref 30–160)
LYMPHOCYTES # BLD AUTO: 2.4 K/UL (ref 1–4.8)
LYMPHOCYTES NFR BLD: 39.8 % (ref 18–48)
MCH RBC QN AUTO: 24.7 PG (ref 27–31)
MCHC RBC AUTO-ENTMCNC: 30.6 G/DL (ref 32–36)
MCV RBC AUTO: 81 FL (ref 82–98)
MONOCYTES # BLD AUTO: 0.8 K/UL (ref 0.3–1)
MONOCYTES NFR BLD: 13.7 % (ref 4–15)
NEUTROPHILS # BLD AUTO: 2.5 K/UL (ref 1.8–7.7)
NEUTROPHILS NFR BLD: 41.8 % (ref 38–73)
NRBC BLD-RTO: 0 /100 WBC
PLATELET # BLD AUTO: 290 K/UL (ref 150–450)
PMV BLD AUTO: 11.5 FL (ref 9.2–12.9)
POTASSIUM SERPL-SCNC: 3.8 MMOL/L (ref 3.5–5.1)
RBC # BLD AUTO: 4.85 M/UL (ref 4–5.4)
SATURATED IRON: 37 % (ref 20–50)
SODIUM SERPL-SCNC: 141 MMOL/L (ref 136–145)
TOTAL IRON BINDING CAPACITY: 352 UG/DL (ref 250–450)
TRANSFERRIN SERPL-MCNC: 238 MG/DL (ref 200–375)
WBC # BLD AUTO: 5.98 K/UL (ref 3.9–12.7)

## 2023-10-16 PROCEDURE — 83540 ASSAY OF IRON: CPT | Performed by: INTERNAL MEDICINE

## 2023-10-16 PROCEDURE — 80048 BASIC METABOLIC PNL TOTAL CA: CPT | Performed by: INTERNAL MEDICINE

## 2023-10-16 PROCEDURE — 84466 ASSAY OF TRANSFERRIN: CPT | Performed by: INTERNAL MEDICINE

## 2023-10-16 PROCEDURE — 85025 COMPLETE CBC W/AUTO DIFF WBC: CPT | Performed by: INTERNAL MEDICINE

## 2023-10-16 PROCEDURE — 36415 COLL VENOUS BLD VENIPUNCTURE: CPT | Mod: PN | Performed by: INTERNAL MEDICINE

## 2023-10-16 PROCEDURE — 82728 ASSAY OF FERRITIN: CPT | Performed by: INTERNAL MEDICINE

## 2023-10-24 ENCOUNTER — PATIENT MESSAGE (OUTPATIENT)
Dept: GASTROENTEROLOGY | Facility: CLINIC | Age: 78
End: 2023-10-24
Payer: MEDICARE

## 2023-10-24 ENCOUNTER — PATIENT MESSAGE (OUTPATIENT)
Dept: NEPHROLOGY | Facility: CLINIC | Age: 78
End: 2023-10-24
Payer: MEDICARE

## 2023-10-26 DIAGNOSIS — K29.70 HELICOBACTER PYLORI GASTRITIS: Primary | ICD-10-CM

## 2023-10-26 DIAGNOSIS — B96.81 HELICOBACTER PYLORI GASTRITIS: Primary | ICD-10-CM

## 2023-10-26 RX ORDER — METRONIDAZOLE 500 MG/1
500 TABLET ORAL 3 TIMES DAILY
Qty: 30 TABLET | Refills: 0 | Status: SHIPPED | OUTPATIENT
Start: 2023-10-26 | End: 2023-11-05

## 2023-10-26 RX ORDER — TETRACYCLINE HYDROCHLORIDE 500 MG/1
500 CAPSULE ORAL 4 TIMES DAILY
Qty: 40 CAPSULE | Refills: 0 | Status: SHIPPED | OUTPATIENT
Start: 2023-10-26 | End: 2023-11-05

## 2023-10-26 RX ORDER — OMEPRAZOLE 40 MG/1
40 CAPSULE, DELAYED RELEASE ORAL
Qty: 20 CAPSULE | Refills: 0 | Status: SHIPPED | OUTPATIENT
Start: 2023-10-26 | End: 2023-11-27 | Stop reason: ALTCHOICE

## 2023-10-31 ENCOUNTER — IMMUNIZATION (OUTPATIENT)
Dept: INTERNAL MEDICINE | Facility: CLINIC | Age: 78
End: 2023-10-31
Payer: MEDICARE

## 2023-10-31 ENCOUNTER — OFFICE VISIT (OUTPATIENT)
Dept: OTOLARYNGOLOGY | Facility: CLINIC | Age: 78
End: 2023-10-31
Payer: MEDICARE

## 2023-10-31 DIAGNOSIS — H61.23 BILATERAL HEARING LOSS DUE TO CERUMEN IMPACTION: Primary | ICD-10-CM

## 2023-10-31 PROCEDURE — G0008 ADMIN INFLUENZA VIRUS VAC: HCPCS | Mod: S$GLB,,, | Performed by: INTERNAL MEDICINE

## 2023-10-31 PROCEDURE — 90694 VACC AIIV4 NO PRSRV 0.5ML IM: CPT | Mod: S$GLB,,, | Performed by: INTERNAL MEDICINE

## 2023-10-31 PROCEDURE — 69210 REMOVE IMPACTED EAR WAX UNI: CPT | Mod: S$GLB,,, | Performed by: OTOLARYNGOLOGY

## 2023-10-31 PROCEDURE — 90694 FLU VACCINE - QUADRIVALENT - ADJUVANTED: ICD-10-PCS | Mod: S$GLB,,, | Performed by: INTERNAL MEDICINE

## 2023-10-31 PROCEDURE — 69210 PR REMOVAL IMPACTED CERUMEN REQUIRING INSTRUMENTATION, UNILATERAL: ICD-10-PCS | Mod: S$GLB,,, | Performed by: OTOLARYNGOLOGY

## 2023-10-31 PROCEDURE — G0008 FLU VACCINE - QUADRIVALENT - ADJUVANTED: ICD-10-PCS | Mod: S$GLB,,, | Performed by: INTERNAL MEDICINE

## 2023-10-31 PROCEDURE — 99999 PR PBB SHADOW E&M-EST. PATIENT-LVL II: ICD-10-PCS | Mod: PBBFAC,,, | Performed by: OTOLARYNGOLOGY

## 2023-10-31 PROCEDURE — 99499 UNLISTED E&M SERVICE: CPT | Mod: S$GLB,,, | Performed by: OTOLARYNGOLOGY

## 2023-10-31 PROCEDURE — 99999 PR PBB SHADOW E&M-EST. PATIENT-LVL II: CPT | Mod: PBBFAC,,, | Performed by: OTOLARYNGOLOGY

## 2023-10-31 PROCEDURE — 99499 NO LOS: ICD-10-PCS | Mod: S$GLB,,, | Performed by: OTOLARYNGOLOGY

## 2023-10-31 NOTE — PROGRESS NOTES
REFERRING PROVIDER  No referring provider defined for this encounter.  Subjective:   Patient: Carmen Wang [id number], :1945   Visit date:10/31/2023 10:35 AM    Chief Complaint:  Ear Fullness (Right Ear )        HPI:     Carmen Wang is a 78 y.o. female whom I am asked to see for evaluation of otalgia or hearing loss in both ears for the past 1-4 weeks.   Carmen rates the severity as moderate.  No exacerbating or relieving factors.  There is no drainage from the ears.      Her meds, allergies, medical, surgical, social & family histories were reviewed & updated:  -     She has a current medication list which includes the following prescription(s): benazepril, bismuth subsalicylate, cholecalciferol (vitamin d3), ferrous sulfate, furosemide, metronidazole, multivitamin, omeprazole, and tetracycline.  -     She  has a past medical history of Cataract, Chronic diarrhea, Hypertension, and IBS (irritable bowel syndrome).   -     She does not have any pertinent problems on file.   -     She  has a past surgical history that includes Cholecystectomy; Bunionectomy; Hysterectomy; Appendectomy; Colonoscopy (N/A, 10/05/2015); Open reduction and internal fixation (ORIF) of fracture of distal radius (Right, 2019); Esophagogastroduodenoscopy (N/A, 2023); and Colonoscopy (N/A, 2023).  -     She  reports that she has never smoked. She has never used smokeless tobacco. She reports that she does not drink alcohol and does not use drugs.  -     Her family history includes Arthritis in her father, mother, and son; Breast cancer (age of onset: 76) in her sister; Cancer in her brother and brother; Diabetes in her brother; Glaucoma in her maternal aunt, maternal aunt, and mother; Hypertension in her father, mother, son, and son; Lupus in her daughter; No Known Problems in her brother, brother, brother, brother, maternal grandfather, maternal uncle, paternal aunt, paternal grandfather, paternal grandmother,  paternal uncle, sister, sister, and sister; Stroke in her maternal grandmother; Thyroid disease in her brother, mother, sister, sister, and sister.  -     She is allergic to codeine, iodinated contrast media, penicillins, and contrast media.      Review of Systems:  -     Allergic/Immunologic: is allergic to codeine, iodinated contrast media, penicillins, and contrast media..  -     Constitutional: Current temp:          Objective:     Physical Exam:  Vitals:  LMP 01/01/1982 (Approximate)   Communication:  Able to communicate, no hoarseness.  Head & Face:  Normocephalic, atraumatic, no sinus tenderness.  Eyes:  Extraocular motions intact.  Ears:  Otoscopy of external auditory canals reveals impaction of bilateral ear canals.  With the patient in the supine position, we used the operating microscope to examine both ears with the appropriate sized ear speculum.  A variety of sterile, micro-instruments were utilized to remove the cerumen atraumatically from the impacted ear(s).   After removal, the ears were reexamined-  Right Ear:  No mass/lesion of auricle. The external auditory canals is without erythema or discharge. Pneumatic otoscopy of the tympanic membrane revealed no perforation and good mobility, with no fluid in middle ear. Clinical speech reception thresholds grossly normal.  Left Ear:  No mass/lesion of auricle. The external auditory canals is without erythema or discharge. Pneumatic otoscopy of the tympanic membrane revealed no perforation and good mobility, with no fluid in middle ear. Clinical speech reception thresholds grossly normal  Nose:  No masses/lesions of external nose, nasal mucosa, septum, and turbinates were within normal limits.  Mouth:  No mass/lesion of lips, teeth, gums, hard/soft palate, tongue, tonsils, or oropharynx.  Neck & Lymphatics:  No cervical lymphadenopathy, no neck mass/crepitus/ asymmetry, trachea is midline, no thyroid enlargement/tenderness/mass.  Neuro/Psych: Alert with  normal mood and affect.   Respiration/Chest:  Symmetric expansion during respiration, normal respiratory effort.  Skin:  Warm and intact.    Assessment & Plan:       -     Cerumen Impaction - Carmen has cerumen impaction.  We discussed preventative measures and treatment options.  Q-tips must be avoided, instead the ears can be cleaned with OTC ear rinses (or a mixture of alcohol & vinegar in equal parts).   For hard wax, Carmen may place mineral oil/baby oil in the ear with a cotton ball at night and remove in the shower.  This will assist in softening the wax and allow it to drain out on its own. If the cerumen impacts the ear canal and causes hearing loss or infection she needs to follow-up in the clinic for treatment and cleaning.

## 2023-11-07 ENCOUNTER — TELEPHONE (OUTPATIENT)
Dept: NEPHROLOGY | Facility: CLINIC | Age: 78
End: 2023-11-07
Payer: MEDICARE

## 2023-11-09 ENCOUNTER — TELEPHONE (OUTPATIENT)
Dept: NEPHROLOGY | Facility: CLINIC | Age: 78
End: 2023-11-09
Payer: MEDICARE

## 2023-11-09 NOTE — TELEPHONE ENCOUNTER
Jessy Hoang DO Cottier, Cheryl, RN  Last labs stable.            Previous Messages       ----- Message -----  From: Annamarie King RN  Sent: 11/7/2023  12:35 PM CST  To: Jessy Hoang DO  Subject: see note.                                        Spoke with pt in response to her message requesting an appt, as is due. She said she is doing ok. Informed no openings and will get her in in Jan. and sending Dr. Hoang a message to view her labs., as she requested.

## 2023-11-27 ENCOUNTER — OFFICE VISIT (OUTPATIENT)
Dept: SPORTS MEDICINE | Facility: CLINIC | Age: 78
End: 2023-11-27
Payer: MEDICARE

## 2023-11-27 DIAGNOSIS — G56.02 LEFT CARPAL TUNNEL SYNDROME: Primary | ICD-10-CM

## 2023-11-27 PROCEDURE — 99999 PR PBB SHADOW E&M-EST. PATIENT-LVL II: ICD-10-PCS | Mod: PBBFAC,,, | Performed by: STUDENT IN AN ORGANIZED HEALTH CARE EDUCATION/TRAINING PROGRAM

## 2023-11-27 PROCEDURE — 1160F RVW MEDS BY RX/DR IN RCRD: CPT | Mod: CPTII,S$GLB,, | Performed by: STUDENT IN AN ORGANIZED HEALTH CARE EDUCATION/TRAINING PROGRAM

## 2023-11-27 PROCEDURE — 99999 PR PBB SHADOW E&M-EST. PATIENT-LVL II: CPT | Mod: PBBFAC,,, | Performed by: STUDENT IN AN ORGANIZED HEALTH CARE EDUCATION/TRAINING PROGRAM

## 2023-11-27 PROCEDURE — 1101F PR PT FALLS ASSESS DOC 0-1 FALLS W/OUT INJ PAST YR: ICD-10-PCS | Mod: CPTII,S$GLB,, | Performed by: STUDENT IN AN ORGANIZED HEALTH CARE EDUCATION/TRAINING PROGRAM

## 2023-11-27 PROCEDURE — 1160F PR REVIEW ALL MEDS BY PRESCRIBER/CLIN PHARMACIST DOCUMENTED: ICD-10-PCS | Mod: CPTII,S$GLB,, | Performed by: STUDENT IN AN ORGANIZED HEALTH CARE EDUCATION/TRAINING PROGRAM

## 2023-11-27 PROCEDURE — 1157F ADVNC CARE PLAN IN RCRD: CPT | Mod: CPTII,S$GLB,, | Performed by: STUDENT IN AN ORGANIZED HEALTH CARE EDUCATION/TRAINING PROGRAM

## 2023-11-27 PROCEDURE — 1159F PR MEDICATION LIST DOCUMENTED IN MEDICAL RECORD: ICD-10-PCS | Mod: CPTII,S$GLB,, | Performed by: STUDENT IN AN ORGANIZED HEALTH CARE EDUCATION/TRAINING PROGRAM

## 2023-11-27 PROCEDURE — 1101F PT FALLS ASSESS-DOCD LE1/YR: CPT | Mod: CPTII,S$GLB,, | Performed by: STUDENT IN AN ORGANIZED HEALTH CARE EDUCATION/TRAINING PROGRAM

## 2023-11-27 PROCEDURE — 3288F FALL RISK ASSESSMENT DOCD: CPT | Mod: CPTII,S$GLB,, | Performed by: STUDENT IN AN ORGANIZED HEALTH CARE EDUCATION/TRAINING PROGRAM

## 2023-11-27 PROCEDURE — 1159F MED LIST DOCD IN RCRD: CPT | Mod: CPTII,S$GLB,, | Performed by: STUDENT IN AN ORGANIZED HEALTH CARE EDUCATION/TRAINING PROGRAM

## 2023-11-27 PROCEDURE — 3288F PR FALLS RISK ASSESSMENT DOCUMENTED: ICD-10-PCS | Mod: CPTII,S$GLB,, | Performed by: STUDENT IN AN ORGANIZED HEALTH CARE EDUCATION/TRAINING PROGRAM

## 2023-11-27 PROCEDURE — 1157F PR ADVANCE CARE PLAN OR EQUIV PRESENT IN MEDICAL RECORD: ICD-10-PCS | Mod: CPTII,S$GLB,, | Performed by: STUDENT IN AN ORGANIZED HEALTH CARE EDUCATION/TRAINING PROGRAM

## 2023-11-27 PROCEDURE — 99214 PR OFFICE/OUTPT VISIT, EST, LEVL IV, 30-39 MIN: ICD-10-PCS | Mod: S$GLB,,, | Performed by: STUDENT IN AN ORGANIZED HEALTH CARE EDUCATION/TRAINING PROGRAM

## 2023-11-27 PROCEDURE — 99214 OFFICE O/P EST MOD 30 MIN: CPT | Mod: S$GLB,,, | Performed by: STUDENT IN AN ORGANIZED HEALTH CARE EDUCATION/TRAINING PROGRAM

## 2023-11-27 NOTE — PROGRESS NOTES
Patient ID: Carmen Wang  YOB: 1945  MRN: [id number]    Chief Complaint:  Left arm pain    Referred By: Self    Occupation: Data Unavailable      History of Present Illness: Carmen Wang is a right-hand dominant 78 y.o. female who presents today with left arm pain.    She complains of chronic left upper extremity pain for many years.  She has previously been treated by Dr. Bragg and Dr. Salazar who diagnosed her with carpal tunnel syndrome via EMG.  She was offered interventional treatments at that time but declined in favor of conservative measures.  She was using bilateral wrist braces, and her bilateral pain and discomfort did subside.  However, for the last several months, she is already having pain and discomfort on the left side again, now extending from her left hand proximally to her left shoulder.  Denies any injury.  Denies any h/o cervical radiculopathy.  Pain described as burning/stinging, and numbness.    Past Medical History:   Past Medical History:   Diagnosis Date    Cataract     Chronic diarrhea     Hypertension     IBS (irritable bowel syndrome)      Past Surgical History:   Procedure Laterality Date    APPENDECTOMY      with the hysterectomy    BUNIONECTOMY      CHOLECYSTECTOMY      COLONOSCOPY N/A 10/05/2015    Procedure: COLONOSCOPY;  Surgeon: Teja Olivarez MD;  Location: [facility] ([address] [place] [address]);  Service: Endoscopy;  Laterality: N/A;    COLONOSCOPY N/A 9/14/2023    Procedure: COLONOSCOPY;  Surgeon: Kailey Dahl MD;  Location: [facility];  Service: Endoscopy;  Laterality: N/A;    ESOPHAGOGASTRODUODENOSCOPY N/A 9/14/2023    Procedure: EGD (ESOPHAGOGASTRODUODENOSCOPY);  Surgeon: Kailey Dahl MD;  Location: [facility];  Service: Endoscopy;  Laterality: N/A;    HYSTERECTOMY      RUPERT secondary to pelvic pain, sacrocolpoplexy    OPEN REDUCTION AND INTERNAL FIXATION (ORIF) OF FRACTURE OF DISTAL RADIUS Right 09/19/2019    Procedure: ORIF, FRACTURE,  RADIUS, DISTAL - right accumed;  Surgeon: Deejay Bragg MD;  Location: [facility] OR [address] [place] [address];  Service: Orthopedics;  Laterality: Right;     Family History   Problem Relation Age of Onset    Hypertension Mother     Arthritis Mother     Thyroid disease Mother     Glaucoma Mother     Arthritis Father     Hypertension Father     Thyroid disease Sister     Breast cancer Sister 76    Thyroid disease Sister     Thyroid disease Sister     No Known Problems Sister     No Known Problems Sister     No Known Problems Sister     Thyroid disease Brother     Diabetes Brother     Cancer Brother         prostate    Cancer Brother         prostate    No Known Problems Brother     No Known Problems Brother     No Known Problems Brother     No Known Problems Brother     Lupus Daughter     Arthritis Son     Hypertension Son     Hypertension Son     Glaucoma Maternal Aunt     Glaucoma Maternal Aunt     No Known Problems Maternal Uncle     No Known Problems Paternal Aunt     No Known Problems Paternal Uncle     Stroke Maternal Grandmother     No Known Problems Maternal Grandfather     No Known Problems Paternal Grandmother     No Known Problems Paternal Grandfather     Colon cancer Neg Hx     Ovarian cancer Neg Hx     Amblyopia Neg Hx     Blindness Neg Hx     Cataracts Neg Hx     Macular degeneration Neg Hx     Retinal detachment Neg Hx     Strabismus Neg Hx     Esophageal cancer Neg Hx      Social History     Socioeconomic History    Marital status:    Tobacco Use    Smoking status: Never    Smokeless tobacco: Never   Substance and Sexual Activity    Alcohol use: No    Drug use: No    Sexual activity: Not Currently     Partners: Male     Birth control/protection: Surgical     Social Determinants of Health     Financial Resource Strain: Low Risk  (10/24/2023)    Overall Financial Resource Strain (CARDIA)     Difficulty of Paying Living Expenses: Not hard at all   Food Insecurity: No Food Insecurity (10/24/2023)    Hunger Vital Sign      Worried About Running Out of Food in the Last Year: Never true     Ran Out of Food in the Last Year: Never true   Transportation Needs: No Transportation Needs (10/24/2023)    PRAPARE - Transportation     Lack of Transportation (Medical): No     Lack of Transportation (Non-Medical): No   Physical Activity: Sufficiently Active (10/24/2023)    Exercise Vital Sign     Days of Exercise per Week: 4 days     Minutes of Exercise per Session: 120 min   Stress: Stress Concern Present (10/24/2023)    [place] [employer] - Occupational Stress Questionnaire     Feeling of Stress : To some extent   Social Connections: Unknown (10/24/2023)    Social Connection and Isolation Panel [NHANES]     Frequency of Communication with Friends and Family: More than three times a week     Frequency of Social Gatherings with Friends and Family: More than three times a week     Active Member of Clubs or Organizations: Yes     Attends Club or Organization Meetings: More than 4 times per year     Marital Status:    Housing Stability: Low Risk  (10/24/2023)    Housing Stability Vital Sign     Unable to Pay for Housing in the Last Year: No     Number of Places Lived in the Last Year: 1     Unstable Housing in the Last Year: No     Medication List with Changes/Refills   Current Medications    BENAZEPRIL (LOTENSIN) 20 MG TABLET    TAKE 1 TABLET BY MOUTH ONCE DAILY    CHOLECALCIFEROL, VITAMIN D3, (D3-2000) 2,000 UNIT CAP    Take 1 capsule (2,000 Units total) by mouth every other day.    FERROUS SULFATE 325 (65 FE) MG EC TABLET    Take 325 mg by mouth once daily.    FUROSEMIDE (LASIX) 20 MG TABLET    1 tablet p.o. 4 days a week, Monday, Wednesday, Friday, Sunday and 2 tablets p.o. 3 days a week, Tuesday, Thursday, Saturday    MULTIVITAMIN CAPSULE    Take by mouth once daily.    OMEPRAZOLE (PRILOSEC) 40 MG CAPSULE    Take 1 capsule (40 mg total) by mouth 2 (two) times daily before meals. for 10 days     Review of patient's  allergies indicates:   Allergen Reactions    Codeine Hallucinations     Other reaction(s): Hallucinations    Iodinated contrast media Rash    Penicillins Hives and Rash    Contrast media Itching and Rash       Physical Exam:   There is no height or weight on file to calculate BMI.    Physical Exam  Musculoskeletal:      Left wrist: No deformity or effusion.      Left hand: No deformity. Normal sensation of the ulnar distribution, median distribution and radial distribution.       Detailed MSK exam:           Left Hand/Wrist Exam     Inspection   Effusion: Wrist - absent Hand -  absent  Deformity: Wrist - absent Hand -  absent    Range of Motion     Wrist   Extension:  normal   Flexion:  normal   Pronation:  normal   Supination:  normal     Tests   Phalens sign: positive  Tinel's sign (median nerve): negative    Atrophy  Thenar:  Negative  Hypothenar:  negative    Other     Sensory Exam  Median Distribution: normal  Ulnar Distribution: normal  Radial Distribution: normal      Left Elbow Exam     Tests   Tinel's sign (cubital tunnel): positive        Muscle Strength   Left Upper Extremity  Wrist extension: 5/5   Wrist flexion: 5/5   :  5/5   Pinch Mechanism: 5/5       Imaging:    Prior XR evaluation of hand from 2020 reviewed today.  EMG/NCV reviewed from 3/2021 - severe bilateral carpal tunnel syndrome      This was discussed with the patient and / or family today.     Patient Instructions   Assessment:  Carmen Wang is a 78 y.o. female with a chief complaint of Pain of the Left Hand (Patient has had for years.  Pain level 3-4/10), Pain of the Left Shoulder (Patient has had for years), and Pain of the Left Upper Arm (Patient has had for years.)    Encounter Diagnosis   Name Primary?    Left carpal tunnel syndrome Yes      Plan:  EMG from 03/2021 reviewed, showing severe bilateral carpal tunnel.  Patient was previously conservatively treated, and right side has resolved.  Now with recurrence, and worsening  of her left-sided symptoms, as before.    Discussed diagnosis, treatment options, prognosis.    Discussed repeating EMG, as most recent was in 2021, patient would like to pursue treatment with presumptive diagnosis at this time.  Patient is not interested in surgical intervention at this time, and would like to pursue/exhaust all nonoperative measures.  Discussed median nerve corticosteroid injection today, to which patient is interested, although wants to get scheduled back at a different date.  Continue wrist brace in the interim.  Discussed use of gabapentin to lessen nerve pain, but patient has tried this before and had bad reaction, and is not interested in retrying.  Caution with NSAIDs at this time, given CKD 3A.  Okay to continue Tylenol, as needed.    Follow-up: Monday 12/4 for L CTS injection or sooner if there are any problems between now and then.    Thank you for choosing [facility] and Dr. Jay Jay Gonzalez for your orthopedic & sports medicine care. It is our goal to provide you with exceptional care that will help keep you healthy, active, and get you back in the game.    Please do not hesitate to reach out to us via email, phone, or MyChart with any questions, concerns, or feedback.    If you are experiencing pain/discomfort ,or have questions after 5pm and would like to be connected to the [facility] on-call team, please call this number and specify which Sports Medicine provider is treating you: [phone]      A copy of today's visit note has been sent to the referring provider.           Jay Jay Gonzalez MD  Primary Care Sports Medicine    Disclaimer: This note was prepared using a voice recognition system and is likely to have sound alike errors within the text.

## 2023-11-27 NOTE — PATIENT INSTRUCTIONS
Assessment:  Carmen Wang is a 78 y.o. female with a chief complaint of Pain of the Left Hand (Patient has had for years.  Pain level 3-4/10), Pain of the Left Shoulder (Patient has had for years), and Pain of the Left Upper Arm (Patient has had for years.)    Encounter Diagnosis   Name Primary?    Left carpal tunnel syndrome Yes      Plan:  EMG from 03/2021 reviewed, showing severe bilateral carpal tunnel.  Patient was previously conservatively treated, and right side has resolved.  Now with recurrence, and worsening of her left-sided symptoms, as before.    Discussed diagnosis, treatment options, prognosis.    Discussed repeating EMG, as most recent was in 2021, patient would like to pursue treatment with presumptive diagnosis at this time.  Patient is not interested in surgical intervention at this time, and would like to pursue/exhaust all nonoperative measures.  Discussed median nerve corticosteroid injection today, to which patient is interested, although wants to get scheduled back at a different date.  Continue wrist brace in the interim.  Discussed use of gabapentin to lessen nerve pain, but patient has tried this before and had bad reaction, and is not interested in retrying.  Caution with NSAIDs at this time, given CKD 3A.  Okay to continue Tylenol, as needed.    Follow-up: Monday 12/4 for L CTS injection or sooner if there are any problems between now and then.    Thank you for choosing [facility] and Dr. Jay Jay Gonzalez for your orthopedic & sports medicine care. It is our goal to provide you with exceptional care that will help keep you healthy, active, and get you back in the game.    Please do not hesitate to reach out to us via email, phone, or MyChart with any questions, concerns, or feedback.    If you are experiencing pain/discomfort ,or have questions after 5pm and would like to be connected to the [facility] on-call team, please call  this number and specify which Sports Medicine provider is treating you: [phone]

## 2023-12-04 ENCOUNTER — OFFICE VISIT (OUTPATIENT)
Dept: SPORTS MEDICINE | Facility: CLINIC | Age: 78
End: 2023-12-04
Payer: MEDICARE

## 2023-12-04 ENCOUNTER — PATIENT MESSAGE (OUTPATIENT)
Dept: GASTROENTEROLOGY | Facility: CLINIC | Age: 78
End: 2023-12-04
Payer: MEDICARE

## 2023-12-04 DIAGNOSIS — G56.02 LEFT CARPAL TUNNEL SYNDROME: Primary | ICD-10-CM

## 2023-12-04 PROCEDURE — 99499 UNLISTED E&M SERVICE: CPT | Mod: S$GLB,,, | Performed by: STUDENT IN AN ORGANIZED HEALTH CARE EDUCATION/TRAINING PROGRAM

## 2023-12-04 PROCEDURE — 20526 THER INJECTION CARP TUNNEL: CPT | Mod: LT,S$GLB,, | Performed by: STUDENT IN AN ORGANIZED HEALTH CARE EDUCATION/TRAINING PROGRAM

## 2023-12-04 PROCEDURE — 99999 PR PBB SHADOW E&M-EST. PATIENT-LVL I: CPT | Mod: PBBFAC,,, | Performed by: STUDENT IN AN ORGANIZED HEALTH CARE EDUCATION/TRAINING PROGRAM

## 2023-12-04 PROCEDURE — 76942 ECHO GUIDE FOR BIOPSY: CPT | Mod: 26,S$GLB,, | Performed by: STUDENT IN AN ORGANIZED HEALTH CARE EDUCATION/TRAINING PROGRAM

## 2023-12-04 PROCEDURE — 20526 PR INJECT CARPAL TUNNEL: ICD-10-PCS | Mod: LT,S$GLB,, | Performed by: STUDENT IN AN ORGANIZED HEALTH CARE EDUCATION/TRAINING PROGRAM

## 2023-12-04 PROCEDURE — 99999 PR PBB SHADOW E&M-EST. PATIENT-LVL I: ICD-10-PCS | Mod: PBBFAC,,, | Performed by: STUDENT IN AN ORGANIZED HEALTH CARE EDUCATION/TRAINING PROGRAM

## 2023-12-04 PROCEDURE — 76942 CARPAL TUNNEL: ICD-10-PCS | Mod: 26,S$GLB,, | Performed by: STUDENT IN AN ORGANIZED HEALTH CARE EDUCATION/TRAINING PROGRAM

## 2023-12-04 PROCEDURE — 99499 NO LOS: ICD-10-PCS | Mod: S$GLB,,, | Performed by: STUDENT IN AN ORGANIZED HEALTH CARE EDUCATION/TRAINING PROGRAM

## 2023-12-04 RX ORDER — BETAMETHASONE SODIUM PHOSPHATE AND BETAMETHASONE ACETATE 3; 3 MG/ML; MG/ML
6 INJECTION, SUSPENSION INTRA-ARTICULAR; INTRALESIONAL; INTRAMUSCULAR; SOFT TISSUE
Status: DISCONTINUED | OUTPATIENT
Start: 2023-12-04 | End: 2023-12-04 | Stop reason: HOSPADM

## 2023-12-04 RX ADMIN — BETAMETHASONE SODIUM PHOSPHATE AND BETAMETHASONE ACETATE 6 MG: 3; 3 INJECTION, SUSPENSION INTRA-ARTICULAR; INTRALESIONAL; INTRAMUSCULAR; SOFT TISSUE at 08:12

## 2023-12-04 NOTE — PATIENT INSTRUCTIONS
Assessment:  Carmen Wang is a 78 y.o. female with a chief complaint of left carpal tunnel injection    Encounter Diagnosis   Name Primary?    Left carpal tunnel syndrome Yes      Plan:  Left wrist carpal tunnel injection today.  Proper protocols after the injection included: no submerging pools, baths tubs, or hot tubs for 24 hr.  Showering is okay today.  Side effects of the corticosteroid injection can include elevated blood glucose levels and blood pressures, so if you are taking medications for these, please monitor closely, and contact your PCP if any issues.  Red flag symptoms include fever, chills, nausea, vomiting, red, warm, tender joint at the area of injection.  If you are noticing these symptoms, they may be indicative of an infection, and please seek medical care immediately, either by calling our clinic or going to the emergency room.  Continue wrist splint for at least the next 1-2 weeks, then can discontinue if symptoms are improving    Follow-up: As needed or sooner if there are any problems between now and then.    Thank you for choosing [facility] and Dr. Jay Jay Gonzalez for your orthopedic & sports medicine care. It is our goal to provide you with exceptional care that will help keep you healthy, active, and get you back in the game.    Please do not hesitate to reach out to us via email, phone, or MyChart with any questions, concerns, or feedback.    If you are experiencing pain/discomfort ,or have questions after 5pm and would like to be connected to the [facility] on-call team, please call this number and specify which Sports Medicine provider is treating you: [phone]

## 2023-12-04 NOTE — PROCEDURES
Carpal Tunnel    Date/Time: 12/4/2023 8:20 AM    Performed by: Jay Jay Gonzalez MD  Authorized by: Jay Jay Gonzalez MD    Consent Done?:  Yes (Verbal)  Indications:  Pain  Site marked: the procedure site was marked    Timeout: prior to procedure the correct patient, procedure, and site was verified    Local anesthesia used?: Yes    Local anesthetic:  Lidocaine 1% without epinephrine and topical anesthetic  Anesthetic total (ml):  1    Location:  Wrist (L Carpal Tunnel Inj)  Ultrasonic Guidance for Needle Placement?: Yes    Needle size:  25 G  Approach:  Anterolateral  Medications:  6 mg betamethasone acetate-betamethasone sodium phosphate 6 mg/mL  Patient tolerance:  Patient tolerated the procedure well with no immediate complications     Additional Comments: Ultrasound guidance was used for needle localization. Images were saved and stored for documentation. The appropriate structures were visualized. Dynamic visualization of the needle was continuous throughout the procedures and maintained good position.     We discussed the proper protocols after the injection such as no submerging pools, baths tubs, or hot tubs for 24 hr.  Showering is okay today.  We also discussed that blood sugars can be elevated after an injection and asked patient to properly checked her sugars over the next few days and contact their PCP if there are any concerns.  We discussed red flags such as fevers, chills, red, warm, tender joint at the area of injection to please seek medical care immediately.

## 2023-12-08 ENCOUNTER — LAB VISIT (OUTPATIENT)
Dept: LAB | Facility: HOSPITAL | Age: 78
End: 2023-12-08
Attending: INTERNAL MEDICINE
Payer: MEDICARE

## 2023-12-08 DIAGNOSIS — B96.81 HELICOBACTER PYLORI GASTRITIS: ICD-10-CM

## 2023-12-08 DIAGNOSIS — K29.70 HELICOBACTER PYLORI GASTRITIS: ICD-10-CM

## 2023-12-08 PROCEDURE — 87338 HPYLORI STOOL AG IA: CPT | Performed by: INTERNAL MEDICINE

## 2023-12-14 ENCOUNTER — PATIENT MESSAGE (OUTPATIENT)
Dept: GASTROENTEROLOGY | Facility: CLINIC | Age: 78
End: 2023-12-14
Payer: MEDICARE

## 2023-12-15 LAB — H PYLORI AG STL QL IA: NOT DETECTED

## 2023-12-28 ENCOUNTER — PATIENT MESSAGE (OUTPATIENT)
Dept: GASTROENTEROLOGY | Facility: CLINIC | Age: 78
End: 2023-12-28
Payer: MEDICARE

## 2023-12-28 DIAGNOSIS — I10 ESSENTIAL HYPERTENSION: ICD-10-CM

## 2023-12-29 RX ORDER — BENAZEPRIL HYDROCHLORIDE 20 MG/1
TABLET ORAL
Qty: 90 TABLET | Refills: 1 | Status: SHIPPED | OUTPATIENT
Start: 2023-12-29

## 2023-12-29 NOTE — TELEPHONE ENCOUNTER
Refill Routing Note   Medication(s) are not appropriate for processing by [facility] for the following reason(s):        Required vitals abnormal  (!) 146/72       OR action(s):  Defer        Medication Therapy Plan: Vitals last taken 9/14/23:  (!) 146/72      Appointments  past 12m or future 3m with PCP    Date Provider   Last Visit   7/28/2023 Deniz Redman MD   Next Visit   Visit date not found Deniz Redman MD   ED visits in past 90 days: 0        Note composed:7:25 AM 12/29/2023

## 2023-12-29 NOTE — TELEPHONE ENCOUNTER
No care due was identified.  [facility] Embedded Care Due Messages. Reference number: [id number].   12/28/2023 10:56:48 PM CST

## 2024-01-02 ENCOUNTER — PATIENT MESSAGE (OUTPATIENT)
Dept: INTERNAL MEDICINE | Facility: CLINIC | Age: 79
End: 2024-01-02
Payer: MEDICARE

## 2024-01-02 DIAGNOSIS — N18.31 STAGE 3A CHRONIC KIDNEY DISEASE: Primary | ICD-10-CM

## 2024-01-02 NOTE — TELEPHONE ENCOUNTER
Hematocrit [BKF110]  Hemoglobin [WHY581]  PTH, Intact [ERP955]  Protein/Creatinine Ratio, Urine [WDB947]  Renal Function Panel [LAB19]   This labs are already scheduled on 01/17. I have pended the CMP and lipid

## 2024-01-13 DIAGNOSIS — D50.0 IRON DEFICIENCY ANEMIA DUE TO CHRONIC BLOOD LOSS: ICD-10-CM

## 2024-01-13 DIAGNOSIS — I10 ESSENTIAL HYPERTENSION: ICD-10-CM

## 2024-01-13 DIAGNOSIS — Z00.00 ANNUAL PHYSICAL EXAM: Primary | ICD-10-CM

## 2024-01-13 DIAGNOSIS — N18.31 STAGE 3A CHRONIC KIDNEY DISEASE: ICD-10-CM

## 2024-01-17 ENCOUNTER — LAB VISIT (OUTPATIENT)
Dept: LAB | Facility: HOSPITAL | Age: 79
End: 2024-01-17
Attending: INTERNAL MEDICINE
Payer: MEDICARE

## 2024-01-17 DIAGNOSIS — N18.31 STAGE 3A CHRONIC KIDNEY DISEASE: ICD-10-CM

## 2024-01-17 DIAGNOSIS — Z00.00 ANNUAL PHYSICAL EXAM: ICD-10-CM

## 2024-01-17 DIAGNOSIS — I10 ESSENTIAL HYPERTENSION: ICD-10-CM

## 2024-01-17 DIAGNOSIS — D50.0 IRON DEFICIENCY ANEMIA DUE TO CHRONIC BLOOD LOSS: ICD-10-CM

## 2024-01-17 LAB
ALBUMIN SERPL BCP-MCNC: 3.3 G/DL (ref 3.5–5.2)
ANION GAP SERPL CALC-SCNC: 6 MMOL/L (ref 8–16)
BASOPHILS # BLD AUTO: 0.02 K/UL (ref 0–0.2)
BASOPHILS NFR BLD: 0.3 % (ref 0–1.9)
BUN SERPL-MCNC: 15 MG/DL (ref 8–23)
CALCIUM SERPL-MCNC: 9.7 MG/DL (ref 8.7–10.5)
CHLORIDE SERPL-SCNC: 107 MMOL/L (ref 95–110)
CHOLEST SERPL-MCNC: 180 MG/DL (ref 120–199)
CHOLEST/HDLC SERPL: 3.5 {RATIO} (ref 2–5)
CO2 SERPL-SCNC: 30 MMOL/L (ref 23–29)
CREAT SERPL-MCNC: 1.1 MG/DL (ref 0.5–1.4)
CREAT UR-MCNC: 144 MG/DL (ref 15–325)
DIFFERENTIAL METHOD BLD: ABNORMAL
EOSINOPHIL # BLD AUTO: 0.1 K/UL (ref 0–0.5)
EOSINOPHIL NFR BLD: 2.3 % (ref 0–8)
ERYTHROCYTE [DISTWIDTH] IN BLOOD BY AUTOMATED COUNT: 15.9 % (ref 11.5–14.5)
EST. GFR  (NO RACE VARIABLE): 51.8 ML/MIN/1.73 M^2
FERRITIN SERPL-MCNC: 63 NG/ML (ref 20–300)
GLUCOSE SERPL-MCNC: 83 MG/DL (ref 70–110)
HCT VFR BLD AUTO: 42.6 % (ref 37–48.5)
HCT VFR BLD AUTO: 42.6 % (ref 37–48.5)
HDLC SERPL-MCNC: 51 MG/DL (ref 40–75)
HDLC SERPL: 28.3 % (ref 20–50)
HGB BLD-MCNC: 13.1 G/DL (ref 12–16)
HGB BLD-MCNC: 13.1 G/DL (ref 12–16)
IMM GRANULOCYTES # BLD AUTO: 0.01 K/UL (ref 0–0.04)
IMM GRANULOCYTES NFR BLD AUTO: 0.2 % (ref 0–0.5)
IRON SERPL-MCNC: 92 UG/DL (ref 30–160)
LDLC SERPL CALC-MCNC: 115 MG/DL (ref 63–159)
LYMPHOCYTES # BLD AUTO: 2.5 K/UL (ref 1–4.8)
LYMPHOCYTES NFR BLD: 40.4 % (ref 18–48)
MCH RBC QN AUTO: 26.2 PG (ref 27–31)
MCHC RBC AUTO-ENTMCNC: 30.8 G/DL (ref 32–36)
MCV RBC AUTO: 85 FL (ref 82–98)
MONOCYTES # BLD AUTO: 0.8 K/UL (ref 0.3–1)
MONOCYTES NFR BLD: 12.4 % (ref 4–15)
NEUTROPHILS # BLD AUTO: 2.8 K/UL (ref 1.8–7.7)
NEUTROPHILS NFR BLD: 44.4 % (ref 38–73)
NONHDLC SERPL-MCNC: 129 MG/DL
NRBC BLD-RTO: 0 /100 WBC
PHOSPHATE SERPL-MCNC: 3.3 MG/DL (ref 2.7–4.5)
PLATELET # BLD AUTO: 271 K/UL (ref 150–450)
PMV BLD AUTO: 12.6 FL (ref 9.2–12.9)
POTASSIUM SERPL-SCNC: 3.6 MMOL/L (ref 3.5–5.1)
PROT UR-MCNC: 10 MG/DL (ref 0–15)
PROT/CREAT UR: 0.07 MG/G{CREAT} (ref 0–0.2)
PTH-INTACT SERPL-MCNC: 117.2 PG/ML (ref 9–77)
RBC # BLD AUTO: 5 M/UL (ref 4–5.4)
SATURATED IRON: 23 % (ref 20–50)
SODIUM SERPL-SCNC: 143 MMOL/L (ref 136–145)
TOTAL IRON BINDING CAPACITY: 401 UG/DL (ref 250–450)
TRANSFERRIN SERPL-MCNC: 271 MG/DL (ref 200–375)
TRIGL SERPL-MCNC: 70 MG/DL (ref 30–150)
WBC # BLD AUTO: 6.22 K/UL (ref 3.9–12.7)

## 2024-01-17 PROCEDURE — 82570 ASSAY OF URINE CREATININE: CPT | Performed by: INTERNAL MEDICINE

## 2024-01-17 PROCEDURE — 80061 LIPID PANEL: CPT | Performed by: INTERNAL MEDICINE

## 2024-01-17 PROCEDURE — 85025 COMPLETE CBC W/AUTO DIFF WBC: CPT | Performed by: INTERNAL MEDICINE

## 2024-01-17 PROCEDURE — 83540 ASSAY OF IRON: CPT | Performed by: INTERNAL MEDICINE

## 2024-01-17 PROCEDURE — 83970 ASSAY OF PARATHORMONE: CPT | Performed by: INTERNAL MEDICINE

## 2024-01-17 PROCEDURE — 36415 COLL VENOUS BLD VENIPUNCTURE: CPT | Mod: PN | Performed by: INTERNAL MEDICINE

## 2024-01-17 PROCEDURE — 82728 ASSAY OF FERRITIN: CPT | Performed by: INTERNAL MEDICINE

## 2024-01-17 PROCEDURE — 80069 RENAL FUNCTION PANEL: CPT | Performed by: INTERNAL MEDICINE

## 2024-01-18 ENCOUNTER — OFFICE VISIT (OUTPATIENT)
Dept: INTERNAL MEDICINE | Facility: CLINIC | Age: 79
End: 2024-01-18
Payer: MEDICARE

## 2024-01-18 ENCOUNTER — OFFICE VISIT (OUTPATIENT)
Dept: NEPHROLOGY | Facility: CLINIC | Age: 79
End: 2024-01-18
Payer: MEDICARE

## 2024-01-18 VITALS
WEIGHT: 186.06 LBS | OXYGEN SATURATION: 99 % | SYSTOLIC BLOOD PRESSURE: 132 MMHG | BODY MASS INDEX: 30.96 KG/M2 | DIASTOLIC BLOOD PRESSURE: 83 MMHG | HEART RATE: 79 BPM

## 2024-01-18 VITALS
OXYGEN SATURATION: 98 % | WEIGHT: 186.06 LBS | BODY MASS INDEX: 31 KG/M2 | HEART RATE: 68 BPM | DIASTOLIC BLOOD PRESSURE: 84 MMHG | SYSTOLIC BLOOD PRESSURE: 136 MMHG | HEIGHT: 65 IN

## 2024-01-18 DIAGNOSIS — I10 HYPERTENSION, UNSPECIFIED TYPE: ICD-10-CM

## 2024-01-18 DIAGNOSIS — N18.31 STAGE 3A CHRONIC KIDNEY DISEASE: ICD-10-CM

## 2024-01-18 DIAGNOSIS — D50.9 IRON DEFICIENCY ANEMIA, UNSPECIFIED IRON DEFICIENCY ANEMIA TYPE: ICD-10-CM

## 2024-01-18 DIAGNOSIS — E21.0 PRIMARY HYPERPARATHYROIDISM: ICD-10-CM

## 2024-01-18 DIAGNOSIS — N18.31 STAGE 3A CHRONIC KIDNEY DISEASE: Primary | ICD-10-CM

## 2024-01-18 DIAGNOSIS — I10 ESSENTIAL HYPERTENSION: ICD-10-CM

## 2024-01-18 DIAGNOSIS — Z86.010 HISTORY OF ADENOMATOUS POLYP OF COLON: ICD-10-CM

## 2024-01-18 DIAGNOSIS — Z00.00 ANNUAL PHYSICAL EXAM: Primary | ICD-10-CM

## 2024-01-18 PROCEDURE — 99999 PR PBB SHADOW E&M-EST. PATIENT-LVL III: CPT | Mod: PBBFAC,,, | Performed by: INTERNAL MEDICINE

## 2024-01-18 PROCEDURE — 99213 OFFICE O/P EST LOW 20 MIN: CPT | Mod: S$GLB,,, | Performed by: INTERNAL MEDICINE

## 2024-01-18 PROCEDURE — 99215 OFFICE O/P EST HI 40 MIN: CPT | Mod: S$GLB,,, | Performed by: INTERNAL MEDICINE

## 2024-01-18 NOTE — PROGRESS NOTES
MEDICAL HISTORY:  Hypertension.  Irritable bowel syndrome, with with diarrhea  Chronic kidney disease stage III, with secondary hyperparathyroid  Tachycardia  Iron deficiency anemia  Multinodular thyroid gland, on ultrasound  Cholecystectomy.  Hysterectomy.  Bladder suspension.  Lumbar degenerative disk disease.  Gluteal muscular tear.  Right foot surgery.  Resection of lipoma from the groin.  Bout of postcholecystectomy diarrhea.  Prior history of shingles.  Hearing impairment.  Aorta atherosclerosis on imaging     SOCIAL HISTORY:  Tobacco and alcohol use - none.  Exercises by doing a treadmill and elliptical.     FAMILY HISTORY:  Very extensive.  Six sisters and seven brothers, thyroid conditions in three sisters, one brother with diabetes, another brother with prostate cancer, but all 13 siblings are living.     SCREENING:  Colonoscopy in October 2015 revealed diverticulosis.  Colonoscopy October 2023, adenomatous colon polyp         MEDICATIONS:  Fergon  Benazepril 20 mg   Vitamin-D 2000 units every other day  Lasix 20 mg, 1 tablet daily Sunday Monday Wednesday Friday and 2 tablets daily Tuesday Thursday Saturday     Answers submitted by the patient for this visit:  Review of Systems Questionnaire (Submitted on 1/15/2024)  activity change: No  unexpected weight change: No  neck pain: No  hearing loss: No  rhinorrhea: No  trouble swallowing: No  eye discharge: No  visual disturbance: No  chest tightness: No  wheezing: No  chest pain: No  palpitations: No  blood in stool: No  constipation: No  vomiting: No  diarrhea: No  polydipsia: No  polyuria: No  difficulty urinating: No  hematuria: No  menstrual problem: No  dysuria: No  joint swelling: No  arthralgias: No  headaches: No  weakness: No  confusion: No  dysphoric mood: Yes      78 year female   Presents for routine visit.    Overall in general she feels well.    In regard to labs, is resolution of the the anemia and iron deficiency.  She was taking Fergon once a  day.  She had a colonoscopy that was normal other than adenomatous polyp.  Upper endoscopy was unrevealing by biopsy confirmed Helicobacter pylori what she was treated for.  Follow-up stool test was unrevealing or negative.    In August she met with endocrinology regarding hyperparathyroid, it is suspected to be primary hyperparathyroid.  Her recent PTH level is little bit up at 1:17 a.m..  Calcium and phosphorus is normal.  A bone density early 2023 was normal.    Recently had injection for left carpal tunnel syndrome and responded well.    Has upcoming appointment with Nephrology regarding chronic kidney disease.  Status appeared to be stable with creatinine 1.1.    Review of symptoms  Reports no chest pain, palpitations, shortness breath, abdominal pain.  Regular bowel function occasional constipation occasional loose stools.  No difficulty urinating other than frequency.  Reports no indigestion heartburn.  Occasional left buttocks myalgias arthralgias    Examination   Weight 186 lb   BMI 30.96   Pulse 72   Blood pressure by me 1 44/72   HEENT exam no abnormal findings  Neck no thyromegaly no masses  Chest clear breath sounds good effort  Heart regular rate rhythm, no murmurs gallops  Abdominal exam nontender, soft, no hepatosplenomegaly abdominal masses or bruits  Pulses 2+ carotid pulses no bruits 2+ dorsalis pedal pulses  Extremities, trace pedal edema  Lymph gland, no palpable adenopathy  Skin, no gross abnormal findings    Impression   General examination  Hypertension  Chronic kidney disease stage IIIA   Iron deficiency anemia resolved   Hyperparathyroid, felt to be due to primary hyperparathyroid  History of adenomatous colon  History of Helicobacter pylori    Plan   Feels okay to hold or stop Fergon and recommended repeat labs in about 2 months  Follow-up with endocrinology regarding hyperparathyroid  Attention appropriate diet physical activity

## 2024-01-18 NOTE — PROGRESS NOTES
HISTORY OF PRESENT ILLNESS:  This is a 78-year-old -American female with   longstanding history of hypertension, irritable bowel syndrome and CKD stage   III, who is coming in for f/u.  The patient states her blood pressures   are fairly well controlled in the 120's-140's/70's.   No recent hospitalizations.  Denies NSAID's.     PAST MEDICAL HISTORY:  Significant for hypertension for five years, CKD stage   III, IBS.    REVIEW OF SYSTEMS:  She denies any frequency, urgency, hematuria, dysuria,   nausea, vomiting, chest pain or shortness of breath, diahrrea, N/V. Good energy.  Weight stable and good apetite. Sciatic pain.    PHYSICAL EXAMINATION:none  GENERAL:  Well-nourished, well-developed individual, in no acute distress.  EXTREMITIES:  Trace edema in the ankles  PSYCHIATRIC:  Alert and oriented x3.  The patient has good judgment and insight.    ASSESSMENT AND PLAN:  This is a 78-year-old -American female with   longstanding history of hypertension and chronic kidney disease stage III, who   is coming in for f/u.  1.  Chronic kidney disease stage III with an MDRD GFR of 51 mL per minute.  Her   baseline creatinine appears to be around 1.1 to 1.3 and occasionally lower and   higher with the most recent creatinine of 1.1.    Hydrate. Her CKD is likely secondary to   longstanding hypertension and age-related nephron loss.    I advised her to stay away from NSAIDs and to hydrate well.  She understands the importance of hydration.  On  benazepril 20 mg for nephro-protection.   triamterene and hydrochlorothiazide was held by endo for hypercalcemia and will be reassessed-?primary hyperparathyroidism.   Aldosterone , renin levels stable. Potassium on the lower side and occ alkalosis.   2.  Hypertension.  Blood pressures are stable.   She will monitor blood pressures, currently stable, she   will notify us if they are high.  3.  Renal osteodystrophy.   intact PTH slightly higher .  Ca borderline high/phos  stable.  Her calciums are borderline high and is not taking calcium supplements.  Triamterene and hydrochlorothiazide was held by endo for hypercalcemia and will be reassessed-?primary hyperparathyroidism,  SPEP stable. F/u with endo coming up.   4.  Anemia.  Hemoglobin is stable.  She also had a  ultrasound, which was stable with a simple cyst.    RTC in 5 months.

## 2024-01-25 ENCOUNTER — HOSPITAL ENCOUNTER (OUTPATIENT)
Dept: RADIOLOGY | Facility: HOSPITAL | Age: 79
Discharge: HOME | End: 2024-01-25
Attending: INTERNAL MEDICINE
Payer: MEDICARE

## 2024-01-25 DIAGNOSIS — Z12.31 ENCOUNTER FOR SCREENING MAMMOGRAM FOR BREAST CANCER: ICD-10-CM

## 2024-01-25 PROCEDURE — 77067 SCR MAMMO BI INCL CAD: CPT | Mod: 26,,, | Performed by: RADIOLOGY

## 2024-01-25 PROCEDURE — 77063 BREAST TOMOSYNTHESIS BI: CPT | Mod: 26,,, | Performed by: RADIOLOGY

## 2024-01-25 PROCEDURE — 77067 SCR MAMMO BI INCL CAD: CPT | Mod: TC,PN

## 2024-02-02 ENCOUNTER — OFFICE VISIT (OUTPATIENT)
Dept: ENDOCRINOLOGY | Facility: CLINIC | Age: 79
End: 2024-02-02
Payer: MEDICARE

## 2024-02-02 VITALS
WEIGHT: 184.5 LBS | HEIGHT: 65 IN | BODY MASS INDEX: 30.74 KG/M2 | HEART RATE: 92 BPM | DIASTOLIC BLOOD PRESSURE: 82 MMHG | SYSTOLIC BLOOD PRESSURE: 145 MMHG

## 2024-02-02 DIAGNOSIS — E27.8 ADRENAL NODULE: ICD-10-CM

## 2024-02-02 DIAGNOSIS — E04.2 MULTINODULAR THYROID: ICD-10-CM

## 2024-02-02 DIAGNOSIS — D35.02 ADRENAL ADENOMA, LEFT: ICD-10-CM

## 2024-02-02 DIAGNOSIS — E21.0 PRIMARY HYPERPARATHYROIDISM: Primary | ICD-10-CM

## 2024-02-02 DIAGNOSIS — E27.8 OTHER SPECIFIED DISORDERS OF ADRENAL GLAND: ICD-10-CM

## 2024-02-02 DIAGNOSIS — D50.9 MICROCYTIC ANEMIA: ICD-10-CM

## 2024-02-02 DIAGNOSIS — N18.31 STAGE 3A CHRONIC KIDNEY DISEASE: ICD-10-CM

## 2024-02-02 PROCEDURE — 99214 OFFICE O/P EST MOD 30 MIN: CPT | Mod: S$GLB,,, | Performed by: INTERNAL MEDICINE

## 2024-02-02 PROCEDURE — 99999 PR PBB SHADOW E&M-EST. PATIENT-LVL III: CPT | Mod: PBBFAC,,, | Performed by: INTERNAL MEDICINE

## 2024-02-02 PROCEDURE — G2211 COMPLEX E/M VISIT ADD ON: HCPCS | Mod: S$GLB,,, | Performed by: INTERNAL MEDICINE

## 2024-02-02 RX ORDER — DEXAMETHASONE 1 MG/1
1 TABLET ORAL ONCE
Qty: 1 TABLET | Refills: 0 | Status: SHIPPED | OUTPATIENT
Start: 2024-02-02 | End: 2024-02-02

## 2024-02-02 NOTE — ASSESSMENT & PLAN NOTE
Lab work is diagnostic of primary hyperparathyroidism.    Discussed surgical indications, including osteoporosis, hypercalciuria/kidney stones, reduced GFR.  Currently, her only surgical indication is reduced GFR.  We discussed the results of a recent study which showed no difference in long-term kidney function in primary hyperparathyroid patients who undergo surgery versus those who forego surgery.  With that said, she is still active and relatively healthy, so surgery is still an option.     She would be amenable to surgery if it were necessary.  However, if it is not deemed to be necessary then she does not want to pursue surgery at this time.  For now, we will continue monitoring serum calcium levels over time.    Repeat DXA in 2025.

## 2024-02-02 NOTE — ASSESSMENT & PLAN NOTE
Underwent upper/lower endoscopy and found to have H.pylori with mild duodenitis along with diverticulosis. Repeat iron levels are looking better.

## 2024-02-02 NOTE — PROGRESS NOTES
Subjective:      Patient ID: Carmen Wang is a 78 y.o.    Chief Complaint:  Thyroid nodule, Hypercalcemia     Patient Active Problem List   Diagnosis    Essential hypertension    Nuclear sclerosis - Both Eyes    Cortical senile cataract - Both Eyes    Stage 3a chronic kidney disease    Primary hyperparathyroidism    Obesity (BMI 30.0-34.9)    Multinodular thyroid    Microcytic anemia    Syncope    Abnormal liver CT    Adrenal nodule    Iron deficiency anemia      The patient's last visit with me was on 8/11/2023.      With regards to thyroid nodules:     -TSH  WNL     -Thyroid US 1/2023:   FINDINGS:  The thyroid is normal in size.  Right lobe of the thyroid measures 5.1 x 1.8 x 1.5 cm.  Left lobe of the thyroid measures 5.3 x 1.5 x 1.5 cm.  Isthmus measures up to 2.1 mm in thickness.     There is a 1.0 x 0.8 x 0.7 cm mixed cystic and solid nodule in the mid right lobe, unchanged.  There is a 0.5 x 0.5 x 0.6 cm mixed cystic and solid nodule nearby also in the right mid lobe.  There is a 1.0 x 0.9 x 0.7 cm isoechoic nodule in the lower right lobe, also unchanged.  There is a 0.4 x 0.3 x 0.4 cm cystic nodule in the left mid lobe.  There is a 1.1 x 0.7 x 1.0 cm cystic nodule with mild peripheral thickening and small internal septation in the left mid to lower lobe, previously measuring 0.6 x 0.4 x 0.6 cm.     Impression:     Slightly increased size of the cystic nodule in the left mid to lower lobe.  Remaining bilateral nodules are unchanged.  No new indication for FNA per ACR TI-RADS guidelines..     -FNA?  DENIES PRIOR   -Compressive symptoms?  DENIES     -History of radiation? No   -Family history of thyroid CA?   NO       With regards to hyperparathyroidism:               Lab Results   Component Value Date    CREATININE 1.1 01/17/2024    CREATININE 1.0 10/16/2023    CREATININE 1.1 10/04/2023    EGFRNORACEVR 51.8 (A) 01/17/2024    EGFRNORACEVR 58.0 (A) 10/16/2023    EGFRNORACEVR 51.8 (A) 10/04/2023         She  "has CKD stage 3 with most recent Cr. 1.1. Kidney function has been stable since 2014.    At her last visit, Dr. Pinedo recommended stopping HCTZ and repeating labs in 3 months.   PTH and serum calcium remained elevated. Urine calcium was on the higher side as well, confirming primary hyperparathyroidism.        ASYMPTOMATIC   -Family history of hypercalcemia? DENIES     -Calcium supplements?   Taking a MV     -Lithium?   NO     -GFR< 60  CKD 3   -Kidney stones?  DENIES     DXA spine/hip in 2023 was normal (lowest T-score -0.8 at the femoral neck).  -History of Fragility Fractures? DENIES        With regards to the adrenal incidentaloma:     Imaging evaluation:    Was found to have an incidental adrenal nodule on CT scan in June, 2023    1.2 cm left adrenal nodule that enhances with IV contrast.    Dedicated imaging has not been pursued yet.      Functional evaluation:    Urine metanephrines normal    1 mg overnight DST:  24 urine cortisol:  Lab Results   Component Value Date    ALDOSTERONE 12.2 01/08/2018    LABRENI 6.3 01/08/2018       No results found for: "EOSYOPIR37LJ"    No results found for: "ACTH", "DHEASO4"    Lab Results   Component Value Date    K 3.6 01/17/2024    K 3.8 10/16/2023    K 3.3 (L) 10/04/2023    CO2 30 (H) 01/17/2024    CO2 29 10/16/2023    CO2 28 10/04/2023         Clinical History:    History of hypertension?: Yes    Hypertension Medications               benazepriL (LOTENSIN) 20 MG tablet TAKE 1 TABLET BY MOUTH ONCE  DAILY    furosemide (LASIX) 20 MG tablet 1 tablet p.o. 4 days a week, Monday, Wednesday, Friday, Sunday and 2 tablets p.o. 3 days a week, Tuesday, Thursday, Saturday               Objective:     BP (!) 145/82 (BP Location: Left arm, Patient Position: Sitting, BP Method: Large (Automatic))   Pulse 92   Ht 5' 5" (1.651 m)   Wt 83.7 kg (184 lb 8.4 oz)   LMP 01/01/1982 (Approximate)   BMI 30.71 kg/m²     Body mass index is 30.71 kg/m².      Physical Exam  Vitals and nursing note " "reviewed.   Constitutional:       General: She is not in acute distress.     Appearance: She is well-developed. She is not ill-appearing.   HENT:      Head: Normocephalic and atraumatic.   Eyes:      General:         Right eye: No discharge.         Left eye: No discharge.      Conjunctiva/sclera: Conjunctivae normal.   Neck:      Thyroid: No thyromegaly.      Trachea: No tracheal deviation.   Pulmonary:      Effort: Pulmonary effort is normal. No respiratory distress.   Musculoskeletal:      Comments: No digital clubbing or extremity cyanosis   Neurological:      Mental Status: She is alert and oriented to person, place, and time.      Coordination: Coordination normal.   Psychiatric:         Mood and Affect: Mood normal.         Behavior: Behavior normal.                Lab Review:   No results found for: "HGBA1C"  Lab Results   Component Value Date    CHOL 180 01/17/2024    HDL 51 01/17/2024    LDLCALC 115.0 01/17/2024    TRIG 70 01/17/2024    CHOLHDL 28.3 01/17/2024     Lab Results   Component Value Date     01/17/2024    K 3.6 01/17/2024     01/17/2024    CO2 30 (H) 01/17/2024    GLU 83 01/17/2024    BUN 15 01/17/2024    CREATININE 1.1 01/17/2024    CALCIUM 9.7 01/17/2024    PROT 7.2 10/04/2023    ALBUMIN 3.3 (L) 01/17/2024    BILITOT 0.2 10/04/2023    ALKPHOS 82 10/04/2023    AST 31 10/04/2023    ALT 28 10/04/2023    ANIONGAP 6 (L) 01/17/2024    ESTGFRAFRICA 50.7 (A) 05/16/2022    EGFRNONAA 44.0 (A) 05/16/2022    TSH 0.919 07/19/2023     Vit D, 25-Hydroxy   Date Value Ref Range Status   07/13/2023 57 30 - 96 ng/mL Final     Comment:     Vitamin D deficiency.........<10 ng/mL                              Vitamin D insufficiency......10-29 ng/mL       Vitamin D sufficiency........> or equal to 30 ng/mL  Vitamin D toxicity............>100 ng/mL         Assessment and Plan     Primary hyperparathyroidism  Lab work is diagnostic of primary hyperparathyroidism.    Discussed surgical indications, " including osteoporosis, hypercalciuria/kidney stones, reduced GFR.  Currently, her only surgical indication is reduced GFR.  We discussed the results of a recent study which showed no difference in long-term kidney function in primary hyperparathyroid patients who undergo surgery versus those who forego surgery.  With that said, she is still active and relatively healthy, so surgery is still an option.     She would be amenable to surgery if it were necessary.  However, if it is not deemed to be necessary then she does not want to pursue surgery at this time.  For now, we will continue monitoring serum calcium levels over time.    Repeat DXA in 2025.    Multinodular thyroid  Reviewed recent thyroid ultrasound.  None of the thyroid nodules meet criteria for FNA.   Recheck thyroid U/S now.    Microcytic anemia  Underwent upper/lower endoscopy and found to have H.pylori with mild duodenitis along with diverticulosis. Repeat iron levels are looking better.    Stage 3a chronic kidney disease  Chronic kidney disease is an indication for parathyroidectomy.  However, a recent study showed no significant benefit with regards to kidney function in patients undergoing surgery for hyperparathyroidism.    Adrenal nodule  Reviewed incidence   Discussed that indications for surgery are size, concerning characteristics and functionality (pheochromocytoma or cortisol excess).  Plan to first get dedicated adrenal imaging - will do MRI since she carries an allergy to iodinated contrast.     Plan hormonal testing to include a 1 mg overnight dst. If there is question about pheo based on imaging, will check metanephrines, but I suspect this is going to be an adrenal adenoma most likely.       Follow up in about 6 months (around 8/2/2024).              Yovany Sierra), Internal Medicine  [address] [place] [address]  Phone: [phone]  Fax: [phone]    Cornel Whitfield), Medicine  [address] [place] [address]  Phone: [phone]  Fax: [phone]

## 2024-02-02 NOTE — ASSESSMENT & PLAN NOTE
Reviewed recent thyroid ultrasound.  None of the thyroid nodules meet criteria for FNA.   Recheck thyroid U/S now.

## 2024-02-02 NOTE — ASSESSMENT & PLAN NOTE
Reviewed incidence   Discussed that indications for surgery are size, concerning characteristics and functionality (pheochromocytoma or cortisol excess).  Plan to first get dedicated adrenal imaging - will do MRI since she carries an allergy to iodinated contrast.     Plan hormonal testing to include a 1 mg overnight dst. If there is question about pheo based on imaging, will check metanephrines, but I suspect this is going to be an adrenal adenoma most likely.

## 2024-02-08 ENCOUNTER — PATIENT MESSAGE (OUTPATIENT)
Dept: INTERNAL MEDICINE | Facility: CLINIC | Age: 79
End: 2024-02-08
Payer: MEDICARE

## 2024-02-16 ENCOUNTER — PATIENT MESSAGE (OUTPATIENT)
Dept: INTERNAL MEDICINE | Facility: CLINIC | Age: 79
End: 2024-02-16
Payer: MEDICARE

## 2024-02-16 ENCOUNTER — HOSPITAL ENCOUNTER (OUTPATIENT)
Dept: RADIOLOGY | Facility: HOSPITAL | Age: 79
Discharge: HOME | End: 2024-02-16
Attending: INTERNAL MEDICINE
Payer: MEDICARE

## 2024-02-16 ENCOUNTER — PATIENT MESSAGE (OUTPATIENT)
Dept: ENDOCRINOLOGY | Facility: CLINIC | Age: 79
End: 2024-02-16
Payer: MEDICARE

## 2024-02-16 DIAGNOSIS — R19.5 LOOSE STOOLS: ICD-10-CM

## 2024-02-16 DIAGNOSIS — E04.2 MULTINODULAR THYROID: ICD-10-CM

## 2024-02-16 DIAGNOSIS — D35.02 ADRENAL ADENOMA, LEFT: ICD-10-CM

## 2024-02-16 DIAGNOSIS — K58.0 IRRITABLE BOWEL SYNDROME WITH DIARRHEA: Primary | ICD-10-CM

## 2024-02-16 PROCEDURE — 76536 US EXAM OF HEAD AND NECK: CPT | Mod: 26,,, | Performed by: RADIOLOGY

## 2024-02-16 PROCEDURE — 74181 MRI ABDOMEN W/O CONTRAST: CPT | Mod: 26,,, | Performed by: RADIOLOGY

## 2024-02-16 PROCEDURE — 74181 MRI ABDOMEN W/O CONTRAST: CPT | Mod: TC,PN

## 2024-02-16 PROCEDURE — 76536 US EXAM OF HEAD AND NECK: CPT | Mod: TC,PN

## 2024-02-16 NOTE — TELEPHONE ENCOUNTER
Pt sent a portal msg on 02/08/2024 and awaiting your response and/or a referral to GI to see Dr. Herbert Norwood if diagnosis is IBS.    Please review and respond,  Thank You.

## 2024-03-05 ENCOUNTER — TELEPHONE (OUTPATIENT)
Dept: GASTROENTEROLOGY | Facility: CLINIC | Age: 79
End: 2024-03-05
Payer: MEDICARE

## 2024-03-05 ENCOUNTER — PATIENT MESSAGE (OUTPATIENT)
Dept: INTERNAL MEDICINE | Facility: CLINIC | Age: 79
End: 2024-03-05
Payer: MEDICARE

## 2024-03-05 NOTE — TELEPHONE ENCOUNTER
----- Message from Lisa Ndiaye sent at 3/5/2024 11:57 AM CST -----  Regarding: Appointment  Contact: [phone]  Calling to schedule an appointment per referral as soon as possible. Please call patient to schedule today.

## 2024-03-22 NOTE — TELEPHONE ENCOUNTER
No care due was identified.  [facility] Embedded Care Due Messages. Reference number: [id number].   3/21/2024 9:35:04 PM CDT

## 2024-03-23 NOTE — TELEPHONE ENCOUNTER
Refill Routing Note   Medication(s) are not appropriate for processing by [facility] for the following reason(s):        Required vitals abnormal    [facility] action(s):  Defer               Appointments  past 12m or future 3m with PCP    Date Provider   Last Visit   1/18/2024 Deniz Redman MD   Next Visit   Visit date not found Deniz Redman MD   ED visits in past 90 days: 0        Note composed:11:15 PM 03/22/2024

## 2024-03-24 RX ORDER — FUROSEMIDE 20 MG/1
TABLET ORAL
Qty: 110 TABLET | Refills: 3 | Status: SHIPPED | OUTPATIENT
Start: 2024-03-24

## 2024-04-02 ENCOUNTER — OFFICE VISIT (OUTPATIENT)
Dept: GASTROENTEROLOGY | Facility: CLINIC | Age: 79
End: 2024-04-02
Payer: MEDICARE

## 2024-04-02 VITALS
SYSTOLIC BLOOD PRESSURE: 162 MMHG | HEIGHT: 65 IN | BODY MASS INDEX: 30.67 KG/M2 | WEIGHT: 184.06 LBS | HEART RATE: 76 BPM | DIASTOLIC BLOOD PRESSURE: 95 MMHG

## 2024-04-02 DIAGNOSIS — B96.81 HELICOBACTER PYLORI GASTRITIS: ICD-10-CM

## 2024-04-02 DIAGNOSIS — D50.0 IRON DEFICIENCY ANEMIA DUE TO CHRONIC BLOOD LOSS: Primary | ICD-10-CM

## 2024-04-02 DIAGNOSIS — R19.5 LOOSE STOOLS: ICD-10-CM

## 2024-04-02 DIAGNOSIS — K29.70 HELICOBACTER PYLORI GASTRITIS: ICD-10-CM

## 2024-04-02 DIAGNOSIS — K58.0 IRRITABLE BOWEL SYNDROME WITH DIARRHEA: ICD-10-CM

## 2024-04-02 PROCEDURE — 1157F ADVNC CARE PLAN IN RCRD: CPT | Mod: CPTII,S$GLB,, | Performed by: INTERNAL MEDICINE

## 2024-04-02 PROCEDURE — 1159F MED LIST DOCD IN RCRD: CPT | Mod: CPTII,S$GLB,, | Performed by: INTERNAL MEDICINE

## 2024-04-02 PROCEDURE — 99214 OFFICE O/P EST MOD 30 MIN: CPT | Mod: S$GLB,,, | Performed by: INTERNAL MEDICINE

## 2024-04-02 PROCEDURE — 1101F PT FALLS ASSESS-DOCD LE1/YR: CPT | Mod: CPTII,S$GLB,, | Performed by: INTERNAL MEDICINE

## 2024-04-02 PROCEDURE — 3080F DIAST BP >= 90 MM HG: CPT | Mod: CPTII,S$GLB,, | Performed by: INTERNAL MEDICINE

## 2024-04-02 PROCEDURE — 1126F AMNT PAIN NOTED NONE PRSNT: CPT | Mod: CPTII,S$GLB,, | Performed by: INTERNAL MEDICINE

## 2024-04-02 PROCEDURE — 3288F FALL RISK ASSESSMENT DOCD: CPT | Mod: CPTII,S$GLB,, | Performed by: INTERNAL MEDICINE

## 2024-04-02 PROCEDURE — 1160F RVW MEDS BY RX/DR IN RCRD: CPT | Mod: CPTII,S$GLB,, | Performed by: INTERNAL MEDICINE

## 2024-04-02 PROCEDURE — 99999 PR PBB SHADOW E&M-EST. PATIENT-LVL III: CPT | Mod: PBBFAC,,, | Performed by: INTERNAL MEDICINE

## 2024-04-02 PROCEDURE — 3077F SYST BP >= 140 MM HG: CPT | Mod: CPTII,S$GLB,, | Performed by: INTERNAL MEDICINE

## 2024-04-02 NOTE — PROGRESS NOTES
Subjective:       Patient ID: Carmen Wang is a 78 y.o. female.    Chief Complaint: GI Problem (Problems with digestive system)    HPI    78-year-old lady.  My 1st visit with her.  She is seen Dr. Rutledge in this department in the past for IBS.  She is recently seen nurse practitioner and James.  She was referred in August for iron-deficiency anemia.  Prior to discovery of the anemia she might have had an episode of postprandial syncope with vomiting.  Had a cardiac workup for that.  She was found to have an iron-deficiency anemia.  And this led to a GI workup which included EGD and colonoscopy.  The EGD showed H pylori gastritis but no erosions or ulceration.  The H pylori was treated and subsequent H. pylori stool was negative so that infection has been eradicated.  The colon revealed a small polyp but no source of bleeding.  She was put on oral iron and hemoglobin and iron levels have improved and corrected.    But she continues to have some GI symptoms and she feels like the other doctors were listening to her.  She complains of a lot of noise or gurgling in her abdomen especially in the morning.  Increased gas in terms of flatus each day.  She is having multiple stools each day none seem to be very well formed.  Often having urgent stool after eating.  So in a typical day she might have 4 bowel movements.  The stools never wake her up.  No blood in the stool.  No weight loss.  Appetite normal.  She says this is different from the past IBS that she had.  In the past she had been tried on a number of agents including Colestid, cholestyramine and ib Guard.  She says that these agents did not help her but given that the symptoms were different than it might be reasonable to repeat trials     She did have a CT abdomen less than a year ago which was normal    Past Medical History:   Diagnosis Date    Cataract     Chronic diarrhea     Hypertension     IBS (irritable bowel syndrome)        Review of patient's  allergies indicates:   Allergen Reactions    Codeine Hallucinations     Other reaction(s): Hallucinations    Iodinated contrast media Rash    Penicillins Hives and Rash    Contrast media Itching and Rash        Family History   Problem Relation Age of Onset    Hypertension Mother     Arthritis Mother     Thyroid disease Mother     Glaucoma Mother     Arthritis Father     Hypertension Father     Thyroid disease Sister     Breast cancer Sister 76    Thyroid disease Sister     Thyroid disease Sister     No Known Problems Sister     No Known Problems Sister     No Known Problems Sister     Thyroid disease Brother     Diabetes Brother     Cancer Brother         prostate    Cancer Brother         prostate    No Known Problems Brother     No Known Problems Brother     No Known Problems Brother     No Known Problems Brother     Lupus Daughter     Arthritis Son     Hypertension Son     Hypertension Son     Glaucoma Maternal Aunt     Glaucoma Maternal Aunt     No Known Problems Maternal Uncle     No Known Problems Paternal Aunt     No Known Problems Paternal Uncle     Stroke Maternal Grandmother     No Known Problems Maternal Grandfather     No Known Problems Paternal Grandmother     No Known Problems Paternal Grandfather     Colon cancer Neg Hx     Ovarian cancer Neg Hx     Amblyopia Neg Hx     Blindness Neg Hx     Cataracts Neg Hx     Macular degeneration Neg Hx     Retinal detachment Neg Hx     Strabismus Neg Hx     Esophageal cancer Neg Hx        Social History     Tobacco Use    Smoking status: Never    Smokeless tobacco: Never   Substance Use Topics    Alcohol use: No    Drug use: No        Review of Systems        No results found for this or any previous visit from the past 365 days.             Objective:      Physical Exam    Assessment & Plan:       Iron deficiency anemia due to chronic blood loss    Helicobacter pylori gastritis    Irritable bowel syndrome with diarrhea  -     Pancreatic elastase, fecal; Future;  Expected date: 04/02/2024  -     Fecal fat, qualitative; Future; Expected date: 04/02/2024  -     Calprotectin, Stool; Future; Expected date: 04/02/2024     Assessment.  1. Iron-deficiency anemia secondary to chronic blood loss.  It is possible that the H. pylori gastritis was a factor in this but it is also possible that the cause was never found.  I explained that she very well could have a small bowel source like an AVM.  I did discuss with her video capsule endoscopy of the small bowel but did not recommended at this time.  I do think it is reasonable to follow-up her hemoglobin from time to time and if we find her starting to be anemic again we can reconsider doing the video capsule.  2. H pylori gastritis this has been treated and is resolved  3. IBS.  IBS with diarrhea.  I think it is important to rule out some other entities like pancreatic insufficiency or SIBO.  We will get stool test and breath test now.  In the meantime I have asked her to start taking fiber gummies 2 per day and then I will follow-up with her.  We will decide if any additional testing needs to be done.  Again we may well do another trial of cholestyramine in the future.    This note was created with voice recognition dictation technology.  There may be errors that I did not see, detect or correct.      Salo Quiros MD

## 2024-04-02 NOTE — PROGRESS NOTES
"GENERAL GI PATIENT INTAKE:    COVID symptoms in the last 7 days (runny nose, sore throat, congestion, cough, fever): No  PCP: Deniz Redman  If not PCP-  number given to establish [phone]: N/A    ALLERGIES REVIEWED:  Yes    CHIEF COMPLAINT:    Chief Complaint   Patient presents with    GI Problem     Problems with digestive system       VITAL SIGNS:  BP (!) 162/95   Pulse 76   Ht 5' 5" (1.651 m)   Wt 83.5 kg (184 lb 1.4 oz)   LMP 01/01/1982 (Approximate)   BMI 30.63 kg/m²      Change in medical, surgical, family or social history: No      REVIEWED MEDICATION LIST RECONCILED INCLUDING ABOVE MEDS:  Yes     "

## 2024-04-03 ENCOUNTER — PATIENT MESSAGE (OUTPATIENT)
Dept: GASTROENTEROLOGY | Facility: CLINIC | Age: 79
End: 2024-04-03
Payer: MEDICARE

## 2024-04-04 ENCOUNTER — LAB VISIT (OUTPATIENT)
Dept: LAB | Facility: HOSPITAL | Age: 79
End: 2024-04-04
Attending: INTERNAL MEDICINE
Payer: MEDICARE

## 2024-04-04 DIAGNOSIS — K58.0 IRRITABLE BOWEL SYNDROME WITH DIARRHEA: ICD-10-CM

## 2024-04-04 PROCEDURE — 82705 FATS/LIPIDS FECES QUAL: CPT | Performed by: INTERNAL MEDICINE

## 2024-04-04 PROCEDURE — 82653 EL-1 FECAL QUANTITATIVE: CPT | Performed by: INTERNAL MEDICINE

## 2024-04-04 PROCEDURE — 83993 ASSAY FOR CALPROTECTIN FECAL: CPT | Performed by: INTERNAL MEDICINE

## 2024-04-07 LAB
FAT STL QL: NORMAL
NEUTRAL FAT STL QL: NORMAL

## 2024-04-08 LAB
CALPROTECTIN STL-MCNT: 5.3 MCG/G
ELASTASE 1, FECAL: 323 MCG/G

## 2024-04-09 ENCOUNTER — PATIENT MESSAGE (OUTPATIENT)
Dept: GASTROENTEROLOGY | Facility: CLINIC | Age: 79
End: 2024-04-09
Payer: MEDICARE

## 2024-04-18 ENCOUNTER — PATIENT MESSAGE (OUTPATIENT)
Dept: GASTROENTEROLOGY | Facility: CLINIC | Age: 79
End: 2024-04-18
Payer: MEDICARE

## 2024-04-29 ENCOUNTER — TELEPHONE (OUTPATIENT)
Dept: GASTROENTEROLOGY | Facility: CLINIC | Age: 79
End: 2024-04-29
Payer: MEDICARE

## 2024-04-29 NOTE — TELEPHONE ENCOUNTER
----- Message from Salo Quiros MD sent at 4/29/2024  1:15 PM CDT -----  Regarding: RE: Continued problems  We just need to go step by step  Try the imodium  See what the breath test shows  And go from there  ----- Message -----  From: Merced Vidal MA  Sent: 4/25/2024  10:33 AM CDT  To: Salo Quiros MD  Subject: Continued problems                               Spoke with patient.  Her appointment with you on 7/2 has been cancelled.   She stated she continues to have problems with her bowels.  She has restarted IB guard recently but does not find it is helping.  She has not started the Imodium that you recommended to her recently.  She stated if she does not see any improvement in the next couple of days with the IB guard she will try the Imodium.  She has not received the SIBO testing kit as yet.   That order was faxed to [facility] on 4/16.  She should be receiving it soon.    Elli

## 2024-05-26 DIAGNOSIS — I10 ESSENTIAL HYPERTENSION: ICD-10-CM

## 2024-05-27 NOTE — TELEPHONE ENCOUNTER
Refill Routing Note   Medication(s) are not appropriate for processing by [facility] for the following reason(s):        Required vitals abnormal    [facility] action(s):  Defer               Appointments  past 12m or future 3m with PCP    Date Provider   Last Visit   1/18/2024 Deniz Redman MD   Next Visit   Visit date not found Deniz Redman MD   ED visits in past 90 days: 0        Note composed:1:30 PM 05/27/2024

## 2024-05-27 NOTE — TELEPHONE ENCOUNTER
No care due was identified.  [facility] Embedded Care Due Messages. Reference number: [id number].   5/26/2024 9:27:08 PM CDT

## 2024-05-28 RX ORDER — BENAZEPRIL HYDROCHLORIDE 20 MG/1
TABLET ORAL
Qty: 90 TABLET | Refills: 2 | Status: SHIPPED | OUTPATIENT
Start: 2024-05-28

## 2024-06-06 ENCOUNTER — PATIENT MESSAGE (OUTPATIENT)
Dept: INTERNAL MEDICINE | Facility: CLINIC | Age: 79
End: 2024-06-06
Payer: MEDICARE

## 2024-06-10 ENCOUNTER — PATIENT MESSAGE (OUTPATIENT)
Dept: INTERNAL MEDICINE | Facility: CLINIC | Age: 79
End: 2024-06-10
Payer: MEDICARE

## 2024-07-05 ENCOUNTER — PATIENT MESSAGE (OUTPATIENT)
Dept: GASTROENTEROLOGY | Facility: CLINIC | Age: 79
End: 2024-07-05
Payer: MEDICARE

## 2024-07-09 ENCOUNTER — PATIENT MESSAGE (OUTPATIENT)
Dept: INTERNAL MEDICINE | Facility: CLINIC | Age: 79
End: 2024-07-09
Payer: MEDICARE

## 2024-07-09 DIAGNOSIS — D50.9 IRON DEFICIENCY ANEMIA, UNSPECIFIED IRON DEFICIENCY ANEMIA TYPE: ICD-10-CM

## 2024-07-09 DIAGNOSIS — E21.0 PRIMARY HYPERPARATHYROIDISM: ICD-10-CM

## 2024-07-09 DIAGNOSIS — N25.81 SECONDARY HYPERPARATHYROIDISM OF RENAL ORIGIN: ICD-10-CM

## 2024-07-09 DIAGNOSIS — I10 ESSENTIAL HYPERTENSION: Primary | ICD-10-CM

## 2024-07-09 DIAGNOSIS — N18.31 STAGE 3A CHRONIC KIDNEY DISEASE: ICD-10-CM

## 2024-07-11 ENCOUNTER — LAB VISIT (OUTPATIENT)
Dept: LAB | Facility: HOSPITAL | Age: 79
End: 2024-07-11
Attending: FAMILY MEDICINE
Payer: MEDICARE

## 2024-07-11 ENCOUNTER — PATIENT MESSAGE (OUTPATIENT)
Dept: INTERNAL MEDICINE | Facility: CLINIC | Age: 79
End: 2024-07-11
Payer: MEDICARE

## 2024-07-11 DIAGNOSIS — I10 ESSENTIAL HYPERTENSION: ICD-10-CM

## 2024-07-11 DIAGNOSIS — E21.0 PRIMARY HYPERPARATHYROIDISM: ICD-10-CM

## 2024-07-11 DIAGNOSIS — N18.31 STAGE 3A CHRONIC KIDNEY DISEASE: ICD-10-CM

## 2024-07-11 DIAGNOSIS — D50.9 IRON DEFICIENCY ANEMIA, UNSPECIFIED IRON DEFICIENCY ANEMIA TYPE: ICD-10-CM

## 2024-07-11 DIAGNOSIS — N25.81 SECONDARY HYPERPARATHYROIDISM OF RENAL ORIGIN: ICD-10-CM

## 2024-07-11 LAB
ALBUMIN SERPL BCP-MCNC: 3.5 G/DL (ref 3.5–5.2)
ANION GAP SERPL CALC-SCNC: 7 MMOL/L (ref 8–16)
BASOPHILS # BLD AUTO: 0.04 K/UL (ref 0–0.2)
BASOPHILS NFR BLD: 0.6 % (ref 0–1.9)
BUN SERPL-MCNC: 20 MG/DL (ref 8–23)
CALCIUM SERPL-MCNC: 10.5 MG/DL (ref 8.7–10.5)
CHLORIDE SERPL-SCNC: 103 MMOL/L (ref 95–110)
CHOLEST SERPL-MCNC: 183 MG/DL (ref 120–199)
CHOLEST/HDLC SERPL: 3.4 {RATIO} (ref 2–5)
CO2 SERPL-SCNC: 27 MMOL/L (ref 23–29)
CREAT SERPL-MCNC: 1.1 MG/DL (ref 0.5–1.4)
DIFFERENTIAL METHOD BLD: ABNORMAL
EOSINOPHIL # BLD AUTO: 0.2 K/UL (ref 0–0.5)
EOSINOPHIL NFR BLD: 3.1 % (ref 0–8)
ERYTHROCYTE [DISTWIDTH] IN BLOOD BY AUTOMATED COUNT: 15.9 % (ref 11.5–14.5)
EST. GFR  (NO RACE VARIABLE): 51.4 ML/MIN/1.73 M^2
GLUCOSE SERPL-MCNC: 91 MG/DL (ref 70–110)
HCT VFR BLD AUTO: 36.1 % (ref 37–48.5)
HDLC SERPL-MCNC: 54 MG/DL (ref 40–75)
HDLC SERPL: 29.5 % (ref 20–50)
HGB BLD-MCNC: 10.7 G/DL (ref 12–16)
IMM GRANULOCYTES # BLD AUTO: 0.02 K/UL (ref 0–0.04)
IMM GRANULOCYTES NFR BLD AUTO: 0.3 % (ref 0–0.5)
IRON SERPL-MCNC: 35 UG/DL (ref 30–160)
LDLC SERPL CALC-MCNC: 115.8 MG/DL (ref 63–159)
LYMPHOCYTES # BLD AUTO: 2.8 K/UL (ref 1–4.8)
LYMPHOCYTES NFR BLD: 39 % (ref 18–48)
MCH RBC QN AUTO: 23.3 PG (ref 27–31)
MCHC RBC AUTO-ENTMCNC: 29.6 G/DL (ref 32–36)
MCV RBC AUTO: 79 FL (ref 82–98)
MONOCYTES # BLD AUTO: 0.8 K/UL (ref 0.3–1)
MONOCYTES NFR BLD: 11.7 % (ref 4–15)
NEUTROPHILS # BLD AUTO: 3.2 K/UL (ref 1.8–7.7)
NEUTROPHILS NFR BLD: 45.3 % (ref 38–73)
NONHDLC SERPL-MCNC: 129 MG/DL
NRBC BLD-RTO: 0 /100 WBC
PHOSPHATE SERPL-MCNC: 3.3 MG/DL (ref 2.7–4.5)
PLATELET # BLD AUTO: 354 K/UL (ref 150–450)
PMV BLD AUTO: 12.7 FL (ref 9.2–12.9)
POTASSIUM SERPL-SCNC: 3.8 MMOL/L (ref 3.5–5.1)
PTH-INTACT SERPL-MCNC: 100.1 PG/ML (ref 9–77)
RBC # BLD AUTO: 4.6 M/UL (ref 4–5.4)
SATURATED IRON: 6 % (ref 20–50)
SODIUM SERPL-SCNC: 137 MMOL/L (ref 136–145)
TOTAL IRON BINDING CAPACITY: 576 UG/DL (ref 250–450)
TRANSFERRIN SERPL-MCNC: 389 MG/DL (ref 200–375)
TRIGL SERPL-MCNC: 66 MG/DL (ref 30–150)
WBC # BLD AUTO: 7.15 K/UL (ref 3.9–12.7)

## 2024-07-11 PROCEDURE — 80069 RENAL FUNCTION PANEL: CPT | Performed by: INTERNAL MEDICINE

## 2024-07-11 PROCEDURE — 80061 LIPID PANEL: CPT | Performed by: INTERNAL MEDICINE

## 2024-07-11 PROCEDURE — 84466 ASSAY OF TRANSFERRIN: CPT | Performed by: INTERNAL MEDICINE

## 2024-07-11 PROCEDURE — 85025 COMPLETE CBC W/AUTO DIFF WBC: CPT | Performed by: INTERNAL MEDICINE

## 2024-07-11 PROCEDURE — 82728 ASSAY OF FERRITIN: CPT | Performed by: INTERNAL MEDICINE

## 2024-07-11 PROCEDURE — 83970 ASSAY OF PARATHORMONE: CPT | Performed by: INTERNAL MEDICINE

## 2024-07-11 PROCEDURE — 36415 COLL VENOUS BLD VENIPUNCTURE: CPT | Mod: PN | Performed by: INTERNAL MEDICINE

## 2024-07-12 LAB — FERRITIN SERPL-MCNC: 9 NG/ML (ref 20–300)

## 2024-07-15 NOTE — TELEPHONE ENCOUNTER
Spoke with patient.  Aware  is out of the office until early August.    looked at the breath test results.  He said to tell her it was positive for bacterial overgrowth with methane gas.  She is aware it is a chronic condition and can wait for 's return.   Patient has an appointment to see  on 8/6. She would also like to discuss having  VCE for anemia.  Elli

## 2024-07-15 NOTE — PROGRESS NOTES
MEDICAL HISTORY:  Hypertension.  Irritable bowel syndrome, with with diarrhea  Chronic kidney disease stage III, with secondary hyperparathyroid  Tachycardia  Iron deficiency anemia  Multinodular thyroid gland, on ultrasound  Cholecystectomy.  Hysterectomy.  Bladder suspension.  Lumbar degenerative disk disease.  Gluteal muscular tear.  Right foot surgery.  Resection of lipoma from the groin.  Bout of postcholecystectomy diarrhea.  Prior history of shingles.  Hearing impairment.  Aorta atherosclerosis on imaging     SOCIAL HISTORY:  Tobacco and alcohol use - none.  Exercises by doing a treadmill and elliptical.            MEDICATIONS:  Fergon  Benazepril 20 mg   Vitamin-D 2000 units every other day  Lasix 20 mg, 1 tablet daily Sunday Monday Wednesday Friday and 2 tablets daily Tuesday Thursday Saturday     Component      Latest Ref Rng 7/11/2024   WBC      3.90 - 12.70 K/uL 7.15    RBC      4.00 - 5.40 M/uL 4.60    Hemoglobin      12.0 - 16.0 g/dL 10.7 (L)    Hematocrit      37.0 - 48.5 % 36.1 (L)    MCV      82 - 98 fL 79 (L)    MCH      27.0 - 31.0 pg 23.3 (L)    MCHC      32.0 - 36.0 g/dL 29.6 (L)    RDW      11.5 - 14.5 % 15.9 (H)    Platelet Count      150 - 450 K/uL 354    MPV      9.2 - 12.9 fL 12.7    Immature Granulocytes      0.0 - 0.5 % 0.3    Gran # (ANC)      1.8 - 7.7 K/uL 3.2    Immature Grans (Abs)      0.00 - 0.04 K/uL 0.02    Lymph #      1.0 - 4.8 K/uL 2.8    Mono #      0.3 - 1.0 K/uL 0.8    Eos #      0.0 - 0.5 K/uL 0.2    Baso #      0.00 - 0.20 K/uL 0.04    nRBC      0 /100 WBC 0    Gran %      38.0 - 73.0 % 45.3    Lymph %      18.0 - 48.0 % 39.0    Mono %      4.0 - 15.0 % 11.7    Eos %      0.0 - 8.0 % 3.1    Basophil %      0.0 - 1.9 % 0.6    Differential Method Automated    Glucose      70 - 110 mg/dL 91    Sodium      136 - 145 mmol/L 137    Potassium      3.5 - 5.1 mmol/L 3.8    Chloride      95 - 110 mmol/L 103    CO2      23 - 29 mmol/L 27    BUN      8 - 23 mg/dL 20    Calcium       8.7 - 10.5 mg/dL 10.5    Creatinine      0.5 - 1.4 mg/dL 1.1    Albumin      3.5 - 5.2 g/dL 3.5    Phosphorus Level      2.7 - 4.5 mg/dL 3.3    eGFR      >60 mL/min/1.73 m^2 51.4 !    Anion Gap      8 - 16 mmol/L 7 (L)    Cholesterol Total      120 - 199 mg/dL 183    Triglycerides      30 - 150 mg/dL 66    HDL      40 - 75 mg/dL 54    LDL Cholesterol      63.0 - 159.0 mg/dL 115.8    HDL/Cholesterol Ratio      20.0 - 50.0 % 29.5    Total Cholesterol/HDL Ratio      2.0 - 5.0  3.4    Non-HDL Cholesterol      mg/dL 129    Iron      30 - 160 ug/dL 35    Transferrin      200 - 375 mg/dL 389 (H)    TIBC      250 - 450 ug/dL 576 (H)    Saturated Iron      20 - 50 % 6 (L)    PTH      9.0 - 77.0 pg/mL 100.1 (H)    Ferritin      20.0 - 300.0 ng/mL 9 (L)       Legend:  (L) Low  (H) High  ! Abnormal    Answers submitted by the patient for this visit:  Review of Systems Questionnaire (Submitted on 7/10/2024)  activity change: No  unexpected weight change: No  neck pain: No  hearing loss: No  rhinorrhea: No  trouble swallowing: No  eye discharge: No  visual disturbance: No  chest tightness: No  wheezing: No  chest pain: No  palpitations: No  blood in stool: No  constipation: No  vomiting: No  diarrhea: No  polydipsia: No  polyuria: No  difficulty urinating: No  hematuria: No  menstrual problem: No  dysuria: No  joint swelling: No  arthralgias: No  headaches: No  weakness: No  confusion: No  dysphoric mood: No      [age] year female   Presents for routine follow-up visit   She was followed up with Gastroenterology, endocrinology, nephrology.    It was noted that she has iron deficiency anemia recent blood test.  She was previously on Fergon but the medicine was stopped on last visit because numbers look fine.  She actually met with Gastroenterology in the interim.  For evaluation of abdominal cramps loose stools.  Stools for pancreatic elastase calprotectin fecal fat was negative.  Apparently she had a breathing study test to  evaluate for small intestinal bowel overgrowth.  She got the report that there was an abnormality but does not know the specifics.  She will be having upcoming appointment with Gastroenterology.  Last week with a number it was noted above Fergon was re-initiated.  On occasionally it can be lower abdominal cramps.  But most mornings after eating breakfast she will have loose stools or diarrhea for which she will take 1-2 Imodium.  She was also using probiotic    Early in the year she met with endocrinology.  She has a left adrenal adenoma.  Dexamethasone suppression test was normal.  She also has thyroid nodule which is stable.    She also has hyperparathyroid which appears to be primary.  Presently calcium is fine in his she was had a normal bone density    Review of symptoms   Reports no chest pain, palpitations, shortness for breath.  No difficulty urinating.  No indigestion heartburn headaches.  Occasional right hip arthralgias    For the past week she was taking note of a small lump in his back of the right neck.  It does not hurt no other symptoms with this    Examination   Weight 187 lb   BMI 31.18   Pulse 68   Blood pressure by me 128/72  Neck mi thyromegaly no palpable nodule.  There is a small nodule or mass that is palpable in the right posterior cervical area.  It was not tender does not appear to be within the skin  Chest clear breath sounds   Heart regular rate and rhythm no murmurs   Abdominal exam nontender soft no hepatosplenomegaly abdominal masses   Pulses 2+ carotid pulses no bruits, 2+ dorsalis pedal pulses   Extremities no edema    Impression   Hypertension   Chronic kidney disease stage IIIA  Iron deficiency anemia  Multinodular thyroid  Hyperparathyroid, primary  Irritable bowel syndrome with diarrhea   Right posterior cervical lump    Plan   Ultrasound of the right cervical area   Confer with GI about doing video capsule endoscopy.  Continue with Fergon once a day  Repeat blood cell count on  when August 6 when she was the GI appointment

## 2024-07-16 ENCOUNTER — OFFICE VISIT (OUTPATIENT)
Dept: INTERNAL MEDICINE | Facility: CLINIC | Age: 79
End: 2024-07-16
Payer: MEDICARE

## 2024-07-16 VITALS
DIASTOLIC BLOOD PRESSURE: 72 MMHG | OXYGEN SATURATION: 98 % | SYSTOLIC BLOOD PRESSURE: 128 MMHG | HEIGHT: 65 IN | WEIGHT: 187.38 LBS | BODY MASS INDEX: 31.22 KG/M2 | HEART RATE: 66 BPM

## 2024-07-16 DIAGNOSIS — I10 ESSENTIAL HYPERTENSION: Primary | ICD-10-CM

## 2024-07-16 DIAGNOSIS — D50.9 IRON DEFICIENCY ANEMIA, UNSPECIFIED IRON DEFICIENCY ANEMIA TYPE: ICD-10-CM

## 2024-07-16 DIAGNOSIS — N18.31 STAGE 3A CHRONIC KIDNEY DISEASE: ICD-10-CM

## 2024-07-16 DIAGNOSIS — E04.2 MULTINODULAR THYROID: ICD-10-CM

## 2024-07-16 DIAGNOSIS — E21.0 PRIMARY HYPERPARATHYROIDISM: ICD-10-CM

## 2024-07-16 DIAGNOSIS — K58.0 IRRITABLE BOWEL SYNDROME WITH DIARRHEA: ICD-10-CM

## 2024-07-16 DIAGNOSIS — R22.1 NECK MASS: ICD-10-CM

## 2024-07-16 PROCEDURE — 3288F FALL RISK ASSESSMENT DOCD: CPT | Mod: CPTII,S$GLB,, | Performed by: INTERNAL MEDICINE

## 2024-07-16 PROCEDURE — 3074F SYST BP LT 130 MM HG: CPT | Mod: CPTII,S$GLB,, | Performed by: INTERNAL MEDICINE

## 2024-07-16 PROCEDURE — 1101F PT FALLS ASSESS-DOCD LE1/YR: CPT | Mod: CPTII,S$GLB,, | Performed by: INTERNAL MEDICINE

## 2024-07-16 PROCEDURE — 3078F DIAST BP <80 MM HG: CPT | Mod: CPTII,S$GLB,, | Performed by: INTERNAL MEDICINE

## 2024-07-16 PROCEDURE — 1157F ADVNC CARE PLAN IN RCRD: CPT | Mod: CPTII,S$GLB,, | Performed by: INTERNAL MEDICINE

## 2024-07-16 PROCEDURE — 1159F MED LIST DOCD IN RCRD: CPT | Mod: CPTII,S$GLB,, | Performed by: INTERNAL MEDICINE

## 2024-07-16 PROCEDURE — 1126F AMNT PAIN NOTED NONE PRSNT: CPT | Mod: CPTII,S$GLB,, | Performed by: INTERNAL MEDICINE

## 2024-07-16 PROCEDURE — 99214 OFFICE O/P EST MOD 30 MIN: CPT | Mod: S$GLB,,, | Performed by: INTERNAL MEDICINE

## 2024-07-16 PROCEDURE — 99999 PR PBB SHADOW E&M-EST. PATIENT-LVL III: CPT | Mod: PBBFAC,,, | Performed by: INTERNAL MEDICINE

## 2024-07-18 ENCOUNTER — HOSPITAL ENCOUNTER (OUTPATIENT)
Dept: RADIOLOGY | Facility: HOSPITAL | Age: 79
Discharge: HOME | End: 2024-07-18
Attending: INTERNAL MEDICINE
Payer: MEDICARE

## 2024-07-18 DIAGNOSIS — R22.1 NECK MASS: ICD-10-CM

## 2024-07-18 PROCEDURE — 76536 US EXAM OF HEAD AND NECK: CPT | Mod: 26,,, | Performed by: STUDENT IN AN ORGANIZED HEALTH CARE EDUCATION/TRAINING PROGRAM

## 2024-07-18 PROCEDURE — 76536 US EXAM OF HEAD AND NECK: CPT | Mod: TC,PN

## 2024-07-19 ENCOUNTER — PATIENT MESSAGE (OUTPATIENT)
Dept: INTERNAL MEDICINE | Facility: CLINIC | Age: 79
End: 2024-07-19
Payer: MEDICARE

## 2024-07-22 ENCOUNTER — PATIENT MESSAGE (OUTPATIENT)
Dept: INTERNAL MEDICINE | Facility: CLINIC | Age: 79
End: 2024-07-22
Payer: MEDICARE

## 2024-07-28 ENCOUNTER — PATIENT MESSAGE (OUTPATIENT)
Dept: INTERNAL MEDICINE | Facility: CLINIC | Age: 79
End: 2024-07-28
Payer: MEDICARE

## 2024-07-31 ENCOUNTER — LAB VISIT (OUTPATIENT)
Dept: LAB | Facility: HOSPITAL | Age: 79
End: 2024-07-31
Attending: INTERNAL MEDICINE
Payer: MEDICARE

## 2024-07-31 DIAGNOSIS — R59.0 POSTERIOR CERVICAL LYMPHADENOPATHY: Primary | ICD-10-CM

## 2024-07-31 DIAGNOSIS — D50.9 IRON DEFICIENCY ANEMIA, UNSPECIFIED IRON DEFICIENCY ANEMIA TYPE: ICD-10-CM

## 2024-07-31 DIAGNOSIS — R59.0 POSTERIOR CERVICAL LYMPHADENOPATHY: ICD-10-CM

## 2024-07-31 LAB
BASOPHILS # BLD AUTO: 0.02 K/UL (ref 0–0.2)
BASOPHILS NFR BLD: 0.3 % (ref 0–1.9)
CRP SERPL-MCNC: 2.9 MG/L (ref 0–8.2)
DIFFERENTIAL METHOD BLD: ABNORMAL
EOSINOPHIL # BLD AUTO: 0.2 K/UL (ref 0–0.5)
EOSINOPHIL NFR BLD: 2.4 % (ref 0–8)
ERYTHROCYTE [DISTWIDTH] IN BLOOD BY AUTOMATED COUNT: 20.6 % (ref 11.5–14.5)
ERYTHROCYTE [SEDIMENTATION RATE] IN BLOOD BY PHOTOMETRIC METHOD: 26 MM/HR (ref 0–36)
FERRITIN SERPL-MCNC: 31 NG/ML (ref 20–300)
HCT VFR BLD AUTO: 38.3 % (ref 37–48.5)
HGB BLD-MCNC: 11.6 G/DL (ref 12–16)
IMM GRANULOCYTES # BLD AUTO: 0.01 K/UL (ref 0–0.04)
IMM GRANULOCYTES NFR BLD AUTO: 0.1 % (ref 0–0.5)
LYMPHOCYTES # BLD AUTO: 2.8 K/UL (ref 1–4.8)
LYMPHOCYTES NFR BLD: 37.9 % (ref 18–48)
MCH RBC QN AUTO: 24 PG (ref 27–31)
MCHC RBC AUTO-ENTMCNC: 30.3 G/DL (ref 32–36)
MCV RBC AUTO: 79 FL (ref 82–98)
MONOCYTES # BLD AUTO: 1.1 K/UL (ref 0.3–1)
MONOCYTES NFR BLD: 14.4 % (ref 4–15)
NEUTROPHILS # BLD AUTO: 3.3 K/UL (ref 1.8–7.7)
NEUTROPHILS NFR BLD: 44.9 % (ref 38–73)
NRBC BLD-RTO: 0 /100 WBC
PLATELET # BLD AUTO: 328 K/UL (ref 150–450)
PMV BLD AUTO: 12.1 FL (ref 9.2–12.9)
RBC # BLD AUTO: 4.83 M/UL (ref 4–5.4)
WBC # BLD AUTO: 7.37 K/UL (ref 3.9–12.7)

## 2024-07-31 PROCEDURE — 82728 ASSAY OF FERRITIN: CPT | Performed by: INTERNAL MEDICINE

## 2024-07-31 PROCEDURE — 85652 RBC SED RATE AUTOMATED: CPT | Performed by: INTERNAL MEDICINE

## 2024-07-31 PROCEDURE — 85025 COMPLETE CBC W/AUTO DIFF WBC: CPT | Performed by: INTERNAL MEDICINE

## 2024-07-31 PROCEDURE — 36415 COLL VENOUS BLD VENIPUNCTURE: CPT | Performed by: INTERNAL MEDICINE

## 2024-07-31 PROCEDURE — 86140 C-REACTIVE PROTEIN: CPT | Performed by: INTERNAL MEDICINE

## 2024-08-05 ENCOUNTER — LAB VISIT (OUTPATIENT)
Dept: LAB | Facility: HOSPITAL | Age: 79
End: 2024-08-05
Attending: INTERNAL MEDICINE
Payer: MEDICARE

## 2024-08-05 DIAGNOSIS — D50.9 IRON DEFICIENCY ANEMIA, UNSPECIFIED IRON DEFICIENCY ANEMIA TYPE: ICD-10-CM

## 2024-08-05 LAB
BASOPHILS # BLD AUTO: 0.02 K/UL (ref 0–0.2)
BASOPHILS NFR BLD: 0.3 % (ref 0–1.9)
DIFFERENTIAL METHOD BLD: ABNORMAL
EOSINOPHIL # BLD AUTO: 0.2 K/UL (ref 0–0.5)
EOSINOPHIL NFR BLD: 2.3 % (ref 0–8)
ERYTHROCYTE [DISTWIDTH] IN BLOOD BY AUTOMATED COUNT: 21.7 % (ref 11.5–14.5)
FERRITIN SERPL-MCNC: 31 NG/ML (ref 20–300)
HCT VFR BLD AUTO: 41.8 % (ref 37–48.5)
HGB BLD-MCNC: 11.9 G/DL (ref 12–16)
IMM GRANULOCYTES # BLD AUTO: 0.01 K/UL (ref 0–0.04)
IMM GRANULOCYTES NFR BLD AUTO: 0.2 % (ref 0–0.5)
IRON SERPL-MCNC: 231 UG/DL (ref 30–160)
LYMPHOCYTES # BLD AUTO: 2.7 K/UL (ref 1–4.8)
LYMPHOCYTES NFR BLD: 39.9 % (ref 18–48)
MCH RBC QN AUTO: 23.8 PG (ref 27–31)
MCHC RBC AUTO-ENTMCNC: 28.5 G/DL (ref 32–36)
MCV RBC AUTO: 84 FL (ref 82–98)
MONOCYTES # BLD AUTO: 0.8 K/UL (ref 0.3–1)
MONOCYTES NFR BLD: 12.3 % (ref 4–15)
NEUTROPHILS # BLD AUTO: 3 K/UL (ref 1.8–7.7)
NEUTROPHILS NFR BLD: 45 % (ref 38–73)
NRBC BLD-RTO: 0 /100 WBC
PLATELET # BLD AUTO: 282 K/UL (ref 150–450)
PMV BLD AUTO: 12.5 FL (ref 9.2–12.9)
RBC # BLD AUTO: 5 M/UL (ref 4–5.4)
SATURATED IRON: 47 % (ref 20–50)
TOTAL IRON BINDING CAPACITY: 490 UG/DL (ref 250–450)
TRANSFERRIN SERPL-MCNC: 331 MG/DL (ref 200–375)
WBC # BLD AUTO: 6.66 K/UL (ref 3.9–12.7)

## 2024-08-05 PROCEDURE — 83540 ASSAY OF IRON: CPT | Performed by: INTERNAL MEDICINE

## 2024-08-05 PROCEDURE — 85025 COMPLETE CBC W/AUTO DIFF WBC: CPT | Performed by: INTERNAL MEDICINE

## 2024-08-05 PROCEDURE — 36415 COLL VENOUS BLD VENIPUNCTURE: CPT | Mod: PN | Performed by: INTERNAL MEDICINE

## 2024-08-05 PROCEDURE — 82728 ASSAY OF FERRITIN: CPT | Performed by: INTERNAL MEDICINE

## 2024-08-06 ENCOUNTER — OFFICE VISIT (OUTPATIENT)
Dept: GASTROENTEROLOGY | Facility: CLINIC | Age: 79
End: 2024-08-06
Payer: MEDICARE

## 2024-08-06 ENCOUNTER — PATIENT MESSAGE (OUTPATIENT)
Dept: INTERNAL MEDICINE | Facility: CLINIC | Age: 79
End: 2024-08-06
Payer: MEDICARE

## 2024-08-06 VITALS
HEIGHT: 65 IN | HEART RATE: 67 BPM | SYSTOLIC BLOOD PRESSURE: 148 MMHG | BODY MASS INDEX: 30.97 KG/M2 | DIASTOLIC BLOOD PRESSURE: 89 MMHG | WEIGHT: 185.88 LBS

## 2024-08-06 DIAGNOSIS — D50.0 IRON DEFICIENCY ANEMIA DUE TO CHRONIC BLOOD LOSS: Primary | ICD-10-CM

## 2024-08-06 DIAGNOSIS — K58.0 IRRITABLE BOWEL SYNDROME WITH DIARRHEA: ICD-10-CM

## 2024-08-06 PROCEDURE — 3288F FALL RISK ASSESSMENT DOCD: CPT | Mod: CPTII,S$GLB,, | Performed by: INTERNAL MEDICINE

## 2024-08-06 PROCEDURE — 3079F DIAST BP 80-89 MM HG: CPT | Mod: CPTII,S$GLB,, | Performed by: INTERNAL MEDICINE

## 2024-08-06 PROCEDURE — 1160F RVW MEDS BY RX/DR IN RCRD: CPT | Mod: CPTII,S$GLB,, | Performed by: INTERNAL MEDICINE

## 2024-08-06 PROCEDURE — 1159F MED LIST DOCD IN RCRD: CPT | Mod: CPTII,S$GLB,, | Performed by: INTERNAL MEDICINE

## 2024-08-06 PROCEDURE — 1157F ADVNC CARE PLAN IN RCRD: CPT | Mod: CPTII,S$GLB,, | Performed by: INTERNAL MEDICINE

## 2024-08-06 PROCEDURE — 1101F PT FALLS ASSESS-DOCD LE1/YR: CPT | Mod: CPTII,S$GLB,, | Performed by: INTERNAL MEDICINE

## 2024-08-06 PROCEDURE — 99999 PR PBB SHADOW E&M-EST. PATIENT-LVL III: CPT | Mod: PBBFAC,,, | Performed by: INTERNAL MEDICINE

## 2024-08-06 PROCEDURE — 1126F AMNT PAIN NOTED NONE PRSNT: CPT | Mod: CPTII,S$GLB,, | Performed by: INTERNAL MEDICINE

## 2024-08-06 PROCEDURE — 99214 OFFICE O/P EST MOD 30 MIN: CPT | Mod: S$GLB,,, | Performed by: INTERNAL MEDICINE

## 2024-08-06 PROCEDURE — 3077F SYST BP >= 140 MM HG: CPT | Mod: CPTII,S$GLB,, | Performed by: INTERNAL MEDICINE

## 2024-08-13 ENCOUNTER — PATIENT MESSAGE (OUTPATIENT)
Dept: GASTROENTEROLOGY | Facility: CLINIC | Age: 79
End: 2024-08-13
Payer: MEDICARE

## 2024-08-19 ENCOUNTER — OFFICE VISIT (OUTPATIENT)
Dept: SPORTS MEDICINE | Facility: CLINIC | Age: 79
End: 2024-08-19
Payer: MEDICARE

## 2024-08-19 DIAGNOSIS — R20.2 PARESTHESIAS IN LEFT HAND: Primary | ICD-10-CM

## 2024-08-19 DIAGNOSIS — G56.02 LEFT CARPAL TUNNEL SYNDROME: ICD-10-CM

## 2024-08-19 PROCEDURE — 1159F MED LIST DOCD IN RCRD: CPT | Mod: CPTII,S$GLB,, | Performed by: STUDENT IN AN ORGANIZED HEALTH CARE EDUCATION/TRAINING PROGRAM

## 2024-08-19 PROCEDURE — 1157F ADVNC CARE PLAN IN RCRD: CPT | Mod: CPTII,S$GLB,, | Performed by: STUDENT IN AN ORGANIZED HEALTH CARE EDUCATION/TRAINING PROGRAM

## 2024-08-19 PROCEDURE — 99999 PR PBB SHADOW E&M-EST. PATIENT-LVL II: CPT | Mod: PBBFAC,,, | Performed by: STUDENT IN AN ORGANIZED HEALTH CARE EDUCATION/TRAINING PROGRAM

## 2024-08-19 PROCEDURE — 99214 OFFICE O/P EST MOD 30 MIN: CPT | Mod: S$GLB,,, | Performed by: STUDENT IN AN ORGANIZED HEALTH CARE EDUCATION/TRAINING PROGRAM

## 2024-08-19 PROCEDURE — 1160F RVW MEDS BY RX/DR IN RCRD: CPT | Mod: CPTII,S$GLB,, | Performed by: STUDENT IN AN ORGANIZED HEALTH CARE EDUCATION/TRAINING PROGRAM

## 2024-08-19 PROCEDURE — 1125F AMNT PAIN NOTED PAIN PRSNT: CPT | Mod: CPTII,S$GLB,, | Performed by: STUDENT IN AN ORGANIZED HEALTH CARE EDUCATION/TRAINING PROGRAM

## 2024-08-19 NOTE — PROGRESS NOTES
Patient ID: Carmen Wang  YOB: 1945  MRN: [id number]    Chief Complaint:  Left arm pain    Referred By: Self    History of Present Illness: Carmen Wang is a right-hand dominant 79 y.o. female who presents today with left arm pain.    She was initially evaluated in our office on 11/27/23 with chronic left arm, wrist, hand pain.   She was diagnosed with left carpal tunnel syndrome, supported by prior EMG findings, and treated with CSI on 12/4/23.  Gabapentin deferred due to patient preference.  NSAIDs avoided due to CKD.    Today, she reports she got very good relief following her last injection in December, with several months of very good pain relief, although not complete relief.  Starting about 2 months ago, she has started getting recurrence discomfort and paresthesias in her left hand, now localized to her 2nd through 5th digits.  This is different from her prior pain and discomfort which was more localized to her wrist.  She had no longer using her night splints.      HPI 11/27/23:  She complains of chronic left upper extremity pain for many years.  She has previously been treated by Dr. Bragg and Dr. Salazar who diagnosed her with carpal tunnel syndrome via EMG.  She was offered interventional treatments at that time but declined in favor of conservative measures.  She was using bilateral wrist braces, and her bilateral pain and discomfort did subside.  However, for the last several months, she is already having pain and discomfort on the left side again, now extending from her left hand proximally to her left shoulder.  Denies any injury.  Denies any h/o cervical radiculopathy.  Pain described as burning/stinging, and numbness.    Past Medical History:   Past Medical History:   Diagnosis Date    Cataract     Chronic diarrhea     Hypertension     IBS (irritable bowel syndrome)      Past Surgical History:   Procedure Laterality Date    APPENDECTOMY      with the hysterectomy     BUNIONECTOMY      CHOLECYSTECTOMY      COLONOSCOPY N/A 10/05/2015    Procedure: COLONOSCOPY;  Surgeon: Teja Olivarez MD;  Location: [facility] ENDO (4TH FLR);  Service: Endoscopy;  Laterality: N/A;    COLONOSCOPY N/A 9/14/2023    Procedure: COLONOSCOPY;  Surgeon: Kailey Dahl MD;  Location: [facility] ENDO;  Service: Endoscopy;  Laterality: N/A;    ESOPHAGOGASTRODUODENOSCOPY N/A 9/14/2023    Procedure: EGD (ESOPHAGOGASTRODUODENOSCOPY);  Surgeon: Kailey Dahl MD;  Location: [facility] ENDO;  Service: Endoscopy;  Laterality: N/A;    HYSTERECTOMY      RUPERT secondary to pelvic pain, sacrocolpoplexy    OPEN REDUCTION AND INTERNAL FIXATION (ORIF) OF FRACTURE OF DISTAL RADIUS Right 09/19/2019    Procedure: ORIF, FRACTURE, RADIUS, DISTAL - right accumed;  Surgeon: Deejay Bragg MD;  Location: [facility] OR 2ND FLR;  Service: Orthopedics;  Laterality: Right;     Family History   Problem Relation Name Age of Onset    Hypertension Mother      Arthritis Mother      Thyroid disease Mother      Glaucoma Mother      Arthritis Father      Hypertension Father      Thyroid disease Sister      Breast cancer Sister  76    Thyroid disease Sister      Thyroid disease Sister      No Known Problems Sister      No Known Problems Sister      No Known Problems Sister      Thyroid disease Brother      Diabetes Brother      Cancer Brother          prostate    Cancer Brother          prostate    No Known Problems Brother      No Known Problems Brother      No Known Problems Brother      No Known Problems Brother      Lupus Daughter Francisca     Arthritis Son Jitendra     Hypertension Son Jitendra     Hypertension Son Tremayne     Glaucoma Maternal Aunt      Glaucoma Maternal Aunt      No Known Problems Maternal Uncle      No Known Problems Paternal Aunt      No Known Problems Paternal Uncle      Stroke Maternal Grandmother      No Known Problems Maternal Grandfather      No Known Problems Paternal Grandmother      No Known Problems Paternal  Grandfather      Colon cancer Neg Hx      Ovarian cancer Neg Hx      Amblyopia Neg Hx      Blindness Neg Hx      Cataracts Neg Hx      Macular degeneration Neg Hx      Retinal detachment Neg Hx      Strabismus Neg Hx      Esophageal cancer Neg Hx       Social History     Socioeconomic History    Marital status:    Tobacco Use    Smoking status: Never    Smokeless tobacco: Never   Substance and Sexual Activity    Alcohol use: No    Drug use: No    Sexual activity: Not Currently     Partners: Male     Birth control/protection: Surgical   Social History Narrative    N/a per the patient.      Social Determinants of Health     Financial Resource Strain: Low Risk  (1/15/2024)    Overall Financial Resource Strain (CARDIA)     Difficulty of Paying Living Expenses: Not hard at all   Food Insecurity: No Food Insecurity (1/15/2024)    Hunger Vital Sign     Worried About Running Out of Food in the Last Year: Never true     Ran Out of Food in the Last Year: Never true   Transportation Needs: No Transportation Needs (1/15/2024)    PRAPARE - Transportation     Lack of Transportation (Medical): No     Lack of Transportation (Non-Medical): No   Physical Activity: Sufficiently Active (1/15/2024)    Exercise Vital Sign     Days of Exercise per Week: 4 days     Minutes of Exercise per Session: 120 min   Stress: No Stress Concern Present (1/15/2024)    [place] [employer] - Occupational Stress Questionnaire     Feeling of Stress : Only a little   Recent Concern: Stress - Stress Concern Present (10/24/2023)    [place] [employer] - Occupational Stress Questionnaire     Feeling of Stress : To some extent   Housing Stability: Low Risk  (1/15/2024)    Housing Stability Vital Sign     Unable to Pay for Housing in the Last Year: No     Number of Places Lived in the Last Year: 1     Unstable Housing in the Last Year: No     Medication List with Changes/Refills   Current Medications    BENAZEPRIL  (LOTENSIN) 20 MG TABLET    TAKE 1 TABLET BY MOUTH ONCE  DAILY    CHOLECALCIFEROL, VITAMIN D3, (D3-2000) 2,000 UNIT CAP    Take 1 capsule (2,000 Units total) by mouth every other day.    FUROSEMIDE (LASIX) 20 MG TABLET    TAKE 1 TABLET BY MOUTH 4 DAYS  PER WEEK ON MONDAY , WEDNESDAY , FRIDAY , SUNDAY AND 2 TABLETS 3  DAYS WEEKLY ON TUESDAY ,  THURSDAY , SATURDAY     Review of patient's allergies indicates:   Allergen Reactions    Codeine Hallucinations     Other reaction(s): Hallucinations    Iodinated contrast media Rash    Penicillins Hives and Rash    Contrast media Itching and Rash       Physical Exam:   There is no height or weight on file to calculate BMI.    Physical Exam  Musculoskeletal:      Left wrist: No deformity or effusion.      Left hand: No deformity. Normal sensation of the ulnar distribution, median distribution and radial distribution.       Detailed MSK exam:           Left Hand/Wrist Exam     Inspection   Effusion: Wrist - absent Hand -  absent  Deformity: Wrist - absent Hand -  absent    Range of Motion     Wrist   Extension:  normal   Flexion:  normal   Pronation:  normal   Supination:  normal     Tests   Phalens Sign: negative  Tinel's sign (median nerve): negative    Atrophy  Thenar:  Negative  Hypothenar:  negative    Other     Sensory Exam  Median Distribution: normal  Ulnar Distribution: normal  Radial Distribution: normal      Left Elbow Exam     Tests   Tinel's sign (cubital tunnel): negative        Muscle Strength   Left Upper Extremity  Wrist extension: 5/5   Wrist flexion: 5/5   :  5/5   Pinch Mechanism: 5/5       Imaging:    No new imaging today.     Patient Instructions   Assessment:  Carmen Wang is a 79 y.o. female with a chief complaint of Pain of the Left Wrist and Pain of the Left Hand    Encounter Diagnoses   Name Primary?    Paresthesias in left hand Yes    Left carpal tunnel syndrome       Plan:  Patient with a h/o left carpal tunnel syndrome, s/p carpal tunnel  injection December of 2023 with significant interval improvement.  However, patient is having recurrence of hand left discomfort, particularly with the paresthesias of her 2nd through 5th digit, which is different from her prior carpal tunnel type symptoms.  Suspect carpal tunnel still the underlying cause of her discomfort.  Resume wearing your wrist brace, as often to our able, and especially at night.    Recommend to use topical diclofenac gel over your hand, 2-3 times per day.  If not improving, may consider for repeat carpal tunnel injection vs EMG/NCS.    Follow-up:  As needed or sooner if there are any problems between now and then.    Thank you for choosing [facility] and Dr. Jay Jay Gonzalez for your orthopedic & sports medicine care. It is our goal to provide you with exceptional care that will help keep you healthy, active, and get you back in the game.    Please do not hesitate to reach out to us via email, phone, or MyChart with any questions, concerns, or feedback.    If you are experiencing pain/discomfort ,or have questions after 5pm and would like to be connected to the [facility] on-call team, please call this number and specify which Sports Medicine provider is treating you: [phone]               A copy of today's visit note has been sent to the referring provider.           Jay Jay Gonzalez MD  Primary Care Sports Medicine    Disclaimer: This note was prepared using a voice recognition system and is likely to have sound alike errors within the text.

## 2024-08-19 NOTE — PATIENT INSTRUCTIONS
Assessment:  Carmen Wang is a 79 y.o. female with a chief complaint of Pain of the Left Wrist and Pain of the Left Hand    Encounter Diagnoses   Name Primary?    Paresthesias in left hand Yes    Left carpal tunnel syndrome       Plan:  Patient with a h/o left carpal tunnel syndrome, s/p carpal tunnel injection December of 2023 with significant interval improvement.  However, patient is having recurrence of hand left discomfort, particularly with the paresthesias of her 2nd through 5th digit, which is different from her prior carpal tunnel type symptoms.  Suspect carpal tunnel still the underlying cause of her discomfort.  Resume wearing your wrist brace, as often to our able, and especially at night.    Recommend to use topical diclofenac gel over your hand, 2-3 times per day.  If not improving, may consider for repeat carpal tunnel injection vs EMG/NCS.    Follow-up:  As needed or sooner if there are any problems between now and then.    Thank you for choosing [facility] and Dr. Jay Jay Gonzalez for your orthopedic & sports medicine care. It is our goal to provide you with exceptional care that will help keep you healthy, active, and get you back in the game.    Please do not hesitate to reach out to us via email, phone, or MyChart with any questions, concerns, or feedback.    If you are experiencing pain/discomfort ,or have questions after 5pm and would like to be connected to the [facility] on-call team, please call this number and specify which Sports Medicine provider is treating you: [phone]

## 2024-09-03 ENCOUNTER — OFFICE VISIT (OUTPATIENT)
Dept: OTOLARYNGOLOGY | Facility: CLINIC | Age: 79
End: 2024-09-03
Payer: MEDICARE

## 2024-09-03 VITALS — WEIGHT: 186.31 LBS | HEIGHT: 66 IN | BODY MASS INDEX: 29.94 KG/M2

## 2024-09-03 DIAGNOSIS — R59.0 POSTERIOR CERVICAL LYMPHADENOPATHY: Primary | ICD-10-CM

## 2024-09-03 PROCEDURE — 1101F PT FALLS ASSESS-DOCD LE1/YR: CPT | Mod: CPTII,S$GLB,, | Performed by: OTOLARYNGOLOGY

## 2024-09-03 PROCEDURE — 1126F AMNT PAIN NOTED NONE PRSNT: CPT | Mod: CPTII,S$GLB,, | Performed by: OTOLARYNGOLOGY

## 2024-09-03 PROCEDURE — 3288F FALL RISK ASSESSMENT DOCD: CPT | Mod: CPTII,S$GLB,, | Performed by: OTOLARYNGOLOGY

## 2024-09-03 PROCEDURE — 99214 OFFICE O/P EST MOD 30 MIN: CPT | Mod: S$GLB,,, | Performed by: OTOLARYNGOLOGY

## 2024-09-03 PROCEDURE — 1157F ADVNC CARE PLAN IN RCRD: CPT | Mod: CPTII,S$GLB,, | Performed by: OTOLARYNGOLOGY

## 2024-09-03 PROCEDURE — 99999 PR PBB SHADOW E&M-EST. PATIENT-LVL II: CPT | Mod: PBBFAC,,, | Performed by: OTOLARYNGOLOGY

## 2024-09-03 PROCEDURE — 1159F MED LIST DOCD IN RCRD: CPT | Mod: CPTII,S$GLB,, | Performed by: OTOLARYNGOLOGY

## 2024-09-03 NOTE — PROGRESS NOTES
"Referring Provider:    No referring provider defined for this encounter.  Subjective:   Patient: Carmen Wang [id number], :1945   Visit date:9/3/2024 11:41 AM    Chief Complaint:  enlarged lymph node (Pt states she has an enlarged lymph node left side neck x1.5 months. No other c/o at this time)    HPI:    Prior notes reviewed by myself.  Clinical documentation obtained by nursing staff reviewed.       79-year-old female presents for evaluation of palpable lymph node.  She has noticed a palpable lymph node on the left side of her neck for the last 6 weeks.  She has minimal tenderness in his area.  She does not think that the lymph node is getting larger.  She denies any night sweats, unintentional weight loss, new lumps or bumps, odynophagia, dysphagia.  She did have an ultrasound performed which has noted below.      Objective:     Physical Exam:  Vitals:  Ht 5' 5.5" (1.664 m)   Wt 84.5 kg (186 lb 4.6 oz)   LMP 1982 (Approximate)   BMI 30.53 kg/m²   General appearance:  Well developed, well nourished    Ears:  Otoscopy of external auditory canals and tympanic membranes was normal, clinical speech reception thresholds grossly intact, no mass/lesion of auricle.    Nose:  No masses/lesions of external nose, nasal mucosa, septum, and turbinates were within normal limits.    Mouth:  No mass/lesion of lips, teeth, gums, hard/soft palate, tongue, tonsils, or oropharynx.    Neck & Lymphatics:  small palpable left level V lymph node, mobile, no neck mass/crepitus/ asymmetry, trachea is midline, no thyroid enlargement/tenderness/mass.        [x]  Data Reviewed:    Lab Results   Component Value Date    WBC 6.66 2024    HGB 11.9 (L) 2024    HCT 41.8 2024    MCV 84 2024    EOSINOPHIL 2.3 2024         [x]  Independent interpretation of test: 1.0 cm LN        US Soft Tissue Head Neck  Order: [id number]  Status: Final result       Visible to patient: Yes (seen)       Next appt: " 10/29/2024 at 10:45 AM in Otolaryngology (Grzegorz Sabillon MD)       Dx: Neck mass    0 Result Notes  Details    Reading Physician Reading Date Result Priority   Sofiya Sanchez MD  [phone] 7/18/2024 Routine     Narrative & Impression  EXAMINATION:  US SOFT TISSUE HEAD NECK     CLINICAL HISTORY:  Right posterior cervical mass lump; Localized swelling, mass and lump, neck, left posterior neck lump x1 week     TECHNIQUE:  Ultrasound of the left posterior neck performed at site of patient's palpable abnormality.     COMPARISON:  None.     FINDINGS:  At site of patient's palpable concern in the left neck, there is a 1.0 x 0.7 x 0.9 cm oval hypoechoic mass with eccentric increased echogenicity with vascularity.  Finding compatible with a lymph node with preservation of normal fatty hilum, noting the cortex appears mildly prominent.     A similar appearing hypoechoic mass measuring 0.7 x 0.5 x 0.3 cm with central eccentric echogenicity with vascularity is present within the right posterior neck.  Finding also compatible with a lymph node with preservation of normal fatty hilum.  No significant cortical thickening.     Impression:     Bilateral posterior cervical lymph nodes without pathologic james enlargement.  Cortex of the left-sided node appears mildly prominent, possibly reactive.        Electronically signed by:Sofiya Sanchez  Date:                                            07/18/2024  Time:                                           11:04           Exam Ended: 07/18/24 09:37 CDT Last Resulted: 07/18/24 11:04 CDT           Assessment & Plan:   Posterior cervical lymphadenopathy          She has a small palpable lymph node in her left posterior neck.  This is mobile and has benign characteristics.  It also is not pathologically enlarged.  I suspect that this is a reactive lymph node.  We discussed options including antibiotics, observation and excision.  We agreed to continue with observation have her  follow-up in 8 weeks.  She will continue to self-monitor and let us know if she notices any acute changes    Grzegorz Sabillon M.D.  Department of Otolaryngology - Head & Neck Surgery  [address]  [place] [id number]  P: [phone]  F: [phone]        DISCLAIMER: This note was prepared with [employer] voice recognition transcription software. Garbled syntax, mangled pronouns, and other bizarre constructions may be attributed to that software system. While efforts were made to correct any mistakes made by this voice recognition program, some errors and/or omissions may remain in the note that were missed when the note was originally created.

## 2024-09-18 ENCOUNTER — PATIENT MESSAGE (OUTPATIENT)
Dept: INTERNAL MEDICINE | Facility: CLINIC | Age: 79
End: 2024-09-18
Payer: MEDICARE

## 2024-10-04 ENCOUNTER — PATIENT MESSAGE (OUTPATIENT)
Dept: NEPHROLOGY | Facility: CLINIC | Age: 79
End: 2024-10-04
Payer: MEDICARE

## 2024-10-04 DIAGNOSIS — N18.31 STAGE 3A CHRONIC KIDNEY DISEASE: Primary | ICD-10-CM

## 2024-10-07 DIAGNOSIS — N18.31 STAGE 3A CHRONIC KIDNEY DISEASE: Primary | ICD-10-CM

## 2024-10-09 RX ORDER — FUROSEMIDE 20 MG/1
TABLET ORAL
Qty: 156 TABLET | Refills: 3 | Status: SHIPPED | OUTPATIENT
Start: 2024-10-09

## 2024-10-09 NOTE — TELEPHONE ENCOUNTER
No care due was identified.  [facility] Embedded Care Due Messages. Reference number: [id number].   10/08/2024 9:23:33 PM CDT

## 2024-10-09 NOTE — TELEPHONE ENCOUNTER
Refill Routing Note   Medication(s) are not appropriate for processing by [facility] for the following reason(s):        Required vitals abnormal    [facility] action(s):  Defer             Appointments  past 12m or future 3m with PCP    Date Provider   Last Visit   7/16/2024 Deniz Redman MD   Next Visit   Visit date not found Deniz Redman MD   ED visits in past 90 days: 0        Note composed:9:27 AM 10/09/2024

## 2024-10-21 ENCOUNTER — PATIENT MESSAGE (OUTPATIENT)
Dept: NEPHROLOGY | Facility: CLINIC | Age: 79
End: 2024-10-21
Payer: MEDICARE

## 2024-10-21 ENCOUNTER — LAB VISIT (OUTPATIENT)
Dept: LAB | Facility: HOSPITAL | Age: 79
End: 2024-10-21
Attending: INTERNAL MEDICINE
Payer: MEDICARE

## 2024-10-21 DIAGNOSIS — N18.31 STAGE 3A CHRONIC KIDNEY DISEASE: ICD-10-CM

## 2024-10-21 LAB
ALBUMIN SERPL BCP-MCNC: 3.4 G/DL (ref 3.5–5.2)
ANION GAP SERPL CALC-SCNC: 9 MMOL/L (ref 8–16)
BUN SERPL-MCNC: 17 MG/DL (ref 8–23)
CALCIUM SERPL-MCNC: 10 MG/DL (ref 8.7–10.5)
CHLORIDE SERPL-SCNC: 104 MMOL/L (ref 95–110)
CO2 SERPL-SCNC: 26 MMOL/L (ref 23–29)
CREAT SERPL-MCNC: 1.1 MG/DL (ref 0.5–1.4)
CREAT UR-MCNC: 41 MG/DL (ref 15–325)
EST. GFR  (NO RACE VARIABLE): 51.4 ML/MIN/1.73 M^2
GLUCOSE SERPL-MCNC: 102 MG/DL (ref 70–110)
HCT VFR BLD AUTO: 42.4 % (ref 37–48.5)
HGB BLD-MCNC: 13.2 G/DL (ref 12–16)
PHOSPHATE SERPL-MCNC: 3 MG/DL (ref 2.7–4.5)
POTASSIUM SERPL-SCNC: 3.9 MMOL/L (ref 3.5–5.1)
PROT UR-MCNC: <7 MG/DL (ref 0–15)
PROT/CREAT UR: NORMAL MG/G{CREAT} (ref 0–0.2)
PTH-INTACT SERPL-MCNC: 105.2 PG/ML (ref 9–77)
SODIUM SERPL-SCNC: 139 MMOL/L (ref 136–145)

## 2024-10-21 PROCEDURE — 82570 ASSAY OF URINE CREATININE: CPT | Performed by: INTERNAL MEDICINE

## 2024-10-21 PROCEDURE — 85018 HEMOGLOBIN: CPT | Performed by: INTERNAL MEDICINE

## 2024-10-21 PROCEDURE — 36415 COLL VENOUS BLD VENIPUNCTURE: CPT | Mod: PN | Performed by: INTERNAL MEDICINE

## 2024-10-21 PROCEDURE — 83970 ASSAY OF PARATHORMONE: CPT | Performed by: INTERNAL MEDICINE

## 2024-10-21 PROCEDURE — 80069 RENAL FUNCTION PANEL: CPT | Performed by: INTERNAL MEDICINE

## 2024-10-21 PROCEDURE — 85014 HEMATOCRIT: CPT | Performed by: INTERNAL MEDICINE

## 2024-10-22 ENCOUNTER — OFFICE VISIT (OUTPATIENT)
Dept: NEPHROLOGY | Facility: CLINIC | Age: 79
End: 2024-10-22
Payer: MEDICARE

## 2024-10-22 DIAGNOSIS — N18.31 STAGE 3A CHRONIC KIDNEY DISEASE: Primary | ICD-10-CM

## 2024-10-22 DIAGNOSIS — I10 ESSENTIAL HYPERTENSION: ICD-10-CM

## 2024-10-22 PROCEDURE — 1157F ADVNC CARE PLAN IN RCRD: CPT | Mod: CPTII,95,, | Performed by: INTERNAL MEDICINE

## 2024-10-22 PROCEDURE — 99214 OFFICE O/P EST MOD 30 MIN: CPT | Mod: 95,,, | Performed by: INTERNAL MEDICINE

## 2024-10-22 RX ORDER — BENAZEPRIL HYDROCHLORIDE 40 MG/1
40 TABLET ORAL DAILY
Qty: 90 TABLET | Refills: 3 | Status: SHIPPED | OUTPATIENT
Start: 2024-10-22

## 2024-11-04 ENCOUNTER — PATIENT MESSAGE (OUTPATIENT)
Dept: ENDOCRINOLOGY | Facility: CLINIC | Age: 79
End: 2024-11-04
Payer: MEDICARE

## 2024-11-11 ENCOUNTER — LAB VISIT (OUTPATIENT)
Dept: LAB | Facility: HOSPITAL | Age: 79
End: 2024-11-11
Attending: INTERNAL MEDICINE
Payer: MEDICARE

## 2024-11-11 DIAGNOSIS — D50.0 IRON DEFICIENCY ANEMIA DUE TO CHRONIC BLOOD LOSS: ICD-10-CM

## 2024-11-11 LAB
BASOPHILS # BLD AUTO: 0.03 K/UL (ref 0–0.2)
BASOPHILS NFR BLD: 0.5 % (ref 0–1.9)
DIFFERENTIAL METHOD BLD: ABNORMAL
EOSINOPHIL # BLD AUTO: 0.2 K/UL (ref 0–0.5)
EOSINOPHIL NFR BLD: 3.5 % (ref 0–8)
ERYTHROCYTE [DISTWIDTH] IN BLOOD BY AUTOMATED COUNT: 18 % (ref 11.5–14.5)
FERRITIN SERPL-MCNC: 71 NG/ML (ref 20–300)
HCT VFR BLD AUTO: 44.2 % (ref 37–48.5)
HGB BLD-MCNC: 13.5 G/DL (ref 12–16)
IMM GRANULOCYTES # BLD AUTO: 0.01 K/UL (ref 0–0.04)
IMM GRANULOCYTES NFR BLD AUTO: 0.2 % (ref 0–0.5)
IRON SERPL-MCNC: 108 UG/DL (ref 30–160)
LYMPHOCYTES # BLD AUTO: 2.3 K/UL (ref 1–4.8)
LYMPHOCYTES NFR BLD: 37.9 % (ref 18–48)
MCH RBC QN AUTO: 25.4 PG (ref 27–31)
MCHC RBC AUTO-ENTMCNC: 30.5 G/DL (ref 32–36)
MCV RBC AUTO: 83 FL (ref 82–98)
MONOCYTES # BLD AUTO: 0.7 K/UL (ref 0.3–1)
MONOCYTES NFR BLD: 11.4 % (ref 4–15)
NEUTROPHILS # BLD AUTO: 2.8 K/UL (ref 1.8–7.7)
NEUTROPHILS NFR BLD: 46.5 % (ref 38–73)
NRBC BLD-RTO: 0 /100 WBC
PLATELET # BLD AUTO: 259 K/UL (ref 150–450)
PMV BLD AUTO: 12.1 FL (ref 9.2–12.9)
RBC # BLD AUTO: 5.31 M/UL (ref 4–5.4)
SATURATED IRON: 24 % (ref 20–50)
TOTAL IRON BINDING CAPACITY: 443 UG/DL (ref 250–450)
TRANSFERRIN SERPL-MCNC: 299 MG/DL (ref 200–375)
WBC # BLD AUTO: 5.99 K/UL (ref 3.9–12.7)

## 2024-11-11 PROCEDURE — 82728 ASSAY OF FERRITIN: CPT | Performed by: INTERNAL MEDICINE

## 2024-11-11 PROCEDURE — 85025 COMPLETE CBC W/AUTO DIFF WBC: CPT | Performed by: INTERNAL MEDICINE

## 2024-11-11 PROCEDURE — 36415 COLL VENOUS BLD VENIPUNCTURE: CPT | Mod: PN | Performed by: INTERNAL MEDICINE

## 2024-11-11 PROCEDURE — 83540 ASSAY OF IRON: CPT | Performed by: INTERNAL MEDICINE

## 2024-11-22 ENCOUNTER — OFFICE VISIT (OUTPATIENT)
Dept: ENDOCRINOLOGY | Facility: CLINIC | Age: 79
End: 2024-11-22
Payer: MEDICARE

## 2024-11-22 VITALS — WEIGHT: 188.5 LBS | SYSTOLIC BLOOD PRESSURE: 153 MMHG | BODY MASS INDEX: 30.89 KG/M2 | DIASTOLIC BLOOD PRESSURE: 89 MMHG

## 2024-11-22 DIAGNOSIS — E21.0 PRIMARY HYPERPARATHYROIDISM: Primary | ICD-10-CM

## 2024-11-22 DIAGNOSIS — E04.2 MULTINODULAR THYROID: ICD-10-CM

## 2024-11-22 DIAGNOSIS — N18.31 STAGE 3A CHRONIC KIDNEY DISEASE: ICD-10-CM

## 2024-11-22 DIAGNOSIS — E27.9 ADRENAL NODULE: ICD-10-CM

## 2024-11-22 PROCEDURE — 99999 PR PBB SHADOW E&M-EST. PATIENT-LVL III: CPT | Mod: PBBFAC,,, | Performed by: INTERNAL MEDICINE

## 2024-11-22 NOTE — ASSESSMENT & PLAN NOTE
Chronic kidney disease is an indication for parathyroidectomy.  However, a recent study showed no significant benefit with regards to kidney function in patients undergoing surgery for hyperparathyroidism. Therefore, we will not pursue surgery on that basis.   Plan: Recommended patient consult with his orthopedic doctor for treatment Detail Level: Zone

## 2024-11-22 NOTE — ASSESSMENT & PLAN NOTE
Lab work is diagnostic of primary hyperparathyroidism.    Discussed surgical indications, including osteoporosis, hypercalciuria/kidney stones, reduced GFR.  Currently, her only surgical indication is reduced GFR.  We discussed the results of a recent study which showed no difference in long-term kidney function in primary hyperparathyroid patients who undergo surgery versus those who forego surgery.  With that said, she is still active and relatively healthy, so surgery is still an option.     She would be amenable to surgery if it were necessary.  However, if it is not deemed to be necessary then she does not want to pursue surgery at this time.  For now, we will continue monitoring serum calcium levels over time.    Repeat DXA in 5/2025 prior to next visit - include spine/hip and forearm.

## 2024-11-22 NOTE — ASSESSMENT & PLAN NOTE
Reviewed recent thyroid ultrasound.  None of the thyroid nodules meet criteria for FNA.   Recheck thyroid U/S in April, 2025 prior to next visit.

## 2024-11-22 NOTE — PROGRESS NOTES
Subjective:      Patient ID: Carmen Wang is a 79 y.o.    Chief Complaint:  Thyroid nodule, Hypercalcemia     Patient Active Problem List   Diagnosis    Essential hypertension    Nuclear sclerosis - Both Eyes    Cortical senile cataract - Both Eyes    Stage 3a chronic kidney disease    Primary hyperparathyroidism    Obesity (BMI 30.0-34.9)    Multinodular thyroid    Microcytic anemia    Syncope    Abnormal liver CT    Adrenal nodule    Iron deficiency anemia    Irritable bowel syndrome with diarrhea      The patient's last visit with me was on 2/2/2024.      With regards to the thyroid nodule:    Thyroid nodules found several years ago. None have met criteria for FNA.    Last ultrasound in 2/2024 was independently reviewed:     Few cystic/spongiform nodules in both lobes are stable since the prior U/S.  Solid isoechoic nodules in both inferior lobes are also stable.  No nodules meet FNA criteria.      No   Yes  [x]    [] Preexisting thyroid disease    Compressive Symptoms:  No  Yes  [x]    [] Anterior neck pressure  [x]    [] Dysphagia  [x]    [] Voice changes    Risk Factors:  No   Yes  [x]    [] Radiation to head or neck for any treatment of cancer or exposure to radiation  [x]    [] Tobacco use  [x]    [] Family history of thyroid cancer     FH: mom, siblings with thyroid disease. One sister with parathyroid disease -     Previous biopsy results:  None    Lab Results   Component Value Date    TSH 0.919 07/19/2023    TSH 2.090 07/13/2023    TSH 1.568 01/11/2023    TSH 2.453 01/13/2022    TSH 1.801 12/03/2020       With regards to hyperparathyroidism:             Lab Results   Component Value Date    CREATININE 1.1 10/21/2024    CREATININE 1.1 07/11/2024    CREATININE 1.1 01/17/2024    EGFRNORACEVR 51.4 (A) 10/21/2024    EGFRNORACEVR 51.4 (A) 07/11/2024    EGFRNORACEVR 51.8 (A) 01/17/2024         She has CKD stage 3 with most recent Cr. 1.1. Kidney function has been stable since 2014.    At her last visit,   "Pinedo recommended stopping HCTZ and repeating labs in 3 months.   PTH and serum calcium remained elevated. Urine calcium was on the higher side as well, confirming primary hyperparathyroidism.        ASYMPTOMATIC   -Family history of hypercalcemia? DENIES     -Calcium supplements?   Taking a MV     -Lithium?   NO     -GFR< 60  CKD 3   -Kidney stones?  DENIES     DXA spine/hip in 2023 was normal (lowest T-score -0.8 at the femoral neck).  -History of Fragility Fractures? DENIES        With regards to the adrenal incidentaloma:     Imaging evaluation:    Was found to have an incidental adrenal nodule on CT scan in June, 2023    1.2 cm left adrenal nodule that enhances with IV contrast.    MRI with adrenal protocol was done in 2/2024 and was consistent with benign adenoma.  Due for repeat MRI next year.      Functional evaluation:       1 mg overnight DST: WNL  Lab Results   Component Value Date    LABCORT 1.20 (L) 02/16/2024    ALDOSTERONE 12.2 01/08/2018    LABRENI 6.3 01/08/2018     Imaging characteristics not c/w pheo so metanephrines aren't needed.    No results found for: "BXBOEZBN52QT"    No results found for: "ACTH", "DHEASO4"    Lab Results   Component Value Date    K 3.9 10/21/2024    K 3.8 07/11/2024    K 3.6 01/17/2024    CO2 26 10/21/2024    CO2 27 07/11/2024    CO2 30 (H) 01/17/2024         Clinical History:    History of hypertension?: Yes    Hypertension Medications               benazepriL (LOTENSIN) 20 MG tablet TAKE 1 TABLET BY MOUTH ONCE  DAILY    furosemide (LASIX) 20 MG tablet 1 tablet p.o. 4 days a week, Monday, Wednesday, Friday, Sunday and 2 tablets p.o. 3 days a week, Tuesday, Thursday, Saturday               Objective:     BP (!) 153/89 (BP Location: Right arm, Patient Position: Sitting)   Wt 85.5 kg (188 lb 7.9 oz)   LMP 01/01/1982 (Approximate)   BMI 30.89 kg/m²     Body mass index is 30.89 kg/m².      Physical Exam  Vitals and nursing note reviewed.   Constitutional:       General: She " "is not in acute distress.     Appearance: She is well-developed. She is not ill-appearing.   HENT:      Head: Normocephalic and atraumatic.   Eyes:      General:         Right eye: No discharge.         Left eye: No discharge.      Conjunctiva/sclera: Conjunctivae normal.   Neck:      Thyroid: No thyromegaly.      Trachea: No tracheal deviation.   Pulmonary:      Effort: Pulmonary effort is normal. No respiratory distress.   Musculoskeletal:      Comments: No digital clubbing or extremity cyanosis   Neurological:      Mental Status: She is alert and oriented to person, place, and time.      Coordination: Coordination normal.   Psychiatric:         Mood and Affect: Mood normal.         Behavior: Behavior normal.                Lab Review:   No results found for: "HGBA1C"  Lab Results   Component Value Date    CHOL 183 07/11/2024    HDL 54 07/11/2024    LDLCALC 115.8 07/11/2024    TRIG 66 07/11/2024    CHOLHDL 29.5 07/11/2024     Lab Results   Component Value Date     10/21/2024    K 3.9 10/21/2024     10/21/2024    CO2 26 10/21/2024     10/21/2024    BUN 17 10/21/2024    CREATININE 1.1 10/21/2024    CALCIUM 10.0 10/21/2024    PROT 7.2 10/04/2023    ALBUMIN 3.4 (L) 10/21/2024    BILITOT 0.2 10/04/2023    ALKPHOS 82 10/04/2023    AST 31 10/04/2023    ALT 28 10/04/2023    ANIONGAP 9 10/21/2024    ESTGFRAFRICA 50.7 (A) 05/16/2022    EGFRNONAA 44.0 (A) 05/16/2022    TSH 0.919 07/19/2023     Vit D, 25-Hydroxy   Date Value Ref Range Status   07/13/2023 57 30 - 96 ng/mL Final     Comment:     Vitamin D deficiency.........<10 ng/mL                              Vitamin D insufficiency......10-29 ng/mL       Vitamin D sufficiency........> or equal to 30 ng/mL  Vitamin D toxicity............>100 ng/mL         Assessment and Plan     Primary hyperparathyroidism  Lab work is diagnostic of primary hyperparathyroidism.    Discussed surgical indications, including osteoporosis, hypercalciuria/kidney stones, reduced " GFR.  Currently, her only surgical indication is reduced GFR.  We discussed the results of a recent study which showed no difference in long-term kidney function in primary hyperparathyroid patients who undergo surgery versus those who forego surgery.  With that said, she is still active and relatively healthy, so surgery is still an option.     She would be amenable to surgery if it were necessary.  However, if it is not deemed to be necessary then she does not want to pursue surgery at this time.  For now, we will continue monitoring serum calcium levels over time.    Repeat DXA in 5/2025 prior to next visit - include spine/hip and forearm.    Multinodular thyroid  Reviewed recent thyroid ultrasound.  None of the thyroid nodules meet criteria for FNA.   Recheck thyroid U/S in April, 2025 prior to next visit.    Adrenal nodule  Reviewed incidence   Discussed that indications for surgery are size, concerning characteristics and functionality (pheochromocytoma or cortisol excess).      Will not pursue further biochemical workup in the absence of a clinical change.    Check MRI before next visit in 6 months. If the nodule is not changed, I don't plan to pursue additional imaging.    Stage 3a chronic kidney disease  Chronic kidney disease is an indication for parathyroidectomy.  However, a recent study showed no significant benefit with regards to kidney function in patients undergoing surgery for hyperparathyroidism. Therefore, we will not pursue surgery on that basis.    Follow up in about 6 months (around 5/22/2025).

## 2024-11-25 ENCOUNTER — PATIENT MESSAGE (OUTPATIENT)
Dept: INTERNAL MEDICINE | Facility: CLINIC | Age: 79
End: 2024-11-25
Payer: MEDICARE

## 2024-11-25 DIAGNOSIS — F41.9 ANXIETY: Primary | ICD-10-CM

## 2024-12-05 ENCOUNTER — OFFICE VISIT (OUTPATIENT)
Dept: OPHTHALMOLOGY | Facility: CLINIC | Age: 79
End: 2024-12-05
Payer: MEDICARE

## 2024-12-05 DIAGNOSIS — H52.203 ASTIGMATISM OF BOTH EYES WITH PRESBYOPIA: ICD-10-CM

## 2024-12-05 DIAGNOSIS — H25.813 COMBINED FORM OF AGE-RELATED CATARACT, BOTH EYES: Primary | ICD-10-CM

## 2024-12-05 DIAGNOSIS — H43.812 POSTERIOR VITREOUS DETACHMENT OF LEFT EYE: ICD-10-CM

## 2024-12-05 DIAGNOSIS — H52.4 ASTIGMATISM OF BOTH EYES WITH PRESBYOPIA: ICD-10-CM

## 2024-12-05 PROCEDURE — 1159F MED LIST DOCD IN RCRD: CPT | Mod: CPTII,S$GLB,, | Performed by: OPTOMETRIST

## 2024-12-05 PROCEDURE — 1160F RVW MEDS BY RX/DR IN RCRD: CPT | Mod: CPTII,S$GLB,, | Performed by: OPTOMETRIST

## 2024-12-05 PROCEDURE — 99999 PR PBB SHADOW E&M-EST. PATIENT-LVL II: CPT | Mod: PBBFAC,,, | Performed by: OPTOMETRIST

## 2024-12-05 PROCEDURE — 92014 COMPRE OPH EXAM EST PT 1/>: CPT | Mod: S$GLB,,, | Performed by: OPTOMETRIST

## 2024-12-05 PROCEDURE — 1157F ADVNC CARE PLAN IN RCRD: CPT | Mod: CPTII,S$GLB,, | Performed by: OPTOMETRIST

## 2024-12-05 NOTE — PROGRESS NOTES
HPI     Annual Exam            Comments: Vision changes since last eye exam?: no     Any eye pain today: no    Other ocular symptoms: no    Interested in contact lens fitting today? no                    Last edited by Lisa Perez on 12/5/2024  9:11 AM.            Assessment /Plan     For exam results, see Encounter Report.    1. Combined form of age-related cataract, both eyes  OS>OD, pt elect to trial Mrx for now.    2. Posterior vitreous detachment of left eye  Longstanding, RD precautions reviewed.     3. Astigmatism of both eyes with presbyopia  Eyeglass Final Rx       Eyeglass Final Rx         Sphere Cylinder Axis Add    Right Lisman +1.50 175 +2.50    Left -0.50 +2.00 178 +2.50      Type: PAL    Expiration Date: 12/5/2025                     RTC 1 yr for dilated eye exam or sooner if any changes to vision.   Discussed above and answered questions.

## 2024-12-10 ENCOUNTER — PATIENT MESSAGE (OUTPATIENT)
Dept: INTERNAL MEDICINE | Facility: CLINIC | Age: 79
End: 2024-12-10
Payer: MEDICARE

## 2024-12-10 ENCOUNTER — OFFICE VISIT (OUTPATIENT)
Dept: URGENT CARE | Facility: CLINIC | Age: 79
End: 2024-12-10
Payer: MEDICARE

## 2024-12-10 VITALS
SYSTOLIC BLOOD PRESSURE: 130 MMHG | HEIGHT: 66 IN | HEART RATE: 78 BPM | WEIGHT: 187 LBS | BODY MASS INDEX: 30.05 KG/M2 | OXYGEN SATURATION: 99 % | TEMPERATURE: 98 F | RESPIRATION RATE: 18 BRPM | DIASTOLIC BLOOD PRESSURE: 73 MMHG

## 2024-12-10 DIAGNOSIS — R30.0 DYSURIA: Primary | ICD-10-CM

## 2024-12-10 DIAGNOSIS — N30.90 CYSTITIS: Primary | ICD-10-CM

## 2024-12-10 LAB
BILIRUBIN, UA POC OHS: NEGATIVE
BLOOD, UA POC OHS: NEGATIVE
CLARITY, UA POC OHS: CLEAR
COLOR, UA POC OHS: COLORLESS
GLUCOSE, UA POC OHS: NEGATIVE
KETONES, UA POC OHS: NEGATIVE
LEUKOCYTES, UA POC OHS: NEGATIVE
NITRITE, UA POC OHS: NEGATIVE
PH, UA POC OHS: 6
PROTEIN, UA POC OHS: NEGATIVE
SPECIFIC GRAVITY, UA POC OHS: 1.01
UROBILINOGEN, UA POC OHS: 0.2

## 2024-12-10 PROCEDURE — 87088 URINE BACTERIA CULTURE: CPT | Performed by: NURSE PRACTITIONER

## 2024-12-10 PROCEDURE — 81003 URINALYSIS AUTO W/O SCOPE: CPT | Mod: QW,S$GLB,, | Performed by: NURSE PRACTITIONER

## 2024-12-10 PROCEDURE — 87086 URINE CULTURE/COLONY COUNT: CPT | Performed by: NURSE PRACTITIONER

## 2024-12-10 PROCEDURE — 99213 OFFICE O/P EST LOW 20 MIN: CPT | Mod: S$GLB,,, | Performed by: NURSE PRACTITIONER

## 2024-12-10 RX ORDER — SULFAMETHOXAZOLE AND TRIMETHOPRIM 800; 160 MG/1; MG/1
1 TABLET ORAL 2 TIMES DAILY
Qty: 10 TABLET | Refills: 0 | Status: SHIPPED | OUTPATIENT
Start: 2024-12-10 | End: 2024-12-15

## 2024-12-10 NOTE — PATIENT INSTRUCTIONS

## 2024-12-10 NOTE — PROGRESS NOTES
"Subjective:      Patient ID: Carmen Wang is a 79 y.o. female.    Vitals:  height is 5' 6" (1.676 m) and weight is 84.8 kg (187 lb). Her temperature is 98.1 °F (36.7 °C). Her blood pressure is 130/73 and her pulse is 78. Her respiration is 18 and oxygen saturation is 99%.     Chief Complaint: No chief complaint on file.    79 yr old female presents to the Urgent Care with complaint of burning sensation with urination and urinary urgency x 2 weeks. Patient denies hx of UTI. Patient denies any CP, SOB, abdominal pain or fever.     Urinary Tract Infection   This is a new problem. The current episode started gradual onset. The problem has been unchanged. The quality of the pain is described as burning. The pain is at a severity of 0/10. The patient is experiencing no pain. There has been no fever. She is Not sexually active. Associated symptoms include frequency and urgency. Pertinent negatives include no behavior changes, chills, discharge, flank pain, hematuria, hesitancy, nausea, possible pregnancy, sweats, vomiting, weight loss, bubble bath use, constipation, rash or withholding. She has tried nothing for the symptoms. Her past medical history is significant for hypertension. There is no history of catheterization, diabetes insipidus, diabetes mellitus, genitourinary reflux, kidney stones or recurrent UTIs.       Constitution: Negative for chills, sweating, fatigue and fever.   Gastrointestinal:  Negative for nausea, vomiting and constipation.   Genitourinary:  Positive for dysuria, frequency and urgency. Negative for flank pain and hematuria.   Skin:  Negative for rash.      Objective:     Physical Exam   Constitutional: She is oriented to person, place, and time. She appears well-developed. She is cooperative. She is easily aroused.  Non-toxic appearance. She does not appear ill. No distress.   HENT:   Head: Normocephalic and atraumatic.   Nose: Nose abnormal.   Mouth/Throat: Mucous membranes are normal. "   Eyes: Conjunctivae and lids are normal.   Cardiovascular: Normal rate, regular rhythm and normal heart sounds.   Pulmonary/Chest: Effort normal and breath sounds normal. No respiratory distress.   Abdominal: Normal appearance and bowel sounds are normal. She exhibits no distension, no abdominal bruit, no pulsatile midline mass and no mass. Soft. There is no abdominal tenderness. There is no rigidity, no rebound, no guarding, no tenderness at McBurney's point, negative Valdez's sign, no left CVA tenderness and no right CVA tenderness.   Musculoskeletal: Normal range of motion.         General: No edema. Normal range of motion.   Neurological: She is alert, oriented to person, place, and time and easily aroused. She has normal strength.   Skin: Skin is warm, dry, intact, not diaphoretic and not pale.   Psychiatric: Her speech is normal and behavior is normal. Judgment and thought content normal.   Nursing note and vitals reviewed.      Assessment:     1. Dysuria      Results for orders placed or performed in visit on 12/10/24   POCT Urinalysis(Instrument)    Collection Time: 12/10/24  8:56 AM   Result Value Ref Range    Color, POC UA Colorless Yellow, Straw, Colorless    Clarity, POC UA Clear Clear    Glucose, POC UA Negative Negative    Bilirubin, POC UA Negative Negative    Ketones, POC UA Negative Negative    Spec Grav POC UA 1.015 1.005 - 1.030    Blood, POC UA Negative Negative    pH, POC UA 6.0 5.0 - 8.0    Protein, POC UA Negative Negative    Urobilinogen, POC UA 0.2 <=1.0    Nitrite, POC UA Negative Negative    WBC, POC UA Negative Negative     *Note: Due to a large number of results and/or encounters for the requested time period, some results have not been displayed. A complete set of results can be found in Results Review.       Plan:   ROS positive for: dysuria; ROS negative for fever. The patient is a non-toxic, afebrile, and well appearing female. On physical exam, there is no abdominal tenderness,  distention, peritoneal signs, or CVA tenderness.     Vital Signs: Reassuring  Lab\Radiology\Other Procedure RESULTS: UA negative; Urine Culture is pending.     Given the above findings, my overall impression is UTI. Given the above findings, I do not think the patient has pyelonephritis or STI .    Will treat empirically for UTI with Bactrim rx. Additional home care recommendations include increase water intake. The diagnosis, treatment plan, instructions for follow-up and reevaluation with her PCP as well as ED precautions have been discussed with the patient and she has verbalized an understanding of the information. All questions or concerns from the patient have been addressed.       Dysuria  -     POCT Urinalysis(Instrument)  -     Culture, Urine  -     sulfamethoxazole-trimethoprim 800-160mg (BACTRIM DS) 800-160 mg Tab; Take 1 tablet by mouth 2 (two) times daily. for 5 days  Dispense: 10 tablet; Refill: 0      Patient Instructions   If you were prescribed a narcotic or controlled medication, do not drive or operate heavy equipment or machinery while taking these medications.  You must understand that you've received an Urgent Care treatment only and that you may be released before all your medical problems are known or treated. You, the patient, will arrange for follow up care as instructed.  Follow up with your PCP or specialty clinic as directed within 2-5 days if not improved or as needed.  You can call [phone] to schedule an appointment with the appropriate provider.  If your condition worsens we recommend that you receive another evaluation at the emergency room immediately or contact your primary medical clinics after hours call service to discuss your concerns.  Please return here or go to the Emergency Department for any concerns or worsening of condition.

## 2024-12-11 ENCOUNTER — TELEPHONE (OUTPATIENT)
Dept: INTERNAL MEDICINE | Facility: CLINIC | Age: 79
End: 2024-12-11
Payer: MEDICARE

## 2024-12-11 NOTE — TELEPHONE ENCOUNTER
----- Message from Uriahjenniffer sent at 12/10/2024  3:14 PM CST -----  Contact: self [phone]  Would like to receive medical advice.    Would they like a call back or a response via [employer]:  call back from the provider    Additional information:  calling to speak with the office about her being seen at the urgent care this morning. She also states that she hasn't gotten any contact from the office.

## 2024-12-12 ENCOUNTER — TELEPHONE (OUTPATIENT)
Dept: URGENT CARE | Facility: CLINIC | Age: 79
End: 2024-12-12
Payer: MEDICARE

## 2024-12-12 ENCOUNTER — PATIENT MESSAGE (OUTPATIENT)
Dept: URGENT CARE | Facility: CLINIC | Age: 79
End: 2024-12-12
Payer: MEDICARE

## 2024-12-12 LAB — BACTERIA UR CULT: ABNORMAL

## 2024-12-12 NOTE — TELEPHONE ENCOUNTER
Called and spoke to pt. Pt states her urine cx is back and wanted to let PCP know. Pt states she is still experiencing  suprapubic pressure after emptying her bladder. Denies burning, frequency, urgency. Pt is currently taking Bactrim. Pt states PCP told her to let know know when results were in.

## 2024-12-13 ENCOUNTER — LAB VISIT (OUTPATIENT)
Dept: LAB | Facility: HOSPITAL | Age: 79
End: 2024-12-13
Attending: INTERNAL MEDICINE
Payer: MEDICARE

## 2024-12-13 DIAGNOSIS — N30.90 CYSTITIS: ICD-10-CM

## 2024-12-13 DIAGNOSIS — D50.0 IRON DEFICIENCY ANEMIA DUE TO CHRONIC BLOOD LOSS: ICD-10-CM

## 2024-12-13 LAB
BASOPHILS # BLD AUTO: 0.03 K/UL (ref 0–0.2)
BASOPHILS NFR BLD: 0.4 % (ref 0–1.9)
BILIRUB UR QL STRIP: NEGATIVE
CLARITY UR REFRACT.AUTO: CLEAR
COLOR UR AUTO: COLORLESS
DIFFERENTIAL METHOD BLD: ABNORMAL
EOSINOPHIL # BLD AUTO: 0.1 K/UL (ref 0–0.5)
EOSINOPHIL NFR BLD: 1.4 % (ref 0–8)
ERYTHROCYTE [DISTWIDTH] IN BLOOD BY AUTOMATED COUNT: 16.9 % (ref 11.5–14.5)
GLUCOSE UR QL STRIP: NEGATIVE
HCT VFR BLD AUTO: 37.2 % (ref 37–48.5)
HGB BLD-MCNC: 11.9 G/DL (ref 12–16)
HGB UR QL STRIP: NEGATIVE
IMM GRANULOCYTES # BLD AUTO: 0.02 K/UL (ref 0–0.04)
IMM GRANULOCYTES NFR BLD AUTO: 0.2 % (ref 0–0.5)
KETONES UR QL STRIP: NEGATIVE
LEUKOCYTE ESTERASE UR QL STRIP: NEGATIVE
LYMPHOCYTES # BLD AUTO: 2.5 K/UL (ref 1–4.8)
LYMPHOCYTES NFR BLD: 29.7 % (ref 18–48)
MCH RBC QN AUTO: 26.1 PG (ref 27–31)
MCHC RBC AUTO-ENTMCNC: 32 G/DL (ref 32–36)
MCV RBC AUTO: 82 FL (ref 82–98)
MONOCYTES # BLD AUTO: 1.2 K/UL (ref 0.3–1)
MONOCYTES NFR BLD: 14.5 % (ref 4–15)
NEUTROPHILS # BLD AUTO: 4.5 K/UL (ref 1.8–7.7)
NEUTROPHILS NFR BLD: 53.8 % (ref 38–73)
NITRITE UR QL STRIP: NEGATIVE
NRBC BLD-RTO: 0 /100 WBC
PH UR STRIP: 6 [PH] (ref 5–8)
PLATELET # BLD AUTO: 268 K/UL (ref 150–450)
PMV BLD AUTO: 11.9 FL (ref 9.2–12.9)
PROT UR QL STRIP: NEGATIVE
RBC # BLD AUTO: 4.56 M/UL (ref 4–5.4)
SP GR UR STRIP: 1.01 (ref 1–1.03)
URN SPEC COLLECT METH UR: ABNORMAL
WBC # BLD AUTO: 8.37 K/UL (ref 3.9–12.7)

## 2024-12-13 PROCEDURE — 36415 COLL VENOUS BLD VENIPUNCTURE: CPT | Mod: PN | Performed by: INTERNAL MEDICINE

## 2024-12-13 PROCEDURE — 85025 COMPLETE CBC W/AUTO DIFF WBC: CPT | Performed by: INTERNAL MEDICINE

## 2024-12-13 PROCEDURE — 81003 URINALYSIS AUTO W/O SCOPE: CPT | Performed by: INTERNAL MEDICINE

## 2024-12-13 NOTE — TELEPHONE ENCOUNTER
Attempted to call patient and discuss urine culture results from urgent care.  Upon chart review, her PCP is requesting repeat urinalysis and urine culture on tomorrow at their clinic  Pt is on bactrim per chart review.     Attempted to call patient and notify of results below.  No answer.    Results for orders placed or performed in visit on 12/10/24   POCT Urinalysis(Instrument)    Collection Time: 12/10/24  8:56 AM   Result Value Ref Range    Color, POC UA Colorless Yellow, Straw, Colorless    Clarity, POC UA Clear Clear    Glucose, POC UA Negative Negative    Bilirubin, POC UA Negative Negative    Ketones, POC UA Negative Negative    Spec Grav POC UA 1.015 1.005 - 1.030    Blood, POC UA Negative Negative    pH, POC UA 6.0 5.0 - 8.0    Protein, POC UA Negative Negative    Urobilinogen, POC UA 0.2 <=1.0    Nitrite, POC UA Negative Negative    WBC, POC UA Negative Negative   Culture, Urine    Collection Time: 12/10/24  9:31 AM    Specimen: Urine, Clean Catch   Result Value Ref Range    Urine Culture, Routine LACTOBACILLUS SPECIES  10,000 - 49,999 cfu/ml   (A)      *Note: Due to a large number of results and/or encounters for the requested time period, some results have not been displayed. A complete set of results can be found in Results Review.

## 2024-12-15 ENCOUNTER — PATIENT MESSAGE (OUTPATIENT)
Dept: INTERNAL MEDICINE | Facility: CLINIC | Age: 79
End: 2024-12-15
Payer: MEDICARE

## 2024-12-16 ENCOUNTER — TELEPHONE (OUTPATIENT)
Dept: PSYCHIATRY | Facility: CLINIC | Age: 79
End: 2024-12-16
Payer: MEDICARE

## 2024-12-16 NOTE — TELEPHONE ENCOUNTER
----- Message from Malathi sent at 12/16/2024 12:08 PM CST -----  Contact: Carmen Morales would like a call back at  [phone] in regards to needing to speak with nurse about needing to make an appointment from her referral.  Thanks   Am

## 2024-12-17 ENCOUNTER — LAB VISIT (OUTPATIENT)
Dept: LAB | Facility: HOSPITAL | Age: 79
End: 2024-12-17
Attending: INTERNAL MEDICINE
Payer: MEDICARE

## 2024-12-17 ENCOUNTER — PATIENT MESSAGE (OUTPATIENT)
Dept: GASTROENTEROLOGY | Facility: CLINIC | Age: 79
End: 2024-12-17
Payer: MEDICARE

## 2024-12-17 DIAGNOSIS — N30.90 CYSTITIS: ICD-10-CM

## 2024-12-17 PROCEDURE — 87086 URINE CULTURE/COLONY COUNT: CPT | Performed by: INTERNAL MEDICINE

## 2024-12-19 LAB — BACTERIA UR CULT: NO GROWTH

## 2025-01-02 ENCOUNTER — PATIENT MESSAGE (OUTPATIENT)
Dept: INTERNAL MEDICINE | Facility: CLINIC | Age: 80
End: 2025-01-02
Payer: MEDICARE

## 2025-01-02 DIAGNOSIS — I10 ESSENTIAL HYPERTENSION: ICD-10-CM

## 2025-01-02 RX ORDER — FUROSEMIDE 20 MG/1
TABLET ORAL
Qty: 156 TABLET | Refills: 3 | Status: SHIPPED | OUTPATIENT
Start: 2025-01-02

## 2025-01-02 RX ORDER — BENAZEPRIL HYDROCHLORIDE 40 MG/1
40 TABLET ORAL DAILY
Qty: 90 TABLET | Refills: 3 | Status: SHIPPED | OUTPATIENT
Start: 2025-01-02

## 2025-01-02 NOTE — TELEPHONE ENCOUNTER
No care due was identified.  [facility] Embedded Care Due Messages. Reference number: [id number].   1/02/2025 10:55:30 AM CST

## 2025-01-10 ENCOUNTER — PATIENT MESSAGE (OUTPATIENT)
Dept: INTERNAL MEDICINE | Facility: CLINIC | Age: 80
End: 2025-01-10
Payer: MEDICARE

## 2025-01-10 NOTE — TELEPHONE ENCOUNTER
The PT and her  has a annual appointment on 1/15/25 and are requesting lab orders prior to their appointment. If the patent needs labs can you place them in the active requests please.

## 2025-01-13 DIAGNOSIS — I10 ESSENTIAL HYPERTENSION: ICD-10-CM

## 2025-01-13 DIAGNOSIS — E04.2 MULTINODULAR THYROID: ICD-10-CM

## 2025-01-13 DIAGNOSIS — D50.0 IRON DEFICIENCY ANEMIA DUE TO CHRONIC BLOOD LOSS: ICD-10-CM

## 2025-01-13 DIAGNOSIS — Z00.00 ANNUAL PHYSICAL EXAM: Primary | ICD-10-CM

## 2025-01-13 DIAGNOSIS — N25.81 SECONDARY HYPERPARATHYROIDISM OF RENAL ORIGIN: ICD-10-CM

## 2025-01-13 DIAGNOSIS — N18.31 STAGE 3A CHRONIC KIDNEY DISEASE: ICD-10-CM

## 2025-01-14 ENCOUNTER — LAB VISIT (OUTPATIENT)
Dept: LAB | Facility: HOSPITAL | Age: 80
End: 2025-01-14
Attending: INTERNAL MEDICINE
Payer: MEDICARE

## 2025-01-14 DIAGNOSIS — E04.2 MULTINODULAR THYROID: ICD-10-CM

## 2025-01-14 DIAGNOSIS — I10 ESSENTIAL HYPERTENSION: ICD-10-CM

## 2025-01-14 DIAGNOSIS — N18.31 STAGE 3A CHRONIC KIDNEY DISEASE: ICD-10-CM

## 2025-01-14 DIAGNOSIS — D50.0 IRON DEFICIENCY ANEMIA DUE TO CHRONIC BLOOD LOSS: ICD-10-CM

## 2025-01-14 DIAGNOSIS — Z00.00 ANNUAL PHYSICAL EXAM: ICD-10-CM

## 2025-01-14 DIAGNOSIS — N25.81 SECONDARY HYPERPARATHYROIDISM OF RENAL ORIGIN: ICD-10-CM

## 2025-01-14 LAB
ALBUMIN SERPL BCP-MCNC: 3.4 G/DL (ref 3.5–5.2)
ALP SERPL-CCNC: 76 U/L (ref 40–150)
ALT SERPL W/O P-5'-P-CCNC: 14 U/L (ref 10–44)
ANION GAP SERPL CALC-SCNC: 10 MMOL/L (ref 8–16)
AST SERPL-CCNC: 18 U/L (ref 10–40)
BASOPHILS # BLD AUTO: 0.03 K/UL (ref 0–0.2)
BASOPHILS NFR BLD: 0.4 % (ref 0–1.9)
BILIRUB SERPL-MCNC: 0.4 MG/DL (ref 0.1–1)
BUN SERPL-MCNC: 20 MG/DL (ref 8–23)
CALCIUM SERPL-MCNC: 9.9 MG/DL (ref 8.7–10.5)
CHLORIDE SERPL-SCNC: 107 MMOL/L (ref 95–110)
CHOLEST SERPL-MCNC: 182 MG/DL (ref 120–199)
CHOLEST/HDLC SERPL: 3.8 {RATIO} (ref 2–5)
CO2 SERPL-SCNC: 24 MMOL/L (ref 23–29)
CREAT SERPL-MCNC: 1.2 MG/DL (ref 0.5–1.4)
DIFFERENTIAL METHOD BLD: ABNORMAL
EOSINOPHIL # BLD AUTO: 0.2 K/UL (ref 0–0.5)
EOSINOPHIL NFR BLD: 2.3 % (ref 0–8)
ERYTHROCYTE [DISTWIDTH] IN BLOOD BY AUTOMATED COUNT: 16 % (ref 11.5–14.5)
EST. GFR  (NO RACE VARIABLE): 46.3 ML/MIN/1.73 M^2
FERRITIN SERPL-MCNC: 37 NG/ML (ref 20–300)
GLUCOSE SERPL-MCNC: 87 MG/DL (ref 70–110)
HCT VFR BLD AUTO: 40.8 % (ref 37–48.5)
HDLC SERPL-MCNC: 48 MG/DL (ref 40–75)
HDLC SERPL: 26.4 % (ref 20–50)
HGB BLD-MCNC: 12.4 G/DL (ref 12–16)
IMM GRANULOCYTES # BLD AUTO: 0.01 K/UL (ref 0–0.04)
IMM GRANULOCYTES NFR BLD AUTO: 0.1 % (ref 0–0.5)
IRON SERPL-MCNC: 61 UG/DL (ref 30–160)
LDLC SERPL CALC-MCNC: 121.6 MG/DL (ref 63–159)
LYMPHOCYTES # BLD AUTO: 3.1 K/UL (ref 1–4.8)
LYMPHOCYTES NFR BLD: 45 % (ref 18–48)
MCH RBC QN AUTO: 25.6 PG (ref 27–31)
MCHC RBC AUTO-ENTMCNC: 30.4 G/DL (ref 32–36)
MCV RBC AUTO: 84 FL (ref 82–98)
MONOCYTES # BLD AUTO: 0.8 K/UL (ref 0.3–1)
MONOCYTES NFR BLD: 10.8 % (ref 4–15)
NEUTROPHILS # BLD AUTO: 2.9 K/UL (ref 1.8–7.7)
NEUTROPHILS NFR BLD: 41.4 % (ref 38–73)
NONHDLC SERPL-MCNC: 134 MG/DL
NRBC BLD-RTO: 0 /100 WBC
PLATELET # BLD AUTO: 288 K/UL (ref 150–450)
PMV BLD AUTO: 12.2 FL (ref 9.2–12.9)
POTASSIUM SERPL-SCNC: 3.9 MMOL/L (ref 3.5–5.1)
PROT SERPL-MCNC: 7.5 G/DL (ref 6–8.4)
RBC # BLD AUTO: 4.85 M/UL (ref 4–5.4)
SATURATED IRON: 13 % (ref 20–50)
SODIUM SERPL-SCNC: 141 MMOL/L (ref 136–145)
TOTAL IRON BINDING CAPACITY: 460 UG/DL (ref 250–450)
TRANSFERRIN SERPL-MCNC: 311 MG/DL (ref 200–375)
TRIGL SERPL-MCNC: 62 MG/DL (ref 30–150)
TSH SERPL DL<=0.005 MIU/L-ACNC: 3.73 UIU/ML (ref 0.4–4)
WBC # BLD AUTO: 6.96 K/UL (ref 3.9–12.7)

## 2025-01-14 PROCEDURE — 80061 LIPID PANEL: CPT | Performed by: INTERNAL MEDICINE

## 2025-01-14 PROCEDURE — 84466 ASSAY OF TRANSFERRIN: CPT | Performed by: INTERNAL MEDICINE

## 2025-01-14 PROCEDURE — 36415 COLL VENOUS BLD VENIPUNCTURE: CPT | Mod: PN | Performed by: INTERNAL MEDICINE

## 2025-01-14 PROCEDURE — 82728 ASSAY OF FERRITIN: CPT | Performed by: INTERNAL MEDICINE

## 2025-01-14 PROCEDURE — 84443 ASSAY THYROID STIM HORMONE: CPT | Performed by: INTERNAL MEDICINE

## 2025-01-14 PROCEDURE — 80053 COMPREHEN METABOLIC PANEL: CPT | Performed by: INTERNAL MEDICINE

## 2025-01-14 PROCEDURE — 85025 COMPLETE CBC W/AUTO DIFF WBC: CPT | Performed by: INTERNAL MEDICINE

## 2025-01-14 NOTE — PROGRESS NOTES
MEDICAL HISTORY:  Hypertension.  Irritable bowel syndrome, with with diarrhea  Chronic kidney disease stage III, with secondary hyperparathyroid  Tachycardia  Iron deficiency anemia  Multinodular thyroid gland, on ultrasound  Cholecystectomy.  Hysterectomy.  Bladder suspension.  Lumbar degenerative disk disease.  Gluteal muscular tear.  Right foot surgery.  Resection of lipoma from the groin.  Bout of postcholecystectomy diarrhea.  Prior history of shingles.  Hearing impairment.  Aorta atherosclerosis on imaging     SOCIAL HISTORY:  Tobacco and alcohol use - none.  Exercises by doing a treadmill and elliptical. FAMILY HISTORY:  Very extensive.  Six sisters and seven brothers, thyroid conditions in three sisters, one brother with diabetes, another brother with prostate cancer, but all 13 siblings are living.     SCREENING:  Colonoscopy in October 2015 revealed diverticulosis.  Colonoscopy October 2023, adenomatous colon polyp            MEDICATIONS:  Fergon  Benazepril 20 mg   Vitamin-D 2000 units every other day  Lasix 20 mg, 1 tablet daily Sunday Monday Wednesday Friday and 2 tablets daily Tuesday Thursday Saturday     Answers submitted by the patient for this visit:  Review of Systems Questionnaire (Submitted on 1/8/2025)  activity change: No  unexpected weight change: No  neck pain: No  hearing loss: No  rhinorrhea: No  trouble swallowing: No  eye discharge: No  visual disturbance: No  chest tightness: No  wheezing: No  chest pain: No  palpitations: No  blood in stool: No  constipation: No  vomiting: No  diarrhea: No  polydipsia: No  polyuria: No  difficulty urinating: No  hematuria: No  menstrual problem: No  dysuria: No  joint swelling: No  arthralgias: No  headaches: No  weakness: No  confusion: No  dysphoric mood: No          [age] year female   Presents for an annual routine visit    She has followed up with the endocrinology regarding thyroid nodule, adrenal nodule, hyperparathyroid,   , bone  density, upcoming thyroid ultrasound, MRI of the abdomen.    She has followed up with GI regarding small bowel overgrowth and iron deficiency anemia.  For the past couple of months iron tablets have been put on hold.  Labs noted above she is not anemic but that has been a slight drop in his ferritin level.  That has no change in regard to gastric symptoms in regard to rumbling in his stomach that has well as having fecal urgency at times particularly after eating but doing relatively well with the use of fiber gummies and ib guard    She has gone through a adjustment disorder with depression anxiety mainly in his fact that is Fatou's diminish interaction with people of late.  Father passed away.   has multiple conditions for which he is not to engaging i, lost some degree of social contact with others.  She does have an appointment with a counselor in his month    Presently that has no report of chest pain shortness for breath or abdominal pain.  No difficulty urinating.  No headaches arthralgias.  She goes to the gym 3 days a week    Also been complaining of a irritated itch in the left genitalia inguinal region    Exam   Weight 190 lb   BMI 30.70  Pulse 76   Blood pressure 152/72  Neck minimal thyromegaly, left lower lobe nodule   Chest clear breath sounds   Heart regular rate rhythm  Abdominal exam is bowel sounds soft nontender no hepatosplenomegaly abdominal masses pulses 2+ carotid pulses no bruits 2+ dorsalis pedal pulses  Extremities 1+ pedal pretibial edema  Musculoskeletal no abnormal findings  Skin an area hyperpigmentation within the left inguinal region where she has itching    Impression   General exam   Hypertension  Iron deficiency  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

## 2025-01-15 ENCOUNTER — OFFICE VISIT (OUTPATIENT)
Dept: INTERNAL MEDICINE | Facility: CLINIC | Age: 80
End: 2025-01-15
Payer: MEDICARE

## 2025-01-15 VITALS
WEIGHT: 190.69 LBS | SYSTOLIC BLOOD PRESSURE: 160 MMHG | DIASTOLIC BLOOD PRESSURE: 94 MMHG | HEIGHT: 66 IN | OXYGEN SATURATION: 99 % | HEART RATE: 76 BPM | BODY MASS INDEX: 30.65 KG/M2

## 2025-01-15 DIAGNOSIS — Z00.00 ANNUAL PHYSICAL EXAM: Primary | ICD-10-CM

## 2025-01-15 DIAGNOSIS — N25.81 SECONDARY HYPERPARATHYROIDISM OF RENAL ORIGIN: ICD-10-CM

## 2025-01-15 DIAGNOSIS — E21.0 PRIMARY HYPERPARATHYROIDISM: ICD-10-CM

## 2025-01-15 DIAGNOSIS — N18.31 STAGE 3A CHRONIC KIDNEY DISEASE: ICD-10-CM

## 2025-01-15 DIAGNOSIS — K63.8219 SMALL INTESTINAL BACTERIAL OVERGROWTH (SIBO): ICD-10-CM

## 2025-01-15 DIAGNOSIS — E04.2 MULTINODULAR THYROID: ICD-10-CM

## 2025-01-15 DIAGNOSIS — D50.0 IRON DEFICIENCY ANEMIA DUE TO CHRONIC BLOOD LOSS: ICD-10-CM

## 2025-01-15 DIAGNOSIS — I10 ESSENTIAL HYPERTENSION: ICD-10-CM

## 2025-01-15 DIAGNOSIS — F43.20 ADJUSTMENT DISORDER, UNSPECIFIED TYPE: ICD-10-CM

## 2025-01-15 DIAGNOSIS — D35.02 ADRENAL ADENOMA, LEFT: ICD-10-CM

## 2025-01-15 PROCEDURE — 3288F FALL RISK ASSESSMENT DOCD: CPT | Mod: CPTII,S$GLB,, | Performed by: INTERNAL MEDICINE

## 2025-01-15 PROCEDURE — 99999 PR PBB SHADOW E&M-EST. PATIENT-LVL III: CPT | Mod: PBBFAC,,, | Performed by: INTERNAL MEDICINE

## 2025-01-15 PROCEDURE — 1157F ADVNC CARE PLAN IN RCRD: CPT | Mod: CPTII,S$GLB,, | Performed by: INTERNAL MEDICINE

## 2025-01-15 PROCEDURE — 99397 PER PM REEVAL EST PAT 65+ YR: CPT | Mod: S$GLB,,, | Performed by: INTERNAL MEDICINE

## 2025-01-15 PROCEDURE — 1160F RVW MEDS BY RX/DR IN RCRD: CPT | Mod: CPTII,S$GLB,, | Performed by: INTERNAL MEDICINE

## 2025-01-15 PROCEDURE — 3077F SYST BP >= 140 MM HG: CPT | Mod: CPTII,S$GLB,, | Performed by: INTERNAL MEDICINE

## 2025-01-15 PROCEDURE — 3080F DIAST BP >= 90 MM HG: CPT | Mod: CPTII,S$GLB,, | Performed by: INTERNAL MEDICINE

## 2025-01-15 PROCEDURE — 1101F PT FALLS ASSESS-DOCD LE1/YR: CPT | Mod: CPTII,S$GLB,, | Performed by: INTERNAL MEDICINE

## 2025-01-15 PROCEDURE — 1159F MED LIST DOCD IN RCRD: CPT | Mod: CPTII,S$GLB,, | Performed by: INTERNAL MEDICINE

## 2025-01-15 PROCEDURE — 1126F AMNT PAIN NOTED NONE PRSNT: CPT | Mod: CPTII,S$GLB,, | Performed by: INTERNAL MEDICINE

## 2025-01-15 RX ORDER — OLMESARTAN MEDOXOMIL 40 MG/1
40 TABLET ORAL DAILY
Qty: 30 TABLET | Refills: 2 | Status: SHIPPED | OUTPATIENT
Start: 2025-01-15 | End: 2025-02-11 | Stop reason: SDUPTHER

## 2025-01-15 RX ORDER — NYSTATIN AND TRIAMCINOLONE ACETONIDE 100000; 1 [USP'U]/G; MG/G
CREAM TOPICAL 4 TIMES DAILY
Qty: 30 G | Refills: 0 | Status: SHIPPED | OUTPATIENT
Start: 2025-01-15

## 2025-01-15 NOTE — ASSESSMENT & PLAN NOTE
Reviewed incidence   Discussed that indications for surgery are size, concerning characteristics and functionality (pheochromocytoma or cortisol excess).      Will not pursue further biochemical workup in the absence of a clinical change.    Check MRI before next visit in 6 months. If the nodule is not changed, I don't plan to pursue additional imaging.   Specialty Care (immediate)...

## 2025-01-28 ENCOUNTER — PATIENT MESSAGE (OUTPATIENT)
Dept: INTERNAL MEDICINE | Facility: CLINIC | Age: 80
End: 2025-01-28
Payer: MEDICARE

## 2025-01-29 ENCOUNTER — PATIENT MESSAGE (OUTPATIENT)
Dept: INTERNAL MEDICINE | Facility: CLINIC | Age: 80
End: 2025-01-29
Payer: MEDICARE

## 2025-01-30 ENCOUNTER — HOSPITAL ENCOUNTER (OUTPATIENT)
Dept: RADIOLOGY | Facility: HOSPITAL | Age: 80
Discharge: HOME | End: 2025-01-30
Attending: INTERNAL MEDICINE
Payer: MEDICARE

## 2025-01-30 DIAGNOSIS — Z00.00 ENCOUNTER FOR MEDICARE ANNUAL WELLNESS EXAM: ICD-10-CM

## 2025-01-30 DIAGNOSIS — Z12.31 ENCOUNTER FOR SCREENING MAMMOGRAM FOR BREAST CANCER: ICD-10-CM

## 2025-01-30 PROCEDURE — 77067 SCR MAMMO BI INCL CAD: CPT | Mod: TC,PN

## 2025-01-30 PROCEDURE — 77067 SCR MAMMO BI INCL CAD: CPT | Mod: 26,,, | Performed by: RADIOLOGY

## 2025-01-30 PROCEDURE — 77063 BREAST TOMOSYNTHESIS BI: CPT | Mod: 26,,, | Performed by: RADIOLOGY

## 2025-02-07 ENCOUNTER — TELEPHONE (OUTPATIENT)
Dept: SPORTS MEDICINE | Facility: CLINIC | Age: 80
End: 2025-02-07
Payer: MEDICARE

## 2025-02-07 ENCOUNTER — PATIENT MESSAGE (OUTPATIENT)
Dept: INTERNAL MEDICINE | Facility: CLINIC | Age: 80
End: 2025-02-07
Payer: MEDICARE

## 2025-02-07 RX ORDER — HYDROCORTISONE ACETATE 25 MG/1
25 SUPPOSITORY RECTAL 2 TIMES DAILY
Qty: 6 SUPPOSITORY | Refills: 0 | Status: SHIPPED | OUTPATIENT
Start: 2025-02-07 | End: 2025-02-10

## 2025-02-07 NOTE — TELEPHONE ENCOUNTER
Spoke with patient who states that when she had BM this morning she wiped andsaw bright blood on the toilet paper; no straining, no abdominal pain, noted that a little bit of blood could've been in the stool because toilet water was a little red    Had another bowel movement about an hr ago, with dark stool believed to be from iron tablets, but saw no blood    States that she has had a sharp pain/pressure in rectum/vaginal area about 6 months ago that resolved when walking; the the similar event on yesterday with pressure only;     Yesterday had a normal BM    LOV 1/15/2025 annual

## 2025-02-07 NOTE — TELEPHONE ENCOUNTER
Called pt to get xr scheduled for appointment. Pt stated she wanted to cancel the appointment because she is feeling better. Informed pt to contact us if she needs help.

## 2025-02-11 NOTE — TELEPHONE ENCOUNTER
No care due was identified.  [facility] Embedded Care Due Messages. Reference number: [id number].   2/11/2025 9:26:00 AM CST

## 2025-02-17 ENCOUNTER — LAB VISIT (OUTPATIENT)
Dept: LAB | Facility: HOSPITAL | Age: 80
End: 2025-02-17
Attending: INTERNAL MEDICINE
Payer: MEDICARE

## 2025-02-17 DIAGNOSIS — I10 ESSENTIAL HYPERTENSION: ICD-10-CM

## 2025-02-17 DIAGNOSIS — D50.0 IRON DEFICIENCY ANEMIA DUE TO CHRONIC BLOOD LOSS: ICD-10-CM

## 2025-02-17 DIAGNOSIS — D35.02 ADRENAL ADENOMA, LEFT: ICD-10-CM

## 2025-02-17 DIAGNOSIS — N18.31 STAGE 3A CHRONIC KIDNEY DISEASE: ICD-10-CM

## 2025-02-17 DIAGNOSIS — N25.81 SECONDARY HYPERPARATHYROIDISM OF RENAL ORIGIN: ICD-10-CM

## 2025-02-17 DIAGNOSIS — F43.20 ADJUSTMENT DISORDER, UNSPECIFIED TYPE: ICD-10-CM

## 2025-02-17 DIAGNOSIS — K63.8219 SMALL INTESTINAL BACTERIAL OVERGROWTH (SIBO): ICD-10-CM

## 2025-02-17 DIAGNOSIS — E21.0 PRIMARY HYPERPARATHYROIDISM: ICD-10-CM

## 2025-02-17 DIAGNOSIS — E04.2 MULTINODULAR THYROID: ICD-10-CM

## 2025-02-17 DIAGNOSIS — Z00.00 ANNUAL PHYSICAL EXAM: ICD-10-CM

## 2025-02-17 LAB
ANION GAP SERPL CALC-SCNC: 8 MMOL/L (ref 8–16)
BASOPHILS # BLD AUTO: 0.04 K/UL (ref 0–0.2)
BASOPHILS NFR BLD: 0.7 % (ref 0–1.9)
BUN SERPL-MCNC: 16 MG/DL (ref 8–23)
CALCIUM SERPL-MCNC: 9.7 MG/DL (ref 8.7–10.5)
CHLORIDE SERPL-SCNC: 105 MMOL/L (ref 95–110)
CO2 SERPL-SCNC: 27 MMOL/L (ref 23–29)
CREAT SERPL-MCNC: 1.1 MG/DL (ref 0.5–1.4)
DIFFERENTIAL METHOD BLD: ABNORMAL
EOSINOPHIL # BLD AUTO: 0.3 K/UL (ref 0–0.5)
EOSINOPHIL NFR BLD: 4.5 % (ref 0–8)
ERYTHROCYTE [DISTWIDTH] IN BLOOD BY AUTOMATED COUNT: 15.9 % (ref 11.5–14.5)
EST. GFR  (NO RACE VARIABLE): 51.4 ML/MIN/1.73 M^2
FERRITIN SERPL-MCNC: 54 NG/ML (ref 20–300)
GLUCOSE SERPL-MCNC: 120 MG/DL (ref 70–110)
HCT VFR BLD AUTO: 41.6 % (ref 37–48.5)
HGB BLD-MCNC: 13 G/DL (ref 12–16)
IMM GRANULOCYTES # BLD AUTO: 0.01 K/UL (ref 0–0.04)
IMM GRANULOCYTES NFR BLD AUTO: 0.2 % (ref 0–0.5)
IRON SERPL-MCNC: 136 UG/DL (ref 30–160)
LYMPHOCYTES # BLD AUTO: 2.3 K/UL (ref 1–4.8)
LYMPHOCYTES NFR BLD: 40.5 % (ref 18–48)
MCH RBC QN AUTO: 26.4 PG (ref 27–31)
MCHC RBC AUTO-ENTMCNC: 31.3 G/DL (ref 32–36)
MCV RBC AUTO: 85 FL (ref 82–98)
MONOCYTES # BLD AUTO: 0.5 K/UL (ref 0.3–1)
MONOCYTES NFR BLD: 8.5 % (ref 4–15)
NEUTROPHILS # BLD AUTO: 2.6 K/UL (ref 1.8–7.7)
NEUTROPHILS NFR BLD: 45.6 % (ref 38–73)
NRBC BLD-RTO: 0 /100 WBC
PLATELET # BLD AUTO: 254 K/UL (ref 150–450)
PMV BLD AUTO: 12 FL (ref 9.2–12.9)
POTASSIUM SERPL-SCNC: 3.6 MMOL/L (ref 3.5–5.1)
PTH-INTACT SERPL-MCNC: 129.1 PG/ML (ref 9–77)
RBC # BLD AUTO: 4.92 M/UL (ref 4–5.4)
SATURATED IRON: 32 % (ref 20–50)
SODIUM SERPL-SCNC: 140 MMOL/L (ref 136–145)
TOTAL IRON BINDING CAPACITY: 425 UG/DL (ref 250–450)
TRANSFERRIN SERPL-MCNC: 287 MG/DL (ref 200–375)
WBC # BLD AUTO: 5.76 K/UL (ref 3.9–12.7)

## 2025-02-17 PROCEDURE — 82728 ASSAY OF FERRITIN: CPT | Performed by: INTERNAL MEDICINE

## 2025-02-17 PROCEDURE — 85025 COMPLETE CBC W/AUTO DIFF WBC: CPT | Performed by: INTERNAL MEDICINE

## 2025-02-17 PROCEDURE — 36415 COLL VENOUS BLD VENIPUNCTURE: CPT | Mod: PN | Performed by: INTERNAL MEDICINE

## 2025-02-17 PROCEDURE — 80048 BASIC METABOLIC PNL TOTAL CA: CPT | Performed by: INTERNAL MEDICINE

## 2025-02-17 PROCEDURE — 83970 ASSAY OF PARATHORMONE: CPT | Performed by: INTERNAL MEDICINE

## 2025-02-17 PROCEDURE — 84466 ASSAY OF TRANSFERRIN: CPT | Performed by: INTERNAL MEDICINE

## 2025-02-17 RX ORDER — OLMESARTAN MEDOXOMIL 40 MG/1
40 TABLET ORAL DAILY
Qty: 30 TABLET | Refills: 5 | Status: SHIPPED | OUTPATIENT
Start: 2025-02-17

## 2025-02-21 ENCOUNTER — PATIENT MESSAGE (OUTPATIENT)
Dept: INTERNAL MEDICINE | Facility: CLINIC | Age: 80
End: 2025-02-21
Payer: MEDICARE

## 2025-02-21 DIAGNOSIS — I10 ESSENTIAL HYPERTENSION: Primary | ICD-10-CM

## 2025-02-21 DIAGNOSIS — N25.81 SECONDARY HYPERPARATHYROIDISM OF RENAL ORIGIN: ICD-10-CM

## 2025-02-21 DIAGNOSIS — N18.31 STAGE 3A CHRONIC KIDNEY DISEASE: ICD-10-CM

## 2025-02-21 DIAGNOSIS — R73.9 HYPERGLYCEMIA: ICD-10-CM

## 2025-02-21 DIAGNOSIS — D50.0 IRON DEFICIENCY ANEMIA DUE TO CHRONIC BLOOD LOSS: ICD-10-CM

## 2025-02-23 NOTE — TELEPHONE ENCOUNTER
Breast Surgery Clinic Note    Chief Complaint:   Debbie Mcleod is a 74 y.o. female presenting today for a routine follow up concerning her breast cancer diagnosis.  She has had some left rib pain  History of Present Illness:   Mrs. Mcleod was diagnosed with left locally-advanced breast cancer in April 2021 at the age of 72. ER/CO Positive and HER 2 FISH negative. She elected MRM that showed a 2.5 cm Grade III IDC with DCIS.  Margins were negative.  Only 1 of 20 LNs was positive.  Her Oncotype DX Score was 14 and she did not require adjuvant chemotherapy.  She also did not require adjuvant Radiation.  She has a history of Atypical Ductal Hyperplasia and has a family history of breast cancer. [employer] Breast Cancer  Panel with Add-On Genes Negative (2021). She's been on Arimidex since September 2021.->Changed to Tamoxifen in December 2021 but she did not start it until the end of March 2022. MD:::Robe Hughes MD; Christine Licona MD; Jose Vidal MD; Lui Mahajan MD    Past Medical History:   Diagnosis Date    Abnormal mammogram     Anxiety disorder, unspecified     Breast cancer 04/2021    Left-Invasive Ductal Carcinoma    Depression     Esophageal reflux     Fibrocystic mastopathy     Hypercholesterolemia     Malignant neoplasm of thyroid gland     Mastodynia     Osteopenia       Past Surgical History:   Procedure Laterality Date    AUGMENTATION OF BREAST      BACK SURGERY      BREAST LUMPECTOMY Left     HYSTERECTOMY      MODIFIED RADICAL MASTECTOMY W/ AXILLARY LYMPH NODE DISSECTION Left     TOTAL REDUCTION MAMMOPLASTY  05/2021        Current Outpatient Medications:     ALPRAZolam (XANAX) 0.25 MG tablet, Take 0.25 mg by mouth every evening., Disp: , Rfl:     cyclobenzaprine (FLEXERIL) 10 MG tablet, Take 10 mg by mouth every evening., Disp: , Rfl:     gabapentin (NEURONTIN) 300 MG capsule, Take 300 mg by mouth 3 (three) times daily., Disp: , Rfl:     PERCOCET 7.5-325 mg per tablet, Take 1 tablet by mouth.,   LOV with Deniz Redman MD , 1/15/2025  Requesting results of labs performed on 2/17/25   Disp: , Rfl:     tamoxifen (NOLVADEX) 20 MG Tab, Take 1 tablet by mouth once daily., Disp: , Rfl:    Review of patient's allergies indicates:   Allergen Reactions    Iodinated contrast media Hives and Itching    Penicillins Anaphylaxis    Cefaclor     Clarithromycin     Doxycycline     Hydrocodone-aspirin     Iodine     Nitrofurantoin monohyd/m-cryst Other (See Comments)    Sulfa (sulfonamide antibiotics)     Sulfamethoxazole-trimethoprim Hives    Sulfur Hives      Social History     Tobacco Use    Smoking status: Some Days    Smokeless tobacco: Never   Substance Use Topics    Alcohol use: Never      Family History   Problem Relation Age of Onset    Breast cancer Sister     Breast cancer Daughter     Breast cancer Maternal Aunt       Review of Systems   Genitourinary:  Negative for hot flashes.   Integumentary:  Negative for color change, rash, mole/lesion, breast mass, breast discharge and breast tenderness.   Breast: Negative for mass and tenderness   Physical Exam  Chest:      Chest wall: Tenderness (left back rib pain) present.   Breasts:     Right: Absent. No mass or skin change.      Left: No swelling, mass (she has dense fibronodular tissue), nipple discharge or skin change.   Lymphadenopathy:      Upper Body:      Right upper body: No supraclavicular or axillary adenopathy.      Left upper body: No supraclavicular or axillary adenopathy.        1. Malignant neoplasm of upper-inner quadrant of left breast in female, estrogen receptor positive    She is doing well from a cancer standpoint and has no worrisome findings today.  She will continue to be followed according to NCCN Guidelines.     2. Screening breast examination    She understands that her imaging and exams have remained stable and is comfortable being followed in a conservative fashion. She understands the importance of monthly self-breast examination and knows to report any and all changes as they occur.        Medical Decision Making:    It is my  impression that this patient suffers all conditions contained in this medical document.  Each of these conditions did affect our plan of care and my medical decision making today.  It is my opinion that the medical decision making concerning this patient was of moderate difficulty based on the aforementioned conditions.  Any further recommendations will be communicated to the patient by me.  I have reviewed and verified her allergies, list of medications, medical and surgical histories, social history, and a pertinent review of symptoms.     Follow up:  3 months and prn (FERNANDO MORAN (D) at [facility] )

## 2025-02-24 ENCOUNTER — OFFICE VISIT (OUTPATIENT)
Dept: SPORTS MEDICINE | Facility: CLINIC | Age: 80
End: 2025-02-24
Payer: MEDICARE

## 2025-02-24 ENCOUNTER — HOSPITAL ENCOUNTER (OUTPATIENT)
Dept: RADIOLOGY | Facility: HOSPITAL | Age: 80
Discharge: HOME | End: 2025-02-24
Attending: STUDENT IN AN ORGANIZED HEALTH CARE EDUCATION/TRAINING PROGRAM
Payer: MEDICARE

## 2025-02-24 DIAGNOSIS — M17.11 PRIMARY OSTEOARTHRITIS OF RIGHT KNEE: Primary | ICD-10-CM

## 2025-02-24 DIAGNOSIS — M25.561 RIGHT KNEE PAIN, UNSPECIFIED CHRONICITY: Primary | ICD-10-CM

## 2025-02-24 DIAGNOSIS — M25.561 ACUTE PAIN OF RIGHT KNEE: ICD-10-CM

## 2025-02-24 DIAGNOSIS — M25.561 RIGHT KNEE PAIN, UNSPECIFIED CHRONICITY: ICD-10-CM

## 2025-02-24 PROCEDURE — 1157F ADVNC CARE PLAN IN RCRD: CPT | Mod: CPTII,S$GLB,, | Performed by: STUDENT IN AN ORGANIZED HEALTH CARE EDUCATION/TRAINING PROGRAM

## 2025-02-24 PROCEDURE — 73562 X-RAY EXAM OF KNEE 3: CPT | Mod: 26,59,LT, | Performed by: RADIOLOGY

## 2025-02-24 PROCEDURE — 1101F PT FALLS ASSESS-DOCD LE1/YR: CPT | Mod: CPTII,S$GLB,, | Performed by: STUDENT IN AN ORGANIZED HEALTH CARE EDUCATION/TRAINING PROGRAM

## 2025-02-24 PROCEDURE — 99214 OFFICE O/P EST MOD 30 MIN: CPT | Mod: S$GLB,,, | Performed by: STUDENT IN AN ORGANIZED HEALTH CARE EDUCATION/TRAINING PROGRAM

## 2025-02-24 PROCEDURE — 73564 X-RAY EXAM KNEE 4 OR MORE: CPT | Mod: TC,PN,RT

## 2025-02-24 PROCEDURE — 3288F FALL RISK ASSESSMENT DOCD: CPT | Mod: CPTII,S$GLB,, | Performed by: STUDENT IN AN ORGANIZED HEALTH CARE EDUCATION/TRAINING PROGRAM

## 2025-02-24 PROCEDURE — 1160F RVW MEDS BY RX/DR IN RCRD: CPT | Mod: CPTII,S$GLB,, | Performed by: STUDENT IN AN ORGANIZED HEALTH CARE EDUCATION/TRAINING PROGRAM

## 2025-02-24 PROCEDURE — 1159F MED LIST DOCD IN RCRD: CPT | Mod: CPTII,S$GLB,, | Performed by: STUDENT IN AN ORGANIZED HEALTH CARE EDUCATION/TRAINING PROGRAM

## 2025-02-24 PROCEDURE — 1126F AMNT PAIN NOTED NONE PRSNT: CPT | Mod: CPTII,S$GLB,, | Performed by: STUDENT IN AN ORGANIZED HEALTH CARE EDUCATION/TRAINING PROGRAM

## 2025-02-24 PROCEDURE — 73564 X-RAY EXAM KNEE 4 OR MORE: CPT | Mod: 26,RT,, | Performed by: RADIOLOGY

## 2025-02-24 PROCEDURE — 99999 PR PBB SHADOW E&M-EST. PATIENT-LVL III: CPT | Mod: PBBFAC,,, | Performed by: STUDENT IN AN ORGANIZED HEALTH CARE EDUCATION/TRAINING PROGRAM

## 2025-02-24 NOTE — PATIENT INSTRUCTIONS
Assessment:  Carmen Wang is a 79 y.o. female with a chief complaint of Pain of the Right Knee    Encounter Diagnoses   Name Primary?    Primary osteoarthritis of right knee Yes    Acute pain of right knee       Plan:  XR reviewed - mild degenerative changes about the right knee, with mild medial compartment narrowing, and small osteophytosis of the patellofemoral compartment.  Labs reviewed - Cr 1.1, GFR 51, LFTs WNL   History and clinical exam is consistent with a acute right medial knee pain due to underlying degenerative osteoarthritis, possible mild strain of the pes anserine tendons vs sprain of the MCL.  Diagnosis, treatment options, prognosis discussed today.    Recommend conservative management.    NSAIDs contraindicated, as patient has underlying renal dysfunction and h/o GERD.  Can continue Tylenol as needed.  Would recommend to use topical such as diclofenac gel vs Biofreeze to the area, as needed.  Can also use ice vs heat as needed.  No need to alter exercise, activities, as pain does not causing any issues during the day.  No indication for injections at this time, but if night discomfort continues, may be a candidate for injections such as corticosteroid injection vs viscosupplementation injection.    Follow-up:  As needed or sooner if there are any problems between now and then.    Thank you for choosing [facility] and Dr. Jay Jay Gonzalez for your orthopedic & sports medicine care. It is our goal to provide you with exceptional care that will help keep you healthy, active, and get you back in the game.    Please do not hesitate to reach out to us via email, phone, or MyChart with any questions, concerns, or feedback.    If you are experiencing pain/discomfort ,or have questions after 5pm and would like to be connected to the [facility] on-call team, please call this number and specify which Sports Medicine provider is treating you: [phone]

## 2025-02-24 NOTE — PROGRESS NOTES
Internal medicine note    Test results from February 17th reviewed    Blood count, iron levels are better.  Kidney function stable 1.1.  Glucose minimally elevated but this will continue to be monitored.  PTH is elevated due to chronic kidney disease but will continue to be monitored.    My chart message sent.  Return visit 4-5 months

## 2025-02-24 NOTE — PROGRESS NOTES
"      Patient ID: Carmen Wang  YOB: 1945  MRN: [id number]    Referred By: established patient    Occupation: Retired    History of Present Illness: Carmen Wang is a 79 y.o. female who presents today with Pain of the Right Knee     History of Present Illness    HPI:  Carmen presents with left knee pain that started about a month ago after injuring it while exercising on the floor. Carmen rolled over incorrectly and heard a slight creaking sound, which is occasionally audible, followed by pain. Pain occurs only at night when trying to sleep, particularly when lying on the left side. Carmen does not feel pain during exercise or daily activities. When exercising, patient feels discomfort in a specific area of the knee, but not during regular movement. Carmen uses a heating pad for relief, which allows sleep on the right side but not on the left side without the heat. Carmen had initially scheduled an appointment two weeks ago but cancelled it as symptoms had improved at the time. This morning, patient noticed legs appeared "kind of large," with one leg slightly larger than the other. Carmen denies using any creams or gels on the knee, relying solely on the heating pad for relief.    Carmen denies pain when getting out of bed in the morning and pain with knee manipulation during the exam. Heating pad: Used at night, provides relief for sleep.    MEDICATIONS:  - Tylenol    ALLERGIES:  - Carmen cannot take Aleve or ibuprofen    WORK STATUS:  - Carmen mentions working in a small garden and doing household tasks    HISTORY:  -   - Lives with          Past Medical History:   Past Medical History:   Diagnosis Date    Cataract     Chronic diarrhea     Hypertension     IBS (irritable bowel syndrome)      Past Surgical History:   Procedure Laterality Date    APPENDECTOMY      with the hysterectomy    BUNIONECTOMY      CHOLECYSTECTOMY      COLONOSCOPY N/A 10/05/2015    " Procedure: COLONOSCOPY;  Surgeon: Teja Olivarez MD;  Location: [facility] (4TH FLR);  Service: Endoscopy;  Laterality: N/A;    COLONOSCOPY N/A 09/14/2023    Procedure: COLONOSCOPY;  Surgeon: Kailey Dahl MD;  Location: [facility];  Service: Endoscopy;  Laterality: N/A;    ESOPHAGOGASTRODUODENOSCOPY N/A 09/14/2023    Procedure: EGD (ESOPHAGOGASTRODUODENOSCOPY);  Surgeon: Kailey Dahl MD;  Location: [facility];  Service: Endoscopy;  Laterality: N/A;    HYSTERECTOMY      RUPERT secondary to pelvic pain, sacrocolpoplexy. In her 30s.    OPEN REDUCTION AND INTERNAL FIXATION (ORIF) OF FRACTURE OF DISTAL RADIUS Right 09/19/2019    Procedure: ORIF, FRACTURE, RADIUS, DISTAL - right accumed;  Surgeon: Deejay Bragg MD;  Location: [facility] OR 2ND FLR;  Service: Orthopedics;  Laterality: Right;     Family History   Problem Relation Name Age of Onset    Hypertension Mother      Arthritis Mother      Thyroid disease Mother      Glaucoma Mother      Arthritis Father      Hypertension Father      Thyroid disease Sister      Breast cancer Sister  76    Thyroid disease Sister      Thyroid disease Sister      No Known Problems Sister      No Known Problems Sister      No Known Problems Sister      Thyroid disease Brother      Diabetes Brother      Cancer Brother          prostate    Cancer Brother          prostate    No Known Problems Brother      No Known Problems Brother      No Known Problems Brother      No Known Problems Brother      Lupus Daughter Francisca     Arthritis Son Jitendra     Hypertension Son Jitendra     Hypertension Son Tremayne     Glaucoma Maternal Aunt      Glaucoma Maternal Aunt      No Known Problems Maternal Uncle      No Known Problems Paternal Aunt      No Known Problems Paternal Uncle      Stroke Maternal Grandmother      No Known Problems Maternal Grandfather      No Known Problems Paternal Grandmother      No Known Problems Paternal Grandfather      Colon cancer Neg Hx      Ovarian cancer Neg Hx       Amblyopia Neg Hx      Blindness Neg Hx      Cataracts Neg Hx      Macular degeneration Neg Hx      Retinal detachment Neg Hx      Strabismus Neg Hx      Esophageal cancer Neg Hx       Social History[1]  Medication List with Changes/Refills   Current Medications    CHOLECALCIFEROL, VITAMIN D3, (D3-2000) 2,000 UNIT CAP    Take 1 capsule (2,000 Units total) by mouth every other day.    FUROSEMIDE (LASIX) 20 MG TABLET    TAKE 1 TABLET BY MOUTH 4 DAYS  PER WEEK ON MONDAY WEDNESDAY FRIDAY AND SUNDAY AND 2 TABLETS  BY MOUTH 3 DAYS WEEKLY ON  TUESDAY THURSDAY SATURDAY    NYSTATIN-TRIAMCINOLONE (MYCOLOG II) CREAM    Apply topically 4 (four) times daily.    OLMESARTAN (BENICAR) 40 MG TABLET    Take 1 tablet (40 mg total) by mouth once daily.     Review of patient's allergies indicates:   Allergen Reactions    Codeine Hallucinations     Other reaction(s): Hallucinations    Iodinated contrast media Rash    Penicillins Hives and Rash    Contrast media Itching and Rash       Physical Exam:   There is no height or weight on file to calculate BMI.    Detailed MSK exam:     Right Knee:  Inspection: Genu valgus  Palpation tenderness: Medial and medial PF joint lines  Range of motion: 0 deg extension - 130 deg flexion  Strength:  5/5 Extension    5/5 Flexion  Stability: Stable ACL/Lachman      Stable Posterior Drawer      1+ with firm endpoint to Valgus Stress      Stable Varus Stress  Patella Exam: Negative J-sign   Negative Patellar apprehension   Negative Patellar crepitus   N/V Exam:  Tibial:    Normal sensory (plantar foot)  Normal motor (FHL)    Sup Peroneal:  Normal sensory (dorsal foot)  Normal motor (Peroneals)            Deep Peroneal:  Normal sensory (1st web space) Normal motor (EHL)    Sural:   Normal sensory (lateral foot)   Saphenous:   Normal sensory (medial lower leg)   Normal pedal pulses, warm and well perfused with capillary refill < 2 sec        Imaging:  X-ray Knee Ortho Right with Flexion (XPD)  Narrative:  EXAMINATION:  XR KNEE ORTHO RIGHT WITH FLEXION (XPD)    CLINICAL HISTORY:  Pain in right knee    TECHNIQUE:  AP standing as well as PA flexion standing and Merchant views of both knees were performed.  A lateral view of the right knee is also performed.    COMPARISON:  November 8, 2012    FINDINGS:  Negative for right knee joint effusion.  The joint spaces are fairly well maintained.  Bilateral patellar spurring noted.  Negative for fracture or dislocation.  Negative for soft tissue abnormalities, other than a small calcification medial to the left patella on the sunrise view.  Impression: As above    Electronically signed by: Jurgen Lundy MD  Date:    02/24/2025  Time:    14:31    Relevant imaging results were reviewed and interpreted by me and per my read:  Mild degenerative changes about the right knee.  Mild narrowing of the medial compartment.  Small osteophytosis of the patellofemoral compartment.  Normal alignment.  No fractures or other acute abnormalities.    This was discussed with the patient and / or family today.     Patient Instructions   Assessment:  Carmen Wang is a 79 y.o. female with a chief complaint of Pain of the Right Knee    Encounter Diagnoses   Name Primary?    Primary osteoarthritis of right knee Yes    Acute pain of right knee       Plan:  XR reviewed - mild degenerative changes about the right knee, with mild medial compartment narrowing, and small osteophytosis of the patellofemoral compartment.  Labs reviewed - Cr 1.1, GFR 51, LFTs WNL   History and clinical exam is consistent with a acute right medial knee pain due to underlying degenerative osteoarthritis, possible mild strain of the pes anserine tendons vs sprain of the MCL.  Diagnosis, treatment options, prognosis discussed today.    Recommend conservative management.    NSAIDs contraindicated, as patient has underlying renal dysfunction and h/o GERD.  Can continue Tylenol as needed.  Would recommend to use topical such as  diclofenac gel vs Biofreeze to the area, as needed.  Can also use ice vs heat as needed.  No need to alter exercise, activities, as pain does not causing any issues during the day.  No indication for injections at this time, but if night discomfort continues, may be a candidate for injections such as corticosteroid injection vs viscosupplementation injection.    Follow-up:  As needed or sooner if there are any problems between now and then.    Thank you for choosing [facility] and Dr. Jay Jay Gonzalez for your orthopedic & sports medicine care. It is our goal to provide you with exceptional care that will help keep you healthy, active, and get you back in the game.    Please do not hesitate to reach out to us via email, phone, or MyChart with any questions, concerns, or feedback.    If you are experiencing pain/discomfort ,or have questions after 5pm and would like to be connected to the [facility] on-call team, please call this number and specify which Sports Medicine provider is treating you: [phone]       A copy of today's visit note has been sent to the referring provider.           Jay Jay Gonzalez MD  Primary Care Sports Medicine    Disclaimer: This note was prepared using a voice recognition system and is likely to have sound alike errors within the text.     This note was generated with the assistance of ambient listening technology. Verbal consent was obtained by the patient and accompanying visitor(s) for the recording of patient appointment to facilitate this note. I attest to having reviewed and edited the generated note for accuracy, though some syntax or spelling errors may persist. Please contact the author of this note for any clarification.           [1]   Social History  Socioeconomic History    Marital status:    Tobacco Use    Smoking status: Never    Smokeless tobacco: Never   Substance and Sexual Activity    Alcohol use: No     Drug use: No    Sexual activity: Not Currently     Partners: Male     Birth control/protection: Surgical   Social History Narrative    N/a per the patient.      Social Drivers of Health     Financial Resource Strain: Low Risk  (2/24/2025)    Overall Financial Resource Strain (CARDIA)     Difficulty of Paying Living Expenses: Not hard at all   Food Insecurity: No Food Insecurity (2/24/2025)    Hunger Vital Sign     Worried About Running Out of Food in the Last Year: Never true     Ran Out of Food in the Last Year: Never true   Transportation Needs: No Transportation Needs (2/24/2025)    PRAPARE - Transportation     Lack of Transportation (Medical): No     Lack of Transportation (Non-Medical): No   Physical Activity: Sufficiently Active (2/24/2025)    Exercise Vital Sign     Days of Exercise per Week: 4 days     Minutes of Exercise per Session: 80 min   Stress: Stress Concern Present (2/24/2025)    [employer] - Occupational Stress Questionnaire     Feeling of Stress : To some extent   Housing Stability: Low Risk  (2/24/2025)    Housing Stability Vital Sign     Unable to Pay for Housing in the Last Year: No     Number of Times Moved in the Last Year: 0     Homeless in the Last Year: No

## 2025-03-04 ENCOUNTER — PATIENT MESSAGE (OUTPATIENT)
Dept: INTERNAL MEDICINE | Facility: CLINIC | Age: 80
End: 2025-03-04
Payer: MEDICARE

## 2025-03-08 RX ORDER — OLMESARTAN MEDOXOMIL 40 MG/1
40 TABLET ORAL DAILY
Qty: 30 TABLET | Refills: 5 | Status: SHIPPED | OUTPATIENT
Start: 2025-03-08

## 2025-03-08 NOTE — TELEPHONE ENCOUNTER
No care due was identified.  [facility] Embedded Care Due Messages. Reference number: [id number].   3/08/2025 7:57:18 AM CST

## 2025-03-30 ENCOUNTER — PATIENT MESSAGE (OUTPATIENT)
Dept: INTERNAL MEDICINE | Facility: CLINIC | Age: 80
End: 2025-03-30
Payer: MEDICARE

## 2025-03-31 ENCOUNTER — PATIENT MESSAGE (OUTPATIENT)
Dept: INTERNAL MEDICINE | Facility: CLINIC | Age: 80
End: 2025-03-31
Payer: MEDICARE

## 2025-03-31 ENCOUNTER — PATIENT MESSAGE (OUTPATIENT)
Dept: GASTROENTEROLOGY | Facility: CLINIC | Age: 80
End: 2025-03-31
Payer: MEDICARE

## 2025-03-31 NOTE — TELEPHONE ENCOUNTER
Pt transferred to me from phone staff, states that she would like a VV sooner than next week. Will send a message to staff to see if they can accommodate, pt scheduled for the soonest available appt.

## 2025-04-07 NOTE — PROGRESS NOTES
MEDICAL HISTORY:  Hypertension.  Irritable bowel syndrome, with with diarrhea  Chronic kidney disease stage III, with secondary hyperparathyroid  Tachycardia  Iron deficiency anemia  Multinodular thyroid gland, on ultrasound  Cholecystectomy.  Hysterectomy.  Bladder suspension.  Lumbar degenerative disk disease.  Gluteal muscular tear.  Right foot surgery.  Resection of lipoma from the groin.  Bout of postcholecystectomy diarrhea.  Prior history of shingles.  Hearing impairment.  Aorta atherosclerosis on imaging     SOCIAL HISTORY:  Tobacco and alcohol use - none.  Exercises by doing a treadmill and elliptical.         MEDICATIONS:  Fergon  Olmesartan 40 mg  Vitamin-D 2000 units every other day  Lasix 20 mg, 1 tablet daily Sunday Monday Wednesday Friday and 2 tablets daily Tuesday Thursday Saturday        Answers submitted by the patient for this visit:  Review of Systems Questionnaire (Submitted on 4/7/2025)  activity change: No  unexpected weight change: No  neck pain: No  hearing loss: No  rhinorrhea: No  trouble swallowing: No  eye discharge: No  visual disturbance: No  chest tightness: No  wheezing: No  chest pain: No  palpitations: No  blood in stool: No  constipation: No  vomiting: Yes  diarrhea: No  polydipsia: No  polyuria: No  difficulty urinating: No  hematuria: No  menstrual problem: No  dysuria: No  joint swelling: No  arthralgias: No  headaches: No  weakness: No  confusion: No  dysphoric mood: No      The patient location is: home  The chief complaint leading to consultation is:  Syncope  Visit type: audiovisual    Face to Face time with patient:  35 minutes  Forty minutes of total time spent on the encounter, which includes face to face time and non-face to face time preparing to see the patient (eg, review of tests), Obtaining and/or reviewing separately obtained history, Documenting clinical information in the electronic or other health record, Independently interpreting results (not separately  reported) and communicating results to the patient/family/caregiver, or Care coordination (not separately reported).         Each patient to whom he or she provides medical services by telemedicine is:  (1) informed of the relationship between the physician and patient and the respective role of any other health care provider with respect to management of the patient; and (2) notified that he or she may decline to receive medical services by telemedicine and may withdraw from such care at any time.    Notes:      79-year-old female   On March 28th had observe syncopal spell.  She was in the blood mobile bus, donating 1 unit of blood.  She did not eat anything that has morning other than crackers in the morning.    After the procedure, she sat down, then it was noted by others that has she passed out possibly for couple of sec.  Upon wakening she had vomitus.  There  no jerking back and forth motion.      She was not aware of any symptoms prior to this other than the the hearing sounds the distance.    After coming to.  She felt fine and went home.    Reportedly after procedure she that has told of the blood pressure being low.  She that has not aware of the heart rate    Prior to the procedure she was told of the blood pressure being fine.    Through the digital hypertension medicine program systolic blood pressures that has been in the 130s    Presently she feels well  no chest pain or palpitations.    She had a similar situation year 2023 that has prompted referral to the hospital, emergency room.  Extensive cardiac testing was feeling.    Medical history includes hypertension, chronic kidney disease stage 3 a/B in his history of iron deficient anemia    Impression   syncopal spell, possibly due to vasovagal reaction with hypotension.  Chronic kidney disease stage 3  Iron deficiency anemia    Plan   Labs of CBC chemistry.  CT of the head  Referral to Cardiology determine if any further investigation as  needed  Maintain physical activity, proper hydration

## 2025-04-08 ENCOUNTER — OFFICE VISIT (OUTPATIENT)
Dept: INTERNAL MEDICINE | Facility: CLINIC | Age: 80
End: 2025-04-08
Payer: MEDICARE

## 2025-04-08 DIAGNOSIS — R55 SYNCOPE, UNSPECIFIED SYNCOPE TYPE: Primary | ICD-10-CM

## 2025-04-08 DIAGNOSIS — D50.0 IRON DEFICIENCY ANEMIA DUE TO CHRONIC BLOOD LOSS: ICD-10-CM

## 2025-04-08 DIAGNOSIS — I10 ESSENTIAL HYPERTENSION: ICD-10-CM

## 2025-04-08 DIAGNOSIS — R55 SYNCOPE AND COLLAPSE: ICD-10-CM

## 2025-04-08 DIAGNOSIS — N18.31 STAGE 3A CHRONIC KIDNEY DISEASE: ICD-10-CM

## 2025-04-08 PROCEDURE — 1159F MED LIST DOCD IN RCRD: CPT | Mod: CPTII,95,, | Performed by: INTERNAL MEDICINE

## 2025-04-08 PROCEDURE — 1157F ADVNC CARE PLAN IN RCRD: CPT | Mod: CPTII,95,, | Performed by: INTERNAL MEDICINE

## 2025-04-08 PROCEDURE — 1160F RVW MEDS BY RX/DR IN RCRD: CPT | Mod: CPTII,95,, | Performed by: INTERNAL MEDICINE

## 2025-04-08 PROCEDURE — 98006 SYNCH AUDIO-VIDEO EST MOD 30: CPT | Mod: 95,,, | Performed by: INTERNAL MEDICINE

## 2025-04-14 ENCOUNTER — LAB VISIT (OUTPATIENT)
Dept: LAB | Facility: HOSPITAL | Age: 80
End: 2025-04-14
Attending: INTERNAL MEDICINE
Payer: MEDICARE

## 2025-04-14 DIAGNOSIS — R55 SYNCOPE, UNSPECIFIED SYNCOPE TYPE: ICD-10-CM

## 2025-04-14 DIAGNOSIS — R55 SYNCOPE AND COLLAPSE: ICD-10-CM

## 2025-04-14 DIAGNOSIS — D50.0 IRON DEFICIENCY ANEMIA DUE TO CHRONIC BLOOD LOSS: ICD-10-CM

## 2025-04-14 DIAGNOSIS — I10 ESSENTIAL HYPERTENSION: ICD-10-CM

## 2025-04-14 DIAGNOSIS — N18.31 STAGE 3A CHRONIC KIDNEY DISEASE: ICD-10-CM

## 2025-04-14 PROCEDURE — 36415 COLL VENOUS BLD VENIPUNCTURE: CPT | Mod: PN

## 2025-04-14 PROCEDURE — 85025 COMPLETE CBC W/AUTO DIFF WBC: CPT

## 2025-04-14 PROCEDURE — 80069 RENAL FUNCTION PANEL: CPT

## 2025-04-14 PROCEDURE — 84466 ASSAY OF TRANSFERRIN: CPT

## 2025-04-14 PROCEDURE — 82728 ASSAY OF FERRITIN: CPT

## 2025-04-15 LAB
ABSOLUTE EOSINOPHIL (OHS): 0.32 K/UL
ABSOLUTE MONOCYTE (OHS): 1.48 K/UL (ref 0.3–1)
ABSOLUTE NEUTROPHIL COUNT (OHS): 3.63 K/UL (ref 1.8–7.7)
ALBUMIN SERPL BCP-MCNC: 3.3 G/DL (ref 3.5–5.2)
ANION GAP (OHS): 11 MMOL/L (ref 8–16)
BASOPHILS # BLD AUTO: 0.05 K/UL
BASOPHILS NFR BLD AUTO: 0.6 %
BUN SERPL-MCNC: 17 MG/DL (ref 8–23)
CALCIUM SERPL-MCNC: 10.2 MG/DL (ref 8.7–10.5)
CHLORIDE SERPL-SCNC: 101 MMOL/L (ref 95–110)
CO2 SERPL-SCNC: 25 MMOL/L (ref 23–29)
CREAT SERPL-MCNC: 1.2 MG/DL (ref 0.5–1.4)
ERYTHROCYTE [DISTWIDTH] IN BLOOD BY AUTOMATED COUNT: 17.4 % (ref 11.5–14.5)
FERRITIN SERPL-MCNC: 65 NG/ML (ref 20–300)
GFR SERPLBLD CREATININE-BSD FMLA CKD-EPI: 46 ML/MIN/1.73/M2
GLUCOSE SERPL-MCNC: 64 MG/DL (ref 70–110)
HCT VFR BLD AUTO: 39.4 % (ref 37–48.5)
HGB BLD-MCNC: 12 GM/DL (ref 12–16)
IMM GRANULOCYTES # BLD AUTO: 0.02 K/UL (ref 0–0.04)
IMM GRANULOCYTES NFR BLD AUTO: 0.3 % (ref 0–0.5)
IRON SATN MFR SERPL: 30 % (ref 20–50)
IRON SERPL-MCNC: 128 UG/DL (ref 30–160)
LYMPHOCYTES # BLD AUTO: 2.22 K/UL (ref 1–4.8)
MCH RBC QN AUTO: 25.6 PG (ref 27–31)
MCHC RBC AUTO-ENTMCNC: 30.5 G/DL (ref 32–36)
MCV RBC AUTO: 84 FL (ref 82–98)
NUCLEATED RBC (/100WBC) (OHS): 0 /100 WBC
PHOSPHATE SERPL-MCNC: 2.6 MG/DL (ref 2.7–4.5)
PLATELET # BLD AUTO: 262 K/UL (ref 150–450)
PMV BLD AUTO: 12.5 FL (ref 9.2–12.9)
POTASSIUM SERPL-SCNC: 3.9 MMOL/L (ref 3.5–5.1)
RBC # BLD AUTO: 4.69 M/UL (ref 4–5.4)
RELATIVE EOSINOPHIL (OHS): 4.1 %
RELATIVE LYMPHOCYTE (OHS): 28.8 % (ref 18–48)
RELATIVE MONOCYTE (OHS): 19.2 % (ref 4–15)
RELATIVE NEUTROPHIL (OHS): 47 % (ref 38–73)
SODIUM SERPL-SCNC: 137 MMOL/L (ref 136–145)
TIBC SERPL-MCNC: 431 UG/DL (ref 250–450)
TRANSFERRIN SERPL-MCNC: 291 MG/DL (ref 200–375)
WBC # BLD AUTO: 7.72 K/UL (ref 3.9–12.7)

## 2025-04-16 DIAGNOSIS — N18.31 STAGE 3A CHRONIC KIDNEY DISEASE: Primary | ICD-10-CM

## 2025-04-24 ENCOUNTER — OFFICE VISIT (OUTPATIENT)
Dept: INTERNAL MEDICINE | Facility: CLINIC | Age: 80
End: 2025-04-24
Payer: MEDICARE

## 2025-04-24 ENCOUNTER — PATIENT MESSAGE (OUTPATIENT)
Dept: INTERNAL MEDICINE | Facility: CLINIC | Age: 80
End: 2025-04-24
Payer: MEDICARE

## 2025-04-24 ENCOUNTER — HOSPITAL ENCOUNTER (OUTPATIENT)
Dept: RADIOLOGY | Facility: HOSPITAL | Age: 80
Discharge: HOME | End: 2025-04-24
Attending: INTERNAL MEDICINE
Payer: MEDICARE

## 2025-04-24 VITALS
WEIGHT: 187 LBS | HEART RATE: 82 BPM | SYSTOLIC BLOOD PRESSURE: 176 MMHG | DIASTOLIC BLOOD PRESSURE: 64 MMHG | BODY MASS INDEX: 30.05 KG/M2 | HEIGHT: 66 IN | OXYGEN SATURATION: 97 % | TEMPERATURE: 98 F

## 2025-04-24 DIAGNOSIS — J45.31 MILD PERSISTENT REACTIVE AIRWAY DISEASE WITH ACUTE EXACERBATION: ICD-10-CM

## 2025-04-24 DIAGNOSIS — R55 SYNCOPE, UNSPECIFIED SYNCOPE TYPE: ICD-10-CM

## 2025-04-24 DIAGNOSIS — R55 SYNCOPE AND COLLAPSE: ICD-10-CM

## 2025-04-24 DIAGNOSIS — J21.9 ACUTE BRONCHITIS AND BRONCHIOLITIS: Primary | ICD-10-CM

## 2025-04-24 DIAGNOSIS — J20.9 ACUTE BRONCHITIS AND BRONCHIOLITIS: Primary | ICD-10-CM

## 2025-04-24 DIAGNOSIS — N18.31 STAGE 3A CHRONIC KIDNEY DISEASE: ICD-10-CM

## 2025-04-24 DIAGNOSIS — I10 ESSENTIAL HYPERTENSION: ICD-10-CM

## 2025-04-24 DIAGNOSIS — J21.9 ACUTE BRONCHITIS AND BRONCHIOLITIS: ICD-10-CM

## 2025-04-24 DIAGNOSIS — D50.0 IRON DEFICIENCY ANEMIA DUE TO CHRONIC BLOOD LOSS: ICD-10-CM

## 2025-04-24 DIAGNOSIS — J20.9 ACUTE BRONCHITIS AND BRONCHIOLITIS: ICD-10-CM

## 2025-04-24 PROCEDURE — 71046 X-RAY EXAM CHEST 2 VIEWS: CPT | Mod: 26,,, | Performed by: RADIOLOGY

## 2025-04-24 PROCEDURE — 70450 CT HEAD/BRAIN W/O DYE: CPT | Mod: 26,,, | Performed by: RADIOLOGY

## 2025-04-24 PROCEDURE — 70450 CT HEAD/BRAIN W/O DYE: CPT | Mod: TC

## 2025-04-24 PROCEDURE — 71046 X-RAY EXAM CHEST 2 VIEWS: CPT | Mod: TC,FY,PO

## 2025-04-24 PROCEDURE — 99999 PR PBB SHADOW E&M-EST. PATIENT-LVL III: CPT | Mod: PBBFAC,,, | Performed by: INTERNAL MEDICINE

## 2025-04-24 RX ORDER — DOXYCYCLINE 100 MG/1
100 CAPSULE ORAL EVERY 12 HOURS
Qty: 20 CAPSULE | Refills: 0 | Status: SHIPPED | OUTPATIENT
Start: 2025-04-24 | End: 2025-05-04

## 2025-04-24 RX ORDER — ALBUTEROL SULFATE 90 UG/1
2 INHALANT RESPIRATORY (INHALATION) EVERY 6 HOURS PRN
Qty: 16 G | Refills: 0 | Status: SHIPPED | OUTPATIENT
Start: 2025-04-24 | End: 2025-05-08

## 2025-04-24 RX ORDER — METHYLPREDNISOLONE ACETATE 40 MG/ML
40 INJECTION, SUSPENSION INTRA-ARTICULAR; INTRALESIONAL; INTRAMUSCULAR; SOFT TISSUE
Status: COMPLETED | OUTPATIENT
Start: 2025-04-24 | End: 2025-04-24

## 2025-04-24 RX ORDER — MONTELUKAST SODIUM 10 MG/1
10 TABLET ORAL NIGHTLY
Qty: 30 TABLET | Refills: 0 | Status: SHIPPED | OUTPATIENT
Start: 2025-04-24 | End: 2025-05-24

## 2025-04-24 RX ADMIN — METHYLPREDNISOLONE ACETATE 40 MG: 40 INJECTION, SUSPENSION INTRA-ARTICULAR; INTRALESIONAL; INTRAMUSCULAR; SOFT TISSUE at 04:04

## 2025-04-25 ENCOUNTER — RESULTS FOLLOW-UP (OUTPATIENT)
Dept: INTERNAL MEDICINE | Facility: CLINIC | Age: 80
End: 2025-04-25
Payer: MEDICARE

## 2025-04-25 NOTE — PROGRESS NOTES
Chief Complaint  Chief Complaint   Patient presents with    Cough        HPI  Carmen Wang is a 79 y.o. female here today for cough x 2 weeks. 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 She has been nursing this cough the past 2 weeks and cannot get rid of it with OTC medications. She denied any fevr, chills, SOB but the paroxysms of cough makes her winded with moderate exertion. She is now cough up scanty green mucus starting this week. Cough wakes her up at night. She has tried TheraFlu, Coricidin HBP and Mucinex. She denied any headaches, neck pain, palpitations or chest pains. Her BP is better at home and at other clinic visits.     PHQ-2 (Reflex to PHQ-9) Interpretation:  Over the last two weeks how often have you been bothered by little interest or pleasure in doing things: 0  Over the last two weeks how often have you been bothered by feeling down, depressed or hopeless: 0  PHQ-2 Total Score: 0       Review of patient's allergies indicates:   Allergen Reactions    Codeine Hallucinations     Other reaction(s): Hallucinations    Iodinated contrast media Rash    Penicillins Hives and Rash    Contrast media Itching and Rash       MEDS:  Current Outpatient Medications   Medication Instructions    albuterol (PROVENTIL HFA) 90 mcg/actuation inhaler 2 puffs, Inhalation, Every 6 hours PRN, Rescue    cholecalciferol (vitamin D3) (D3-2000) 2,000 Units, Oral, Every other day    doxycycline (VIBRAMYCIN) 100 mg, Oral, Every 12 hours    furosemide (LASIX) 20 MG tablet TAKE 1 TABLET BY MOUTH 4 DAYS  PER WEEK ON MONDAY WEDNESDAY FRIDAY AND SUNDAY AND 2 TABLETS  BY MOUTH 3 DAYS WEEKLY ON  TUESDAY THURSDAY SATURDAY    montelukast (SINGULAIR) 10 mg, Oral, Nightly    olmesartan (BENICAR) 40 mg, Oral, Daily        Past Medical History:   Diagnosis Date    Cataract     Chronic diarrhea     Hypertension     IBS  (irritable bowel syndrome)        Past Surgical History:   Procedure Laterality Date    APPENDECTOMY      with the hysterectomy    BUNIONECTOMY      CHOLECYSTECTOMY      COLONOSCOPY N/A 10/05/2015    Procedure: COLONOSCOPY;  Surgeon: Teja Olivarez MD;  Location: [facility] (4TH FLR);  Service: Endoscopy;  Laterality: N/A;    COLONOSCOPY N/A 09/14/2023    Procedure: COLONOSCOPY;  Surgeon: Kailey Dahl MD;  Location: [facility] ENDO;  Service: Endoscopy;  Laterality: N/A;    ESOPHAGOGASTRODUODENOSCOPY N/A 09/14/2023    Procedure: EGD (ESOPHAGOGASTRODUODENOSCOPY);  Surgeon: Kailey Dahl MD;  Location: [facility];  Service: Endoscopy;  Laterality: N/A;    HYSTERECTOMY      RUPERT secondary to pelvic pain, sacrocolpoplexy. In her 30s.    OPEN REDUCTION AND INTERNAL FIXATION (ORIF) OF FRACTURE OF DISTAL RADIUS Right 09/19/2019    Procedure: ORIF, FRACTURE, RADIUS, DISTAL - right accumed;  Surgeon: Deejay Bragg MD;  [facility];  Service: Orthopedics;  Laterality: Right;       Family History   Problem Relation Name Age of Onset    Hypertension Mother      Arthritis Mother      Thyroid disease Mother      Glaucoma Mother      Arthritis Father      Hypertension Father      Thyroid disease Sister      Breast cancer Sister  76    Thyroid disease Sister      Thyroid disease Sister      No Known Problems Sister      No Known Problems Sister      No Known Problems Sister      Thyroid disease Brother      Diabetes Brother      Cancer Brother          prostate    Cancer Brother          prostate    No Known Problems Brother      No Known Problems Brother      No Known Problems Brother      No Known Problems Brother      Lupus Daughter Francisca     Arthritis Son Jitendra     Hypertension Son Jitendra     Hypertension Son Tremayne     Glaucoma Maternal Aunt      Glaucoma Maternal Aunt      No Known Problems Maternal Uncle      No Known Problems Paternal Aunt      No Known Problems Paternal Uncle      Stroke  "Maternal Grandmother      No Known Problems Maternal Grandfather      No Known Problems Paternal Grandmother      No Known Problems Paternal Grandfather      Colon cancer Neg Hx      Ovarian cancer Neg Hx      Amblyopia Neg Hx      Blindness Neg Hx      Cataracts Neg Hx      Macular degeneration Neg Hx      Retinal detachment Neg Hx      Strabismus Neg Hx      Esophageal cancer Neg Hx         Social History     Substance and Sexual Activity   Drug Use No       Review of Systems   Review of Systems   Constitutional:  Positive for malaise/fatigue. Negative for chills and fever.   HENT:  Positive for congestion. Negative for ear pain, hearing loss and sinus pain.    Respiratory:  Positive for cough. Negative for hemoptysis, sputum production, shortness of breath, wheezing and stridor.    Cardiovascular:  Negative for chest pain, palpitations and orthopnea.       Objective   Vitals:    04/24/25 1528   BP: (!) 176/64   Pulse: 82   Temp: 97.8 °F (36.6 °C)   SpO2: 97%   Weight: 84.8 kg (187 lb)   Height: 5' 6" (1.676 m)       Body mass index is 30.18 kg/m².    Physical Exam  Vitals and nursing note reviewed.   Constitutional:       General: She is awake. She is not in acute distress.  HENT:      Head: Normocephalic and atraumatic.      Right Ear: Hearing and tympanic membrane normal.      Left Ear: Hearing and tympanic membrane normal.      Nose: Congestion and rhinorrhea present.      Right Sinus: No maxillary sinus tenderness or frontal sinus tenderness.      Left Sinus: No maxillary sinus tenderness or frontal sinus tenderness.   Neck:      Thyroid: No thyromegaly.      Vascular: No JVD.      Trachea: Trachea normal.   Cardiovascular:      Rate and Rhythm: Normal rate and regular rhythm.      Pulses:           Radial pulses are 2+ on the right side and 2+ on the left side.   Pulmonary:      Effort: Pulmonary effort is normal.      Breath sounds: Examination of the right-middle field reveals wheezing. Examination of the " left-middle field reveals wheezing. Wheezing present.   Abdominal:      General: Bowel sounds are normal.      Palpations: Abdomen is soft.   Musculoskeletal:         General: No swelling, tenderness or deformity.      Cervical back: Neck supple. No rigidity or tenderness.      Right lower leg: No edema.      Left lower leg: No edema.   Lymphadenopathy:      Cervical: No cervical adenopathy.   Neurological:      General: No focal deficit present.      Mental Status: She is alert and oriented to person, place, and time.      Gait: Gait is intact.   Psychiatric:         Attention and Perception: Attention normal.         Mood and Affect: Mood normal.         Speech: Speech normal.         Behavior: Behavior normal. Behavior is cooperative.         Thought Content: Thought content normal.         Recent labs:   No results found for this or any previous visit (from the past 24 hours).    Assessment / Plan  Carmen was seen today for cough.    Diagnoses and all orders for this visit:    Acute bronchitis and bronchiolitis  -     X-Ray Chest PA And Lateral; Future  -     methylPREDNISolone acetate injection 40 mg  -     doxycycline (VIBRAMYCIN) 100 MG Cap; Take 1 capsule (100 mg total) by mouth every 12 (twelve) hours. for 10 days  -     albuterol (PROVENTIL HFA) 90 mcg/actuation inhaler; Inhale 2 puffs into the lungs every 6 (six) hours as needed for Wheezing. Rescue  -     montelukast (SINGULAIR) 10 mg tablet; Take 1 tablet (10 mg total) by mouth every evening.  RTC 4 weeks with PCP. Plan as above. Plenty of PO fluids and rest.     Mild persistent reactive airway disease with acute exacerbation  -     X-Ray Chest PA And Lateral; Future  -     methylPREDNISolone acetate injection 40 mg  -     doxycycline (VIBRAMYCIN) 100 MG Cap; Take 1 capsule (100 mg total) by mouth every 12 (twelve) hours. for 10 days  -     albuterol (PROVENTIL HFA) 90 mcg/actuation inhaler; Inhale 2 puffs into the lungs every 6 (six) hours as needed  for Wheezing. Rescue  -     montelukast (SINGULAIR) 10 mg tablet; Take 1 tablet (10 mg total) by mouth every evening.   Counseling and reassurance offered.    Environmental control.     MD counseled patient and answered questions.     Follow up in about 4 weeks (around 5/22/2025).

## 2025-04-30 ENCOUNTER — LAB VISIT (OUTPATIENT)
Dept: LAB | Facility: HOSPITAL | Age: 80
End: 2025-04-30
Attending: INTERNAL MEDICINE
Payer: MEDICARE

## 2025-04-30 DIAGNOSIS — N18.31 STAGE 3A CHRONIC KIDNEY DISEASE: ICD-10-CM

## 2025-04-30 LAB
ALBUMIN SERPL BCP-MCNC: 3.3 G/DL (ref 3.5–5.2)
ANION GAP (OHS): 8 MMOL/L (ref 8–16)
BUN SERPL-MCNC: 20 MG/DL (ref 8–23)
CALCIUM SERPL-MCNC: 9.8 MG/DL (ref 8.7–10.5)
CHLORIDE SERPL-SCNC: 106 MMOL/L (ref 95–110)
CO2 SERPL-SCNC: 27 MMOL/L (ref 23–29)
CREAT SERPL-MCNC: 1 MG/DL (ref 0.5–1.4)
CREAT UR-MCNC: 122 MG/DL (ref 15–325)
GFR SERPLBLD CREATININE-BSD FMLA CKD-EPI: 58 ML/MIN/1.73/M2
GLUCOSE SERPL-MCNC: 87 MG/DL (ref 70–110)
HCT VFR BLD AUTO: 38.9 % (ref 37–48.5)
HGB BLD-MCNC: 12.4 GM/DL (ref 12–16)
PHOSPHATE SERPL-MCNC: 3.4 MG/DL (ref 2.7–4.5)
POTASSIUM SERPL-SCNC: 4.1 MMOL/L (ref 3.5–5.1)
PROT UR-MCNC: 10 MG/DL
PROT/CREAT UR: 0.08 MG/G{CREAT}
PTH-INTACT SERPL-MCNC: 86 PG/ML (ref 9–77)
SODIUM SERPL-SCNC: 141 MMOL/L (ref 136–145)

## 2025-04-30 PROCEDURE — 85018 HEMOGLOBIN: CPT

## 2025-04-30 PROCEDURE — 84156 ASSAY OF PROTEIN URINE: CPT

## 2025-04-30 PROCEDURE — 36415 COLL VENOUS BLD VENIPUNCTURE: CPT | Mod: PN

## 2025-04-30 PROCEDURE — 85014 HEMATOCRIT: CPT

## 2025-04-30 PROCEDURE — 83970 ASSAY OF PARATHORMONE: CPT

## 2025-04-30 PROCEDURE — 84520 ASSAY OF UREA NITROGEN: CPT

## 2025-05-01 ENCOUNTER — PATIENT MESSAGE (OUTPATIENT)
Dept: INTERNAL MEDICINE | Facility: CLINIC | Age: 80
End: 2025-05-01
Payer: MEDICARE

## 2025-05-01 ENCOUNTER — OFFICE VISIT (OUTPATIENT)
Dept: NEPHROLOGY | Facility: CLINIC | Age: 80
End: 2025-05-01
Payer: MEDICARE

## 2025-05-01 DIAGNOSIS — I10 ESSENTIAL HYPERTENSION: ICD-10-CM

## 2025-05-01 DIAGNOSIS — N18.31 STAGE 3A CHRONIC KIDNEY DISEASE: Primary | ICD-10-CM

## 2025-05-01 NOTE — PROGRESS NOTES
HISTORY OF PRESENT ILLNESS:  This is a 79-year-old -American female with   longstanding history of hypertension, irritable bowel syndrome and CKD stage   III, who is coming in for f/u.  The patient states her blood pressures   are fairly stable in the 130's-140's/70's and lower at times Slightly higher earlier in the month when she was taking cold medicine.  No recent hospitalizations.  Denies NSAID's.     PAST MEDICAL HISTORY:  Significant for hypertension for five years, CKD stage   III, IBS.    REVIEW OF SYSTEMS:  She denies any frequency, urgency, hematuria, dysuria,   nausea, vomiting, chest pain or shortness of breath, diahrrea, N/V. Good energy.  Weight stable and good apetite.     PHYSICAL EXAMINATION:none  GENERAL:  Well-nourished, well-developed individual, in no acute distress.  EXTREMITIES:  Trace edema in the ankles  PSYCHIATRIC:  Alert and oriented x3.  The patient has good judgment and insight.    ASSESSMENT AND PLAN:  This is a 79-year-old -American female with   longstanding history of hypertension and chronic kidney disease stage III, who   is coming in for f/u.  1.  Chronic kidney disease stage III with an MDRD GFR of 58 mL per minute.  Her   baseline creatinine appears to be around 1.1 to 1.3 and occasionally lower and   higher with the most recent creatinine of 1.0.    Hydrate. Her CKD is likely secondary to   longstanding hypertension and age-related nephron loss.    I advised her to stay away from NSAIDs and to hydrate well.  She understands the importance of hydration.  On  olmesartan  for nephro-protection.   Triamterene and hydrochlorothiazide was held by abi for hypercalcemia and will be reassessed-?primary hyperparathyroidism.   Aldosterone , renin levels stable. Potassium on the lower side and occ alkalosis.   2.  Hypertension.  Blood pressures are fairly stable in the 130's-140's/70's and lower at times.  Slightly higher earlier in the month when she was taking cold  medicine.  She will monitor blood pressures-in dig med program. No proteinuria.  3.  Renal osteodystrophy.   intact PTH stable.  Ca borderline high/phos stable.  Her calciums are borderline high and is not taking calcium supplements. PTH improved. Triamterene and hydrochlorothiazide was held by endo for hypercalcemia -?primary hyperparathyroidism although ca and PTH stable now.   SPEP stable. Followed in endo .   4.  Anemia.  Hemoglobin is stable.on iron.  She also had a  ultrasound, which was stable with a simple cyst.    RTC in 6 months.    The patient location is: home  The chief complaint leading to consultation is: ckd 3    Visit type: audiovisual    Face to Face time with patient: 12   minutes of total time spent on the encounter, which includes face to face time and non-face to face time preparing to see the patient (eg, review of tests), Obtaining and/or reviewing separately obtained history, Documenting clinical information in the electronic or other health record, Independently interpreting results (not separately reported) and communicating results to the patient/family/caregiver, or Care coordination (not separately reported).         Each patient to whom he or she provides medical services by telemedicine is:  (1) informed of the relationship between the physician and patient and the respective role of any other health care provider with respect to management of the patient; and (2) notified that he or she may decline to receive medical services by telemedicine and may withdraw from such care at any time.    Notes:      Visit today included increased complexity associated with the care of the episodic problem ckd 3 addressed and managing the longitudinal care of the patient due to the serious and/or complex managed problem(s) .

## 2025-05-28 ENCOUNTER — PATIENT MESSAGE (OUTPATIENT)
Dept: INTERNAL MEDICINE | Facility: CLINIC | Age: 80
End: 2025-05-28
Payer: MEDICARE

## 2025-05-29 NOTE — TELEPHONE ENCOUNTER
LOV with Deniz Redman MD , 4/8/2025; had CT scan of head performed, no active orders for CT found    6/6 pt has a US soft tissue head and neck scheduled that as ordered by Dr. Barrett, [id number]/2024

## 2025-06-06 ENCOUNTER — HOSPITAL ENCOUNTER (OUTPATIENT)
Dept: RADIOLOGY | Facility: HOSPITAL | Age: 80
Discharge: HOME | End: 2025-06-06
Attending: INTERNAL MEDICINE
Payer: MEDICARE

## 2025-06-06 DIAGNOSIS — E04.2 MULTINODULAR THYROID: ICD-10-CM

## 2025-06-06 PROCEDURE — 76536 US EXAM OF HEAD AND NECK: CPT | Mod: TC

## 2025-06-06 PROCEDURE — 76536 US EXAM OF HEAD AND NECK: CPT | Mod: 26,,, | Performed by: RADIOLOGY

## 2025-06-09 ENCOUNTER — TELEPHONE (OUTPATIENT)
Dept: ENDOCRINOLOGY | Facility: CLINIC | Age: 80
End: 2025-06-09
Payer: MEDICARE

## 2025-06-09 NOTE — TELEPHONE ENCOUNTER
Patient is due for appointment November, 2025 and needs to call back to be schedule with dr cervantes on July 1st

## 2025-06-11 ENCOUNTER — RESULTS FOLLOW-UP (OUTPATIENT)
Dept: ENDOCRINOLOGY | Facility: CLINIC | Age: 80
End: 2025-06-11

## 2025-06-17 ENCOUNTER — PATIENT OUTREACH (OUTPATIENT)
Dept: ADMINISTRATIVE | Facility: HOSPITAL | Age: 80
End: 2025-06-17
Payer: MEDICARE

## 2025-06-17 NOTE — PROGRESS NOTES
Chart reviewed and updated. Reconciled immunizations.. digital med checked for updated blood pressure. BP not in range     Anita Layton LPN   Clinical Care Coordinator  [facility]ness

## 2025-06-19 ENCOUNTER — OFFICE VISIT (OUTPATIENT)
Dept: OBSTETRICS AND GYNECOLOGY | Facility: CLINIC | Age: 80
End: 2025-06-19
Payer: MEDICARE

## 2025-06-19 VITALS
HEIGHT: 66 IN | DIASTOLIC BLOOD PRESSURE: 84 MMHG | WEIGHT: 189.06 LBS | SYSTOLIC BLOOD PRESSURE: 136 MMHG | BODY MASS INDEX: 30.39 KG/M2

## 2025-06-19 DIAGNOSIS — Z90.710 S/P HYSTERECTOMY: ICD-10-CM

## 2025-06-19 DIAGNOSIS — Z01.419 ENCOUNTER FOR GYNECOLOGICAL EXAMINATION WITHOUT ABNORMAL FINDING: Primary | ICD-10-CM

## 2025-06-19 DIAGNOSIS — Z78.0 POSTMENOPAUSAL: ICD-10-CM

## 2025-06-19 DIAGNOSIS — L30.9 DERMATITIS: ICD-10-CM

## 2025-06-19 DIAGNOSIS — N89.8 VAGINAL ITCHING: ICD-10-CM

## 2025-06-19 DIAGNOSIS — N90.7 EPIDERMAL CYST OF VULVA: ICD-10-CM

## 2025-06-19 PROCEDURE — 99999 PR PBB SHADOW E&M-EST. PATIENT-LVL III: CPT | Mod: PBBFAC,,, | Performed by: NURSE PRACTITIONER

## 2025-06-19 NOTE — PROGRESS NOTES
CC: Well woman exam    Carmen Wang is a 80 y.o. female  presents for a well woman exam and has external vaginal for itching that comes and goes   Nystatin with triamcinolone helps some     Health Maintenance Topics with due status: Not Due       Topic Last Completion Date    TETANUS VACCINE 2016    Lipid Panel 2025    DEXA Scan 2025      Past Medical History:   Diagnosis Date    Cataract     Chronic diarrhea     Hypertension     IBS (irritable bowel syndrome)      Past Surgical History:   Procedure Laterality Date    APPENDECTOMY      with the hysterectomy    BUNIONECTOMY      CHOLECYSTECTOMY      COLONOSCOPY N/A 10/05/2015    Procedure: COLONOSCOPY;  Surgeon: Teja Olivarez MD;  Location: [facility] (4TH FLR);  Service: Endoscopy;  Laterality: N/A;    COLONOSCOPY N/A 2023    Procedure: COLONOSCOPY;  Surgeon: Kailey Dahl MD;  Location: [facility];  Service: Endoscopy;  Laterality: N/A;    ESOPHAGOGASTRODUODENOSCOPY N/A 2023    Procedure: EGD (ESOPHAGOGASTRODUODENOSCOPY);  Surgeon: Kailey Dahl MD;  Location: [facility];  Service: Endoscopy;  Laterality: N/A;    HYSTERECTOMY      RUPERT secondary to pelvic pain, sacrocolpoplexy. In her 30s.    OPEN REDUCTION AND INTERNAL FIXATION (ORIF) OF FRACTURE OF DISTAL RADIUS Right 2019    Procedure: ORIF, FRACTURE, RADIUS, DISTAL - right accumed;  Surgeon: Deejay Bragg MD;  [facility];  Service: Orthopedics;  Laterality: Right;     Family History   Problem Relation Name Age of Onset    Hypertension Mother      Arthritis Mother      Thyroid disease Mother      Glaucoma Mother      Arthritis Father      Hypertension Father      Thyroid disease Sister      Breast cancer Sister  76    Thyroid disease Sister      Thyroid disease Sister      No Known Problems Sister      No Known Problems Sister      No Known Problems Sister      Thyroid disease Brother      Diabetes Brother      Cancer Brother         "  prostate    Cancer Brother          prostate    No Known Problems Brother      No Known Problems Brother      No Known Problems Brother      No Known Problems Brother      Lupus Daughter Francisca     Arthritis Son Jitendra     Hypertension Son Jitendra     Hypertension Son Tremayne     Glaucoma Maternal Aunt      Glaucoma Maternal Aunt      No Known Problems Maternal Uncle      No Known Problems Paternal Aunt      No Known Problems Paternal Uncle      Stroke Maternal Grandmother      No Known Problems Maternal Grandfather      No Known Problems Paternal Grandmother      No Known Problems Paternal Grandfather      Colon cancer Neg Hx      Ovarian cancer Neg Hx      Amblyopia Neg Hx      Blindness Neg Hx      Cataracts Neg Hx      Macular degeneration Neg Hx      Retinal detachment Neg Hx      Strabismus Neg Hx      Esophageal cancer Neg Hx       Social History[1]  OB History          4    Para   4    Term   4            AB        Living   3         SAB        IAB        Ectopic        Multiple        Live Births   4                 /84 (BP Location: Left arm, Patient Position: Sitting)   Ht 5' 6" (1.676 m)   Wt 85.7 kg (189 lb 0.7 oz)   LMP 1982 (Approximate)   BMI 30.51 kg/m²     ROS:  Per hpi    PE:   APPEARANCE: Well nourished, well developed, in no acute distress.  AFFECT: WNL, alert and oriented x 3.  SKIN: No acne or hirsutism.  NECK: Neck symmetric without masses or thyromegaly.  NODES: No inguinal, cervical, axillary or femoral lymph node enlargement.  CHEST: Good respiratory effort.   ABDOMEN: Soft. No tenderness or masses. No hepatosplenomegaly. No hernias.  BREASTS: Symmetrical, no skin changes or visible lesions. No palpable masses, nipple discharge bilaterally.  PELVIC: Normal external female genitalia without lesions pt area of itching is to left mid groin and supra pubic area - there is a patch of dermatitis that when skin folds together is very symmetrical and has small " epidermal cyst noted to left lower labia. Normal hair distribution. Adequate perineal body, normal urethral meatus. Vagina atrophic without lesions or discharge. No significant cystocele or rectocele. Bimanual exam shows uterus and cervix to be surgically absent. Adnexa without masses or tenderness.  Pt has numerous seborrheic keratosis changes to skin     Physical Exam:               Genitourinary:                            1. Encounter for gynecological examination without abnormal finding        2. Vaginal itching        3. Postmenopausal        4. S/P hysterectomy        5. Dermatitis  Ambulatory referral/consult to Dermatology      6. Epidermal cyst of vulva         and PLAN:    Carmen was seen today for vaginitis.    Diagnoses and all orders for this visit:    Encounter for gynecological examination without abnormal finding    Vaginal itching    Postmenopausal    S/P hysterectomy    Dermatitis  -     Ambulatory referral/consult to Dermatology; Future    Epidermal cyst of vulva     Try sarna lotion with out menthol and follow up with derm to rule out eczema     Patient was counseled today on A.C.S. Pap guidelines and recommendations for yearly pelvic exams, mammograms and monthly self breast exams; to see her PCP for other health maintenance.                       Answers submitted by the patient for this visit:  Gynecologic Exam Questionnaire  (Submitted on 6/19/2025)  Chief Complaint: Gynecologic exam  genital itching: No  genital lesions: No  genital odor: No  genital rash: No  missed menses: No  pelvic pain: No  vaginal bleeding: No  vaginal discharge: No  Chronicity: chronic  Onset: more than 1 month ago  Frequency: daily  Progression since onset: unchanged  Affected side: both  Pregnant now?: No  abdominal pain: No  anorexia: No  back pain: No  chills: No  constipation: No  diarrhea: No  discolored urine: No  dysuria: No  fever: No  flank pain: No  frequency: No  headaches: No  hematuria: No  nausea:  No  painful intercourse: No  rash: Yes  urgency: No  vomiting: No  Vaginal bleeding: no bleeding  Passing clots?: No  Passing tissue?: No  Aggravated by: nothing  treatments tried: antibiotics  Improvement on treatment: mild  Sexual activity: not sexually active  Partner with STD symptoms: no  Menstrual history: postmenopausal  STD: No  abdominal surgery: No   section: No  Ectopic pregnancy: No  Endometriosis: No  herpes simplex: No  gynecological surgery: Yes  menorrhagia: No  metrorrhagia: No  miscarriage: No  ovarian cysts: No  perineal abscess: No  PID: No  terminated pregnancy: No  vaginosis: No         [1]   Social History  Tobacco Use    Smoking status: Never     Passive exposure: Never    Smokeless tobacco: Never   Substance Use Topics    Alcohol use: No    Drug use: No

## 2025-07-07 ENCOUNTER — PATIENT OUTREACH (OUTPATIENT)
Dept: ADMINISTRATIVE | Facility: HOSPITAL | Age: 80
End: 2025-07-07
Payer: MEDICARE

## 2025-07-07 VITALS — DIASTOLIC BLOOD PRESSURE: 84 MMHG | SYSTOLIC BLOOD PRESSURE: 136 MMHG

## 2025-07-21 ENCOUNTER — LAB VISIT (OUTPATIENT)
Dept: LAB | Facility: HOSPITAL | Age: 80
End: 2025-07-21
Attending: INTERNAL MEDICINE
Payer: MEDICARE

## 2025-07-21 DIAGNOSIS — D50.0 IRON DEFICIENCY ANEMIA DUE TO CHRONIC BLOOD LOSS: ICD-10-CM

## 2025-07-21 DIAGNOSIS — I10 ESSENTIAL HYPERTENSION: ICD-10-CM

## 2025-07-21 DIAGNOSIS — N25.81 SECONDARY HYPERPARATHYROIDISM OF RENAL ORIGIN: ICD-10-CM

## 2025-07-21 DIAGNOSIS — R73.9 HYPERGLYCEMIA: ICD-10-CM

## 2025-07-21 DIAGNOSIS — N18.31 STAGE 3A CHRONIC KIDNEY DISEASE: ICD-10-CM

## 2025-07-21 LAB
ABSOLUTE EOSINOPHIL (OHS): 0.33 K/UL
ABSOLUTE MONOCYTE (OHS): 0.8 K/UL (ref 0.3–1)
ABSOLUTE NEUTROPHIL COUNT (OHS): 2.5 K/UL (ref 1.8–7.7)
ALBUMIN SERPL BCP-MCNC: 3.6 G/DL (ref 3.5–5.2)
ANION GAP (OHS): 6 MMOL/L (ref 8–16)
BASOPHILS # BLD AUTO: 0.04 K/UL
BASOPHILS NFR BLD AUTO: 0.6 %
BUN SERPL-MCNC: 13 MG/DL (ref 8–23)
CALCIUM SERPL-MCNC: 9.8 MG/DL (ref 8.7–10.5)
CHLORIDE SERPL-SCNC: 105 MMOL/L (ref 95–110)
CHOLEST SERPL-MCNC: 180 MG/DL (ref 120–199)
CHOLEST/HDLC SERPL: 4 {RATIO} (ref 2–5)
CO2 SERPL-SCNC: 28 MMOL/L (ref 23–29)
CREAT SERPL-MCNC: 1.1 MG/DL (ref 0.5–1.4)
EAG (OHS): 105 MG/DL (ref 68–131)
ERYTHROCYTE [DISTWIDTH] IN BLOOD BY AUTOMATED COUNT: 15.3 % (ref 11.5–14.5)
FERRITIN SERPL-MCNC: 86 NG/ML (ref 20–300)
GFR SERPLBLD CREATININE-BSD FMLA CKD-EPI: 51 ML/MIN/1.73/M2
GLUCOSE SERPL-MCNC: 85 MG/DL (ref 70–110)
HBA1C MFR BLD: 5.3 % (ref 4–5.6)
HCT VFR BLD AUTO: 39.2 % (ref 37–48.5)
HDLC SERPL-MCNC: 45 MG/DL (ref 40–75)
HDLC SERPL: 25 % (ref 20–50)
HGB BLD-MCNC: 12.1 GM/DL (ref 12–16)
IMM GRANULOCYTES # BLD AUTO: 0.01 K/UL (ref 0–0.04)
IMM GRANULOCYTES NFR BLD AUTO: 0.2 % (ref 0–0.5)
IRON SATN MFR SERPL: 31 % (ref 20–50)
IRON SERPL-MCNC: 129 UG/DL (ref 30–160)
LDLC SERPL CALC-MCNC: 118.8 MG/DL (ref 63–159)
LYMPHOCYTES # BLD AUTO: 2.77 K/UL (ref 1–4.8)
MCH RBC QN AUTO: 26.4 PG (ref 27–31)
MCHC RBC AUTO-ENTMCNC: 30.9 G/DL (ref 32–36)
MCV RBC AUTO: 85 FL (ref 82–98)
NONHDLC SERPL-MCNC: 135 MG/DL
NUCLEATED RBC (/100WBC) (OHS): 0 /100 WBC
PHOSPHATE SERPL-MCNC: 3 MG/DL (ref 2.7–4.5)
PLATELET # BLD AUTO: 268 K/UL (ref 150–450)
PMV BLD AUTO: 12 FL (ref 9.2–12.9)
POTASSIUM SERPL-SCNC: 3.9 MMOL/L (ref 3.5–5.1)
PTH-INTACT SERPL-MCNC: 116.8 PG/ML (ref 9–77)
RBC # BLD AUTO: 4.59 M/UL (ref 4–5.4)
RELATIVE EOSINOPHIL (OHS): 5.1 %
RELATIVE LYMPHOCYTE (OHS): 42.9 % (ref 18–48)
RELATIVE MONOCYTE (OHS): 12.4 % (ref 4–15)
RELATIVE NEUTROPHIL (OHS): 38.8 % (ref 38–73)
SODIUM SERPL-SCNC: 139 MMOL/L (ref 136–145)
TIBC SERPL-MCNC: 420 UG/DL (ref 250–450)
TRANSFERRIN SERPL-MCNC: 284 MG/DL (ref 200–375)
TRIGL SERPL-MCNC: 81 MG/DL (ref 30–150)
WBC # BLD AUTO: 6.45 K/UL (ref 3.9–12.7)

## 2025-07-21 PROCEDURE — 83540 ASSAY OF IRON: CPT

## 2025-07-21 PROCEDURE — 82040 ASSAY OF SERUM ALBUMIN: CPT

## 2025-07-21 PROCEDURE — 83970 ASSAY OF PARATHORMONE: CPT

## 2025-07-21 PROCEDURE — 82728 ASSAY OF FERRITIN: CPT

## 2025-07-21 PROCEDURE — 83036 HEMOGLOBIN GLYCOSYLATED A1C: CPT

## 2025-07-21 PROCEDURE — 82465 ASSAY BLD/SERUM CHOLESTEROL: CPT

## 2025-07-21 PROCEDURE — 85025 COMPLETE CBC W/AUTO DIFF WBC: CPT

## 2025-07-21 PROCEDURE — 36415 COLL VENOUS BLD VENIPUNCTURE: CPT | Mod: PN

## 2025-07-22 ENCOUNTER — TELEPHONE (OUTPATIENT)
Dept: INTERNAL MEDICINE | Facility: CLINIC | Age: 80
End: 2025-07-22
Payer: MEDICARE

## 2025-07-22 NOTE — PROGRESS NOTES
MEDICAL HISTORY:  Hypertension.  Irritable bowel syndrome, with with diarrhea  Chronic kidney disease stage III, with secondary hyperparathyroid  Tachycardia  Iron deficiency anemia  Multinodular thyroid gland, on ultrasound  Cholecystectomy.  Hysterectomy.  Bladder suspension.  Lumbar degenerative disk disease.  Gluteal muscular tear.  Right foot surgery.  Resection of lipoma from the groin.  Bout of postcholecystectomy diarrhea.  Prior history of shingles.  Hearing impairment.  Aorta atherosclerosis on imaging     SOCIAL HISTORY:  Tobacco and alcohol use - none.  Exercises by doing a treadmill and elliptical.         MEDICATIONS:  Fergon  Olmesartan 40 mg  Vitamin-D 2000 units every other day  Lasix 20 mg, 1 tablet daily Sunday Monday Wednesday Friday and 2 tablets daily Tuesday Thursday Saturday        Component      Latest Ref Rng 7/21/2025   WBC      3.90 - 12.70 K/uL 6.45    RBC      4.00 - 5.40 M/uL 4.59    Hemoglobin      12.0 - 16.0 gm/dL 12.1    Hematocrit      37.0 - 48.5 % 39.2    MCV      82 - 98 fL 85    MCH      27.0 - 31.0 pg 26.4 (L)    MCHC      32.0 - 36.0 g/dL 30.9 (L)    RDW      11.5 - 14.5 % 15.3 (H)    Platelet Count      150 - 450 K/uL 268    MPV      9.2 - 12.9 fL 12.0    nRBC      <=0 /100 WBC 0    Neut %      38 - 73 % 38.8    Lymph %      18 - 48 % 42.9    Mono %      4 - 15 % 12.4    Eos %      <=8 % 5.1    Basophil %      <=1.9 % 0.6    Immature Granulocytes      0.0 - 0.5 % 0.2    Gran # (ANC)      1.8 - 7.7 K/uL 2.50    Lymph #      1 - 4.8 K/uL 2.77    Mono #      0.3 - 1 K/uL 0.80    Eos #      <=0.5 K/uL 0.33    Baso #      <=0.2 K/uL 0.04    Immature Grans (Abs)      0.00 - 0.04 K/uL 0.01    Sodium      136 - 145 mmol/L 139    Potassium      3.5 - 5.1 mmol/L 3.9    Chloride      95 - 110 mmol/L 105    CO2      23 - 29 mmol/L 28    Glucose      70 - 110 mg/dL 85    BUN      8 - 23 mg/dL 13    Creatinine      0.5 - 1.4 mg/dL 1.1    Calcium      8.7 - 10.5 mg/dL 9.8    Albumin       3.5 - 5.2 g/dL 3.6    Phosphorus Level      2.7 - 4.5 mg/dL 3.0    Anion Gap      8 - 16 mmol/L 6 (L)    eGFR      >60 mL/min/1.73/m2 51 (L)    Cholesterol Total      120 - 199 mg/dL 180    Triglycerides      30 - 150 mg/dL 81    HDL      40 - 75 mg/dL 45    LDL Cholesterol      63.0 - 159.0 mg/dL 118.8    HDL/Cholesterol Ratio      20.0 - 50.0 % 25.0    Total Cholesterol/HDL Ratio      2.0 - 5.0  4.0    Non-HDL Cholesterol      mg/dL 135    Iron      30 - 160 ug/dL 129    Transferrin      200 - 375 mg/dL 284    TIBC      250 - 450 ug/dL 420    Iron Saturation      20 - 50 % 31    Hemoglobin A1C External      4.0 - 5.6 % 5.3    Estimated Avg Glucose      68 - 131 mg/dL 105    Ferritin      20.0 - 300.0 ng/mL 86.0    PTH      9.0 - 77.0 pg/mL 116.8 (H)       Legend:  (L) Low  (H) High      80-year-old female   Here with the son   Presents for follow-up visit    She has upcoming appointment with orthopedics regarding left shoulder pain particularly when she holds it up at a certain position.    Recently followed up with Nephrology regarding chronic kidney disease.  Status is stable.  Advised the promote adequate hydration of course to avoid anti-inflammatory medicine    Recently had a follow-up bone density scan which was normal.  This was done because the secondary hyperparathyroid.  Also follow-up thyroid ultrasound shows stable thyroid nodules, for which no fine-needle aspirate was indicated    Review of symptoms   Reports no pains in the chest palpitations shortness for breath    presently no abdominal pain unless IBS flares.  Regular bowel function.  Continues to take iron supplementation.  No difficulty urinating no incontinence.  No indigestion heartburn    Examination   Weight 188   Pulse 76   Blood pressure by me 136/76  Neck no thyromegaly no palpable nodules.    There is a very small palpable lump midline which is not within the skin.  I feel this is as part of the hyaloid bone  Chest clear breath sounds    Heart regular rate and rhythm   Abdominal exam is bowel sounds soft nontender no hepatosplenomegaly abdominal masses  Pulses 2+ carotid pulses no bruits 2+ dorsalis pedal pulses  Extremities, trace edema    Impression   Hypertension   Chronic kidney disease stage IIIA , stable  Secondary hyperparathyroid, stable  Multinodular thyroid gland stable  Left shoulder pain      Return visit 6 months  Continue with attention a proper diet physical activity.  Continue current medication including iron supplementation  As discussed maintain proper hydration, to fullness and nonsteroidal anti-inflammatory medicine

## 2025-07-23 ENCOUNTER — OFFICE VISIT (OUTPATIENT)
Dept: INTERNAL MEDICINE | Facility: CLINIC | Age: 80
End: 2025-07-23
Payer: MEDICARE

## 2025-07-23 VITALS
DIASTOLIC BLOOD PRESSURE: 88 MMHG | WEIGHT: 188.06 LBS | HEIGHT: 66 IN | SYSTOLIC BLOOD PRESSURE: 150 MMHG | HEART RATE: 76 BPM | BODY MASS INDEX: 30.22 KG/M2 | OXYGEN SATURATION: 99 %

## 2025-07-23 DIAGNOSIS — N25.81 SECONDARY HYPERPARATHYROIDISM OF RENAL ORIGIN: ICD-10-CM

## 2025-07-23 DIAGNOSIS — I10 PRIMARY HYPERTENSION: Primary | ICD-10-CM

## 2025-07-23 DIAGNOSIS — M25.512 CHRONIC LEFT SHOULDER PAIN: ICD-10-CM

## 2025-07-23 DIAGNOSIS — N18.31 STAGE 3A CHRONIC KIDNEY DISEASE: ICD-10-CM

## 2025-07-23 DIAGNOSIS — G89.29 CHRONIC LEFT SHOULDER PAIN: ICD-10-CM

## 2025-07-23 PROCEDURE — 1126F AMNT PAIN NOTED NONE PRSNT: CPT | Mod: CPTII,S$GLB,, | Performed by: INTERNAL MEDICINE

## 2025-07-23 PROCEDURE — 99999 PR PBB SHADOW E&M-EST. PATIENT-LVL III: CPT | Mod: PBBFAC,,, | Performed by: INTERNAL MEDICINE

## 2025-07-23 PROCEDURE — 3077F SYST BP >= 140 MM HG: CPT | Mod: CPTII,S$GLB,, | Performed by: INTERNAL MEDICINE

## 2025-07-23 PROCEDURE — 3288F FALL RISK ASSESSMENT DOCD: CPT | Mod: CPTII,S$GLB,, | Performed by: INTERNAL MEDICINE

## 2025-07-23 PROCEDURE — 1160F RVW MEDS BY RX/DR IN RCRD: CPT | Mod: CPTII,S$GLB,, | Performed by: INTERNAL MEDICINE

## 2025-07-23 PROCEDURE — 1159F MED LIST DOCD IN RCRD: CPT | Mod: CPTII,S$GLB,, | Performed by: INTERNAL MEDICINE

## 2025-07-23 PROCEDURE — 3079F DIAST BP 80-89 MM HG: CPT | Mod: CPTII,S$GLB,, | Performed by: INTERNAL MEDICINE

## 2025-07-23 PROCEDURE — 99214 OFFICE O/P EST MOD 30 MIN: CPT | Mod: S$GLB,,, | Performed by: INTERNAL MEDICINE

## 2025-07-23 PROCEDURE — 1101F PT FALLS ASSESS-DOCD LE1/YR: CPT | Mod: CPTII,S$GLB,, | Performed by: INTERNAL MEDICINE

## 2025-07-23 PROCEDURE — 1157F ADVNC CARE PLAN IN RCRD: CPT | Mod: CPTII,S$GLB,, | Performed by: INTERNAL MEDICINE

## 2025-07-23 RX ORDER — FERROUS SULFATE 325(65) MG
325 TABLET ORAL DAILY
COMMUNITY

## 2025-08-13 DIAGNOSIS — M25.512 LEFT SHOULDER PAIN, UNSPECIFIED CHRONICITY: Primary | ICD-10-CM

## 2025-08-18 ENCOUNTER — OFFICE VISIT (OUTPATIENT)
Dept: SPORTS MEDICINE | Facility: CLINIC | Age: 80
End: 2025-08-18
Payer: MEDICARE

## 2025-08-18 ENCOUNTER — HOSPITAL ENCOUNTER (OUTPATIENT)
Dept: RADIOLOGY | Facility: HOSPITAL | Age: 80
Discharge: HOME | End: 2025-08-18
Attending: STUDENT IN AN ORGANIZED HEALTH CARE EDUCATION/TRAINING PROGRAM
Payer: MEDICARE

## 2025-08-18 DIAGNOSIS — G89.29 CHRONIC PAIN IN LEFT SHOULDER: ICD-10-CM

## 2025-08-18 DIAGNOSIS — M25.512 LEFT SHOULDER PAIN, UNSPECIFIED CHRONICITY: ICD-10-CM

## 2025-08-18 DIAGNOSIS — M75.42 IMPINGEMENT SYNDROME OF LEFT SHOULDER: Primary | ICD-10-CM

## 2025-08-18 DIAGNOSIS — N18.31 STAGE 3A CHRONIC KIDNEY DISEASE: ICD-10-CM

## 2025-08-18 DIAGNOSIS — M25.512 CHRONIC PAIN IN LEFT SHOULDER: ICD-10-CM

## 2025-08-18 PROCEDURE — 99214 OFFICE O/P EST MOD 30 MIN: CPT | Mod: 25,S$GLB,, | Performed by: STUDENT IN AN ORGANIZED HEALTH CARE EDUCATION/TRAINING PROGRAM

## 2025-08-18 PROCEDURE — 73030 X-RAY EXAM OF SHOULDER: CPT | Mod: TC,LT

## 2025-08-18 PROCEDURE — 1159F MED LIST DOCD IN RCRD: CPT | Mod: CPTII,S$GLB,, | Performed by: STUDENT IN AN ORGANIZED HEALTH CARE EDUCATION/TRAINING PROGRAM

## 2025-08-18 PROCEDURE — 1101F PT FALLS ASSESS-DOCD LE1/YR: CPT | Mod: CPTII,S$GLB,, | Performed by: STUDENT IN AN ORGANIZED HEALTH CARE EDUCATION/TRAINING PROGRAM

## 2025-08-18 PROCEDURE — 1157F ADVNC CARE PLAN IN RCRD: CPT | Mod: CPTII,S$GLB,, | Performed by: STUDENT IN AN ORGANIZED HEALTH CARE EDUCATION/TRAINING PROGRAM

## 2025-08-18 PROCEDURE — 1160F RVW MEDS BY RX/DR IN RCRD: CPT | Mod: CPTII,S$GLB,, | Performed by: STUDENT IN AN ORGANIZED HEALTH CARE EDUCATION/TRAINING PROGRAM

## 2025-08-18 PROCEDURE — 20611 DRAIN/INJ JOINT/BURSA W/US: CPT | Mod: LT,S$GLB,, | Performed by: STUDENT IN AN ORGANIZED HEALTH CARE EDUCATION/TRAINING PROGRAM

## 2025-08-18 PROCEDURE — 3288F FALL RISK ASSESSMENT DOCD: CPT | Mod: CPTII,S$GLB,, | Performed by: STUDENT IN AN ORGANIZED HEALTH CARE EDUCATION/TRAINING PROGRAM

## 2025-08-18 PROCEDURE — 99999 PR PBB SHADOW E&M-EST. PATIENT-LVL III: CPT | Mod: PBBFAC,,, | Performed by: STUDENT IN AN ORGANIZED HEALTH CARE EDUCATION/TRAINING PROGRAM

## 2025-08-18 PROCEDURE — 73030 X-RAY EXAM OF SHOULDER: CPT | Mod: 26,LT,, | Performed by: STUDENT IN AN ORGANIZED HEALTH CARE EDUCATION/TRAINING PROGRAM

## 2025-08-18 PROCEDURE — 1125F AMNT PAIN NOTED PAIN PRSNT: CPT | Mod: CPTII,S$GLB,, | Performed by: STUDENT IN AN ORGANIZED HEALTH CARE EDUCATION/TRAINING PROGRAM

## 2025-08-18 RX ORDER — BETAMETHASONE SODIUM PHOSPHATE AND BETAMETHASONE ACETATE 3; 3 MG/ML; MG/ML
6 INJECTION, SUSPENSION INTRA-ARTICULAR; INTRALESIONAL; INTRAMUSCULAR; SOFT TISSUE
Status: DISCONTINUED | OUTPATIENT
Start: 2025-08-18 | End: 2025-08-18 | Stop reason: HOSPADM

## 2025-08-18 RX ADMIN — BETAMETHASONE SODIUM PHOSPHATE AND BETAMETHASONE ACETATE 6 MG: 3; 3 INJECTION, SUSPENSION INTRA-ARTICULAR; INTRALESIONAL; INTRAMUSCULAR; SOFT TISSUE at 10:08

## 2025-09-03 ENCOUNTER — PATIENT MESSAGE (OUTPATIENT)
Dept: INTERNAL MEDICINE | Facility: CLINIC | Age: 80
End: 2025-09-03
Payer: MEDICARE

## 2025-09-03 RX ORDER — OLMESARTAN MEDOXOMIL 40 MG/1
40 TABLET ORAL DAILY
Qty: 30 TABLET | Refills: 5 | Status: SHIPPED | OUTPATIENT
Start: 2025-09-03

## (undated) DEVICE — SCREW BNE LOK HEXLB 3.5X12
Type: IMPLANTABLE DEVICE | Site: RADIUS | Status: NON-FUNCTIONAL
Removed: 2019-09-19

## (undated) DEVICE — PAD CAST SPECIALIST STRL 4

## (undated) DEVICE — STOCKINET 4INX48

## (undated) DEVICE — DRESSING XEROFORM FOIL PK 1X8

## (undated) DEVICE — DRAPE C-ARM MINI DISP

## (undated) DEVICE — DRESSING XEROFORM 1X8IN

## (undated) DEVICE — PADDING CAST 4IN SPECIALIST

## (undated) DEVICE — DRAPE PLASTIC U 60X72

## (undated) DEVICE — SEE MEDLINE ITEM 152622

## (undated) DEVICE — DRILL QUICK RELEASE 2.8MM 5IN

## (undated) DEVICE — SEE MEDLINE ITEM 152515

## (undated) DEVICE — SEE MEDLINE ITEM 157117

## (undated) DEVICE — BIT DRILL QUICK RELEASE 2.0MM

## (undated) DEVICE — SEE MEDLINE ITEM 146313

## (undated) DEVICE — SCREW BONE NLHEXALOBE 3.5 X 18
Type: IMPLANTABLE DEVICE | Site: RADIUS | Status: NON-FUNCTIONAL
Removed: 2019-09-19

## (undated) DEVICE — BIT DRILL QUICK RELEASE 2.8MM

## (undated) DEVICE — DRAPE STERI-DRAPE 1000 17X11IN

## (undated) DEVICE — GOWN SMARTGOWN 3XL XLONG

## (undated) DEVICE — GAUZE SPONGE 4'X4 12 PLY

## (undated) DEVICE — GOWN IMPERVIOUS BLUE XXL

## (undated) DEVICE — GUIDEWIRE ORTHO .054 X 6 IN
Type: IMPLANTABLE DEVICE | Site: RADIUS | Status: NON-FUNCTIONAL
Removed: 2019-09-19

## (undated) DEVICE — Device

## (undated) DEVICE — IMMOBILIZER SHOULDER RAINBOW L

## (undated) DEVICE — SPLINT PLASTER F.S 4INX15IN

## (undated) DEVICE — GAUZE SPONGE 4X4 12PLY

## (undated) DEVICE — SCREW BNE N LOK HEXLB 3.5X12
Type: IMPLANTABLE DEVICE | Site: RADIUS | Status: NON-FUNCTIONAL
Removed: 2019-09-19

## (undated) DEVICE — TOURNIQUET SB QC DP 18X4IN